# Patient Record
Sex: MALE | Race: WHITE | NOT HISPANIC OR LATINO | Employment: FULL TIME | URBAN - METROPOLITAN AREA
[De-identification: names, ages, dates, MRNs, and addresses within clinical notes are randomized per-mention and may not be internally consistent; named-entity substitution may affect disease eponyms.]

---

## 2018-03-14 ENCOUNTER — TRANSCRIBE ORDERS (OUTPATIENT)
Dept: ADMINISTRATIVE | Facility: HOSPITAL | Age: 58
End: 2018-03-14

## 2018-03-14 DIAGNOSIS — K21.9 GASTROESOPHAGEAL REFLUX DISEASE, ESOPHAGITIS PRESENCE NOT SPECIFIED: Primary | ICD-10-CM

## 2018-05-25 ENCOUNTER — TRANSCRIBE ORDERS (OUTPATIENT)
Dept: ADMINISTRATIVE | Facility: HOSPITAL | Age: 58
End: 2018-05-25

## 2018-05-25 DIAGNOSIS — C85.90 LYMPHOMA, UNSPECIFIED BODY REGION, UNSPECIFIED LYMPHOMA TYPE (HCC): Primary | ICD-10-CM

## 2018-05-26 ENCOUNTER — HOSPITAL ENCOUNTER (OUTPATIENT)
Dept: CT IMAGING | Facility: HOSPITAL | Age: 58
Discharge: HOME | End: 2018-05-26
Payer: COMMERCIAL

## 2018-05-26 DIAGNOSIS — C85.90 LYMPHOMA, UNSPECIFIED BODY REGION, UNSPECIFIED LYMPHOMA TYPE (HCC): ICD-10-CM

## 2018-05-26 PROCEDURE — 74177 CT ABD & PELVIS W/CONTRAST: CPT

## 2018-05-26 PROCEDURE — 71260 CT THORAX DX C+: CPT

## 2018-05-26 RX ADMIN — IOHEXOL 100 ML: 350 INJECTION, SOLUTION INTRAVENOUS at 12:45

## 2018-05-30 ENCOUNTER — HOSPITAL ENCOUNTER (OUTPATIENT)
Dept: RADIOLOGY | Age: 58
Discharge: HOME | End: 2018-05-30
Payer: COMMERCIAL

## 2018-05-30 VITALS — WEIGHT: 241 LBS

## 2018-05-30 DIAGNOSIS — D38.1 NEOPLASM OF UNCERTAIN BEHAVIOR OF TRACHEA, BRONCHUS, AND LUNG: ICD-10-CM

## 2018-05-30 LAB — GLUCOSE SERPL-MCNC: 90 MG/DL (ref 65–140)

## 2018-05-30 PROCEDURE — 78815 PET IMAGE W/CT SKULL-THIGH: CPT

## 2018-05-30 PROCEDURE — 82948 REAGENT STRIP/BLOOD GLUCOSE: CPT

## 2018-05-30 PROCEDURE — A9552 F18 FDG: HCPCS

## 2018-05-30 RX ADMIN — IOHEXOL 5 ML: 240 INJECTION, SOLUTION INTRATHECAL; INTRAVASCULAR; INTRAVENOUS; ORAL at 13:40

## 2018-06-13 ENCOUNTER — HOSPITAL ENCOUNTER (OUTPATIENT)
Facility: HOSPITAL | Age: 58
Setting detail: OUTPATIENT SURGERY
Discharge: HOME | End: 2018-06-13
Attending: INTERNAL MEDICINE | Admitting: INTERNAL MEDICINE
Payer: COMMERCIAL

## 2018-06-13 ENCOUNTER — ANESTHESIA EVENT (OUTPATIENT)
Dept: GASTROENTEROLOGY | Facility: HOSPITAL | Age: 58
End: 2018-06-13
Payer: COMMERCIAL

## 2018-06-13 ENCOUNTER — ANESTHESIA (OUTPATIENT)
Dept: GASTROENTEROLOGY | Facility: HOSPITAL | Age: 58
End: 2018-06-13
Payer: COMMERCIAL

## 2018-06-13 VITALS
TEMPERATURE: 98.2 F | DIASTOLIC BLOOD PRESSURE: 69 MMHG | HEIGHT: 70 IN | WEIGHT: 245 LBS | HEART RATE: 68 BPM | RESPIRATION RATE: 18 BRPM | OXYGEN SATURATION: 99 % | SYSTOLIC BLOOD PRESSURE: 113 MMHG | BODY MASS INDEX: 35.07 KG/M2

## 2018-06-13 DIAGNOSIS — C85.19 CUTANEOUS B-CELL LYMPHOMA (HCC): ICD-10-CM

## 2018-06-13 PROCEDURE — 88305 TISSUE EXAM BY PATHOLOGIST: CPT | Performed by: PATHOLOGY

## 2018-06-13 PROCEDURE — 88342 IMHCHEM/IMCYTCHM 1ST ANTB: CPT | Performed by: PATHOLOGY

## 2018-06-13 PROCEDURE — 87077 CULTURE AEROBIC IDENTIFY: CPT | Performed by: INTERNAL MEDICINE

## 2018-06-13 PROCEDURE — 88313 SPECIAL STAINS GROUP 2: CPT | Performed by: PATHOLOGY

## 2018-06-13 PROCEDURE — 88363 XM ARCHIVE TISSUE MOLEC ANAL: CPT | Performed by: PATHOLOGY

## 2018-06-13 RX ORDER — ROSUVASTATIN CALCIUM 5 MG/1
5 TABLET, COATED ORAL DAILY
COMMUNITY
End: 2021-01-01 | Stop reason: ALTCHOICE

## 2018-06-13 RX ORDER — MELATONIN
500 DAILY
COMMUNITY
End: 2022-01-01 | Stop reason: HOSPADM

## 2018-06-13 RX ORDER — SODIUM CHLORIDE 9 MG/ML
INJECTION, SOLUTION INTRAVENOUS CONTINUOUS PRN
Status: DISCONTINUED | OUTPATIENT
Start: 2018-06-13 | End: 2018-06-13 | Stop reason: SURG

## 2018-06-13 RX ORDER — TADALAFIL 5 MG/1
5 TABLET ORAL
COMMUNITY
End: 2021-01-01

## 2018-06-13 RX ORDER — FENTANYL CITRATE 50 UG/ML
INJECTION, SOLUTION INTRAMUSCULAR; INTRAVENOUS AS NEEDED
Status: DISCONTINUED | OUTPATIENT
Start: 2018-06-13 | End: 2018-06-13 | Stop reason: SURG

## 2018-06-13 RX ORDER — OMEPRAZOLE 40 MG/1
40 CAPSULE, DELAYED RELEASE ORAL DAILY
COMMUNITY
End: 2019-09-03 | Stop reason: ALTCHOICE

## 2018-06-13 RX ORDER — PROPOFOL 10 MG/ML
INJECTION, EMULSION INTRAVENOUS AS NEEDED
Status: DISCONTINUED | OUTPATIENT
Start: 2018-06-13 | End: 2018-06-13 | Stop reason: SURG

## 2018-06-13 RX ORDER — PROPOFOL 10 MG/ML
INJECTION, EMULSION INTRAVENOUS CONTINUOUS PRN
Status: DISCONTINUED | OUTPATIENT
Start: 2018-06-13 | End: 2018-06-13 | Stop reason: SURG

## 2018-06-13 RX ADMIN — PROPOFOL 80 MG: 10 INJECTION, EMULSION INTRAVENOUS at 14:11

## 2018-06-13 RX ADMIN — PROPOFOL 100 MCG/KG/MIN: 10 INJECTION, EMULSION INTRAVENOUS at 14:15

## 2018-06-13 RX ADMIN — FENTANYL CITRATE 50 MCG: 50 INJECTION, SOLUTION INTRAMUSCULAR; INTRAVENOUS at 14:06

## 2018-06-13 RX ADMIN — PROPOFOL 40 MG: 10 INJECTION, EMULSION INTRAVENOUS at 14:12

## 2018-06-13 RX ADMIN — PROPOFOL 40 MG: 10 INJECTION, EMULSION INTRAVENOUS at 14:14

## 2018-06-13 RX ADMIN — PROPOFOL 40 MG: 10 INJECTION, EMULSION INTRAVENOUS at 14:21

## 2018-06-13 RX ADMIN — SODIUM CHLORIDE: 0.9 INJECTION, SOLUTION INTRAVENOUS at 14:06

## 2018-06-13 NOTE — OP NOTE
**** GI/ENDOSCOPY REPORT ****     PATIENT NAME: CASSI AGUILAR - VISIT ID:  Patient ID: [id number]   YOB: 1960     INTRODUCTION: Upper Endoscopic [ULTRASOUND] - A 61 male patient presents   for an outpatient Upper Endoscopic [ULTRASOUND] at [address] [place] [address]  INDICATIONS: Gastric MALT lymphoma  CONSENT: The benefits, risks, and alternatives to the procedure were   discussed and informed consent was obtained from the patient  PREPARATION:  EKG, pulse, pulse oximetry and blood pressure were monitored   throughout the procedure  MEDICATIONS: Anesthesia-check records     PROCEDURE:  The endoscope was passed through the mouth under direct   visualization and advanced to the 3rd portion of the duodenum  The scope   was withdrawn and the mucosa was carefully examined  The views were   excellent  The patient's toleration of the procedure was excellent  FINDINGS: Visual Exam:   Esophagus: The esophagus appeared to be normal     Stomach: There was a smooth Marginal Zone Lymphoma per prior biopsy, that   measured 2 cm in size, in the fundus  Multiple biopsies was successfully   taken  The specimen was sent for MALT 1 gene to [facility]  Otherwise, the   stomach appeared to be normal  Two cold forceps biopsies was taken  The   specimens were collected for tj test  Two cold forceps biopsies was   taken  The specimens were collected for pathology (jar 1)  Two cold   forceps biopsies was taken  The specimens were collected for pathology   (jar 2)  Duodenum: The duodenum appeared to be normal  EUS EXAM: The   fundic mass was not readily seen on the EUS (tangential views, small   size)  Possible 2 1 x 0 8 cm mass in the fundus extending into the   sumbmucosa  The pancreas appeared to be normal  There was no evidence of   lymph node/adenopathy  COMPLICATIONS: There were no complications  IMPRESSIONS: Normal esophagus  A tumor found in the fundus   Multiple biopsies taken  Normal duodenum  The fundic mass was not readily seen on   the EUS (tangential views, small size)  Possible 2 1 x 0 8 cm mass in the   fundus extending into the sumbmucosa  Normal pancreas  RECOMMENDATIONS: Call Dr Javier Lopez [phone] with questions or   problems  Follow with Dr Zoila Porter  ESTIMATED BLOOD LOSS:     PATHOLOGY SPECIMENS: Multiple biopsies taken  Associated finding: Tumor  Two cold forceps biopsies taken for tj test  Two cold forceps biopsies   taken for pathology (jar 1)  Two cold forceps biopsies taken for pathology   (jar 2)  PROCEDURE CODES:     ICD-9 Codes: 239 0 Neoplasm of unspecified nature of digestive system     ICD-10 Codes: Q82 7 Neoplasm of uncertain behavior of stomach     PERFORMED BY: PADMA Orr  on 06/13/2018  Version 1, electronically signed by PADMA Orr  on   06/13/2018 at 15:06

## 2018-06-13 NOTE — H&P
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  Tumor biopsies were negative for H pylori  REVIEW OF SYSTEMS: Per the HPI, and otherwise unremarkable  Historical Information   Past Medical History:   Diagnosis Date    Cancer [facility])     Stomach    Thoracic aortic aneurysm ([address] )      Past Surgical History:   Procedure Laterality Date    APPENDECTOMY      HERNIA REPAIR      B/L hernia repair    KNEE ARTHROSCOPY, MEDIAL PATELLO FEMORAL LIGAMENT REPAIR Left      Social History   History   Alcohol Use    Yes     Comment: 2 drinks/month     History   Drug Use No     History   Smoking Status    Never Smoker   Smokeless Tobacco    Never Used     History reviewed  No pertinent family history  Meds/Allergies     Prescriptions Prior to Admission   Medication    cholecalciferol (VITAMIN D3) 1,000 units tablet    Nutritional Supplements (PRO-RAJINDER PLUS PO)    omeprazole (PriLOSEC) 40 MG capsule    rosuvastatin (CRESTOR) 5 mg tablet    tadalafil (CIALIS) 5 MG tablet       No Known Allergies    Objective     Blood pressure 146/66, pulse 67, temperature 98 2 °F (36 8 °C), temperature source Temporal, resp  rate 18, height 5' 10" (1 778 m), weight 111 kg (245 lb), SpO2 95 %  PHYSICAL EXAM    Gen: NAD  CV: RRR  CHEST: Clear  ABD: soft, NT/ND  EXT: no edema      ASSESSMENT/PLAN:  This is a 61y o  year old male here for additional tissue sampling of the gastric marginal zone lymphoma  Antral and body of the stomach biopsies will be obtained for Helicobacter pylori    Biopsies of the gastric mass will be obtained for MALT 1 gene  by [facility]  PLAN:  EGD with biopsy  Procedure:

## 2018-06-13 NOTE — ANESTHESIA POSTPROCEDURE EVALUATION
Post-Op Assessment Note      CV Status:  Stable    Mental Status:  Alert and awake    Hydration Status:  Euvolemic    PONV Controlled:  Controlled    Airway Patency:  Patent    Post Op Vitals Reviewed: Yes          Staff: Anesthesiologist, CRNA           /72 (06/13/18 1455)    Temp      Pulse 70 (06/13/18 1455)   Resp 16 (06/13/18 1455)    SpO2 96 % (06/13/18 1455)

## 2018-06-13 NOTE — ANESTHESIA PREPROCEDURE EVALUATION
Review of Systems/Medical History  Patient summary reviewed  Chart reviewed  No history of anesthetic complications     Cardiovascular  Exercise tolerance (METS): >4,  No CAD , No cardiac stents     Pulmonary  Not a smoker , No COPD , No recent URI ,        GI/Hepatic            Endo/Other     GYN       Hematology   Musculoskeletal       Neurology   Psychology       No results found for: WBC, HGB, PLT  No results found for: NA, K, BUN, CREATININE, GLUCOSE  No results found for: PTT   No results found for: INR      No results found for: HGBA1C      Physical Exam    Airway    Mallampati score: II  TM Distance: >3 FB       Dental       Cardiovascular      Pulmonary      Other Findings        Anesthesia Plan  ASA Score- 3     Anesthesia Type- IV sedation with anesthesia with ASA Monitors  Additional Monitors:   Airway Plan:     Comment: PADMA Clarke , have personally seen and evaluated the patient prior to anesthetic care  I have reviewed the pre-anesthetic record, and other medical records if appropriate to the anesthetic care  If a CRNA is involved in the case, I have reviewed the CRNA assessment, if present, and agree  Risks/benefits and alternatives discussed with patient including possible PONV, sore throat, and possibility of rare anesthetic and surgical emergencies        Plan Factors- Patient instructed to abstain from smoking on day of procedure  Patient did not smoke on day of surgery  Induction-     Postoperative Plan-     Informed Consent- Anesthetic plan and risks discussed with patient  I personally reviewed this patient with the CRNA  Discussed and agreed on the Anesthesia Plan with the CRNA  Norm Rowe

## 2018-06-14 LAB — UREASE TISS QL: NEGATIVE

## 2018-06-25 LAB — SCAN RESULT: NORMAL

## 2019-08-30 PROBLEM — C88.40 MALT LYMPHOMA: Status: ACTIVE | Noted: 2019-08-30

## 2019-08-30 PROBLEM — C88.4 MALT LYMPHOMA (HCC): Status: ACTIVE | Noted: 2019-08-30

## 2019-09-03 ENCOUNTER — RADIATION ONCOLOGY CONSULT (OUTPATIENT)
Dept: RADIATION ONCOLOGY | Facility: HOSPITAL | Age: 59
End: 2019-09-03
Attending: STUDENT IN AN ORGANIZED HEALTH CARE EDUCATION/TRAINING PROGRAM
Payer: COMMERCIAL

## 2019-09-03 VITALS
OXYGEN SATURATION: 97 % | DIASTOLIC BLOOD PRESSURE: 80 MMHG | TEMPERATURE: 97.6 F | HEART RATE: 84 BPM | HEIGHT: 70 IN | WEIGHT: 233.4 LBS | RESPIRATION RATE: 18 BRPM | BODY MASS INDEX: 33.41 KG/M2 | SYSTOLIC BLOOD PRESSURE: 124 MMHG

## 2019-09-03 DIAGNOSIS — C88.4 MALT LYMPHOMA (HCC): Primary | ICD-10-CM

## 2019-09-03 PROCEDURE — 99211 OFF/OP EST MAY X REQ PHY/QHP: CPT | Performed by: STUDENT IN AN ORGANIZED HEALTH CARE EDUCATION/TRAINING PROGRAM

## 2019-09-03 RX ORDER — CIMETIDINE 300 MG/1
300 TABLET, FILM COATED ORAL 2 TIMES DAILY
COMMUNITY
End: 2021-01-01

## 2019-09-03 RX ORDER — ASPIRIN 81 MG/1
81 TABLET ORAL DAILY
COMMUNITY
End: 2021-01-01 | Stop reason: ALTCHOICE

## 2019-09-03 NOTE — PROGRESS NOTES
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  H-Pylori positive, negative for MALT1 gene translocation  He had less than 10% CD5 /CD23+ monoclonal B-cell population in the bone marrow and less than 1% in peripheral blood  He is being followed, approximately one year post H  Pylori treatment (Sept 2018)  EGD in June 2019 showed persistent disease  Awaiting more records  Per Jeancarlos Garg at Dr Russ Salinas office, pt did not get chemo with Dr Sharyn Dyer  His last PET/CT was in 2018  He had EGD with Dr Agata Wild in August 2018, June 2019 and one is scheduled Sept 2019  Reports to be faxed  He also had Bone marrow biopsy in 2018 6/4/19 EGD     7/18/19 Dr Mas follow-up  Pt had intermittent left sided chest pain and stiffness  Stress test on 6/11/19 showed moderate anterolateral ischemia  Card[facility] catheter[facility] showed no evidence of coronary artery disease  Following with cardiologist    EGD from 6/4/19 showed persistent disease, pt much less symptomatic  Discussed further management  Radiation therapy would be recommended as standard of care, provided he has localized disease which workup is suggestive  Radiation lexus be curative as opposed to immunotherapy  F/U in 6-8 weeks  EGD in Sept 2019               MALT [address] [address] )    2018 Initial Diagnosis     MALT lymphoma ([facility] [address] )      6/1/2018 Biopsy     Gastric cardia mass, biopsy: ([facility])  Extranodal marginal zone lymphoma of mucosa-associated lymphoid tissue (MALT lymphoma)           Clinical Trial: No    Past Medical History:   Diagnosis Date    Cancer ([facility] [address] )     Stomach    Lymphoma ([facility] [address] ) 05/2018    Thoracic aortic aneurysm [facility])        Past Surgical History:   Procedure Laterality Date    APPENDECTOMY      HERNIA REPAIR B/L hernia repair    KNEE ARTHROSCOPY, MEDIAL PATELLO FEMORAL LIGAMENT REPAIR Left     LINEAR ENDOSCOPIC U/S N/A 6/13/2018    Procedure: LINEAR ENDOSCOPIC U/S;  Surgeon: Sixto Velázquez MD;  Location: [facility]; Service: Gastroenterology       Family History   Problem Relation Age of Onset   Mona Cordova Melanoma Mother     Prostate cancer Father     Lung cancer Father     Breast cancer Sister     Pancreatic cancer Paternal Grandfather     Hodgkin's lymphoma Sister        Social History     Tobacco Use    Smoking status: Never Smoker    Smokeless tobacco: Never Used   Substance Use Topics    Alcohol use: Yes     Comment: 2 drinks/month    Drug use: No          Current Outpatient Medications:     aspirin (ECOTRIN LOW STRENGTH) 81 mg EC tablet, Take 81 mg by mouth daily, Disp: , Rfl:     cholecalciferol (VITAMIN D3) 1,000 units tablet, Take 500 Units by mouth daily, Disp: , Rfl:     cimetidine (TAGAMET) 300 MG tablet, Take 300 mg by mouth 2 (two) times a day, Disp: , Rfl:     Nutritional Supplements (PRO-RAJINDER PLUS PO), Take by mouth, Disp: , Rfl:     rosuvastatin (CRESTOR) 5 mg tablet, Take 5 mg by mouth daily, Disp: , Rfl:     tadalafil (CIALIS) 5 MG tablet, Take 5 mg by mouth daily as needed for erectile dysfunction, Disp: , Rfl:     No Known Allergies     Review of Systems:  Review of Systems   Constitutional: Positive for unexpected weight change (25 lb weight loss intentionally, mediterranian diet)  HENT: Negative  Eyes: Negative  Respiratory: Negative  Cardiovascular: Negative  Gastrointestinal: Negative  Endocrine: Negative  Genitourinary: Negative  Musculoskeletal: Negative  Skin: Negative  Allergic/Immunologic: Negative  Neurological: Negative  Hematological: Negative  Psychiatric/Behavioral: Negative          Vitals:    09/03/19 1237   BP: 124/80   BP Location: Right arm   Pulse: 84   Resp: 18   Temp: 97 6 °F (36 4 °C)   TempSrc: Temporal   SpO2: 97% Weight: 106 kg (233 lb 6 4 oz)   Height: 5' 10" (1 778 m)            Pain assessment: 0  Imaging:No images are attached to the encounter       Teaching RT packet provided

## 2019-09-04 NOTE — PROGRESS NOTES
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  H-Pylori positive, negative for MALT1 gene translocation  He had less than 10% CD5 /CD23+ monoclonal B-cell population in the bone marrow and less than 1% in peripheral blood  He is being followed, approximately one year post H  Pylori treatment (2018)  EGD in 2019 showed persistent disease       Awaiting more records  Per Kylie Fontana at Dr Sharlene Mohan office, pt did not get chemo with Dr Tina Washburn  His last PET/CT was in   He had EGD with Dr Constantine Verma in 2018, 2019 and one is scheduled 2019  Reports to be faxed  He also had Bone marrow biopsy in 2018       19 EGD      19 Dr Gregg Ip follow-up  Pt had intermittent left sided chest pain and stiffness  Stress test on 19 showed moderate anterolateral ischemia  Cardiac catheterization at [facility] showed no evidence of coronary artery disease  Following with cardiologist    EGD from 19 showed persistent disease, pt much less symptomatic  Discussed further management  Radiation therapy would be recommended as standard of care, provided he has localized disease which workup is suggestive  Radiation lexus be curative as opposed to immunotherapy  F/U in 6-8 weeks  EGD in 2019       Historical Information      MALT lymphoma [address] [address] )    2018 Initial Diagnosis     MALT lymphoma ([facility] [address] )      2018 Biopsy     Gastric cardia mass, biopsy: ([facility])  Extranodal marginal zone lymphoma of mucosa-associated lymphoid tissue (MALT lymphoma)             Past Medical History:   Diagnosis Date    Cancer ([facility] [address] )     Stomach    Lymphoma ([facility] [address] ) 05/2018    Thor[facility] aortic aneurysm [facility])      Past Surgical History:   Procedure Laterality Date    APPENDECTOMY      HERNIA REPAIR      B/L hernia repair    KNEE ARTHROSCOPY, MEDIAL PATELLO FEMORAL LIGAMENT REPAIR Left     LINEAR ENDOSCOPIC U/S N/A 6/13/2018    Procedure: LINEAR ENDOSCOPIC U/S;  Surgeon: Suresh Cid MD;  Location: [facility]; Service: Gastroenterology       Family History   Problem Relation Age of Onset   [employer] Melanoma Mother     Prostate cancer Father     Lung cancer Father     Breast cancer Sister     Pancreatic cancer Paternal Grandfather     Hodgkin's lymphoma Sister        Social History   Social History     Substance and Sexual Activity   Alcohol Use Yes    Comment: 2 drinks/month     Social History     Substance and Sexual Activity   Drug Use No     Social History     Tobacco Use   Smoking Status Never Smoker   Smokeless Tobacco Never Used         Meds/Allergies     Current Outpatient Medications:     aspirin (ECOTRIN LOW STRENGTH) 81 mg EC tablet, Take 81 mg by mouth daily, Disp: , Rfl:     cholecalciferol (VITAMIN D3) 1,000 units tablet, Take 500 Units by mouth daily, Disp: , Rfl:     cimetidine (TAGAMET) 300 MG tablet, Take 300 mg by mouth 2 (two) times a day, Disp: , Rfl:     Nutritional Supplements (PRO-RAJINDER PLUS PO), Take by mouth, Disp: , Rfl:     rosuvastatin (CRESTOR) 5 mg tablet, Take 5 mg by mouth daily, Disp: , Rfl:     tadalafil (CIALIS) 5 MG tablet, Take 5 mg by mouth daily as needed for erectile dysfunction, Disp: , Rfl:   No Known Allergies      Review of Systems Constitutional: Positive for unexpected weight change (25 lb weight loss intentionally, mediterranian diet)  HENT: Negative  Eyes: Negative  Respiratory: Negative  Cardiovascular: Negative      Gastrointestinal: Negative  Endocrine: Negative  Genitourinary: Negative  Musculoskeletal: Negative  Skin: Negative  Allergic/Immunologic: Negative  Neurological: Negative  Hematological: Negative  Psychiatric/Behavioral: Negative            OBJECTIVE:   /80 (BP Location: Right arm)   Pulse 84   Temp 97 6 °F (36 4 °C) (Temporal)   Resp 18   Ht 5' 10" (1 778 m)   Wt 106 kg (233 lb 6 4 oz)   SpO2 97%   BMI 33 49 kg/m²   Pain Assessment:  0  Performance Status: ECOG/Zubrod/WHO: 0 - Asymptomatic    Physical Exam GENERAL:  Appears stated age, in no apparent distress  Alert and oriented  HEENT:  Normocephalic, atraumatic   extraocular muscles intact  Oral mucosa moist   PULMONARY:  Respirations unlabored  CARDIOVASCULAR:  Regular rate  ABDOMEN:  Soft, nondistended  NEUROLOGIC: Moving all extremities, No focal deficits noted  EXTREMITIES: no clubbing, cyanosis, or edema  PSYCHIATRIC: normal mood and affect  Appropriate thought content and judgement  RESULTS  Lab Results    Chemistry    No results found for: NA, K, CL, CO2, BUN, CREATININE No results found for: CALCIUM, ALKPHOS, AST, ALT, BILITOT         No results found for: WBC, HGB, HCT, MCV, PLT      Imaging Studies  No results found  Pathology:MALT lymphoma        ASSESSMENT  No diagnosis found  Cancer Staging  No matching staging information was found for the patient  PLAN/DISCUSSION  No orders of the defined types were placed in this encounter  Gertrude Luke is a 60y o  year old male with MALT lymphoma of the stomach initially 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, H  Pylori +, status post triple antibiotic course at that time  He has been followed with surveillance EGD every 3 months since that time  I explained that unfortunately, I do not have all the EGD reports to evaluate whether he has had any regression of disease from initial size of lesion    Last EGD report from 6/4/19 states that it was stable from EGD in February prior   I advised that if he has had any response from initial diagnosis, it is reasonable to consider continued surveillance until 18 months from diagnosis, which would be around December time frame, as MALT lymphoma have been shown to have delayed response to therapy  We reviewed the mechanism of action of irradiation, logistics of treatment including CT simulation, and fields to include margin on whole stomach over a course of 20 fractions, and side effects including but not limited to fatigue, nausea, vomiting, diarrhea, impaired gastric motility, and secondary malignancy  After this discussion, we agreed to the following:    PLAN:    Awaiting further EGD/office notes  Advised that would be reasonable to continue surveillance until December as he would like to take some time to think about whether he would like to pursue radiation treatment  Patient was in good understanding of these recommendations and all of his questions were answered to his apparent satisfaction  Thank you for allowing us to participate in the care of Mr Jael Joseph  Tyrell Fairbanks MD  9/3/19,  15:47    Portions of the record may have been created with voice recognition software   Occasional wrong word or "sound a like" substitutions may have occurred due to the inherent limitations of voice recognition software   Read the chart carefully and recognize, using context, where substitutions have occurred

## 2019-09-23 ENCOUNTER — LAB REQUISITION (OUTPATIENT)
Dept: LAB | Facility: HOSPITAL | Age: 59
End: 2019-09-23
Payer: COMMERCIAL

## 2019-09-23 DIAGNOSIS — C88.4 EXTRANODAL MARGINAL ZONE B-CELL LYMPHOMA OF MUCOSA-ASSOCIATED LYMPHOID TISSUE (MALT) (HCC): ICD-10-CM

## 2019-09-23 PROCEDURE — 88321 CONSLTJ&REPRT SLD PREP ELSWR: CPT | Performed by: PATHOLOGY

## 2020-01-13 ENCOUNTER — TRANSCRIBE ORDERS (OUTPATIENT)
Dept: ADMINISTRATIVE | Facility: HOSPITAL | Age: 60
End: 2020-01-13

## 2020-01-13 DIAGNOSIS — C85.90 LEUCOSARCOMA (HCC): ICD-10-CM

## 2020-01-13 DIAGNOSIS — C85.19 CUTANEOUS B-CELL LYMPHOMA (HCC): Primary | ICD-10-CM

## 2020-01-15 ENCOUNTER — APPOINTMENT (OUTPATIENT)
Dept: LAB | Facility: HOSPITAL | Age: 60
End: 2020-01-15
Attending: INTERNAL MEDICINE
Payer: COMMERCIAL

## 2020-01-15 DIAGNOSIS — C85.90 LEUCOSARCOMA (HCC): ICD-10-CM

## 2020-01-15 LAB
ALBUMIN SERPL BCP-MCNC: 3.9 G/DL (ref 3.5–5)
ALP SERPL-CCNC: 75 U/L (ref 46–116)
ALT SERPL W P-5'-P-CCNC: 26 U/L (ref 12–78)
ANION GAP SERPL CALCULATED.3IONS-SCNC: 5 MMOL/L (ref 4–13)
AST SERPL W P-5'-P-CCNC: 19 U/L (ref 5–45)
BILIRUB SERPL-MCNC: 0.57 MG/DL (ref 0.2–1)
BUN SERPL-MCNC: 15 MG/DL (ref 5–25)
CALCIUM SERPL-MCNC: 8.6 MG/DL (ref 8.3–10.1)
CHLORIDE SERPL-SCNC: 108 MMOL/L (ref 100–108)
CO2 SERPL-SCNC: 29 MMOL/L (ref 21–32)
CREAT SERPL-MCNC: 0.98 MG/DL (ref 0.6–1.3)
GFR SERPL CREATININE-BSD FRML MDRD: 83 ML/MIN/1.73SQ M
GLUCOSE SERPL-MCNC: 94 MG/DL (ref 65–140)
POTASSIUM SERPL-SCNC: 3.9 MMOL/L (ref 3.5–5.3)
PROT SERPL-MCNC: 7.5 G/DL (ref 6.4–8.2)
SODIUM SERPL-SCNC: 142 MMOL/L (ref 136–145)

## 2020-01-15 PROCEDURE — 80053 COMPREHEN METABOLIC PANEL: CPT

## 2020-01-15 PROCEDURE — 36415 COLL VENOUS BLD VENIPUNCTURE: CPT

## 2020-01-16 ENCOUNTER — HOSPITAL ENCOUNTER (OUTPATIENT)
Dept: RADIOLOGY | Facility: HOSPITAL | Age: 60
Discharge: HOME | End: 2020-01-16
Attending: INTERNAL MEDICINE
Payer: COMMERCIAL

## 2020-01-16 DIAGNOSIS — C85.19 CUTANEOUS B-CELL LYMPHOMA (HCC): ICD-10-CM

## 2020-01-16 PROCEDURE — 74177 CT ABD & PELVIS W/CONTRAST: CPT

## 2020-01-16 PROCEDURE — 71260 CT THORAX DX C+: CPT

## 2020-01-16 RX ADMIN — IOHEXOL 100 ML: 350 INJECTION, SOLUTION INTRAVENOUS at 18:52

## 2020-01-24 ENCOUNTER — HOSPITAL ENCOUNTER (OUTPATIENT)
Dept: RADIOLOGY | Age: 60
Discharge: HOME | End: 2020-01-24

## 2020-01-24 ENCOUNTER — HOSPITAL ENCOUNTER (OUTPATIENT)
Dept: RADIOLOGY | Age: 60
Discharge: HOME | End: 2020-01-24
Payer: COMMERCIAL

## 2020-01-24 DIAGNOSIS — C85.19 B-CELL LYMPHOMA OF EXTRANODAL SITE EXCLUDING SPLEEN AND OTHER SOLID ORGANS, UNSPECIFIED B-CELL LYMPHOMA TYPE (HCC): ICD-10-CM

## 2020-01-24 LAB — GLUCOSE SERPL-MCNC: 86 MG/DL (ref 65–140)

## 2020-01-24 PROCEDURE — 82948 REAGENT STRIP/BLOOD GLUCOSE: CPT

## 2020-01-24 PROCEDURE — A9552 F18 FDG: HCPCS

## 2020-01-24 PROCEDURE — 78815 PET IMAGE W/CT SKULL-THIGH: CPT

## 2020-02-04 ENCOUNTER — LAB REQUISITION (OUTPATIENT)
Dept: LAB | Facility: HOSPITAL | Age: 60
End: 2020-02-04
Payer: COMMERCIAL

## 2020-02-04 ENCOUNTER — CLINICAL SUPPORT (OUTPATIENT)
Dept: RADIATION ONCOLOGY | Facility: HOSPITAL | Age: 60
End: 2020-02-04
Attending: STUDENT IN AN ORGANIZED HEALTH CARE EDUCATION/TRAINING PROGRAM
Payer: COMMERCIAL

## 2020-02-04 VITALS
RESPIRATION RATE: 18 BRPM | BODY MASS INDEX: 34.32 KG/M2 | OXYGEN SATURATION: 97 % | DIASTOLIC BLOOD PRESSURE: 80 MMHG | HEART RATE: 81 BPM | SYSTOLIC BLOOD PRESSURE: 128 MMHG | TEMPERATURE: 98 F | WEIGHT: 239.2 LBS

## 2020-02-04 DIAGNOSIS — E55.9 VITAMIN D DEFICIENCY, UNSPECIFIED: ICD-10-CM

## 2020-02-04 DIAGNOSIS — D51.8 OTHER VITAMIN B12 DEFICIENCY ANEMIAS: ICD-10-CM

## 2020-02-04 DIAGNOSIS — C85.19 B-CELL LYMPHOMA OF EXTRANODAL SITE EXCLUDING SPLEEN AND OTHER SOLID ORGANS, UNSPECIFIED B-CELL LYMPHOMA TYPE (HCC): ICD-10-CM

## 2020-02-04 DIAGNOSIS — C88.4 MALT LYMPHOMA (HCC): Primary | ICD-10-CM

## 2020-02-04 DIAGNOSIS — C85.19 UNSPECIFIED B-CELL LYMPHOMA, EXTRANODAL AND SOLID ORGAN SITES (HCC): ICD-10-CM

## 2020-02-04 LAB
T3FREE SERPL-MCNC: 2.94 PG/ML (ref 2.3–4.2)
T4 FREE SERPL-MCNC: 1.24 NG/DL (ref 0.76–1.46)

## 2020-02-04 PROCEDURE — 83918 ORGANIC ACIDS TOTAL QUANT: CPT | Performed by: INTERNAL MEDICINE

## 2020-02-04 PROCEDURE — G0463 HOSPITAL OUTPT CLINIC VISIT: HCPCS | Performed by: STUDENT IN AN ORGANIZED HEALTH CARE EDUCATION/TRAINING PROGRAM

## 2020-02-04 PROCEDURE — 84481 FREE ASSAY (FT-3): CPT | Performed by: INTERNAL MEDICINE

## 2020-02-04 PROCEDURE — 99211 OFF/OP EST MAY X REQ PHY/QHP: CPT | Performed by: STUDENT IN AN ORGANIZED HEALTH CARE EDUCATION/TRAINING PROGRAM

## 2020-02-04 PROCEDURE — 84439 ASSAY OF FREE THYROXINE: CPT | Performed by: INTERNAL MEDICINE

## 2020-02-04 RX ORDER — PANTOPRAZOLE SODIUM 40 MG/1
40 TABLET, DELAYED RELEASE ORAL DAILY
COMMUNITY
End: 2020-06-15 | Stop reason: SDUPTHER

## 2020-02-04 NOTE — PROGRESS NOTES
Consultation - Radiation Oncology     HSR:[id number] : 1960  Encounter: [id number]  Patient Information: Duong Combs      CHIEF COMPLAINT  Chief Complaint   Patient presents with    Consult     Radiation Oncology     Cancer Staging  No matching staging information was found for the patient  History of Present Illness   Duong Combs is a 60y o  year old male who returns to radiation oncology, referred back by Dr Orlando Rojo      60year old male with MALT lymphoma of the stomach initially diagnosed in 2018, H  Pylori +, status post triple antibiotic course at that time  He was followed with surveillance EGD every 3 months since that time       9/3/19 Initial consult with Dr Mimi Rojas  PLAN: Discussed that it is reasonable to continue surveillance until December, he wanted some time to think about pursuing radiation treatment       20 Dr Orlando Rojo follow-up (Med Onc)  Pt reports increased postprandial discomfort, indigestion  Follow-up EGD on 12/10/19 showed persistent lesion in the fundus of the stomach and distal gastric body erosive gastritis  Biopsy of both sites (per Dr Orlando Rojo office note) showed persistent extranodal marginal zone lymphoma fundus and new atypical lymphoid infiltrate in the distal body of the stomach  Pt is more symptomatic  PLAN:  Restaging CT and PET  Pt was already seen by Dr Mimi Rojas, recommend that Radiation be initiated as long as no obvious disease progression     Continue PPI daily          20 CT chest abd pelvis (ordered by Dr Orlando Rojo)  IMPRESSION:   Unchanged mildly enlarged node presumably in the omentum in the anterior right lower quadrant the abdomen   No other significant lymphadenopathy by CT criteria in the chest, abdomen or pelvis   No new osvaldo enlargement      Cardiomegaly   Fusiform ascending thoracic aortic aneurysm measuring up to 42 mm, unchanged      Fat-containing inguinal hernias bilaterally   Prostatomegaly, unchanged         20 PET/CT (  [facility]  IMPRESSION:   1   No evidence of hypermetabolic malignancy         2/3/20 Dr Pk Perera follow-up to discuss treatment plan (office notes not complete yet)     2/25/20 Dr Pk Perera follow-up  Historical Information      [address] [facility])    2018 Initial Diagnosis     MALT lymphoma ([facility] [address] )      6/1/2018 Biopsy     Gastric cardia mass, biopsy: ([facility])  Extranodal marginal zone lymphoma of mucosa-associated lymphoid tissue (MALT lymphoma)        12/10/2019 Biopsy     Distal Body of Stomach, Biopsy:  Antrofundic-type gastric mucosa with an atypical lymphoid infiltrate    [facility] (Dr Maren Wright)           Past Medical History:   Diagnosis Date    Cancer [facility])     Stomach    Lymphoma ([facility] ) 05/2018    Thoracic aortic aneurysm [facility])      Past Surgical History:   Procedure Laterality Date    APPENDECTOMY      HERNIA REPAIR      B/L hernia repair    KNEE ARTHROSCOPY, MEDIAL PATELLO FEMORAL LIGAMENT REPAIR Left     LINEAR ENDOSCOPIC U/S N/A 6/13/2018    Procedure: LINEAR ENDOSCOPIC U/S;  Surgeon: Maren Wright MD;  Location: [facility];   Service: Gastroenterology       Family History   Problem Relation Age of Onset   [facility] Melanoma Mother     Prostate cancer Father     Lung cancer Father     Breast cancer Sister     Pancreatic cancer Paternal Grandfather     Hodgkin's lymphoma Sister        Social History   Social History     Substance and Sexual Activity   Alcohol Use Yes    Comment: 2 drinks/month     Social History     Substance and Sexual Activity   Drug Use No     Social History     Tobacco Use   Smoking Status Never Smoker   Smokeless Tobacco Never Used         Meds/Allergies     Current Outpatient Medications:     aspirin (ECOTRIN LOW STRENGTH) 81 mg EC tablet, Take 81 mg by mouth daily, Disp: , Rfl:     cholecalciferol (VITAMIN D3) 1,000 units tablet, Take 500 Units by mouth daily, Disp: , Rfl:     cimetidine (TAGAMET) 300 MG tablet, Take 300 mg by mouth 2 (two) times a day, Disp: , Rfl:     magnesium oxide (MAG-OX) 400 mg, Take 400 mg by mouth 2 (two) times a day, Disp: , Rfl:     Nutritional Supplements (PRO-RAJINDER PLUS PO), Take by mouth, Disp: , Rfl:     pantoprazole (PROTONIX) 40 mg tablet, Take 40 mg by mouth daily, Disp: , Rfl:     rosuvastatin (CRESTOR) 5 mg tablet, Take 5 mg by mouth daily, Disp: , Rfl:     tadalafil (CIALIS) 5 MG tablet, Take 5 mg by mouth daily as needed for erectile dysfunction, Disp: , Rfl:   No Known Allergies      Review of Systems Constitutional: Positive for fatigue (more fatigued lately)  HENT:        Indigestion and mild discomfort at times, not constant  Taking pantoprazole daily   Eyes: Negative  Respiratory: Negative  Cardiovascular: Negative  Gastrointestinal: Positive for abdominal pain ("discomfort" not pain, intermittent)  Endocrine: Negative  Genitourinary: Negative  Musculoskeletal: Negative  Skin: Negative  Allergic/Immunologic: Negative  Neurological: Negative  Hematological: Negative  Psychiatric/Behavioral: Negative  OBJECTIVE:   /80 (BP Location: Right arm)   Pulse 81   Temp 98 °F (36 7 °C) (Temporal)   Resp 18   Wt 109 kg (239 lb 3 2 oz)   SpO2 97%   BMI 34 32 kg/m²   Pain Assessment:  0  Performance Status: ECOG/Zubrod/WHO: 1 - Symptomatic but completely ambulatory    Physical Exam GENERAL:  Appears stated age, in no apparent distress  Alert and oriented  HEENT:  Normocephalic, atraumatic   extraocular muscles intact  Oral mucosa moist   PULMONARY:  Respirations unlabored  CARDIOVASCULAR:  Regular rate  ABDOMEN:  Soft, nondistended  NEUROLOGIC: Moving all extremities, No focal deficits noted  EXTREMITIES: no clubbing, cyanosis, or edema  PSYCHIATRIC: normal mood and affect  Appropriate thought content and judgement            RESULTS  Lab Results    Chemistry        Component Value Date/Time    K 3 9 01/15/2020 1551     01/15/2020 1551    CO2 29 01/15/2020 1551    BUN 15 01/15/2020 1551    CREATININE 0 98 01/15/2020 1551        Component Value Date/Time    CALCIUM 8 6 01/15/2020 1551    ALKPHOS 75 01/15/2020 1551    AST 19 01/15/2020 1551    ALT 26 01/15/2020 1551            No results found for: WBC, HGB, HCT, MCV, PLT      Imaging Studies  Ct Chest Abdomen Pelvis W Contrast    Result Date: 1/17/2020  Narrative: CT CHEST, ABDOMEN AND PELVIS WITH IV CONTRAST INDICATION:   C85 19: Unspecified b-cell lymphoma, extranodal and solid organ sites  COMPARISON:  None  TECHNIQUE: CT examination of the chest, abdomen and pelvis was performed  Axial, sagittal, and coronal 2D reformatted images were created from the source data and submitted for interpretation  Radiation dose length product (DLP) for this visit:  1373 47 mGy-cm   This examination, like all CT scans performed in the [facility], was performed utilizing techniques to minimize radiation dose exposure, including the use of iterative reconstruction and automated exposure control  IV Contrast:  100 mL of iohexol (OMNIPAQUE) Enteric Contrast: Enteric contrast was administered  FINDINGS: CHEST LUNGS:  Dependent atelectatic changes are noted in the costophrenic angles  Lungs are otherwise clear  No suspicious pulmonary nodule or mass  No tracheal or endobronchial mass noted  PLEURA:  Unremarkable  HEART/GREAT VESSELS:  Heart is enlarged  Again noted is fusiform aneurysmal enlargement of the ascending thoracic aorta measuring up to 42 mm, tapering to 37 mm at the level of the proximal aortic arch, unchanged  MEDIASTINUM AND LUISA:  Unremarkable  Specifically, no significantly enlarged mediastinal or hilar lymph nodes  CHEST WALL AND LOWER NECK:   Unremarkable  No axillary lymphadenopathy  ABDOMEN LIVER/BILIARY TREE:  One or more subcentimeter sharply circumscribed low-density hepatic lesion(s) are noted, too small to accurately characterize, but statistically most likely to represent subcentimeter hepatic cysts    No suspicious solid hepatic lesion is identified  Hepatic contours are normal   No biliary dilatation  GALLBLADDER:  No calcified gallstones  No pericholecystic inflammatory change  SPLEEN:  Unremarkable  PANCREAS:  Unremarkable  ADRENAL GLANDS:  Unremarkable  KIDNEYS/URETERS:  Unremarkable  No hydronephrosis  STOMACH AND BOWEL:  Unremarkable  APPENDIX:  No findings to suggest appendicitis  ABDOMINOPELVIC CAVITY:  No retroperitoneal or pelvic lymphadenopathy  An abnormally enlarged node to the right of midline in the anterior lower abdomen, probably an omental node, measures 1 0 x 1 0 cm in axial dimensions on image 101 of series 2 and 1 8  cm in craniocaudal dimension on image 47 of series 601, unchanged from prior examination  VESSELS:  Unremarkable for patient's age  PELVIS REPRODUCTIVE ORGANS:  Prostate is heterogeneous and mildly enlarged with slight invagination into the urinary bladder base  URINARY BLADDER:  Intrinsically unremarkable  ABDOMINAL WALL/INGUINAL REGIONS:  Fat containing bilateral inguinal hernias are noted, larger on the right than on the left  OSSEOUS STRUCTURES:  No acute fracture or destructive osseous lesion  Impression: Unchanged mildly enlarged node presumably in the omentum in the anterior right lower quadrant the abdomen  No other significant lymphadenopathy by CT criteria in the chest, abdomen or pelvis  No new osvaldo enlargement  Cardiomegaly  Fusiform ascending thoracic aortic aneurysm measuring up to 42 mm, unchanged  Fat-containing inguinal hernias bilaterally  Prostatomegaly, unchanged  Workstation performed: [id number]     Nm Pet Ct Skull Base To Mid Thigh    Result Date: 1/24/2020  Narrative: PET/CT SCAN INDICATION: Extranodal marginal zone lymphoma  Restaging exam   Originally diagnosed in the duodenum    C85 19: Unspecified b-cell lymphoma, extranodal and solid organ sites MODIFIER: PS COMPARISON: PET/CT 5/30/2018, CT chest abdomen pelvis 1/16/2020 CELL TYPE:  Biopsy duodenum 5/11/2018, malignant B-cell lymphoma TECHNIQUE:   10 1 mCi F-18-FDG administered IV  Multiplanar attenuation corrected and non attenuation corrected PET images were acquired 60 minutes post injection  Contiguous, low dose, axial CT sections were obtained from the skull vertex through the femurs   Intravenous contrast material was not utilized  This examination, like all CT scans performed in the [facility], was performed utilizing techniques to minimize radiation dose exposure, including the use of iterative reconstruction and automated exposure control  Fasting serum glucose: 86 mg/dl FINDINGS: VISUALIZED BRAIN:   No acute abnormalities are seen  HEAD/NECK:   There is a physiologic distribution of FDG  No FDG avid cervical adenopathy is seen  CT images: Mucosal thickening in the left maxillary sinus  CHEST:   No FDG avid soft tissue lesions are seen  CT images: Mildly aneurysmal ascending thoracic aorta measuring up to 4 2 cm in diameter, stable  3 mm nodule in the right mid lung anteriorly image 105 series 3, stable  ABDOMEN:   No FDG avid soft tissue lesions are seen  No suspicious FDG uptake in the stomach or duodenum  Previously there was focal FDG uptake at the gastric fundus which is no longer seen  CT images: A few subcentimeter hypodensities in the liver, too small to characterize and stable  PELVIS: No FDG avid soft tissue lesions are seen  CT images: Stable 1 cm low-density nodule in the anterior omentum image 235 series 3, unchanged and not FDG avid  Small to moderate-sized fat-containing right inguinal hernia  Small fat-containing left inguinal hernia  Prostatomegaly  OSSEOUS STRUCTURES: No FDG avid lesions are seen  CT images: Stable appearance  Spondylolysis defects at the last lumbar vertebral body  Impression: 1  No evidence of hypermetabolic malignancy  Workstation performed: [id number]         Pathology:MALT        ASSESSMENT  1  MALT lymphoma ([address] )     2  B-cell lymphoma of extranodal site excluding spleen and other solid organs, unspecified B-cell lymphoma type (HCC)  NM PET CT skull base to mid thigh     Cancer Staging  No matching staging information was found for the patient  PLAN/DISCUSSION  No orders of the defined types were placed in this encounter  Michael Davison is a 60y o  year old male with MALT lymphoma of the stomach initially 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, H  Pylori +, status post triple antibiotic course at that time, who has been followed with surveillance EGD  Last EGD per report from December states known lesion with continued MALT lymphoma, and also second distal stomach lesion with atypical findings  We discussed that we will request slides for pathology review  If second lesion remains atypical, with no other diagnosis, it would be reasonable to proceed with definitive treatment over a course of 20 fractions, which encompasses the whole stomach with margin  We will plan to follow up in 2 weeks for definitive recommendations, pending pathology review  Johnna Delgado MD  2/4/2020,4:21 PM      Portions of the record may have been created with voice recognition software   Occasional wrong word or "sound a like" substitutions may have occurred due to the inherent limitations of voice recognition software   Read the chart carefully and recognize, using context, where substitutions have occurred

## 2020-02-04 NOTE — PROGRESS NOTES
Sulaiman Warren 1960 is a 60 y o  male    Oncology History    Patient returns to radiation oncology, referred back by Dr Lexus Jones  60year old male with MALT lymphoma of the stomach initially 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, H  Pylori +, status post triple antibiotic course at that time  He was followed with surveillance EGD every 3 months since that time  9/3/19 Initial consult with Dr Albertina Hussein  PLAN: Discussed that it is reasonable to continue surveillance until December, he wanted some time to think about pursuing radiation treatment  1/13/20 Dr Lexus Jones follow-up (Med Onc)  Pt reports increased postprandial discomfort, indigestion  Follow-up EGD on 12/10/19 showed persistent lesion in the fundus of the stomach and distal gastric body erosive gastritis  Biopsy of both sites (per Dr Lexus Jones office note) showed persistent extranodal marginal zone lymphoma fundus and new atypical lymphoid infiltrate in the distal body of the stomach  Pt is more symptomatic  PLAN:  Restaging CT and PET  Pt was already seen by Dr Albertina Hussein, recommend that Radiation be initiated as long as no obvious disease progression  Continue PPI daily  1/16/20 CT chest abd pelvis (ordered by Dr Lexus Jones)  IMPRESSION:   Unchanged mildly enlarged node presumably in the omentum in the anterior right lower quadrant the abdomen  No other significant lymphadenopathy by CT criteria in the chest, abdomen or pelvis  No new osvaldo enlargement      Cardiomegaly  Fusiform ascending thoracic aortic aneurysm measuring up to 42 mm, unchanged      Fat-containing inguinal hernias bilaterally    Prostatomegaly, unchanged        1/24/20 PET/CT (Dr Lexus Jones)  IMPRESSION:   1   No evidence of hypermetabolic malignancy        2/3/20 Dr Lexus Jones follow-up to discuss treatment plan (office notes not complete yet)    2/25/20 Dr Lexus Jones follow-up        MALT lymphoma [facility])    2018 Initial Diagnosis     MALT lymphoma ([facility] [address] )      6/1/2018 Biopsy     Gastric cardia mass, biopsy: ([facility])  Extranodal marginal zone lymphoma of mucosa-associated lymphoid tissue (MALT lymphoma)           Clinical Trial: no      Health Maintenance   Topic Date Due    Hepatitis C Screening  1960    Depression Screening PHQ  1960    Pneumococcal Vaccine: Pediatrics (0 to 5 Years) and At-Risk Patients (6 to 59 Years) (1 of 3 - PCV13) 12/06/1965    DTaP,Tdap,and Td Vaccines (1 - Tdap) 12/06/1970    HIV Screening  12/06/1974    BMI: Followup Plan  12/06/1977    Annual Physical  12/06/1977    Influenza Vaccine  07/01/2019    BMI: Adult  09/03/2020    CRC Screening: Colonoscopy  08/09/2023    Pneumococcal Vaccine: 65+ Years (1 of 2 - PCV13) 12/06/2024    HIB Vaccine  Aged Out    Hepatitis B Vaccine  Aged Out    IPV Vaccine  Aged Out    Hepatitis A Vaccine  Aged Out    Meningococcal ACWY Vaccine  Aged Out    HPV Vaccine  Aged Out       Past Medical History:   Diagnosis Date    Cancer ([facility] [address] )     Stomach    Lymphoma ([facility] [address] ) 05/2018    Thoracic aortic aneurysm ([facility] [address] )        Past Surgical History:   Procedure Laterality Date    APPENDECTOMY      HERNIA REPAIR      B/L hernia repair    KNEE ARTHROSCOPY, MEDIAL PATELLO FEMORAL LIGAMENT REPAIR Left     LINEAR ENDOSCOPIC U/S N/A 6/13/2018    Procedure: LINEAR ENDOSCOPIC U/S;  Surgeon: Vee Archer MD;  Location: [facility];   Service: Gastroenterology       Family History   Problem Relation Age of Onset   Julieann Frankel Melanoma Mother     Prostate cancer Father     Lung cancer Father     Breast cancer Sister     Pancreatic cancer Paternal Grandfather     Hodgkin's lymphoma Sister        Social History     Tobacco Use    Smoking status: Never Smoker    Smokeless tobacco: Never Used   Substance Use Topics    Alcohol use: Yes     Comment: 2 drinks/month    Drug use: No          Current Outpatient Medications:     aspirin (ECOTRIN LOW STRENGTH) 81 mg EC tablet, Take 81 mg by mouth daily, Disp: , Rfl:     cholecalciferol (VITAMIN D3) 1,000 units tablet, Take 500 Units by mouth daily, Disp: , Rfl:     cimetidine (TAGAMET) 300 MG tablet, Take 300 mg by mouth 2 (two) times a day, Disp: , Rfl:     magnesium oxide (MAG-OX) 400 mg, Take 400 mg by mouth 2 (two) times a day, Disp: , Rfl:     Nutritional Supplements (PRO-RAJINDER PLUS PO), Take by mouth, Disp: , Rfl:     pantoprazole (PROTONIX) 40 mg tablet, Take 40 mg by mouth daily, Disp: , Rfl:     rosuvastatin (CRESTOR) 5 mg tablet, Take 5 mg by mouth daily, Disp: , Rfl:     tadalafil (CIALIS) 5 MG tablet, Take 5 mg by mouth daily as needed for erectile dysfunction, Disp: , Rfl:     No Known Allergies     Review of Systems:  Review of Systems   Constitutional: Positive for fatigue (more fatigued lately)  HENT:        Indigestion and mild discomfort at times, not constant  Taking pantoprazole daily   Eyes: Negative  Respiratory: Negative  Cardiovascular: Negative  Gastrointestinal: Positive for abdominal pain ("discomfort" not pain, intermittent)  Endocrine: Negative  Genitourinary: Negative  Musculoskeletal: Negative  Skin: Negative  Allergic/Immunologic: Negative  Neurological: Negative  Hematological: Negative  Psychiatric/Behavioral: Negative  Vitals:    02/04/20 1241   BP: 128/80   BP Location: Right arm   Pulse: 81   Resp: 18   Temp: 98 °F (36 7 °C)   TempSrc: Temporal   SpO2: 97%   Weight: 109 kg (239 lb 3 2 oz)            Pain assessment: 0    Imaging:No images are attached to the encounter       Teaching: Radiation packet, discussed side effects

## 2020-02-07 LAB
METHYLMALONATE SERPL-SCNC: 239 NMOL/L (ref 0–378)
SL AMB DISCLAIMER: NORMAL

## 2020-03-03 ENCOUNTER — TRANSCRIBE ORDERS (OUTPATIENT)
Dept: RADIATION ONCOLOGY | Facility: HOSPITAL | Age: 60
End: 2020-03-03

## 2020-03-03 ENCOUNTER — CLINICAL SUPPORT (OUTPATIENT)
Dept: RADIATION ONCOLOGY | Facility: HOSPITAL | Age: 60
End: 2020-03-03
Attending: STUDENT IN AN ORGANIZED HEALTH CARE EDUCATION/TRAINING PROGRAM
Payer: COMMERCIAL

## 2020-03-03 VITALS
HEART RATE: 76 BPM | WEIGHT: 241 LBS | OXYGEN SATURATION: 97 % | BODY MASS INDEX: 34.58 KG/M2 | RESPIRATION RATE: 18 BRPM | DIASTOLIC BLOOD PRESSURE: 80 MMHG | SYSTOLIC BLOOD PRESSURE: 130 MMHG | TEMPERATURE: 97.1 F

## 2020-03-03 DIAGNOSIS — C88.4 MALT LYMPHOMA (HCC): Primary | ICD-10-CM

## 2020-03-03 PROCEDURE — 99211 OFF/OP EST MAY X REQ PHY/QHP: CPT | Performed by: STUDENT IN AN ORGANIZED HEALTH CARE EDUCATION/TRAINING PROGRAM

## 2020-03-03 PROCEDURE — G0463 HOSPITAL OUTPT CLINIC VISIT: HCPCS | Performed by: STUDENT IN AN ORGANIZED HEALTH CARE EDUCATION/TRAINING PROGRAM

## 2020-03-03 NOTE — PROGRESS NOTES
Anabel Ngo 1960 is a 60 y o  male       Follow up visit       Oncology History    Patient returns to discuss treatment plan  Last seen on 2/4/20     60year old male with MALT lymphoma of the stomach initially 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, H  Pylori +, status post triple antibiotic course at that time, who has been followed with surveillance EGD  Last EGD per report from December states known lesion with continued MALT lymphoma, and also second distal stomach lesion with atypical findings  On 2/4/20, discussed that slides will be requested for pathology review  If second lesion remains atypical, with no other diagnosis, it is reasonable to proceed with definitive treatment over a course of 20 fractions, which encompasses the whole stomach with margin  Plan for 2 week follow-up pending pathology review              MALT lymphoma ([facility] [address] )    2018 Initial Diagnosis     MALT lymphoma ([facility] [address] )      6/1/2018 Biopsy     Gastric cardia mass, biopsy: ([facility])  Extranodal marginal zone lymphoma of mucosa-associated lymphoid tissue (MALT lymphoma)        12/10/2019 Biopsy     Distal Body of Stomach, Biopsy:  Antrofundic-type gastric mucosa with an atypical lymphoid infiltrate    [facility] [address]  (Dr Vee Archer)         Clinical Trial: No    Health Maintenance   Topic Date Due    Hepatitis C Screening  1960    Depression Screening PHQ  1960    Pneumococcal Vaccine: Pediatrics (0 to 5 Years) and At-Risk Patients (6 to 59 Years) (1 of 3 - PCV13) 12/06/1965    DTaP,Tdap,and Td Vaccines (1 - Tdap) 12/06/1970    HIV Screening  12/06/1974    BMI: Followup Plan  12/06/1977    Annual Physical  12/06/1977    Influenza Vaccine  07/01/2019    BMI: Adult  02/04/2021    CRC Screening: Colonoscopy  08/09/2023    Pneumococcal Vaccine: 65+ Years (1 of 2 - PCV13) 12/06/2024    HIB Vaccine  Aged Out    Hepatitis B Vaccine  Aged Out    IPV Vaccine  Aged Out    Hepatitis A Vaccine  Aged Graham Callejas partner violence:     Fear of current or ex partner: Not on file     Emotionally abused: Not on file     Physically abused: Not on file     Forced sexual activity: Not on file   Other Topics Concern    Not on file   Social History Narrative    Not on file       Current Outpatient Medications:     aspirin (ECOTRIN LOW STRENGTH) 81 mg EC tablet, Take 81 mg by mouth daily, Disp: , Rfl:     cholecalciferol (VITAMIN D3) 1,000 units tablet, Take 500 Units by mouth daily, Disp: , Rfl:     cimetidine (TAGAMET) 300 MG tablet, Take 300 mg by mouth 2 (two) times a day, Disp: , Rfl:     magnesium oxide (MAG-OX) 400 mg, Take 400 mg by mouth 2 (two) times a day, Disp: , Rfl:     Nutritional Supplements (PRO-RAJINDER PLUS PO), Take by mouth, Disp: , Rfl:     pantoprazole (PROTONIX) 40 mg tablet, Take 40 mg by mouth daily, Disp: , Rfl:     rosuvastatin (CRESTOR) 5 mg tablet, Take 5 mg by mouth daily, Disp: , Rfl:     tadalafil (CIALIS) 5 MG tablet, Take 5 mg by mouth daily as needed for erectile dysfunction, Disp: , Rfl:   No Known Allergies    Review of Systems:  Review of Systems   Constitutional: Positive for fatigue  HENT: Negative  Eyes: Negative  Respiratory: Negative  Cardiovascular: Negative  Gastrointestinal: Positive for abdominal pain (mild "burning" intermittent)  Endocrine: Negative  Genitourinary: Negative  Musculoskeletal: Negative  Skin: Negative  Allergic/Immunologic: Negative  Neurological: Negative  Hematological: Negative  Psychiatric/Behavioral: Negative  Vitals:    03/03/20 1427   BP: 130/80   BP Location: Right arm   Pulse: 76   Resp: 18   Temp: (!) 97 1 °F (36 2 °C)   TempSrc: Temporal   SpO2: 97%   Weight: 109 kg (241 lb)               Imaging:No results found

## 2020-03-03 NOTE — PROGRESS NOTES
Follow-up - Radiation Oncology   Zahra Toth 1960 60 y o  male [id number]      History of Present Illness   Cancer Staging  No matching staging information was found for the patient  Zahra Toth is a 60y o  year old male who returns to discuss treatment plan  Last seen on 2/4/20      60year old male with MALT lymphoma of the stomach initially 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, H  Pylori +, status post triple antibiotic course at that time, who has been followed with surveillance EGD   Last EGD per report from December states known lesion with continued MALT lymphoma, and also second distal stomach lesion with atypical findings      On 2/4/20, discussed that slides will be requested for pathology review  If second lesion remains atypical, with no other diagnosis, it is reasonable to proceed with definitive treatment over a course of 20 fractions, which encompasses the whole stomach with margin  Plan for 2 week follow-up pending pathology review          Historical Information      MALT lymphoma ([facility] )    2018 Initial Diagnosis     MALT lymphoma ([facility] )      6/1/2018 Biopsy     Gastric cardia mass, biopsy: ([place])  Extranodal marginal zone lymphoma of mucosa-associated lymphoid tissue (MALT lymphoma)        12/10/2019 Biopsy     Distal Body of Stomach, Biopsy:  Antrofundic-type gastric mucosa with an atypical lymphoid infiltrate    [facility] (Dr Rhea Zamudio)         Past Medical History:   Diagnosis Date    Cancer [facility])     Stomach    Lymphoma ([facility] ) 05/2018    Thoracic aortic aneurysm [facility])      Past Surgical History:   Procedure Laterality Date    APPENDECTOMY      HERNIA REPAIR      B/L hernia repair    KNEE ARTHROSCOPY, MEDIAL PATELLO FEMORAL LIGAMENT REPAIR Left     LINEAR ENDOSCOPIC U/S N/A 6/13/2018    Procedure: LINEAR ENDOSCOPIC U/S;  Surgeon: Rhea Zamudio MD;  Location: [facility];   Service: Gastroenterology       Social History   Social History     Substance and Sexual Activity   Alcohol Use Yes    Comment: 2 drinks/month     Social History     Substance and Sexual Activity   Drug Use No     Social History     Tobacco Use   Smoking Status Never Smoker   Smokeless Tobacco Never Used         Meds/Allergies     Current Outpatient Medications:     aspirin (ECOTRIN LOW STRENGTH) 81 mg EC tablet, Take 81 mg by mouth daily, Disp: , Rfl:     cholecalciferol (VITAMIN D3) 1,000 units tablet, Take 500 Units by mouth daily, Disp: , Rfl:     cimetidine (TAGAMET) 300 MG tablet, Take 300 mg by mouth 2 (two) times a day, Disp: , Rfl:     magnesium oxide (MAG-OX) 400 mg, Take 400 mg by mouth 2 (two) times a day, Disp: , Rfl:     Nutritional Supplements (PRO-RAJINDER PLUS PO), Take by mouth, Disp: , Rfl:     pantoprazole (PROTONIX) 40 mg tablet, Take 40 mg by mouth daily, Disp: , Rfl:     rosuvastatin (CRESTOR) 5 mg tablet, Take 5 mg by mouth daily, Disp: , Rfl:     tadalafil (CIALIS) 5 MG tablet, Take 5 mg by mouth daily as needed for erectile dysfunction, Disp: , Rfl:   No Known Allergies      Review of Systems Constitutional: Positive for fatigue  HENT: Negative  Eyes: Negative  Respiratory: Negative  Cardiovascular: Negative  Gastrointestinal: Positive for abdominal pain (mild "burning" intermittent)  Endocrine: Negative  Genitourinary: Negative  Musculoskeletal: Negative  Skin: Negative  Allergic/Immunologic: Negative  Neurological: Negative  Hematological: Negative  Psychiatric/Behavioral: Negative  OBJECTIVE:   /80 (BP Location: Right arm)   Pulse 76   Temp (!) 97 1 °F (36 2 °C) (Temporal)   Resp 18   Wt 109 kg (241 lb)   SpO2 97%   BMI 34 58 kg/m²   Pain Assessment:  2  ECOG/Zubrod/WHO: 1 - Symptomatic but completely ambulatory    Physical Exam GENERAL:  Appears stated age, in no apparent distress  Alert and oriented  HEENT:  Normocephalic, atraumatic   extraocular muscles intact   Oral mucosa moist   LYMPHATICS:  No cervical, supraclavicular, or infraclavicular lymphadenopathy noted bilaterally  PULMONARY:  Respirations unlabored  CARDIOVASCULAR:  Regular rate  ABDOMEN:  Soft, nondistended  NEUROLOGIC: Moving all extremities, No focal deficits noted  EXTREMITIES: no clubbing, cyanosis, or edema  PSYCHIATRIC: normal mood and affect  Appropriate thought content and judgement  RESULTS    Lab Results: No results found for this or any previous visit (from the past 672 hour(s))  Imaging Studies:No results found  Assessment/Plan:  No orders of the defined types were placed in this encounter  Janna Drake is a 60y o  year old male with MALT lymphoma of the stomach initially 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, H  Pylori +, status post triple antibiotic course at that time, who has been followed with surveillance EGD  Pathology review shows mild lymphoma at both lesions seen on prior EGD  We again reviewed recommendations for definitive radiation therapy, with involved site radiation over a course 20 treatments  Review to the logistics of treatment including no eating 4 hours prior to treatment, and side effects including but not limited to fatigue, nausea, vomiting, diarrhea, abdominal cramping, pancreatic dysfunction, kidney dysfunction, secondary malignancy  After this discussion we agreed to the following:    Plan:    Informed consent obtained with plan for patient to return for CT simulation  Recommendations for 30 Gy in 15 fractions definitive IS RT  Patient and his wife accompanies him today were in good understanding of these recommendations and all their questions were answered to their apparent satisfaction thank you for allowing us to participate in the care of Mr Katherine Vinson          Surendra Hameed MD  3/3/2020,4:33 PM    Portions of the record may have been created with voice recognition software   Occasional wrong word or "sound a like" substitutions may have occurred due to the inherent limitations of voice recognition software   Read the chart carefully and recognize, using context, where substitutions have occurred

## 2020-03-06 ENCOUNTER — APPOINTMENT (OUTPATIENT)
Dept: RADIOLOGY | Facility: HOSPITAL | Age: 60
End: 2020-03-06
Attending: STUDENT IN AN ORGANIZED HEALTH CARE EDUCATION/TRAINING PROGRAM
Payer: COMMERCIAL

## 2020-03-06 ENCOUNTER — APPOINTMENT (OUTPATIENT)
Dept: RADIATION ONCOLOGY | Facility: HOSPITAL | Age: 60
End: 2020-03-06
Attending: STUDENT IN AN ORGANIZED HEALTH CARE EDUCATION/TRAINING PROGRAM
Payer: COMMERCIAL

## 2020-03-06 PROCEDURE — 77290 THER RAD SIMULAJ FIELD CPLX: CPT | Performed by: STUDENT IN AN ORGANIZED HEALTH CARE EDUCATION/TRAINING PROGRAM

## 2020-03-06 PROCEDURE — 77370 RADIATION PHYSICS CONSULT: CPT | Performed by: STUDENT IN AN ORGANIZED HEALTH CARE EDUCATION/TRAINING PROGRAM

## 2020-03-06 PROCEDURE — 77334 RADIATION TREATMENT AID(S): CPT | Performed by: STUDENT IN AN ORGANIZED HEALTH CARE EDUCATION/TRAINING PROGRAM

## 2020-03-16 PROCEDURE — 77295 3-D RADIOTHERAPY PLAN: CPT | Performed by: STUDENT IN AN ORGANIZED HEALTH CARE EDUCATION/TRAINING PROGRAM

## 2020-03-16 PROCEDURE — 77334 RADIATION TREATMENT AID(S): CPT | Performed by: STUDENT IN AN ORGANIZED HEALTH CARE EDUCATION/TRAINING PROGRAM

## 2020-03-16 PROCEDURE — 77300 RADIATION THERAPY DOSE PLAN: CPT | Performed by: STUDENT IN AN ORGANIZED HEALTH CARE EDUCATION/TRAINING PROGRAM

## 2020-03-17 ENCOUNTER — TELEPHONE (OUTPATIENT)
Dept: RADIATION ONCOLOGY | Facility: HOSPITAL | Age: 60
End: 2020-03-17

## 2020-03-17 ENCOUNTER — APPOINTMENT (OUTPATIENT)
Dept: RADIATION ONCOLOGY | Facility: HOSPITAL | Age: 60
End: 2020-03-17
Attending: STUDENT IN AN ORGANIZED HEALTH CARE EDUCATION/TRAINING PROGRAM
Payer: COMMERCIAL

## 2020-03-17 PROCEDURE — 77412 RADIATION TX DELIVERY LVL 3: CPT | Performed by: STUDENT IN AN ORGANIZED HEALTH CARE EDUCATION/TRAINING PROGRAM

## 2020-03-17 PROCEDURE — 77417 THER RADIOLOGY PORT IMAGE(S): CPT | Performed by: STUDENT IN AN ORGANIZED HEALTH CARE EDUCATION/TRAINING PROGRAM

## 2020-03-17 PROCEDURE — 77387 GUIDANCE FOR RADJ TX DLVR: CPT | Performed by: STUDENT IN AN ORGANIZED HEALTH CARE EDUCATION/TRAINING PROGRAM

## 2020-03-18 PROCEDURE — 77387 GUIDANCE FOR RADJ TX DLVR: CPT | Performed by: STUDENT IN AN ORGANIZED HEALTH CARE EDUCATION/TRAINING PROGRAM

## 2020-03-18 PROCEDURE — 77412 RADIATION TX DELIVERY LVL 3: CPT | Performed by: STUDENT IN AN ORGANIZED HEALTH CARE EDUCATION/TRAINING PROGRAM

## 2020-03-18 PROCEDURE — 77331 SPECIAL RADIATION DOSIMETRY: CPT | Performed by: STUDENT IN AN ORGANIZED HEALTH CARE EDUCATION/TRAINING PROGRAM

## 2020-03-19 ENCOUNTER — APPOINTMENT (OUTPATIENT)
Dept: RADIATION ONCOLOGY | Facility: HOSPITAL | Age: 60
End: 2020-03-19
Attending: STUDENT IN AN ORGANIZED HEALTH CARE EDUCATION/TRAINING PROGRAM
Payer: COMMERCIAL

## 2020-03-19 PROCEDURE — 77387 GUIDANCE FOR RADJ TX DLVR: CPT | Performed by: STUDENT IN AN ORGANIZED HEALTH CARE EDUCATION/TRAINING PROGRAM

## 2020-03-19 PROCEDURE — 77412 RADIATION TX DELIVERY LVL 3: CPT | Performed by: STUDENT IN AN ORGANIZED HEALTH CARE EDUCATION/TRAINING PROGRAM

## 2020-03-20 ENCOUNTER — APPOINTMENT (OUTPATIENT)
Dept: RADIATION ONCOLOGY | Facility: HOSPITAL | Age: 60
End: 2020-03-20
Attending: STUDENT IN AN ORGANIZED HEALTH CARE EDUCATION/TRAINING PROGRAM
Payer: COMMERCIAL

## 2020-03-20 PROCEDURE — 77387 GUIDANCE FOR RADJ TX DLVR: CPT | Performed by: STUDENT IN AN ORGANIZED HEALTH CARE EDUCATION/TRAINING PROGRAM

## 2020-03-20 PROCEDURE — 77412 RADIATION TX DELIVERY LVL 3: CPT | Performed by: STUDENT IN AN ORGANIZED HEALTH CARE EDUCATION/TRAINING PROGRAM

## 2020-03-23 ENCOUNTER — TELEPHONE (OUTPATIENT)
Dept: RADIATION ONCOLOGY | Facility: HOSPITAL | Age: 60
End: 2020-03-23

## 2020-03-23 ENCOUNTER — APPOINTMENT (OUTPATIENT)
Dept: RADIATION ONCOLOGY | Facility: HOSPITAL | Age: 60
End: 2020-03-23
Attending: STUDENT IN AN ORGANIZED HEALTH CARE EDUCATION/TRAINING PROGRAM
Payer: COMMERCIAL

## 2020-03-23 PROCEDURE — 77412 RADIATION TX DELIVERY LVL 3: CPT | Performed by: STUDENT IN AN ORGANIZED HEALTH CARE EDUCATION/TRAINING PROGRAM

## 2020-03-23 PROCEDURE — 77387 GUIDANCE FOR RADJ TX DLVR: CPT | Performed by: STUDENT IN AN ORGANIZED HEALTH CARE EDUCATION/TRAINING PROGRAM

## 2020-03-23 PROCEDURE — 77336 RADIATION PHYSICS CONSULT: CPT | Performed by: STUDENT IN AN ORGANIZED HEALTH CARE EDUCATION/TRAINING PROGRAM

## 2020-03-24 ENCOUNTER — APPOINTMENT (OUTPATIENT)
Dept: RADIATION ONCOLOGY | Facility: HOSPITAL | Age: 60
End: 2020-03-24
Attending: STUDENT IN AN ORGANIZED HEALTH CARE EDUCATION/TRAINING PROGRAM
Payer: COMMERCIAL

## 2020-03-24 DIAGNOSIS — C88.4 MALT LYMPHOMA (HCC): Primary | ICD-10-CM

## 2020-03-24 DIAGNOSIS — C85.19 B-CELL LYMPHOMA OF EXTRANODAL SITE EXCLUDING SPLEEN AND OTHER SOLID ORGANS, UNSPECIFIED B-CELL LYMPHOMA TYPE (HCC): ICD-10-CM

## 2020-03-24 PROCEDURE — 77412 RADIATION TX DELIVERY LVL 3: CPT | Performed by: STUDENT IN AN ORGANIZED HEALTH CARE EDUCATION/TRAINING PROGRAM

## 2020-03-24 PROCEDURE — 77387 GUIDANCE FOR RADJ TX DLVR: CPT | Performed by: STUDENT IN AN ORGANIZED HEALTH CARE EDUCATION/TRAINING PROGRAM

## 2020-03-24 RX ORDER — ONDANSETRON 4 MG/1
4 TABLET, FILM COATED ORAL EVERY 8 HOURS PRN
Qty: 20 TABLET | Refills: 0 | Status: CANCELLED | OUTPATIENT
Start: 2020-03-24

## 2020-03-24 RX ORDER — ONDANSETRON 4 MG/1
4 TABLET, FILM COATED ORAL EVERY 8 HOURS PRN
Qty: 84 TABLET | Refills: 0 | Status: SHIPPED | OUTPATIENT
Start: 2020-03-24 | End: 2020-06-15 | Stop reason: ALTCHOICE

## 2020-03-25 ENCOUNTER — APPOINTMENT (OUTPATIENT)
Dept: RADIATION ONCOLOGY | Facility: HOSPITAL | Age: 60
End: 2020-03-25
Attending: STUDENT IN AN ORGANIZED HEALTH CARE EDUCATION/TRAINING PROGRAM
Payer: COMMERCIAL

## 2020-03-25 PROCEDURE — 77387 GUIDANCE FOR RADJ TX DLVR: CPT | Performed by: STUDENT IN AN ORGANIZED HEALTH CARE EDUCATION/TRAINING PROGRAM

## 2020-03-25 PROCEDURE — 77412 RADIATION TX DELIVERY LVL 3: CPT | Performed by: STUDENT IN AN ORGANIZED HEALTH CARE EDUCATION/TRAINING PROGRAM

## 2020-03-26 ENCOUNTER — APPOINTMENT (OUTPATIENT)
Dept: RADIATION ONCOLOGY | Facility: HOSPITAL | Age: 60
End: 2020-03-26
Attending: STUDENT IN AN ORGANIZED HEALTH CARE EDUCATION/TRAINING PROGRAM
Payer: COMMERCIAL

## 2020-03-26 PROCEDURE — 77387 GUIDANCE FOR RADJ TX DLVR: CPT | Performed by: STUDENT IN AN ORGANIZED HEALTH CARE EDUCATION/TRAINING PROGRAM

## 2020-03-26 PROCEDURE — 77412 RADIATION TX DELIVERY LVL 3: CPT | Performed by: STUDENT IN AN ORGANIZED HEALTH CARE EDUCATION/TRAINING PROGRAM

## 2020-03-27 ENCOUNTER — APPOINTMENT (OUTPATIENT)
Dept: RADIATION ONCOLOGY | Facility: HOSPITAL | Age: 60
End: 2020-03-27
Attending: STUDENT IN AN ORGANIZED HEALTH CARE EDUCATION/TRAINING PROGRAM
Payer: COMMERCIAL

## 2020-03-27 LAB
BASOPHILS # BLD AUTO: 39 CELLS/UL (ref 0–200)
BASOPHILS NFR BLD AUTO: 1.1 %
EOSINOPHIL # BLD AUTO: 238 CELLS/UL (ref 15–500)
EOSINOPHIL NFR BLD AUTO: 6.8 %
ERYTHROCYTE [DISTWIDTH] IN BLOOD BY AUTOMATED COUNT: 13.8 % (ref 11–15)
HCT VFR BLD AUTO: 43.2 % (ref 38.5–50)
HGB BLD-MCNC: 14.4 G/DL (ref 13.2–17.1)
LYMPHOCYTES # BLD AUTO: 567 CELLS/UL (ref 850–3900)
LYMPHOCYTES NFR BLD AUTO: 16.2 %
MCH RBC QN AUTO: 28.5 PG (ref 27–33)
MCHC RBC AUTO-ENTMCNC: 33.3 G/DL (ref 32–36)
MCV RBC AUTO: 85.5 FL (ref 80–100)
MONOCYTES # BLD AUTO: 431 CELLS/UL (ref 200–950)
MONOCYTES NFR BLD AUTO: 12.3 %
NEUTROPHILS # BLD AUTO: 2226 CELLS/UL (ref 1500–7800)
NEUTROPHILS NFR BLD AUTO: 63.6 %
PLATELET # BLD AUTO: 212 THOUSAND/UL (ref 140–400)
PMV BLD REES-ECKER: 9.8 FL (ref 7.5–12.5)
RBC # BLD AUTO: 5.05 MILLION/UL (ref 4.2–5.8)
WBC # BLD AUTO: 3.5 THOUSAND/UL (ref 3.8–10.8)

## 2020-03-27 PROCEDURE — 77387 GUIDANCE FOR RADJ TX DLVR: CPT | Performed by: STUDENT IN AN ORGANIZED HEALTH CARE EDUCATION/TRAINING PROGRAM

## 2020-03-27 PROCEDURE — 77412 RADIATION TX DELIVERY LVL 3: CPT | Performed by: STUDENT IN AN ORGANIZED HEALTH CARE EDUCATION/TRAINING PROGRAM

## 2020-03-30 PROCEDURE — 77412 RADIATION TX DELIVERY LVL 3: CPT | Performed by: STUDENT IN AN ORGANIZED HEALTH CARE EDUCATION/TRAINING PROGRAM

## 2020-03-30 PROCEDURE — 77336 RADIATION PHYSICS CONSULT: CPT | Performed by: STUDENT IN AN ORGANIZED HEALTH CARE EDUCATION/TRAINING PROGRAM

## 2020-03-30 PROCEDURE — 77387 GUIDANCE FOR RADJ TX DLVR: CPT | Performed by: STUDENT IN AN ORGANIZED HEALTH CARE EDUCATION/TRAINING PROGRAM

## 2020-03-31 ENCOUNTER — APPOINTMENT (OUTPATIENT)
Dept: RADIATION ONCOLOGY | Facility: HOSPITAL | Age: 60
End: 2020-03-31
Attending: STUDENT IN AN ORGANIZED HEALTH CARE EDUCATION/TRAINING PROGRAM
Payer: COMMERCIAL

## 2020-03-31 PROCEDURE — 77387 GUIDANCE FOR RADJ TX DLVR: CPT | Performed by: STUDENT IN AN ORGANIZED HEALTH CARE EDUCATION/TRAINING PROGRAM

## 2020-03-31 PROCEDURE — 77412 RADIATION TX DELIVERY LVL 3: CPT | Performed by: STUDENT IN AN ORGANIZED HEALTH CARE EDUCATION/TRAINING PROGRAM

## 2020-04-01 ENCOUNTER — APPOINTMENT (OUTPATIENT)
Dept: RADIATION ONCOLOGY | Facility: HOSPITAL | Age: 60
End: 2020-04-01
Attending: STUDENT IN AN ORGANIZED HEALTH CARE EDUCATION/TRAINING PROGRAM
Payer: COMMERCIAL

## 2020-04-01 PROCEDURE — 77412 RADIATION TX DELIVERY LVL 3: CPT | Performed by: STUDENT IN AN ORGANIZED HEALTH CARE EDUCATION/TRAINING PROGRAM

## 2020-04-01 PROCEDURE — 77387 GUIDANCE FOR RADJ TX DLVR: CPT | Performed by: STUDENT IN AN ORGANIZED HEALTH CARE EDUCATION/TRAINING PROGRAM

## 2020-04-02 ENCOUNTER — APPOINTMENT (OUTPATIENT)
Dept: RADIATION ONCOLOGY | Facility: HOSPITAL | Age: 60
End: 2020-04-02
Attending: STUDENT IN AN ORGANIZED HEALTH CARE EDUCATION/TRAINING PROGRAM
Payer: COMMERCIAL

## 2020-04-02 PROCEDURE — 77412 RADIATION TX DELIVERY LVL 3: CPT | Performed by: STUDENT IN AN ORGANIZED HEALTH CARE EDUCATION/TRAINING PROGRAM

## 2020-04-02 PROCEDURE — 77387 GUIDANCE FOR RADJ TX DLVR: CPT | Performed by: STUDENT IN AN ORGANIZED HEALTH CARE EDUCATION/TRAINING PROGRAM

## 2020-04-03 ENCOUNTER — APPOINTMENT (OUTPATIENT)
Dept: RADIATION ONCOLOGY | Facility: HOSPITAL | Age: 60
End: 2020-04-03
Attending: STUDENT IN AN ORGANIZED HEALTH CARE EDUCATION/TRAINING PROGRAM
Payer: COMMERCIAL

## 2020-04-03 PROCEDURE — 77387 GUIDANCE FOR RADJ TX DLVR: CPT | Performed by: STUDENT IN AN ORGANIZED HEALTH CARE EDUCATION/TRAINING PROGRAM

## 2020-04-03 PROCEDURE — 77412 RADIATION TX DELIVERY LVL 3: CPT | Performed by: STUDENT IN AN ORGANIZED HEALTH CARE EDUCATION/TRAINING PROGRAM

## 2020-04-06 ENCOUNTER — APPOINTMENT (OUTPATIENT)
Dept: RADIATION ONCOLOGY | Facility: HOSPITAL | Age: 60
End: 2020-04-06
Attending: STUDENT IN AN ORGANIZED HEALTH CARE EDUCATION/TRAINING PROGRAM
Payer: COMMERCIAL

## 2020-04-06 PROCEDURE — 77412 RADIATION TX DELIVERY LVL 3: CPT | Performed by: STUDENT IN AN ORGANIZED HEALTH CARE EDUCATION/TRAINING PROGRAM

## 2020-04-06 PROCEDURE — 77336 RADIATION PHYSICS CONSULT: CPT | Performed by: STUDENT IN AN ORGANIZED HEALTH CARE EDUCATION/TRAINING PROGRAM

## 2020-04-06 PROCEDURE — 77387 GUIDANCE FOR RADJ TX DLVR: CPT | Performed by: STUDENT IN AN ORGANIZED HEALTH CARE EDUCATION/TRAINING PROGRAM

## 2020-04-07 PROCEDURE — 77387 GUIDANCE FOR RADJ TX DLVR: CPT | Performed by: STUDENT IN AN ORGANIZED HEALTH CARE EDUCATION/TRAINING PROGRAM

## 2020-04-07 PROCEDURE — 77412 RADIATION TX DELIVERY LVL 3: CPT | Performed by: STUDENT IN AN ORGANIZED HEALTH CARE EDUCATION/TRAINING PROGRAM

## 2020-04-08 PROCEDURE — 77387 GUIDANCE FOR RADJ TX DLVR: CPT | Performed by: STUDENT IN AN ORGANIZED HEALTH CARE EDUCATION/TRAINING PROGRAM

## 2020-04-08 PROCEDURE — 77412 RADIATION TX DELIVERY LVL 3: CPT | Performed by: STUDENT IN AN ORGANIZED HEALTH CARE EDUCATION/TRAINING PROGRAM

## 2020-04-09 ENCOUNTER — APPOINTMENT (OUTPATIENT)
Dept: RADIATION ONCOLOGY | Facility: HOSPITAL | Age: 60
End: 2020-04-09
Attending: STUDENT IN AN ORGANIZED HEALTH CARE EDUCATION/TRAINING PROGRAM
Payer: COMMERCIAL

## 2020-04-09 PROCEDURE — 77412 RADIATION TX DELIVERY LVL 3: CPT | Performed by: STUDENT IN AN ORGANIZED HEALTH CARE EDUCATION/TRAINING PROGRAM

## 2020-04-09 PROCEDURE — 77387 GUIDANCE FOR RADJ TX DLVR: CPT | Performed by: STUDENT IN AN ORGANIZED HEALTH CARE EDUCATION/TRAINING PROGRAM

## 2020-04-10 ENCOUNTER — APPOINTMENT (OUTPATIENT)
Dept: RADIATION ONCOLOGY | Facility: HOSPITAL | Age: 60
End: 2020-04-10
Attending: STUDENT IN AN ORGANIZED HEALTH CARE EDUCATION/TRAINING PROGRAM
Payer: COMMERCIAL

## 2020-04-10 PROCEDURE — 77387 GUIDANCE FOR RADJ TX DLVR: CPT | Performed by: STUDENT IN AN ORGANIZED HEALTH CARE EDUCATION/TRAINING PROGRAM

## 2020-04-10 PROCEDURE — 77412 RADIATION TX DELIVERY LVL 3: CPT | Performed by: STUDENT IN AN ORGANIZED HEALTH CARE EDUCATION/TRAINING PROGRAM

## 2020-04-11 ENCOUNTER — HOSPITAL ENCOUNTER (EMERGENCY)
Facility: HOSPITAL | Age: 60
Discharge: HOME | End: 2020-04-11
Attending: EMERGENCY MEDICINE
Payer: COMMERCIAL

## 2020-04-11 VITALS
OXYGEN SATURATION: 95 % | TEMPERATURE: 97 F | SYSTOLIC BLOOD PRESSURE: 142 MMHG | DIASTOLIC BLOOD PRESSURE: 78 MMHG | HEART RATE: 56 BPM | BODY MASS INDEX: 34.44 KG/M2 | WEIGHT: 240 LBS | RESPIRATION RATE: 18 BRPM

## 2020-04-11 DIAGNOSIS — R11.2 NAUSEA AND VOMITING: Primary | ICD-10-CM

## 2020-04-11 LAB
ALBUMIN SERPL BCP-MCNC: 3.9 G/DL (ref 3.5–5)
ALP SERPL-CCNC: 57 U/L (ref 46–116)
ALT SERPL W P-5'-P-CCNC: 37 U/L (ref 12–78)
ANION GAP SERPL CALCULATED.3IONS-SCNC: 7 MMOL/L (ref 4–13)
AST SERPL W P-5'-P-CCNC: 33 U/L (ref 5–45)
BASOPHILS # BLD AUTO: 0.03 THOUSANDS/ΜL (ref 0–0.1)
BASOPHILS NFR BLD AUTO: 0 % (ref 0–1)
BILIRUB SERPL-MCNC: 0.8 MG/DL (ref 0.2–1)
BUN SERPL-MCNC: 14 MG/DL (ref 5–25)
CALCIUM SERPL-MCNC: 8.6 MG/DL (ref 8.3–10.1)
CHLORIDE SERPL-SCNC: 103 MMOL/L (ref 100–108)
CO2 SERPL-SCNC: 28 MMOL/L (ref 21–32)
CREAT SERPL-MCNC: 0.99 MG/DL (ref 0.6–1.3)
EOSINOPHIL # BLD AUTO: 0.04 THOUSAND/ΜL (ref 0–0.61)
EOSINOPHIL NFR BLD AUTO: 1 % (ref 0–6)
ERYTHROCYTE [DISTWIDTH] IN BLOOD BY AUTOMATED COUNT: 13.4 % (ref 11.6–15.1)
GFR SERPL CREATININE-BSD FRML MDRD: 82 ML/MIN/1.73SQ M
GLUCOSE SERPL-MCNC: 130 MG/DL (ref 65–140)
HCT VFR BLD AUTO: 45.5 % (ref 36.5–49.3)
HGB BLD-MCNC: 15.2 G/DL (ref 12–17)
IMM GRANULOCYTES # BLD AUTO: 0.02 THOUSAND/UL (ref 0–0.2)
IMM GRANULOCYTES NFR BLD AUTO: 0 % (ref 0–2)
LIPASE SERPL-CCNC: 264 U/L (ref 73–393)
LYMPHOCYTES # BLD AUTO: 0.15 THOUSANDS/ΜL (ref 0.6–4.47)
LYMPHOCYTES NFR BLD AUTO: 2 % (ref 14–44)
MCH RBC QN AUTO: 28.4 PG (ref 26.8–34.3)
MCHC RBC AUTO-ENTMCNC: 33.4 G/DL (ref 31.4–37.4)
MCV RBC AUTO: 85 FL (ref 82–98)
MONOCYTES # BLD AUTO: 0.43 THOUSAND/ΜL (ref 0.17–1.22)
MONOCYTES NFR BLD AUTO: 6 % (ref 4–12)
NEUTROPHILS # BLD AUTO: 6.01 THOUSANDS/ΜL (ref 1.85–7.62)
NEUTS SEG NFR BLD AUTO: 91 % (ref 43–75)
NRBC BLD AUTO-RTO: 0 /100 WBCS
PLATELET # BLD AUTO: 129 THOUSANDS/UL (ref 149–390)
PMV BLD AUTO: 9.3 FL (ref 8.9–12.7)
POTASSIUM SERPL-SCNC: 3.9 MMOL/L (ref 3.5–5.3)
PROT SERPL-MCNC: 7.3 G/DL (ref 6.4–8.2)
RBC # BLD AUTO: 5.36 MILLION/UL (ref 3.88–5.62)
SODIUM SERPL-SCNC: 138 MMOL/L (ref 136–145)
WBC # BLD AUTO: 6.68 THOUSAND/UL (ref 4.31–10.16)

## 2020-04-11 PROCEDURE — 96361 HYDRATE IV INFUSION ADD-ON: CPT

## 2020-04-11 PROCEDURE — 36415 COLL VENOUS BLD VENIPUNCTURE: CPT | Performed by: EMERGENCY MEDICINE

## 2020-04-11 PROCEDURE — 85025 COMPLETE CBC W/AUTO DIFF WBC: CPT | Performed by: EMERGENCY MEDICINE

## 2020-04-11 PROCEDURE — 80053 COMPREHEN METABOLIC PANEL: CPT | Performed by: EMERGENCY MEDICINE

## 2020-04-11 PROCEDURE — 96374 THER/PROPH/DIAG INJ IV PUSH: CPT

## 2020-04-11 PROCEDURE — 99283 EMERGENCY DEPT VISIT LOW MDM: CPT | Performed by: EMERGENCY MEDICINE

## 2020-04-11 PROCEDURE — 83690 ASSAY OF LIPASE: CPT | Performed by: EMERGENCY MEDICINE

## 2020-04-11 PROCEDURE — 96376 TX/PRO/DX INJ SAME DRUG ADON: CPT

## 2020-04-11 PROCEDURE — 99283 EMERGENCY DEPT VISIT LOW MDM: CPT

## 2020-04-11 RX ORDER — PANTOPRAZOLE SODIUM 40 MG/1
40 TABLET, DELAYED RELEASE ORAL AS NEEDED
COMMUNITY
Start: 2020-03-07 | End: 2022-01-01 | Stop reason: HOSPADM

## 2020-04-11 RX ORDER — ONDANSETRON 2 MG/ML
4 INJECTION INTRAMUSCULAR; INTRAVENOUS ONCE
Status: COMPLETED | OUTPATIENT
Start: 2020-04-11 | End: 2020-04-11

## 2020-04-11 RX ADMIN — SODIUM CHLORIDE 1000 ML: 0.9 INJECTION, SOLUTION INTRAVENOUS at 16:33

## 2020-04-11 RX ADMIN — ONDANSETRON 4 MG: 2 INJECTION INTRAMUSCULAR; INTRAVENOUS at 16:33

## 2020-04-11 RX ADMIN — ONDANSETRON 4 MG: 2 INJECTION INTRAMUSCULAR; INTRAVENOUS at 17:45

## 2020-04-13 ENCOUNTER — APPOINTMENT (OUTPATIENT)
Dept: RADIATION ONCOLOGY | Facility: HOSPITAL | Age: 60
End: 2020-04-13
Attending: STUDENT IN AN ORGANIZED HEALTH CARE EDUCATION/TRAINING PROGRAM
Payer: COMMERCIAL

## 2020-04-14 ENCOUNTER — APPOINTMENT (OUTPATIENT)
Dept: RADIATION ONCOLOGY | Facility: HOSPITAL | Age: 60
End: 2020-04-14
Attending: STUDENT IN AN ORGANIZED HEALTH CARE EDUCATION/TRAINING PROGRAM
Payer: COMMERCIAL

## 2020-04-14 PROCEDURE — 77336 RADIATION PHYSICS CONSULT: CPT | Performed by: STUDENT IN AN ORGANIZED HEALTH CARE EDUCATION/TRAINING PROGRAM

## 2020-04-14 PROCEDURE — 77387 GUIDANCE FOR RADJ TX DLVR: CPT | Performed by: STUDENT IN AN ORGANIZED HEALTH CARE EDUCATION/TRAINING PROGRAM

## 2020-04-14 PROCEDURE — 77412 RADIATION TX DELIVERY LVL 3: CPT | Performed by: STUDENT IN AN ORGANIZED HEALTH CARE EDUCATION/TRAINING PROGRAM

## 2020-04-15 ENCOUNTER — APPOINTMENT (OUTPATIENT)
Dept: RADIATION ONCOLOGY | Facility: HOSPITAL | Age: 60
End: 2020-04-15
Attending: STUDENT IN AN ORGANIZED HEALTH CARE EDUCATION/TRAINING PROGRAM
Payer: COMMERCIAL

## 2020-04-28 PROBLEM — Z00.00 ENCOUNTER FOR PREVENTIVE HEALTH EXAMINATION: Status: ACTIVE | Noted: 2020-04-28

## 2020-04-30 ENCOUNTER — DOCUMENTATION (OUTPATIENT)
Dept: INFUSION CENTER | Facility: CLINIC | Age: 60
End: 2020-04-30

## 2020-05-01 ENCOUNTER — APPOINTMENT (OUTPATIENT)
Dept: UROLOGY | Facility: CLINIC | Age: 60
End: 2020-05-01
Payer: COMMERCIAL

## 2020-05-01 VITALS
TEMPERATURE: 99.2 F | SYSTOLIC BLOOD PRESSURE: 125 MMHG | HEIGHT: 70 IN | WEIGHT: 230 LBS | BODY MASS INDEX: 32.93 KG/M2 | DIASTOLIC BLOOD PRESSURE: 72 MMHG | HEART RATE: 108 BPM | RESPIRATION RATE: 17 BRPM

## 2020-05-01 DIAGNOSIS — R68.89 OTHER GENERAL SYMPTOMS AND SIGNS: ICD-10-CM

## 2020-05-01 DIAGNOSIS — Z86.39 PERSONAL HISTORY OF OTHER ENDOCRINE, NUTRITIONAL AND METABOLIC DISEASE: ICD-10-CM

## 2020-05-01 PROCEDURE — 99204 OFFICE O/P NEW MOD 45 MIN: CPT

## 2020-05-01 RX ORDER — CHROMIUM 200 MCG
TABLET ORAL
Refills: 0 | Status: ACTIVE | COMMUNITY

## 2020-05-01 RX ORDER — BACILLUS COAGULANS/INULIN 1B-250 MG
CAPSULE ORAL
Refills: 0 | Status: ACTIVE | COMMUNITY

## 2020-05-01 RX ORDER — PANTOPRAZOLE SODIUM 40 MG/10ML
40 INJECTION, POWDER, FOR SOLUTION INTRAVENOUS
Refills: 0 | Status: ACTIVE | COMMUNITY

## 2020-05-01 RX ORDER — CIMETIDINE 300 MG/1
300 TABLET, FILM COATED ORAL
Refills: 0 | Status: ACTIVE | COMMUNITY

## 2020-05-01 RX ORDER — ASPIRIN 81 MG
81 TABLET, DELAYED RELEASE (ENTERIC COATED) ORAL
Refills: 0 | Status: ACTIVE | COMMUNITY

## 2020-05-01 RX ORDER — ROSUVASTATIN CALCIUM 5 MG/1
5 TABLET, FILM COATED ORAL
Refills: 0 | Status: ACTIVE | COMMUNITY

## 2020-05-01 RX ORDER — TADALAFIL 5 MG/1
5 TABLET, FILM COATED ORAL
Refills: 0 | Status: ACTIVE | COMMUNITY

## 2020-05-01 NOTE — ASSESSMENT
[FreeTextEntry1] : We reviewed his abdomen for radiation marking . He was not radiated in pelvis . Due to the abnormal TYLER we will attempt to get a MR prostate

## 2020-05-01 NOTE — REVIEW OF SYSTEMS
[Dry Eyes] : dryness of the eyes [Feeling Tired] : feeling tired [Abdominal Pain] : abdominal pain [Vomiting] : vomiting [Heartburn] : heartburn [see HPI] : see HPI [Strong urge to urinate] : strong urge to urinate [Unaware of when urine is leaking] : unaware of when urine is leaking [Slow urine stream] : slow urine stream [Dizziness] : dizziness [Negative] : Heme/Lymph

## 2020-05-01 NOTE — HISTORY OF PRESENT ILLNESS
[Nocturia] : nocturia [FreeTextEntry1] : History of b Cell lymphoma  treated with radiation . He had a baseline PSA of 1-2 .\par Creatinine is 0.99 . PSA in April 11, 2020 was 6.7 He was on an antibiotic x 7 days and test was repeated and it was 16.9

## 2020-05-01 NOTE — PHYSICAL EXAM
[General Appearance - Well Developed] : well developed [General Appearance - Well Nourished] : well nourished [] : no respiratory distress [Heart Rate And Rhythm] : Heart rate and rhythm were normal [Bowel Sounds] : normal bowel sounds [Prostate Size ___ gm] : prostate size [unfilled] gm [Normal Station and Gait] : the gait and station were normal for the patient's age [Skin Color & Pigmentation] : normal skin color and pigmentation [No Focal Deficits] : no focal deficits [Oriented To Time, Place, And Person] : oriented to person, place, and time [No Palpable Adenopathy] : no palpable adenopathy [FreeTextEntry1] : multiple indurations Family HX of Ca P (father )

## 2020-05-05 ENCOUNTER — TRANSCRIBE ORDERS (OUTPATIENT)
Dept: ADMINISTRATIVE | Facility: HOSPITAL | Age: 60
End: 2020-05-05

## 2020-05-05 DIAGNOSIS — R97.20 PSA ELEVATION: Primary | ICD-10-CM

## 2020-05-15 ENCOUNTER — HOSPITAL ENCOUNTER (OUTPATIENT)
Dept: RADIOLOGY | Age: 60
Discharge: HOME | End: 2020-05-15
Payer: COMMERCIAL

## 2020-05-15 DIAGNOSIS — R97.20 PSA ELEVATION: ICD-10-CM

## 2020-05-15 PROCEDURE — A9585 GADOBUTROL INJECTION: HCPCS | Performed by: RADIOLOGY

## 2020-05-15 PROCEDURE — 76377 3D RENDER W/INTRP POSTPROCES: CPT

## 2020-05-15 PROCEDURE — 72197 MRI PELVIS W/O & W/DYE: CPT

## 2020-05-15 RX ADMIN — GADOBUTROL 10 ML: 604.72 INJECTION INTRAVENOUS at 10:19

## 2020-05-29 ENCOUNTER — OUTPATIENT (OUTPATIENT)
Dept: OUTPATIENT SERVICES | Facility: HOSPITAL | Age: 60
LOS: 1 days | End: 2020-05-29
Payer: COMMERCIAL

## 2020-05-29 ENCOUNTER — APPOINTMENT (OUTPATIENT)
Dept: UROLOGY | Facility: CLINIC | Age: 60
End: 2020-05-29
Payer: COMMERCIAL

## 2020-05-29 VITALS
DIASTOLIC BLOOD PRESSURE: 90 MMHG | SYSTOLIC BLOOD PRESSURE: 124 MMHG | HEART RATE: 93 BPM | TEMPERATURE: 97.5 F | RESPIRATION RATE: 16 BRPM

## 2020-05-29 VITALS — DIASTOLIC BLOOD PRESSURE: 88 MMHG | SYSTOLIC BLOOD PRESSURE: 122 MMHG

## 2020-05-29 DIAGNOSIS — R35.0 FREQUENCY OF MICTURITION: ICD-10-CM

## 2020-05-29 DIAGNOSIS — R97.20 ELEVATED PROSTATE, SPECIFIC ANTIGEN [PSA]: ICD-10-CM

## 2020-05-29 PROCEDURE — 55700: CPT

## 2020-05-29 PROCEDURE — 76872 US TRANSRECTAL: CPT | Mod: 26

## 2020-05-29 PROCEDURE — 76872 US TRANSRECTAL: CPT

## 2020-05-29 PROCEDURE — 76942 ECHO GUIDE FOR BIOPSY: CPT | Mod: 26,59

## 2020-05-29 PROCEDURE — 76942 ECHO GUIDE FOR BIOPSY: CPT | Mod: 59

## 2020-05-30 ENCOUNTER — HOSPITAL ENCOUNTER (EMERGENCY)
Facility: HOSPITAL | Age: 60
Discharge: HOME | End: 2020-05-30
Attending: EMERGENCY MEDICINE | Admitting: EMERGENCY MEDICINE
Payer: COMMERCIAL

## 2020-05-30 ENCOUNTER — APPOINTMENT (EMERGENCY)
Dept: RADIOLOGY | Facility: HOSPITAL | Age: 60
End: 2020-05-30
Payer: COMMERCIAL

## 2020-05-30 VITALS
TEMPERATURE: 97.9 F | SYSTOLIC BLOOD PRESSURE: 137 MMHG | OXYGEN SATURATION: 95 % | RESPIRATION RATE: 19 BRPM | DIASTOLIC BLOOD PRESSURE: 96 MMHG | HEART RATE: 72 BPM

## 2020-05-30 DIAGNOSIS — S46.912A LEFT SHOULDER STRAIN: Primary | ICD-10-CM
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
## 2020-05-30 PROCEDURE — 99284 EMERGENCY DEPT VISIT MOD MDM: CPT | Performed by: PHYSICIAN ASSISTANT

## 2020-05-30 PROCEDURE — 99283 EMERGENCY DEPT VISIT LOW MDM: CPT

## 2020-05-30 PROCEDURE — 73030 X-RAY EXAM OF SHOULDER: CPT

## 2020-06-01 ENCOUNTER — TRANSCRIBE ORDERS (OUTPATIENT)
Dept: ADMINISTRATIVE | Facility: HOSPITAL | Age: 60
End: 2020-06-01

## 2020-06-01 DIAGNOSIS — R97.20 ELEVATED PROSTATE SPECIFIC ANTIGEN [PSA]: ICD-10-CM

## 2020-06-01 DIAGNOSIS — S43.085D OTHER DISLOCATION OF LEFT SHOULDER JOINT, SUBSEQUENT ENCOUNTER: ICD-10-CM

## 2020-06-01 DIAGNOSIS — M25.512 ACUTE PAIN OF LEFT SHOULDER: Primary | ICD-10-CM

## 2020-06-01 LAB — CORE LAB BIOPSY: NORMAL

## 2020-06-03 ENCOUNTER — TRANSCRIBE ORDERS (OUTPATIENT)
Dept: ADMINISTRATIVE | Facility: HOSPITAL | Age: 60
End: 2020-06-03

## 2020-06-03 DIAGNOSIS — C85.19 CUTANEOUS B-CELL LYMPHOMA (HCC): ICD-10-CM

## 2020-06-03 DIAGNOSIS — R97.20 ELEVATED PROSTATE SPECIFIC ANTIGEN (PSA): ICD-10-CM

## 2020-06-03 DIAGNOSIS — C61 PROSTATE CANCER (HCC): Primary | ICD-10-CM

## 2020-06-07 ENCOUNTER — HOSPITAL ENCOUNTER (OUTPATIENT)
Dept: RADIOLOGY | Facility: HOSPITAL | Age: 60
Discharge: HOME | End: 2020-06-07
Payer: COMMERCIAL

## 2020-06-07 DIAGNOSIS — M25.512 ACUTE PAIN OF LEFT SHOULDER: ICD-10-CM

## 2020-06-07 DIAGNOSIS — S43.085D OTHER DISLOCATION OF LEFT SHOULDER JOINT, SUBSEQUENT ENCOUNTER: ICD-10-CM

## 2020-06-07 PROCEDURE — 73221 MRI JOINT UPR EXTREM W/O DYE: CPT

## 2020-06-08 ENCOUNTER — HOSPITAL ENCOUNTER (OUTPATIENT)
Dept: RADIOLOGY | Age: 60
Discharge: HOME | End: 2020-06-08
Payer: COMMERCIAL

## 2020-06-08 DIAGNOSIS — C88.4 EXTRANODAL MARGINAL ZONE LYMPHOMA OF MUCOSA-ASSOCIATED LYMPHOID TISSUE (MALT) OF STOMACH (HCC): ICD-10-CM

## 2020-06-08 DIAGNOSIS — D40.0 NEOPLASM OF UNCERTAIN BEHAVIOR OF PROSTATE: ICD-10-CM

## 2020-06-08 LAB — GLUCOSE SERPL-MCNC: 82 MG/DL (ref 65–140)

## 2020-06-08 PROCEDURE — 78815 PET IMAGE W/CT SKULL-THIGH: CPT

## 2020-06-08 PROCEDURE — 82948 REAGENT STRIP/BLOOD GLUCOSE: CPT

## 2020-06-08 PROCEDURE — A9552 F18 FDG: HCPCS

## 2020-06-14 ENCOUNTER — ANESTHESIA EVENT (OUTPATIENT)
Dept: GASTROENTEROLOGY | Facility: HOSPITAL | Age: 60
End: 2020-06-14

## 2020-06-14 RX ORDER — SODIUM CHLORIDE 9 MG/ML
125 INJECTION, SOLUTION INTRAVENOUS CONTINUOUS
Status: CANCELLED | OUTPATIENT
Start: 2020-06-14

## 2020-06-15 ENCOUNTER — ANESTHESIA (OUTPATIENT)
Dept: ANESTHESIOLOGY | Facility: HOSPITAL | Age: 60
End: 2020-06-15

## 2020-06-15 ENCOUNTER — ANESTHESIA EVENT (OUTPATIENT)
Dept: ANESTHESIOLOGY | Facility: HOSPITAL | Age: 60
End: 2020-06-15

## 2020-06-15 ENCOUNTER — ANESTHESIA (OUTPATIENT)
Dept: GASTROENTEROLOGY | Facility: HOSPITAL | Age: 60
End: 2020-06-15

## 2020-06-15 ENCOUNTER — HOSPITAL ENCOUNTER (OUTPATIENT)
Dept: GASTROENTEROLOGY | Facility: HOSPITAL | Age: 60
Setting detail: OUTPATIENT SURGERY
Discharge: HOME | End: 2020-06-15
Attending: INTERNAL MEDICINE | Admitting: INTERNAL MEDICINE
Payer: COMMERCIAL

## 2020-06-15 VITALS
DIASTOLIC BLOOD PRESSURE: 81 MMHG | WEIGHT: 235 LBS | HEART RATE: 82 BPM | HEIGHT: 70 IN | SYSTOLIC BLOOD PRESSURE: 125 MMHG | OXYGEN SATURATION: 96 % | RESPIRATION RATE: 18 BRPM | BODY MASS INDEX: 33.64 KG/M2 | TEMPERATURE: 97.2 F

## 2020-06-15 DIAGNOSIS — R59.0 MEDIASTINAL ADENOPATHY: ICD-10-CM

## 2020-06-15 LAB — SARS-COV-2 RNA RESP QL NAA+PROBE: NEGATIVE

## 2020-06-15 PROCEDURE — 88172 CYTP DX EVAL FNA 1ST EA SITE: CPT | Performed by: PATHOLOGY

## 2020-06-15 PROCEDURE — 88305 TISSUE EXAM BY PATHOLOGIST: CPT | Performed by: PATHOLOGY

## 2020-06-15 PROCEDURE — 88341 IMHCHEM/IMCYTCHM EA ADD ANTB: CPT | Performed by: PATHOLOGY

## 2020-06-15 PROCEDURE — 88342 IMHCHEM/IMCYTCHM 1ST ANTB: CPT | Performed by: PATHOLOGY

## 2020-06-15 PROCEDURE — 88365 INSITU HYBRIDIZATION (FISH): CPT | Performed by: PATHOLOGY

## 2020-06-15 PROCEDURE — 88364 INSITU HYBRIDIZATION (FISH): CPT | Performed by: PATHOLOGY

## 2020-06-15 PROCEDURE — 88368 INSITU HYBRIDIZATION MANUAL: CPT | Performed by: PATHOLOGY

## 2020-06-15 PROCEDURE — 88173 CYTOPATH EVAL FNA REPORT: CPT | Performed by: PATHOLOGY

## 2020-06-15 PROCEDURE — U0003 INFECTIOUS AGENT DETECTION BY NUCLEIC ACID (DNA OR RNA); SEVERE ACUTE RESPIRATORY SYNDROME CORONAVIRUS 2 (SARS-COV-2) (CORONAVIRUS DISEASE [COVID-19]), AMPLIFIED PROBE TECHNIQUE, MAKING USE OF HIGH THROUGHPUT TECHNOLOGIES AS DESCRIBED BY CMS-2020-01-R: HCPCS | Performed by: INTERNAL MEDICINE

## 2020-06-15 RX ORDER — SODIUM CHLORIDE 9 MG/ML
INJECTION, SOLUTION INTRAVENOUS CONTINUOUS PRN
Status: DISCONTINUED | OUTPATIENT
Start: 2020-06-15 | End: 2020-06-15 | Stop reason: SURG

## 2020-06-15 RX ORDER — PROPOFOL 10 MG/ML
INJECTION, EMULSION INTRAVENOUS AS NEEDED
Status: DISCONTINUED | OUTPATIENT
Start: 2020-06-15 | End: 2020-06-15 | Stop reason: SURG

## 2020-06-15 RX ORDER — PROPOFOL 10 MG/ML
INJECTION, EMULSION INTRAVENOUS CONTINUOUS PRN
Status: DISCONTINUED | OUTPATIENT
Start: 2020-06-15 | End: 2020-06-15 | Stop reason: SURG

## 2020-06-15 RX ORDER — LIDOCAINE HYDROCHLORIDE 10 MG/ML
INJECTION, SOLUTION EPIDURAL; INFILTRATION; INTRACAUDAL; PERINEURAL AS NEEDED
Status: DISCONTINUED | OUTPATIENT
Start: 2020-06-15 | End: 2020-06-15 | Stop reason: SURG

## 2020-06-15 RX ADMIN — PROPOFOL 70 MG: 10 INJECTION, EMULSION INTRAVENOUS at 16:50

## 2020-06-15 RX ADMIN — PROPOFOL 40 MG: 10 INJECTION, EMULSION INTRAVENOUS at 16:01

## 2020-06-15 RX ADMIN — LIDOCAINE HYDROCHLORIDE 50 MG: 10 INJECTION, SOLUTION EPIDURAL; INFILTRATION; INTRACAUDAL; PERINEURAL at 16:00

## 2020-06-15 RX ADMIN — PROPOFOL 80 MG: 10 INJECTION, EMULSION INTRAVENOUS at 16:00

## 2020-06-15 RX ADMIN — PROPOFOL 100 MCG/KG/MIN: 10 INJECTION, EMULSION INTRAVENOUS at 16:00

## 2020-06-15 RX ADMIN — PROPOFOL 40 MG: 10 INJECTION, EMULSION INTRAVENOUS at 16:02

## 2020-06-15 RX ADMIN — SODIUM CHLORIDE: 9 INJECTION, SOLUTION INTRAVENOUS at 15:57

## 2020-06-15 RX ADMIN — PROPOFOL 40 MG: 10 INJECTION, EMULSION INTRAVENOUS at 16:03

## 2020-06-16 LAB — SCAN RESULT: NORMAL

## 2020-06-22 DIAGNOSIS — R59.9 ADENOPATHY: ICD-10-CM

## 2020-06-22 PROBLEM — C61 PROSTATE CANCER (HCC): Status: ACTIVE | Noted: 2020-06-22

## 2020-06-22 PROCEDURE — U0003 INFECTIOUS AGENT DETECTION BY NUCLEIC ACID (DNA OR RNA); SEVERE ACUTE RESPIRATORY SYNDROME CORONAVIRUS 2 (SARS-COV-2) (CORONAVIRUS DISEASE [COVID-19]), AMPLIFIED PROBE TECHNIQUE, MAKING USE OF HIGH THROUGHPUT TECHNOLOGIES AS DESCRIBED BY CMS-2020-01-R: HCPCS

## 2020-06-23 ENCOUNTER — ANESTHESIA EVENT (OUTPATIENT)
Dept: PERIOP | Facility: HOSPITAL | Age: 60
End: 2020-06-23
Payer: COMMERCIAL

## 2020-06-23 LAB — SARS-COV-2 RNA SPEC QL NAA+PROBE: NOT DETECTED

## 2020-06-24 ENCOUNTER — HOSPITAL ENCOUNTER (EMERGENCY)
Facility: HOSPITAL | Age: 60
Discharge: HOME | End: 2020-06-24
Attending: EMERGENCY MEDICINE | Admitting: EMERGENCY MEDICINE
Payer: COMMERCIAL

## 2020-06-24 ENCOUNTER — ANESTHESIA (OUTPATIENT)
Dept: PERIOP | Facility: HOSPITAL | Age: 60
End: 2020-06-24
Payer: COMMERCIAL

## 2020-06-24 ENCOUNTER — HOSPITAL ENCOUNTER (OUTPATIENT)
Facility: HOSPITAL | Age: 60
Setting detail: OUTPATIENT SURGERY
Discharge: HOME | End: 2020-06-24
Attending: SURGERY | Admitting: SURGERY
Payer: COMMERCIAL

## 2020-06-24 VITALS
TEMPERATURE: 99.8 F | WEIGHT: 230 LBS | SYSTOLIC BLOOD PRESSURE: 163 MMHG | BODY MASS INDEX: 32.93 KG/M2 | HEIGHT: 70 IN | HEART RATE: 106 BPM | DIASTOLIC BLOOD PRESSURE: 85 MMHG | OXYGEN SATURATION: 93 % | RESPIRATION RATE: 18 BRPM

## 2020-06-24 VITALS
HEIGHT: 70 IN | WEIGHT: 235 LBS | RESPIRATION RATE: 20 BRPM | SYSTOLIC BLOOD PRESSURE: 135 MMHG | OXYGEN SATURATION: 94 % | HEART RATE: 75 BPM | BODY MASS INDEX: 33.64 KG/M2 | DIASTOLIC BLOOD PRESSURE: 80 MMHG | TEMPERATURE: 97.5 F

## 2020-06-24 DIAGNOSIS — R33.8 ACUTE URINARY RETENTION: Primary | ICD-10-CM

## 2020-06-24 DIAGNOSIS — R59.9 ADENOPATHY: ICD-10-CM

## 2020-06-24 PROCEDURE — 88305 TISSUE EXAM BY PATHOLOGIST: CPT | Performed by: PATHOLOGY

## 2020-06-24 PROCEDURE — 88185 FLOWCYTOMETRY/TC ADD-ON: CPT

## 2020-06-24 PROCEDURE — 88184 FLOWCYTOMETRY/ TC 1 MARKER: CPT

## 2020-06-24 PROCEDURE — 88342 IMHCHEM/IMCYTCHM 1ST ANTB: CPT | Performed by: PATHOLOGY

## 2020-06-24 PROCEDURE — 88341 IMHCHEM/IMCYTCHM EA ADD ANTB: CPT | Performed by: PATHOLOGY

## 2020-06-24 PROCEDURE — 99282 EMERGENCY DEPT VISIT SF MDM: CPT | Performed by: PHYSICIAN ASSISTANT

## 2020-06-24 PROCEDURE — 88184 FLOWCYTOMETRY/ TC 1 MARKER: CPT | Performed by: SURGERY

## 2020-06-24 PROCEDURE — 38500 BIOPSY/REMOVAL LYMPH NODES: CPT | Performed by: SURGERY

## 2020-06-24 PROCEDURE — 99283 EMERGENCY DEPT VISIT LOW MDM: CPT

## 2020-06-24 RX ORDER — BUPIVACAINE HYDROCHLORIDE AND EPINEPHRINE 2.5; 5 MG/ML; UG/ML
INJECTION, SOLUTION INFILTRATION; PERINEURAL AS NEEDED
Status: DISCONTINUED | OUTPATIENT
Start: 2020-06-24 | End: 2020-06-24 | Stop reason: HOSPADM

## 2020-06-24 RX ORDER — CEFAZOLIN SODIUM 2 G/50ML
2000 SOLUTION INTRAVENOUS ONCE
Status: COMPLETED | OUTPATIENT
Start: 2020-06-24 | End: 2020-06-24

## 2020-06-24 RX ORDER — HYDROMORPHONE HCL/PF 1 MG/ML
0.5 SYRINGE (ML) INJECTION
Status: DISCONTINUED | OUTPATIENT
Start: 2020-06-24 | End: 2020-06-24 | Stop reason: HOSPADM

## 2020-06-24 RX ORDER — OXYCODONE HYDROCHLORIDE AND ACETAMINOPHEN 5; 325 MG/1; MG/1
1 TABLET ORAL EVERY 4 HOURS PRN
Status: DISCONTINUED | OUTPATIENT
Start: 2020-06-24 | End: 2020-06-24 | Stop reason: HOSPADM

## 2020-06-24 RX ORDER — LIDOCAINE HYDROCHLORIDE 10 MG/ML
0.5 INJECTION, SOLUTION EPIDURAL; INFILTRATION; INTRACAUDAL; PERINEURAL ONCE AS NEEDED
Status: DISCONTINUED | OUTPATIENT
Start: 2020-06-24 | End: 2020-06-24 | Stop reason: HOSPADM

## 2020-06-24 RX ORDER — ONDANSETRON 2 MG/ML
4 INJECTION INTRAMUSCULAR; INTRAVENOUS EVERY 4 HOURS PRN
Status: DISCONTINUED | OUTPATIENT
Start: 2020-06-24 | End: 2020-06-24 | Stop reason: HOSPADM

## 2020-06-24 RX ORDER — LIDOCAINE HYDROCHLORIDE 10 MG/ML
INJECTION, SOLUTION EPIDURAL; INFILTRATION; INTRACAUDAL; PERINEURAL AS NEEDED
Status: DISCONTINUED | OUTPATIENT
Start: 2020-06-24 | End: 2020-06-24 | Stop reason: SURG

## 2020-06-24 RX ORDER — FENTANYL CITRATE 50 UG/ML
INJECTION, SOLUTION INTRAMUSCULAR; INTRAVENOUS AS NEEDED
Status: DISCONTINUED | OUTPATIENT
Start: 2020-06-24 | End: 2020-06-24 | Stop reason: SURG

## 2020-06-24 RX ORDER — NEOSTIGMINE METHYLSULFATE 1 MG/ML
INJECTION INTRAVENOUS AS NEEDED
Status: DISCONTINUED | OUTPATIENT
Start: 2020-06-24 | End: 2020-06-24 | Stop reason: SURG

## 2020-06-24 RX ORDER — ONDANSETRON 2 MG/ML
INJECTION INTRAMUSCULAR; INTRAVENOUS AS NEEDED
Status: DISCONTINUED | OUTPATIENT
Start: 2020-06-24 | End: 2020-06-24 | Stop reason: SURG

## 2020-06-24 RX ORDER — SODIUM CHLORIDE, SODIUM LACTATE, POTASSIUM CHLORIDE, CALCIUM CHLORIDE 600; 310; 30; 20 MG/100ML; MG/100ML; MG/100ML; MG/100ML
100 INJECTION, SOLUTION INTRAVENOUS CONTINUOUS
Status: DISCONTINUED | OUTPATIENT
Start: 2020-06-24 | End: 2020-06-24 | Stop reason: HOSPADM

## 2020-06-24 RX ORDER — OXYCODONE HYDROCHLORIDE AND ACETAMINOPHEN 5; 325 MG/1; MG/1
1 TABLET ORAL EVERY 4 HOURS PRN
Qty: 12 TABLET | Refills: 0 | Status: SHIPPED | OUTPATIENT
Start: 2020-06-24 | End: 2021-01-01 | Stop reason: ALTCHOICE

## 2020-06-24 RX ORDER — ACETAMINOPHEN 325 MG/1
650 TABLET ORAL EVERY 4 HOURS PRN
Status: DISCONTINUED | OUTPATIENT
Start: 2020-06-24 | End: 2020-06-24 | Stop reason: HOSPADM

## 2020-06-24 RX ORDER — GLYCOPYRROLATE 0.2 MG/ML
INJECTION INTRAMUSCULAR; INTRAVENOUS AS NEEDED
Status: DISCONTINUED | OUTPATIENT
Start: 2020-06-24 | End: 2020-06-24 | Stop reason: SURG

## 2020-06-24 RX ORDER — SUCCINYLCHOLINE/SOD CL,ISO/PF 100 MG/5ML
SYRINGE (ML) INTRAVENOUS AS NEEDED
Status: DISCONTINUED | OUTPATIENT
Start: 2020-06-24 | End: 2020-06-24 | Stop reason: SURG

## 2020-06-24 RX ORDER — FENTANYL CITRATE/PF 50 MCG/ML
25 SYRINGE (ML) INJECTION
Status: DISCONTINUED | OUTPATIENT
Start: 2020-06-24 | End: 2020-06-24 | Stop reason: HOSPADM

## 2020-06-24 RX ORDER — PROPOFOL 10 MG/ML
INJECTION, EMULSION INTRAVENOUS AS NEEDED
Status: DISCONTINUED | OUTPATIENT
Start: 2020-06-24 | End: 2020-06-24 | Stop reason: SURG

## 2020-06-24 RX ORDER — ONDANSETRON 2 MG/ML
4 INJECTION INTRAMUSCULAR; INTRAVENOUS ONCE AS NEEDED
Status: DISCONTINUED | OUTPATIENT
Start: 2020-06-24 | End: 2020-06-24 | Stop reason: HOSPADM

## 2020-06-24 RX ORDER — DEXAMETHASONE SODIUM PHOSPHATE 10 MG/ML
INJECTION, SOLUTION INTRAMUSCULAR; INTRAVENOUS AS NEEDED
Status: DISCONTINUED | OUTPATIENT
Start: 2020-06-24 | End: 2020-06-24 | Stop reason: SURG

## 2020-06-24 RX ORDER — ROCURONIUM BROMIDE 10 MG/ML
INJECTION, SOLUTION INTRAVENOUS AS NEEDED
Status: DISCONTINUED | OUTPATIENT
Start: 2020-06-24 | End: 2020-06-24 | Stop reason: SURG

## 2020-06-24 RX ORDER — MIDAZOLAM HYDROCHLORIDE 2 MG/2ML
INJECTION, SOLUTION INTRAMUSCULAR; INTRAVENOUS AS NEEDED
Status: DISCONTINUED | OUTPATIENT
Start: 2020-06-24 | End: 2020-06-24 | Stop reason: SURG

## 2020-06-24 RX ORDER — LIDOCAINE HYDROCHLORIDE 20 MG/ML
1 JELLY TOPICAL ONCE
Status: COMPLETED | OUTPATIENT
Start: 2020-06-24 | End: 2020-06-24

## 2020-06-24 RX ADMIN — DEXAMETHASONE SODIUM PHOSPHATE 10 MG: 10 INJECTION, SOLUTION INTRAMUSCULAR; INTRAVENOUS at 10:31

## 2020-06-24 RX ADMIN — SODIUM CHLORIDE, SODIUM LACTATE, POTASSIUM CHLORIDE, AND CALCIUM CHLORIDE: .6; .31; .03; .02 INJECTION, SOLUTION INTRAVENOUS at 09:59

## 2020-06-24 RX ADMIN — FENTANYL CITRATE 50 MCG: 50 INJECTION, SOLUTION INTRAMUSCULAR; INTRAVENOUS at 10:16

## 2020-06-24 RX ADMIN — LIDOCAINE HYDROCHLORIDE 50 MG: 10 INJECTION, SOLUTION EPIDURAL; INFILTRATION; INTRACAUDAL; PERINEURAL at 10:16

## 2020-06-24 RX ADMIN — Medication 100 MG: at 10:16

## 2020-06-24 RX ADMIN — LIDOCAINE HYDROCHLORIDE 1 APPLICATION: 20 JELLY TOPICAL at 18:00

## 2020-06-24 RX ADMIN — GLYCOPYRROLATE 0.6 MG: 0.2 INJECTION, SOLUTION INTRAMUSCULAR; INTRAVENOUS at 11:11

## 2020-06-24 RX ADMIN — PROPOFOL 180 MG: 10 INJECTION, EMULSION INTRAVENOUS at 10:16

## 2020-06-24 RX ADMIN — SODIUM CHLORIDE, SODIUM LACTATE, POTASSIUM CHLORIDE, AND CALCIUM CHLORIDE 100 ML/HR: .6; .31; .03; .02 INJECTION, SOLUTION INTRAVENOUS at 09:15

## 2020-06-24 RX ADMIN — NEOSTIGMINE METHYLSULFATE 3 MG: 1 INJECTION, SOLUTION INTRAVENOUS at 11:11

## 2020-06-24 RX ADMIN — MIDAZOLAM 2 MG: 1 INJECTION INTRAMUSCULAR; INTRAVENOUS at 10:07

## 2020-06-24 RX ADMIN — ONDANSETRON 4 MG: 2 INJECTION INTRAMUSCULAR; INTRAVENOUS at 10:31

## 2020-06-24 RX ADMIN — ROCURONIUM BROMIDE 35 MG: 10 INJECTION, SOLUTION INTRAVENOUS at 10:20

## 2020-06-24 RX ADMIN — CEFAZOLIN SODIUM 2000 MG: 2 SOLUTION INTRAVENOUS at 10:24

## 2020-06-24 RX ADMIN — ROCURONIUM BROMIDE 5 MG: 10 INJECTION, SOLUTION INTRAVENOUS at 10:16

## 2020-06-25 ENCOUNTER — TELEPHONE (OUTPATIENT)
Dept: UROLOGY | Facility: MEDICAL CENTER | Age: 60
End: 2020-06-25

## 2020-06-25 LAB — SCAN RESULT: NORMAL

## 2020-06-26 ENCOUNTER — TELEPHONE (OUTPATIENT)
Dept: UROLOGY | Facility: CLINIC | Age: 60
End: 2020-06-26

## 2020-06-26 ENCOUNTER — OFFICE VISIT (OUTPATIENT)
Dept: UROLOGY | Facility: CLINIC | Age: 60
End: 2020-06-26
Payer: COMMERCIAL

## 2020-06-26 VITALS
DIASTOLIC BLOOD PRESSURE: 78 MMHG | WEIGHT: 234 LBS | SYSTOLIC BLOOD PRESSURE: 124 MMHG | BODY MASS INDEX: 33.58 KG/M2 | TEMPERATURE: 97.8 F | HEART RATE: 88 BPM

## 2020-06-26 DIAGNOSIS — C61 PROSTATE CANCER (HCC): Primary | ICD-10-CM

## 2020-06-26 PROCEDURE — 99242 OFF/OP CONSLTJ NEW/EST SF 20: CPT | Performed by: PHYSICIAN ASSISTANT

## 2020-06-26 RX ORDER — CYCLOSPORINE 0.5 MG/ML
1 EMULSION OPHTHALMIC EVERY 12 HOURS
COMMUNITY
Start: 2020-06-09 | End: 2022-01-01 | Stop reason: HOSPADM

## 2020-06-26 NOTE — TELEPHONE ENCOUNTER
Patient seen for consult today for retention  He has newly diagnosed prostate cancer and is treated in [place]  For continuity of care can we determine if the patient is going to continue any of his care in our office? If so would need records of his prostate cancer diagnosis and treatment to best manage any future retention issues  Alternatively, patient can contact his [place] urologist and arrange trial of void and plan

## 2020-06-26 NOTE — TELEPHONE ENCOUNTER
Contacted and spoke with patient  Patient states he had a procedure on 06/24/2020, biopsy of lymph node, in [place] and then developed urinary retention and seen at [facility] ED for placement of ferris  Patient awaiting biopsy results and he thinks he will decide to have treatment in [place]  Patient wishes to have ferris care here with [facility]  Patient scheduled for voiding trial next week in our office

## 2020-06-27 ENCOUNTER — HOSPITAL ENCOUNTER (OUTPATIENT)
Dept: MRI IMAGING | Facility: HOSPITAL | Age: 60
Discharge: HOME | End: 2020-06-27

## 2020-06-27 DIAGNOSIS — C85.19 CUTANEOUS B-CELL LYMPHOMA (HCC): ICD-10-CM

## 2020-06-27 DIAGNOSIS — R97.20 ELEVATED PROSTATE SPECIFIC ANTIGEN (PSA): ICD-10-CM

## 2020-06-29 ENCOUNTER — TRANSCRIBE ORDERS (OUTPATIENT)
Dept: ADMINISTRATIVE | Facility: HOSPITAL | Age: 60
End: 2020-06-29

## 2020-06-29 ENCOUNTER — OUTPATIENT (OUTPATIENT)
Dept: OUTPATIENT SERVICES | Facility: HOSPITAL | Age: 60
LOS: 1 days | Discharge: HOME | End: 2020-06-29
Payer: COMMERCIAL

## 2020-06-29 DIAGNOSIS — C85.90 LEUCOSARCOMA (HCC): Primary | ICD-10-CM

## 2020-06-29 DIAGNOSIS — C61 MALIGNANT NEOPLASM OF PROSTATE: ICD-10-CM

## 2020-06-29 DIAGNOSIS — C61 MALIGNANT NEOPLASM OF PROSTATE (HCC): ICD-10-CM

## 2020-06-30 ENCOUNTER — RESULT REVIEW (OUTPATIENT)
Age: 60
End: 2020-06-30

## 2020-06-30 ENCOUNTER — TELEPHONE (OUTPATIENT)
Dept: UROLOGY | Facility: MEDICAL CENTER | Age: 60
End: 2020-06-30

## 2020-06-30 PROBLEM — C85.90 LYMPHOMA, LOW GRADE: Status: ACTIVE | Noted: 2020-06-30

## 2020-06-30 PROBLEM — C61 PROSTATE CANCER: Status: ACTIVE | Noted: 2020-06-30

## 2020-06-30 PROCEDURE — 88321 CONSLTJ&REPRT SLD PREP ELSWR: CPT

## 2020-07-01 ENCOUNTER — APPOINTMENT (OUTPATIENT)
Dept: HEMATOLOGY ONCOLOGY | Facility: CLINIC | Age: 60
End: 2020-07-01
Payer: COMMERCIAL

## 2020-07-01 VITALS
TEMPERATURE: 99 F | SYSTOLIC BLOOD PRESSURE: 120 MMHG | DIASTOLIC BLOOD PRESSURE: 86 MMHG | HEART RATE: 107 BPM | BODY MASS INDEX: 33.14 KG/M2 | WEIGHT: 231.49 LBS | HEIGHT: 70 IN | OXYGEN SATURATION: 98 % | RESPIRATION RATE: 17 BRPM

## 2020-07-01 DIAGNOSIS — Z82.5 FAMILY HISTORY OF ASTHMA AND OTHER CHRONIC LOWER RESPIRATORY DISEASES: ICD-10-CM

## 2020-07-01 DIAGNOSIS — Z80.7 FAMILY HISTORY OF OTHER MALIGNANT NEOPLASMS OF LYMPHOID, HEMATOPOIETIC AND RELATED TISSUES: ICD-10-CM

## 2020-07-01 DIAGNOSIS — Z80.42 FAMILY HISTORY OF MALIGNANT NEOPLASM OF PROSTATE: ICD-10-CM

## 2020-07-01 DIAGNOSIS — Z80.1 FAMILY HISTORY OF MALIGNANT NEOPLASM OF TRACHEA, BRONCHUS AND LUNG: ICD-10-CM

## 2020-07-01 DIAGNOSIS — Z80.3 FAMILY HISTORY OF MALIGNANT NEOPLASM OF BREAST: ICD-10-CM

## 2020-07-01 DIAGNOSIS — C85.90 NON-HODGKIN LYMPHOMA, UNSPECIFIED, UNSPECIFIED SITE: ICD-10-CM

## 2020-07-01 DIAGNOSIS — C61 MALIGNANT NEOPLASM OF PROSTATE: ICD-10-CM

## 2020-07-01 DIAGNOSIS — Z85.72 PERSONAL HISTORY OF NON-HODGKIN LYMPHOMAS: ICD-10-CM

## 2020-07-01 DIAGNOSIS — Z80.8 FAMILY HISTORY OF MALIGNANT NEOPLASM OF OTHER ORGANS OR SYSTEMS: ICD-10-CM

## 2020-07-01 PROCEDURE — 99205 OFFICE O/P NEW HI 60 MIN: CPT

## 2020-07-01 RX ORDER — MECLIZINE HCL 25 MG
25 TABLET ORAL
Refills: 0 | Status: DISCONTINUED | COMMUNITY
End: 2020-07-01

## 2020-07-01 RX ORDER — ONDANSETRON HYDROCHLORIDE 4 MG/1
4 TABLET, FILM COATED ORAL
Refills: 0 | Status: DISCONTINUED | COMMUNITY
End: 2020-07-01

## 2020-07-01 NOTE — REASON FOR VISIT
[Initial Consultation] : an initial consultation [Spouse] : spouse [FreeTextEntry2] : prostate cancer, new diagnosis

## 2020-07-01 NOTE — REVIEW OF SYSTEMS
[Fatigue] : fatigue [Dry Eyes] : dryness of the eyes [Joint Pain] : joint pain [Dizziness] : dizziness [Anxiety] : anxiety [Negative] : ENT [Recent Change In Weight] : ~T no recent weight change [Fever] : no fever [Chills] : no chills [Chest Pain] : no chest pain [Palpitations] : no palpitations [Cough] : no cough [Lower Ext Edema] : no lower extremity edema [Wheezing] : no wheezing [Abdominal Pain] : no abdominal pain [Vomiting] : no vomiting [SOB on Exertion] : no shortness of breath during exertion [Constipation] : no constipation [Dysuria] : no dysuria [Incontinence] : no incontinence [Joint Stiffness] : no joint stiffness [Muscle Pain] : no muscle pain [Hot Flashes] : no hot flashes [Depression] : no depression [Skin Rash] : no skin rash [Muscle Weakness] : no muscle weakness [Easy Bleeding] : no tendency for easy bleeding [Easy Bruising] : no tendency for easy bruising [FreeTextEntry7] : no reflux [FreeTextEntry4] : anosmia for years [FreeTextEntry9] : L shoulder [de-identified] : No HA, no paresthesias. Had vertigo, resolving

## 2020-07-01 NOTE — ASSESSMENT
[Palliative Care Plan] : not applicable at this time [FreeTextEntry1] : Darrian Kennedy was seen in the office today in consultation. Notes from his primary oncologist are not available. In 2018, he was found to have gastric MALT lymphoma and he was found to have H. pylori. He was treated for the H. pylori. He recently had endoscopy performed which show that he still had residual mass, and he was referred for radiation therapy to the stomach and duodenum. He had 20 fractions of radiation. He has not received any systemic treatment for the MALT lymphoma.\par \par He was recently diagnosed with prostate cancer. His PSA increased rapidly. His previous PSAs had been between one and 2 in April of 2020, the PSA increased to a value of 6.7. He was treated with a course of antibiotics and a subsequent PSA was 16.9. An MRI of the prostate was performed in [facility] in this revealed a large tumor within the prostate. The lesion broadly abuts the capsule without any visualized gross extraprostatic extension. The seminal vesicles were normal. Retroperitoneal and pelvic adenopathy was noted. He had a PET/CT previously in January and in 2008, and there was no lymphadenopathy at that time. The PET/CT revealed right common iliac, left external iliac, right external iliac, right internal iliac lymph nodes with the largest lymph node being 1.4 x 1.8 cm. There was a 1.2 cm T1 hypointense/T2 hyperintense lesion in the posterior right acetabulum. This was stated to be suspicious for metastasis.\par \par Another PET/CT was performed in June of 2020, revealing multiple avid lymphadenopathy including a supraclavicular mass on the left, mediastinal disease, as well as retroperitoneal adenopathy. He underwent a biopsy of the left supraclavicular node on June 25. Flow cytometry revealed positive findings for low-grade lymphoma. I did not have the biopsy report until his visit, and he printed the results off of the portal, revealing the presence of prostate adenocarcinoma within the supraclavicular node. The report of his recent PET/CT was not available, but the films were loaded onto her system and reviewed.\par \par States that he feels "okay". He fell and fractured his left humerus and has a labral tear and damage to the rotator cuff. He slipped on some weight surface in the garage. He has some paresthesias of the left neck after the lymph node biopsy. He states he feels tired. He had urinary clots along with retention after the anesthesia. The clots not develop until after the Omalley catheter was placed. The Omalley has been removed, and he currently has good urinary flow, but significant frequency. Nocturia occurs once per night. He was previously as high as 4 or 5 times before starting Cialis and a daily basis in the past. He has no dysuria or incontinence. There is no erectile dysfunction. There are no bone pains except for the left humerus where he sustained a recent fracture.\par \par He was due to have an MRI of the pelvis but there was some mix up, and he waited at the radiology Center last Saturday but they told him he could not be done until clarification was made. This has been rescheduled.\par \par He needs to have a formal bone scan as well with which to compare future studies 2. I aborted this, and he will have this performed locally in [facility].\par \par We discussed the natural history of prostate cancer. We discussed risk stratification of newly diagnosed patients based on screening, which is not really pertinent to his case given his metastatic disease at this point. We discussed the meaning of [place] score. We discussed patterns of metastasis, and 9095% of patients with metastatic prostate cancer to have bone metastasis. Lymph node metastases occur in 50-60% of patients, and in those patients with lymph node metastases alone, survival is much better. We discussed other sites of metastasis which in part a worse prognosis such as liver or lung metastases.\par \par We discussed the basis of treatment for metastatic prostate cancer. I would like to see the results of his MRI and bone scan before proceeding. At first I thought that a bone biopsy might be needed, but then the pathology report was brought to my attention showing metastases within the supraclavicular lymph node.\par \par We discussed the role of LHRH agonists in the management of metastatic prostate cancer. We discussed the typical duration of hormonal response in the meaning of castrate resistant metastatic prostate cancer.\par \par We discussed the role of chemotherapy hormonal therapy as published in the CHAARTED trial in the [place] Journal of Medicine in 2014. Patients treated with Taxotere chemotherapy for 6 cycles with extensive disease had a significant survival benefit over patients treated with standard hormonal therapy alone. The definition of extensive disease in that trial was the presence of 4 or more bone metastases, at least one of which was outside of the axial skeleton. In addition, patients with soft tissue metastases such as liver, lung, and adrenal metastases rule considered high risk. Lymph nodes metastases were not considered in the volume of disease definition.\par \par We then discussed the outcome of the LATTITUDE trial, also published in the [place] Journal of Medicine, in 2017. In this trial, nearly 1200 patients were administered either the GHRH agonist or the same treatment combined with abiraterone and prednisone. Overall survival was much improved with this treatment as well. Once again, the definition of extensive disease was similar to that in the chemohormonal trial.\par \par He can receive his GHRH agonist locally. I've asked him to have his radiology results sent to me, and I will discuss the results with him once they are available. We discussed the adverse effects that may occur with a dictation such as Lupron. These adverse affects include the loss of muscle mass as well as anemia, osteopenia and osteoporosis, loss of libido and sexual function, weight gain and the potential for adverse affects on hypertension and diabetes control, or the unmasking of patients who are predisposed to such disorders. Hypertension and diabetes are associated with an increased risk of cardiovascular disease, and that was also discussed as well. We also discussed how there are cognitive issues that develop in some patients, although this has been hard to define. Hot flushes and fatigue are also common adverse effects from this treatment.\par \par He understands that his disease is incurable, likely stage IV B. We discussed some of the other options for treatment and castrate resistant disease. All questions were answered to best of my ability and to his apparent satisfaction. We will speak by telephone once further studies have been performed.

## 2020-07-01 NOTE — HISTORY OF PRESENT ILLNESS
[Disease: _____________________] : Disease: [unfilled] [T: ___] : T[unfilled] [N: ___] : N[unfilled] [M: ___] : M[unfilled] [AJCC Stage: ____] : AJCC Stage: [unfilled] [de-identified] : Darrian Kennedy is seen in consultation on July 1, 2020. He is referred by Alistair Johnson for prostate cancer. He was seen on May 1 by Dr. Johnson for an elevated PSA. He has a history of B-cell lymphoma which was recently treated with radiation treatment (March to April 2020). He was treated with therapy for H pylori, mass was persistent. . His baseline PSA was between one and 2. In April of 2020, his PSA increased to 6.7. He was treated with antibiotics and the test was repeated. PSA was 16.9. An MRI of the prostate was performed at [facility] in [facility]. The prostate volume was 50.7 cc. The posterior aspects of the bilateral peripheral zone revealed a lesion measuring 4.3 x 1.2 x 2.7 cm. This lesion was PIRADS 5. The lesion broadly abuts the capsule without visualized gross extraprostatic extension. The seminal vesicles were normal. Retroperitoneal lymphadenopathy was present and not seen on the prior is PET/CT. A right common iliac lymph node measured 1.1 x 1.4 cm. A left external iliac lymph node was 1.2 x 1.6 cm. A right external iliac lymph node was 0.9 x 1.1 cm, and a right internal iliac lymph node was 1.4 x 1.8 cm. A right internal iliac lymph node measured 1.2 x 1.6 cm. There was a 1.2 cm T1 hypointense/T2 hyperintense lesion in the posterior right acetabulum. This was labeled as suspicious for metastases.\par \par A PET CT is available from January of 2020. This was performed for extra itz marginal zone lymphoma, a restaging exam. He was originally diagnosed from the duodenum according to the radiology report. This was compared to a prior PET/CT in May of 2018. There were no FDG avid lesions noted. No lymphadenopathy was present at that time. A biopsy report dated June 25 of 2020 was a supraclavicular biopsy. The flow cytometry revealed kappa positive, CD5 positive, CD23 positive, CD20 positive (dim) and CD38 negative. He had a PET/CT this month, report not available, but films reviewed. The LN was read as c/w SLL/CLL in addition to metastatic prostate cancer.\par \par Feels "okay". He feels he has had a rough month, fell and fractured L humerus with a labral tear. Has some discomfort. He has paresthesias of the left neck after the LN biopsy. Feels tired. He had clots and retention after the anesthesias, and had Omalley placed. The clots occurred after the Omalley was placed. Omalley was removed and he has good flow, has frequency, nocturia x 01-. He did have nocturia x 4-5 in the past before starting Cialis on a daily basis. No dysuria. NO incontinence. No erectile dysfunction. No bone pans except for left shoulder due to recent fracture.  [de-identified] : Brigid group 4, with intraductal component.

## 2020-07-01 NOTE — RESULTS/DATA
[FreeTextEntry1] : Final Diagnosis\par 1. Prostate, right posterior medial apex, biopsy\par      -Adenocarcinoma of the prostate, Prognostic Grade Group 4 ([place] score 4+4=8) involving 80% (10 mm in length) of 1 of 1 core(s).\par      -Intraductal carcinoma is also present. \par 2. Prostate, right posterior medial base, biopsy\par      -Adenocarcinoma of the prostate, Prognostic Grade Group 4 (Brigid score 4+4=8) involving 10% (1 mm in length) of 1 of 1 core(s).\par 3. Prostate, right posterior lateral apex, biopsy\par      -Adenocarcinoma of the prostate, Prognostic Grade Group 4 (Brigid score 4+4=8) involving 85% (11 mm in length) of 1 of 1 core(s).\par 4. Prostate, right posterior lateral base, biopsy\par      -Adenocarcinoma of the prostate, Prognostic Grade Group 4 ([place] score 4+4=8) involving 95% (11 mm in length) of 1 of 1 core(s).\par      -Intraductal carcinoma is also present.\par 5. Prostate, right lateral, biopsy\par      -Adenocarcinoma of the prostate, Prognostic Grade Group 4 (Brigid score 4+4=8) involving 10% (1 mm in length) of 1 of 1 core(s).\par      -Intraductal carcinoma is also present.\par 6. Prostate, right anterior, biopsy\par      -Adenocarcinoma of the prostate, Prognostic Grade Group 4 (Brigid score 4+4=8) involving 30% (1 mm in length) of 1 of 1 core(s).\par 7. Prostate, left posterior medial apex, biopsy\par      -Adenocarcinoma of the prostate, Prognostic Grade Group 3 (Brigid score 4+3=7) involving 15% (2.5 mm in length) of 1 of 1 core(s).\par 8. Prostate, left posterior medial base, biopsy\par      -Benign prostate tissue. \par 9. Prostate, left posterior lateral apex, biopsy\par      -Adenocarcinoma of the prostate, Prognostic Grade Group 4 ([place] score 4+4=8) involving 90% (10.5 mm in length) of 1 of 1 core(s).\par 10. Prostate, left posterior lateral base, biopsy\par      -Benign prostate tissue. \par 11. Prostate, left lateral, biopsy\par      -Adenocarcinoma of the prostate, Prognostic Grade Group 1 ([place] score 3+3=6) involving 5% (0.5 mm in length) of 1 of 1 core(s).\par 12. Prostate, left anterior, biopsy\par      -Prostate tissue with a small focus of atypical glands.\par Comment:\par Dr. Johnson was notified of the diagnosis on 6/1/20.\par Case reported to Tumor Registry.\par Verified by: Ruth Taylor M.D., Chief of Genitourinary Pathology\par (Electronic Signature)\par Reported on: 06/01/20 \par ******************************************************\par

## 2020-07-07 ENCOUNTER — HOSPITAL ENCOUNTER (EMERGENCY)
Facility: HOSPITAL | Age: 60
Discharge: HOME | End: 2020-07-07
Attending: EMERGENCY MEDICINE | Admitting: EMERGENCY MEDICINE
Payer: COMMERCIAL

## 2020-07-07 ENCOUNTER — TRANSCRIBE ORDERS (OUTPATIENT)
Dept: ADMINISTRATIVE | Facility: HOSPITAL | Age: 60
End: 2020-07-07

## 2020-07-07 VITALS
TEMPERATURE: 100.4 F | SYSTOLIC BLOOD PRESSURE: 159 MMHG | HEIGHT: 70 IN | BODY MASS INDEX: 32.93 KG/M2 | OXYGEN SATURATION: 95 % | HEART RATE: 98 BPM | WEIGHT: 230 LBS | DIASTOLIC BLOOD PRESSURE: 72 MMHG | RESPIRATION RATE: 18 BRPM

## 2020-07-07 DIAGNOSIS — R68.89 RIGORS: ICD-10-CM

## 2020-07-07 DIAGNOSIS — N39.0 URINARY TRACT INFECTION: Primary | ICD-10-CM

## 2020-07-07 DIAGNOSIS — C61 PROSTATE CA (HCC): Primary | ICD-10-CM

## 2020-07-07 LAB
ANION GAP SERPL CALCULATED.3IONS-SCNC: 7 MMOL/L (ref 4–13)
BACTERIA UR QL AUTO: ABNORMAL /HPF
BASOPHILS # BLD AUTO: 0.05 THOUSANDS/ΜL (ref 0–0.1)
BASOPHILS NFR BLD AUTO: 0 % (ref 0–1)
BILIRUB UR QL STRIP: NEGATIVE
BILIRUB UR QL STRIP: NEGATIVE
BUN SERPL-MCNC: 15 MG/DL (ref 5–25)
CALCIUM SERPL-MCNC: 8.1 MG/DL (ref 8.3–10.1)
CHLORIDE SERPL-SCNC: 103 MMOL/L (ref 100–108)
CLARITY UR: CLEAR
CLARITY UR: CLEAR
CLARITY, POC: CLEAR
CO2 SERPL-SCNC: 29 MMOL/L (ref 21–32)
COLOR UR: YELLOW
COLOR UR: YELLOW
COLOR, POC: YELLOW
CREAT SERPL-MCNC: 0.99 MG/DL (ref 0.6–1.3)
EOSINOPHIL # BLD AUTO: 0.13 THOUSAND/ΜL (ref 0–0.61)
EOSINOPHIL NFR BLD AUTO: 1 % (ref 0–6)
ERYTHROCYTE [DISTWIDTH] IN BLOOD BY AUTOMATED COUNT: 13.3 % (ref 11.6–15.1)
EXT BILIRUBIN, UA: NORMAL
EXT BLOOD URINE: NORMAL
EXT GLUCOSE, UA: NORMAL
EXT KETONES: NORMAL
EXT NITRITE, UA: NORMAL
EXT PH, UA: 7.2
EXT PROTEIN, UA: NORMAL
EXT SPECIFIC GRAVITY, UA: 1.01
EXT UROBILINOGEN: NORMAL
GFR SERPL CREATININE-BSD FRML MDRD: 82 ML/MIN/1.73SQ M
GLUCOSE SERPL-MCNC: 113 MG/DL (ref 65–140)
GLUCOSE UR STRIP-MCNC: NEGATIVE MG/DL
GLUCOSE UR STRIP-MCNC: NEGATIVE MG/DL
HCT VFR BLD AUTO: 40.5 % (ref 36.5–49.3)
HGB BLD-MCNC: 13.6 G/DL (ref 12–17)
HGB UR QL STRIP.AUTO: ABNORMAL
HGB UR QL STRIP.AUTO: ABNORMAL
IMM GRANULOCYTES # BLD AUTO: 0.04 THOUSAND/UL (ref 0–0.2)
IMM GRANULOCYTES NFR BLD AUTO: 0 % (ref 0–2)
KETONES UR STRIP-MCNC: NEGATIVE MG/DL
KETONES UR STRIP-MCNC: NEGATIVE MG/DL
LEUKOCYTE ESTERASE UR QL STRIP: ABNORMAL
LEUKOCYTE ESTERASE UR QL STRIP: ABNORMAL
LYMPHOCYTES # BLD AUTO: 0.29 THOUSANDS/ΜL (ref 0.6–4.47)
LYMPHOCYTES NFR BLD AUTO: 2 % (ref 14–44)
MCH RBC QN AUTO: 29.1 PG (ref 26.8–34.3)
MCHC RBC AUTO-ENTMCNC: 33.6 G/DL (ref 31.4–37.4)
MCV RBC AUTO: 87 FL (ref 82–98)
MONOCYTES # BLD AUTO: 0.08 THOUSAND/ΜL (ref 0.17–1.22)
MONOCYTES NFR BLD AUTO: 1 % (ref 4–12)
MUCOUS THREADS UR QL AUTO: ABNORMAL
NEUTROPHILS # BLD AUTO: 11.46 THOUSANDS/ΜL (ref 1.85–7.62)
NEUTS SEG NFR BLD AUTO: 96 % (ref 43–75)
NITRITE UR QL STRIP: POSITIVE
NITRITE UR QL STRIP: POSITIVE
NON-SQ EPI CELLS URNS QL MICRO: ABNORMAL /HPF
NRBC BLD AUTO-RTO: 0 /100 WBCS
OTHER STN SPEC: ABNORMAL
PH UR STRIP.AUTO: 5.5 [PH]
PH UR STRIP.AUTO: 5.5 [PH]
PLATELET # BLD AUTO: 177 THOUSANDS/UL (ref 149–390)
PMV BLD AUTO: 8.7 FL (ref 8.9–12.7)
POTASSIUM SERPL-SCNC: 3.5 MMOL/L (ref 3.5–5.3)
PROT UR STRIP-MCNC: NEGATIVE MG/DL
PROT UR STRIP-MCNC: NEGATIVE MG/DL
RBC # BLD AUTO: 4.67 MILLION/UL (ref 3.88–5.62)
RBC #/AREA URNS AUTO: ABNORMAL /HPF
SODIUM SERPL-SCNC: 139 MMOL/L (ref 136–145)
SP GR UR STRIP.AUTO: 1.02 (ref 1–1.03)
SP GR UR STRIP.AUTO: 1.02 (ref 1–1.03)
UROBILINOGEN UR QL STRIP.AUTO: 0.2 E.U./DL
UROBILINOGEN UR QL STRIP.AUTO: 0.2 E.U./DL
WBC # BLD AUTO: 12.05 THOUSAND/UL (ref 4.31–10.16)
WBC # BLD EST: NORMAL 10*3/UL
WBC #/AREA URNS AUTO: ABNORMAL /HPF

## 2020-07-07 PROCEDURE — 80048 BASIC METABOLIC PNL TOTAL CA: CPT | Performed by: EMERGENCY MEDICINE

## 2020-07-07 PROCEDURE — 87186 SC STD MICRODIL/AGAR DIL: CPT

## 2020-07-07 PROCEDURE — 96365 THER/PROPH/DIAG IV INF INIT: CPT

## 2020-07-07 PROCEDURE — 99283 EMERGENCY DEPT VISIT LOW MDM: CPT

## 2020-07-07 PROCEDURE — 87077 CULTURE AEROBIC IDENTIFY: CPT

## 2020-07-07 PROCEDURE — 36415 COLL VENOUS BLD VENIPUNCTURE: CPT | Performed by: EMERGENCY MEDICINE

## 2020-07-07 PROCEDURE — 87040 BLOOD CULTURE FOR BACTERIA: CPT | Performed by: EMERGENCY MEDICINE

## 2020-07-07 PROCEDURE — 81001 URINALYSIS AUTO W/SCOPE: CPT

## 2020-07-07 PROCEDURE — 99284 EMERGENCY DEPT VISIT MOD MDM: CPT | Performed by: EMERGENCY MEDICINE

## 2020-07-07 PROCEDURE — 87086 URINE CULTURE/COLONY COUNT: CPT

## 2020-07-07 PROCEDURE — 85025 COMPLETE CBC W/AUTO DIFF WBC: CPT | Performed by: EMERGENCY MEDICINE

## 2020-07-07 RX ORDER — CEFTRIAXONE 1 G/50ML
1000 INJECTION, SOLUTION INTRAVENOUS ONCE
Status: COMPLETED | OUTPATIENT
Start: 2020-07-07 | End: 2020-07-07

## 2020-07-07 RX ORDER — SILODOSIN 4 MG/1
CAPSULE ORAL
COMMUNITY
End: 2021-01-01

## 2020-07-07 RX ORDER — CEPHALEXIN 500 MG/1
500 CAPSULE ORAL EVERY 6 HOURS SCHEDULED
Qty: 28 CAPSULE | Refills: 0 | Status: SHIPPED | OUTPATIENT
Start: 2020-07-07 | End: 2020-07-14

## 2020-07-07 RX ADMIN — CEFTRIAXONE 1000 MG: 1 INJECTION, SOLUTION INTRAVENOUS at 21:54

## 2020-07-08 ENCOUNTER — HOSPITAL ENCOUNTER (OUTPATIENT)
Dept: MRI IMAGING | Facility: HOSPITAL | Age: 60
Discharge: HOME | End: 2020-07-08
Payer: COMMERCIAL

## 2020-07-08 DIAGNOSIS — C61 MALIGNANT NEOPLASM OF PROSTATE (HCC): ICD-10-CM

## 2020-07-08 DIAGNOSIS — C85.90 LEUCOSARCOMA (HCC): ICD-10-CM

## 2020-07-08 PROCEDURE — 73721 MRI JNT OF LWR EXTRE W/O DYE: CPT

## 2020-07-08 NOTE — ED PROVIDER NOTES
History  Chief Complaint   Patient presents with    Shaking     Pt c/o of shaking an frequent urination  HPI     59-year-old male presents for rigors and frequent urination  The patient has a history of recently diagnosed prostate cancer, recently underwent biopsy of lymph node as well as the prostate and had urinary retention requiring Milan insertion approximately 10 days ago  He had hematuria his Milan is removed and he was placed on ciprofloxacin  He states that after two days she stop taking the medication because it did make him feel well  Today around 3 hours prior to arrival developed shaking chills, subjective fever as well as urinary frequency  Denies any other associated symptoms  No other modifying factors  Denies chest pain cough diarrhea nausea vomiting  Patient is a comfortable appearing has normal vital signs other than the T temperature of 100 4° orally  Not tachycardic norm attempt not currently shaking or showing evidence of chills    Assessment plan:  Rigors with likely urinary tract infection with recent Milan placement  Will check basic labs including blood culture just for bacteremia although the patient does not appear to be septic  We will start IV Rocephin, send urine culture, treat with Keflex  Follow up with primary care doctor    Prior to Admission Medications   Prescriptions Last Dose Informant Patient Reported? Taking?    Nutritional Supplements (PRO-RAJINDER PLUS PO)  Self Yes Yes   Sig: Take by mouth   RESTASIS 0 05 % ophthalmic emulsion   Yes Yes   Sig: Administer 1 drop to both eyes every 12 (twelve) hours   Silodosin (Rapaflo) 4 MG CAPS   Yes Yes   Sig: Take by mouth   aspirin (ECOTRIN LOW STRENGTH) 81 mg EC tablet  Self Yes Yes   Sig: Take 81 mg by mouth daily   cholecalciferol (VITAMIN D3) 1,000 units tablet  Self Yes Yes   Sig: Take 500 Units by mouth daily   cimetidine (TAGAMET) 300 MG tablet  Self Yes Yes   Sig: Take 300 mg by mouth 2 (two) times a day oxyCODONE-acetaminophen (PERCOCET) 5-325 mg per tablet Not Taking at Unknown time Self No No   Sig: Take 1 tablet by mouth every 4 (four) hours as needed for moderate pain for up to 12 dosesMax Daily Amount: 6 tablets   Patient not taking: Reported on 7/7/2020   pantoprazole (PROTONIX) 40 mg tablet  Self Yes Yes   rosuvastatin (CRESTOR) 5 mg tablet  Self Yes Yes   Sig: Take 5 mg by mouth daily   tadalafil (CIALIS) 5 MG tablet  Self Yes Yes   Sig: Take 5 mg by mouth daily at bedtime       Facility-Administered Medications: None       Past Medical History:   Diagnosis Date    Cancer ([facility] )     Stomach    [facility] ) 05/2018    Non Hodgkin's lymphoma ([facility] )     Thoracic aortic aneurysm ([facility] )        Past Surgical History:   Procedure Laterality Date    APPENDECTOMY      COLONOSCOPY      HERNIA REPAIR      B/L hernia repair    KNEE ARTHROSCOPY      KNEE ARTHROSCOPY, MEDIAL PATELLO FEMORAL LIGAMENT REPAIR Left     LINEAR ENDOSCOPIC U/S N/A 6/13/2018    Procedure: LINEAR ENDOSCOPIC U/S;  Surgeon: Maranda Montes MD;  [facility]; Service: Gastroenterology    NC BIOPSY/EXCISION, LYMPH NODE(S) Left 6/24/2020    Procedure: EXCISION BIOPSY LYMPH NODE SUPRACLAVICULAR;  Surgeon: Power Blanco MD;  [facility];  Service: General       Family History   Problem Relation Age of Onset   Valentine Morelnad Melanoma Mother     Prostate cancer Father     Lung cancer Father     Breast cancer Sister     Pancreatic cancer Paternal Grandfather     Hodgkin's lymphoma Sister      I have reviewed and agree with the history as documented      E-Cigarette/Vaping    E-Cigarette Use Never User      E-Cigarette/Vaping Substances    Nicotine No     Flavoring No      Social History     Tobacco Use    Smoking status: Never Smoker    Smokeless tobacco: Never Used   Substance Use Topics    Alcohol use: Not Currently     Frequency: Never     Comment: 2 drinks/month    Drug use: No       Review of Systems   Constitutional: Positive for chills  Negative for fatigue and fever  Eyes: Negative for photophobia and visual disturbance  Respiratory: Negative for cough and shortness of breath  Cardiovascular: Negative for chest pain, palpitations and leg swelling  Gastrointestinal: Negative for diarrhea, nausea and vomiting  Endocrine: Negative for polydipsia and polyuria  Genitourinary: Positive for urgency  Negative for decreased urine volume, difficulty urinating, dysuria and frequency  Musculoskeletal: Negative for back pain, neck pain and neck stiffness  Skin: Negative for color change and rash  Allergic/Immunologic: Negative for environmental allergies and immunocompromised state  Neurological: Negative for dizziness and headaches  Hematological: Negative for adenopathy  Does not bruise/bleed easily  Psychiatric/Behavioral: Negative for dysphoric mood  The patient is not nervous/anxious  Physical Exam  Physical Exam   Constitutional: He is oriented to person, place, and time  He appears well-developed  HENT:   Head: Normocephalic and atraumatic  Right Ear: External ear normal    Left Ear: External ear normal    Mouth/Throat: Oropharynx is clear and moist    Eyes: Pupils are equal, round, and reactive to light  Conjunctivae and EOM are normal    Neck: Normal range of motion  Neck supple  No JVD present  No thyromegaly present  Cardiovascular: Normal rate, regular rhythm and normal heart sounds  Exam reveals no gallop and no friction rub  No murmur heard  Pulmonary/Chest: Effort normal and breath sounds normal  No respiratory distress  He has no wheezes  He has no rales  Abdominal: Soft  Bowel sounds are normal  He exhibits no distension  There is no rebound and no guarding  Musculoskeletal: Normal range of motion  He exhibits no edema  Lymphadenopathy:     He has no cervical adenopathy  Neurological: He is alert and oriented to person, place, and time  No cranial nerve deficit     Skin: Skin is warm  Psychiatric: He has a normal mood and affect  His behavior is normal    Nursing note and vitals reviewed  Vital Signs  ED Triage Vitals [07/07/20 2035]   Temperature Pulse Respirations Blood Pressure SpO2   100 4 °F (38 °C) 91 18 146/76 96 %      Temp Source Heart Rate Source Patient Position - Orthostatic VS BP Location FiO2 (%)   Temporal Monitor Lying Right arm --      Pain Score       --           Vitals:    07/07/20 2035 07/07/20 2241   BP: 146/76 159/72   Pulse: 91 98   Patient Position - Orthostatic VS: Lying Lying         Visual Acuity      ED Medications  Medications   cefTRIAXone (ROCEPHIN) IVPB (premix) 1,000 mg 50 mL (0 mg Intravenous Stopped 7/7/20 2229)       Diagnostic Studies  Results Reviewed     Procedure Component Value Units Date/Time    Blood culture #1 [[id number]] Collected:  07/07/20 2154    Lab Status:  Preliminary result Specimen:  Blood from Arm, Right Updated:  07/08/20 1001     Blood Culture Received in Microbiology Lab  Culture in Progress  Blood culture #2 [[id number]] Collected:  07/07/20 2154    Lab Status:  Preliminary result Specimen:  Blood from Hand, Right Updated:  07/08/20 1001     Blood Culture Received in Microbiology Lab  Culture in Progress      CBC and differential [[id number]]  (Abnormal) Collected:  07/07/20 2154    Lab Status:  Final result Specimen:  Blood from Arm, Right Updated:  07/07/20 2236     WBC 12 05 Thousand/uL      RBC 4 67 Million/uL      Hemoglobin 13 6 g/dL      Hematocrit 40 5 %      MCV 87 fL      MCH 29 1 pg      MCHC 33 6 g/dL      RDW 13 3 %      MPV 8 7 fL      Platelets 557 Thousands/uL      nRBC 0 /100 WBCs      Neutrophils Relative 96 %      Immat GRANS % 0 %      Lymphocytes Relative 2 %      Monocytes Relative 1 %      Eosinophils Relative 1 %      Basophils Relative 0 %      Neutrophils Absolute 11 46 Thousands/µL      Immature Grans Absolute 0 04 Thousand/uL      Lymphocytes Absolute 0 29 Thousands/µL      Monocytes Absolute 0 08 Thousand/µL      Eosinophils Absolute 0 13 Thousand/µL      Basophils Absolute 0 05 Thousands/µL     Narrative: This is an appended report  These results have been appended to a previously verified report      UA w Reflex to Microscopic w Reflex to Culture [[id number]]  (Abnormal) Collected:  07/07/20 2208    Lab Status:  Final result Specimen:  Urine, Clean Catch Updated:  07/07/20 2223     Color, UA Yellow     Clarity, UA Clear     Specific Gravity, UA 1 025     pH, UA 5 5     Leukocytes, UA Small     Nitrite, UA Positive     Protein, UA Negative mg/dl      Glucose, UA Negative mg/dl      Ketones, UA Negative mg/dl      Urobilinogen, UA 0 2 E U /dl      Bilirubin, UA Negative     Blood, UA Moderate    Basic metabolic panel [[id number]]  (Abnormal) Collected:  07/07/20 2154    Lab Status:  Final result Specimen:  Blood from Arm, Right Updated:  07/07/20 2221     Sodium 139 mmol/L      Potassium 3 5 mmol/L      Chloride 103 mmol/L      CO2 29 mmol/L      ANION GAP 7 mmol/L      BUN 15 mg/dL      Creatinine 0 99 mg/dL      Glucose 113 mg/dL      Calcium 8 1 mg/dL      eGFR 82 ml/min/1 73sq m     Narrative:       [place] guidelines for Chronic Kidney Disease (CKD):     Stage 1 with normal or high GFR (GFR > 90 mL/min/1 73 square meters)    Stage 2 Mild CKD (GFR = 60-89 mL/min/1 73 square meters)    Stage 3A Moderate CKD (GFR = 45-59 mL/min/1 73 square meters)    Stage 3B Moderate CKD (GFR = 30-44 mL/min/1 73 square meters)    Stage 4 Severe CKD (GFR = 15-29 mL/min/1 73 square meters)    Stage 5 End Stage CKD (GFR <15 mL/min/1 73 square meters)  Note: GFR calculation is accurate only with a steady state creatinine    Urine Microscopic [[id number]]  (Abnormal) Collected:  07/07/20 2049    Lab Status:  Final result Specimen:  Urine, Clean Catch Updated:  07/07/20 2124     RBC, UA 1-2 /hpf      WBC, UA 10-20 /hpf      Epithelial Cells Moderate /hpf      Bacteria, UA Moderate /hpf OTHER OBSERVATIONS Sperm Present  WBCs Clumped  Renal Tubule Epithelial Cells Present     MUCUS THREADS Occasional    Urine culture [[id number]] Collected:  07/07/20 2049    Lab Status: In process Specimen:  Urine, Clean Catch Updated:  07/07/20 2124    UA w Reflex to Microscopic w Reflex to Culture [[id number]]  (Abnormal) Collected:  07/07/20 2049    Lab Status:  Final result Specimen:  Urine, Clean Catch Updated:  07/07/20 2057     Color, UA Yellow     Clarity, UA Clear     Specific [place], UA 1 020     pH, UA 5 5     Leukocytes, UA Small     Nitrite, UA Positive     Protein, UA Negative mg/dl      Glucose, UA Negative mg/dl      Ketones, UA Negative mg/dl      Urobilinogen, UA 0 2 E U /dl      Bilirubin, UA Negative     Blood, UA Moderate    POCT urinalysis dipstick [[id number]]  (Normal) Resulted:  07/07/20 2045    Lab Status:  Final result Updated:  07/07/20 2045     Color, UA yellow     Clarity, UA clear     Glucose, UA (Ref: Negative) neg     Bilirubin, UA (Ref: Negative) neg     Ketones, UA (Ref: Negative) neg     Spec Grav, UA (Ref:1 003-1 030) 1 010     Blood, UA (Ref: Negative) pos     pH, UA (Ref: 4 5-8 0) 7 2     Protein, UA (Ref: Negative) neg     Urobilinogen, UA (Ref: 0 2- 1 0) neg      Leukocytes, UA (Ref: Negative) pos     Nitrite, UA (Ref: Negative) pos                 No orders to display              Procedures  Procedures         ED Course       US AUDIT      Most Recent Value   Initial Alcohol Screen: US AUDIT-C    1   How often do you have a drink containing alcohol?  0 Filed at: 07/07/2020 2036   Audit-C Score  0 Filed at: 07/07/2020 2036                                            MDM  Number of Diagnoses or Management Options  Rigors: new and requires workup  Urinary tract infection:      Amount and/or Complexity of Data Reviewed  Clinical lab tests: reviewed and ordered  Tests in the medicine section of CPT®: ordered and reviewed  Review and summarize past medical records: yes    Patient Progress  Patient progress: stable        Disposition  Final diagnoses:   Urinary tract infection   Rigors     Time reflects when diagnosis was documented in both MDM as applicable and the Disposition within this note     Time User Action Codes Description Comment    7/7/2020  9:52 PM Sofie GARCIA Add [N39 0] Urinary tract infection     7/7/2020  9:52 PM Dede Lundborg Add [R68 [id number]] [address] [place]       ED Disposition     ED Disposition Condition Date/Time Comment    Discharge Stable Tue Jul 7, 2020 10:28 PM Hollis Li discharge to home/self care              Follow-up Information     Follow up With Specialties Details Why [id number] Oanh Banegas MD [facility] Medicine Schedule an appointment as soon as possible for a visit in 2 days  [facility]  [address]   [phone]            Discharge Medication List as of 7/7/2020 10:28 PM      START taking these medications    Details   cephalexin (KEFLEX) 500 mg capsule Take 1 capsule (500 mg total) by mouth every 6 (six) hours for 7 days, Starting Tue 7/7/2020, Until Tue 7/14/2020, Normal         CONTINUE these medications which have NOT CHANGED    Details   aspirin (ECOTRIN LOW STRENGTH) 81 mg EC tablet Take 81 mg by mouth daily, Historical Med      cholecalciferol (VITAMIN D3) 1,000 units tablet Take 500 Units by mouth daily, Historical Med      cimetidine (TAGAMET) 300 MG tablet Take 300 mg by mouth 2 (two) times a day, Historical Med      Nutritional Supplements (PRO-RAJINDER PLUS PO) Take by mouth, Historical Med      oxyCODONE-acetaminophen (PERCOCET) 5-325 mg per tablet Take 1 tablet by mouth every 4 (four) hours as needed for moderate pain for up to 12 dosesMax Daily Amount: 6 tablets, Starting Wed 6/24/2020, Normal      pantoprazole (PROTONIX) 40 mg tablet Starting Sat 3/7/2020, Historical Med      RESTASIS 0 05 % ophthalmic emulsion Administer 1 drop to both eyes every 12 (twelve) hours, Starting Tue 6/9/2020, Historical Med      rosuvastatin (CRESTOR) 5 mg tablet Take 5 mg by mouth daily, Historical Med      tadalafil (CIALIS) 5 MG tablet Take 5 mg by mouth daily at bedtime , Historical Med      Silodosin (Rapaflo) 4 MG CAPS Take by mouth, Historical Med           No discharge procedures on file      PDMP Review     None          ED Provider  Electronically Signed by           Gwen Schreiber DO  07/08/20 3555

## 2020-07-10 LAB — BACTERIA UR CULT: ABNORMAL

## 2020-07-13 ENCOUNTER — HOSPITAL ENCOUNTER (OUTPATIENT)
Dept: RADIOLOGY | Facility: HOSPITAL | Age: 60
Discharge: HOME | End: 2020-07-13
Payer: COMMERCIAL

## 2020-07-13 ENCOUNTER — TRANSCRIBE ORDERS (OUTPATIENT)
Dept: ADMINISTRATIVE | Facility: HOSPITAL | Age: 60
End: 2020-07-13

## 2020-07-13 DIAGNOSIS — C79.51 SECONDARY MALIGNANT NEOPLASM OF BONE AND BONE MARROW (HCC): ICD-10-CM

## 2020-07-13 DIAGNOSIS — C85.19 CUTANEOUS B-CELL LYMPHOMA (HCC): ICD-10-CM

## 2020-07-13 DIAGNOSIS — C79.52 SECONDARY MALIGNANT NEOPLASM OF BONE AND BONE MARROW (HCC): ICD-10-CM

## 2020-07-13 DIAGNOSIS — C85.19 CUTANEOUS B-CELL LYMPHOMA (HCC): Primary | ICD-10-CM

## 2020-07-13 DIAGNOSIS — C61 MALIGNANT NEOPLASM OF PROSTATE (HCC): ICD-10-CM

## 2020-07-13 LAB
BACTERIA BLD CULT: NORMAL
BACTERIA BLD CULT: NORMAL

## 2020-07-13 PROCEDURE — 73522 X-RAY EXAM HIPS BI 3-4 VIEWS: CPT

## 2020-07-13 PROCEDURE — 73552 X-RAY EXAM OF FEMUR 2/>: CPT

## 2020-07-14 ENCOUNTER — TELEPHONE (OUTPATIENT)
Dept: INTERVENTIONAL RADIOLOGY/VASCULAR | Facility: HOSPITAL | Age: 60
End: 2020-07-14

## 2020-07-14 ENCOUNTER — HOSPITAL ENCOUNTER (OUTPATIENT)
Dept: NUCLEAR MEDICINE | Facility: HOSPITAL | Age: 60
Discharge: HOME | End: 2020-07-14
Payer: COMMERCIAL

## 2020-07-14 DIAGNOSIS — C61 PROSTATE CA (HCC): ICD-10-CM

## 2020-07-14 PROCEDURE — A9503 TC99M MEDRONATE: HCPCS

## 2020-07-14 PROCEDURE — 78306 BONE IMAGING WHOLE BODY: CPT

## 2020-07-14 RX ORDER — SODIUM CHLORIDE 9 MG/ML
125 INJECTION, SOLUTION INTRAVENOUS ONCE
Status: CANCELLED | OUTPATIENT
Start: 2020-07-17

## 2020-07-15 ENCOUNTER — TELEPHONE (OUTPATIENT)
Dept: INTERVENTIONAL RADIOLOGY/VASCULAR | Facility: HOSPITAL | Age: 60
End: 2020-07-15

## 2020-07-16 ENCOUNTER — TELEPHONE (OUTPATIENT)
Dept: INTERVENTIONAL RADIOLOGY/VASCULAR | Facility: HOSPITAL | Age: 60
End: 2020-07-16

## 2020-07-22 ENCOUNTER — HOSPITAL ENCOUNTER (OUTPATIENT)
Dept: MRI IMAGING | Facility: HOSPITAL | Age: 60
Discharge: HOME | End: 2020-07-22
Payer: COMMERCIAL

## 2020-07-22 DIAGNOSIS — C85.19 CUTANEOUS B-CELL LYMPHOMA (HCC): ICD-10-CM

## 2020-07-22 LAB — SCAN RESULT: NORMAL

## 2020-07-22 PROCEDURE — 72146 MRI CHEST SPINE W/O DYE: CPT

## 2020-07-22 PROCEDURE — 72148 MRI LUMBAR SPINE W/O DYE: CPT

## 2020-07-22 PROCEDURE — 72141 MRI NECK SPINE W/O DYE: CPT

## 2020-07-31 ENCOUNTER — HOSPITAL ENCOUNTER (OUTPATIENT)
Dept: CT IMAGING | Facility: HOSPITAL | Age: 60
Discharge: HOME | End: 2020-07-31
Payer: COMMERCIAL

## 2020-07-31 DIAGNOSIS — C61 MALIGNANT NEOPLASM OF PROSTATE (HCC): ICD-10-CM

## 2020-07-31 DIAGNOSIS — C85.19 CUTANEOUS B-CELL LYMPHOMA (HCC): ICD-10-CM

## 2020-07-31 PROCEDURE — 74177 CT ABD & PELVIS W/CONTRAST: CPT

## 2020-07-31 PROCEDURE — 71260 CT THORAX DX C+: CPT

## 2020-07-31 RX ADMIN — IOHEXOL 100 ML: 350 INJECTION, SOLUTION INTRAVENOUS at 15:51

## 2020-09-14 ENCOUNTER — TRANSCRIBE ORDERS (OUTPATIENT)
Dept: ADMINISTRATIVE | Facility: HOSPITAL | Age: 60
End: 2020-09-14

## 2020-09-14 DIAGNOSIS — C61 MALIGNANT NEOPLASM OF PROSTATE (HCC): ICD-10-CM

## 2020-09-14 DIAGNOSIS — C85.19 UNSPECIFIED B-CELL LYMPHOMA, EXTRANODAL AND SOLID ORGAN SITES (HCC): ICD-10-CM

## 2020-09-14 DIAGNOSIS — C79.51 SECONDARY MALIGNANT NEOPLASM OF BONE (HCC): ICD-10-CM

## 2020-09-14 DIAGNOSIS — C85.19 UNSPECIFIED B-CELL LYMPHOMA, EXTRANODAL AND SOLID ORGAN SITES (HCC): Primary | ICD-10-CM

## 2020-09-14 DIAGNOSIS — R42 VERTIGO: ICD-10-CM

## 2020-09-14 DIAGNOSIS — R42 DIZZINESS AND GIDDINESS: Primary | ICD-10-CM

## 2020-11-11 ENCOUNTER — HOSPITAL ENCOUNTER (OUTPATIENT)
Dept: CT IMAGING | Facility: HOSPITAL | Age: 60
Discharge: HOME | End: 2020-11-11
Payer: COMMERCIAL

## 2020-11-11 DIAGNOSIS — C61 MALIGNANT NEOPLASM OF PROSTATE (HCC): ICD-10-CM

## 2020-11-11 DIAGNOSIS — C85.19 UNSPECIFIED B-CELL LYMPHOMA, EXTRANODAL AND SOLID ORGAN SITES (HCC): ICD-10-CM

## 2020-11-11 DIAGNOSIS — C79.51 SECONDARY MALIGNANT NEOPLASM OF BONE (HCC): ICD-10-CM

## 2020-11-11 PROCEDURE — 71260 CT THORAX DX C+: CPT

## 2020-11-11 PROCEDURE — G1004 CDSM NDSC: HCPCS

## 2020-11-11 PROCEDURE — 74177 CT ABD & PELVIS W/CONTRAST: CPT

## 2020-11-11 RX ADMIN — IOHEXOL 100 ML: 350 INJECTION, SOLUTION INTRAVENOUS at 10:24

## 2020-12-21 ENCOUNTER — TRANSCRIBE ORDERS (OUTPATIENT)
Dept: ADMINISTRATIVE | Facility: HOSPITAL | Age: 60
End: 2020-12-21

## 2020-12-21 DIAGNOSIS — C61 MALIGNANT NEOPLASM OF PROSTATE (HCC): Primary | ICD-10-CM

## 2020-12-21 DIAGNOSIS — C85.19 UNSPECIFIED B-CELL LYMPHOMA, EXTRANODAL AND SOLID ORGAN SITES (HCC): ICD-10-CM

## 2020-12-21 DIAGNOSIS — C79.51 SECONDARY MALIGNANT NEOPLASM OF BONE (HCC): ICD-10-CM

## 2021-01-01 ENCOUNTER — HOSPITAL ENCOUNTER (OUTPATIENT)
Dept: MRI IMAGING | Facility: HOSPITAL | Age: 61
Discharge: HOME | End: 2021-06-16
Attending: INTERNAL MEDICINE
Payer: COMMERCIAL

## 2021-01-01 ENCOUNTER — HOSPITAL ENCOUNTER (OUTPATIENT)
Dept: RADIOLOGY | Facility: HOSPITAL | Age: 61
Discharge: HOME | End: 2021-11-04
Attending: INTERNAL MEDICINE
Payer: COMMERCIAL

## 2021-01-01 ENCOUNTER — APPOINTMENT (OUTPATIENT)
Dept: RADIATION ONCOLOGY | Facility: HOSPITAL | Age: 61
End: 2021-01-01
Attending: RADIOLOGY
Payer: COMMERCIAL

## 2021-01-01 ENCOUNTER — ANESTHESIA EVENT (INPATIENT)
Dept: GASTROENTEROLOGY | Facility: HOSPITAL | Age: 61
DRG: 853 | End: 2021-01-01
Payer: COMMERCIAL

## 2021-01-01 ENCOUNTER — APPOINTMENT (EMERGENCY)
Dept: CT IMAGING | Facility: HOSPITAL | Age: 61
End: 2021-01-01
Payer: COMMERCIAL

## 2021-01-01 ENCOUNTER — HOSPITAL ENCOUNTER (OUTPATIENT)
Dept: ULTRASOUND IMAGING | Facility: HOSPITAL | Age: 61
Discharge: HOME | End: 2021-11-03
Payer: COMMERCIAL

## 2021-01-01 ENCOUNTER — HOSPITAL ENCOUNTER (OUTPATIENT)
Dept: NON INVASIVE DIAGNOSTICS | Facility: HOSPITAL | Age: 61
Discharge: HOME | End: 2021-10-21
Payer: COMMERCIAL

## 2021-01-01 ENCOUNTER — APPOINTMENT (EMERGENCY)
Dept: RADIOLOGY | Facility: HOSPITAL | Age: 61
DRG: 853 | End: 2021-01-01
Payer: COMMERCIAL

## 2021-01-01 ENCOUNTER — TRANSCRIBE ORDERS (OUTPATIENT)
Dept: ADMINISTRATIVE | Facility: HOSPITAL | Age: 61
End: 2021-01-01

## 2021-01-01 ENCOUNTER — HOSPITAL ENCOUNTER (OUTPATIENT)
Dept: MRI IMAGING | Facility: HOSPITAL | Age: 61
Discharge: HOME | End: 2021-05-21
Payer: COMMERCIAL

## 2021-01-01 ENCOUNTER — APPOINTMENT (INPATIENT)
Dept: NON INVASIVE DIAGNOSTICS | Facility: HOSPITAL | Age: 61
DRG: 853 | End: 2021-01-01
Payer: COMMERCIAL

## 2021-01-01 ENCOUNTER — HOSPITAL ENCOUNTER (OUTPATIENT)
Dept: INFUSION CENTER | Facility: HOSPITAL | Age: 61
Discharge: HOME | End: 2021-10-22
Payer: COMMERCIAL

## 2021-01-01 ENCOUNTER — APPOINTMENT (OUTPATIENT)
Dept: RADIATION ONCOLOGY | Facility: HOSPITAL | Age: 61
End: 2021-01-01
Payer: COMMERCIAL

## 2021-01-01 ENCOUNTER — TELEPHONE (OUTPATIENT)
Dept: SURGERY | Facility: HOSPITAL | Age: 61
End: 2021-01-01

## 2021-01-01 ENCOUNTER — APPOINTMENT (OUTPATIENT)
Dept: LAB | Facility: HOSPITAL | Age: 61
End: 2021-01-01
Payer: COMMERCIAL

## 2021-01-01 ENCOUNTER — LAB REQUISITION (OUTPATIENT)
Dept: LAB | Facility: HOSPITAL | Age: 61
End: 2021-01-01
Payer: COMMERCIAL

## 2021-01-01 ENCOUNTER — APPOINTMENT (INPATIENT)
Dept: RADIOLOGY | Facility: HOSPITAL | Age: 61
DRG: 853 | End: 2021-01-01
Payer: COMMERCIAL

## 2021-01-01 ENCOUNTER — HOSPITAL ENCOUNTER (OUTPATIENT)
Dept: MRI IMAGING | Facility: HOSPITAL | Age: 61
Discharge: HOME | End: 2021-11-08
Payer: COMMERCIAL

## 2021-01-01 ENCOUNTER — HOSPITAL ENCOUNTER (OUTPATIENT)
Dept: INFUSION CENTER | Facility: HOSPITAL | Age: 61
Discharge: HOME | End: 2021-12-17
Payer: COMMERCIAL

## 2021-01-01 ENCOUNTER — HOSPITAL ENCOUNTER (OUTPATIENT)
Dept: RADIOLOGY | Age: 61
Discharge: HOME | End: 2021-10-04
Payer: COMMERCIAL

## 2021-01-01 ENCOUNTER — HOSPITAL ENCOUNTER (OUTPATIENT)
Dept: MRI IMAGING | Facility: HOSPITAL | Age: 61
Discharge: HOME | End: 2021-10-22
Payer: COMMERCIAL

## 2021-01-01 ENCOUNTER — HOSPITAL ENCOUNTER (OUTPATIENT)
Dept: NON INVASIVE DIAGNOSTICS | Facility: CLINIC | Age: 61
Discharge: HOME | End: 2021-07-26
Payer: COMMERCIAL

## 2021-01-01 ENCOUNTER — HOSPITAL ENCOUNTER (OUTPATIENT)
Dept: RADIOLOGY | Facility: HOSPITAL | Age: 61
Setting detail: RADIATION/ONCOLOGY SERIES
Discharge: HOME | End: 2021-06-15
Attending: RADIOLOGY
Payer: COMMERCIAL

## 2021-01-01 ENCOUNTER — HOSPITAL ENCOUNTER (INPATIENT)
Facility: HOSPITAL | Age: 61
LOS: 6 days | Discharge: HOME | DRG: 853 | End: 2021-09-21
Attending: EMERGENCY MEDICINE | Admitting: INTERNAL MEDICINE
Payer: COMMERCIAL

## 2021-01-01 ENCOUNTER — PATIENT OUTREACH (OUTPATIENT)
Dept: CASE MANAGEMENT | Facility: HOSPITAL | Age: 61
End: 2021-01-01

## 2021-01-01 ENCOUNTER — HOSPITAL ENCOUNTER (OUTPATIENT)
Dept: INFUSION CENTER | Facility: HOSPITAL | Age: 61
Discharge: HOME | End: 2021-12-24
Payer: COMMERCIAL

## 2021-01-01 ENCOUNTER — HOSPITAL ENCOUNTER (OUTPATIENT)
Dept: INFUSION CENTER | Facility: HOSPITAL | Age: 61
Discharge: HOME | End: 2021-11-12
Payer: COMMERCIAL

## 2021-01-01 ENCOUNTER — TELEPHONE (OUTPATIENT)
Dept: GASTROENTEROLOGY | Facility: HOSPITAL | Age: 61
End: 2021-01-01

## 2021-01-01 ENCOUNTER — HOSPITAL ENCOUNTER (EMERGENCY)
Facility: HOSPITAL | Age: 61
Discharge: HOME | End: 2021-10-22
Attending: EMERGENCY MEDICINE
Payer: COMMERCIAL

## 2021-01-01 ENCOUNTER — ANESTHESIA (INPATIENT)
Dept: GASTROENTEROLOGY | Facility: HOSPITAL | Age: 61
DRG: 853 | End: 2021-01-01
Payer: COMMERCIAL

## 2021-01-01 ENCOUNTER — HOSPITAL ENCOUNTER (OUTPATIENT)
Dept: CT IMAGING | Facility: HOSPITAL | Age: 61
Discharge: HOME | End: 2021-11-03
Payer: COMMERCIAL

## 2021-01-01 ENCOUNTER — HOSPITAL ENCOUNTER (OUTPATIENT)
Dept: MRI IMAGING | Facility: HOSPITAL | Age: 61
Discharge: HOME | End: 2021-07-06
Payer: COMMERCIAL

## 2021-01-01 ENCOUNTER — TELEMEDICINE (OUTPATIENT)
Dept: RADIATION ONCOLOGY | Facility: CLINIC | Age: 61
End: 2021-01-01
Attending: RADIOLOGY

## 2021-01-01 ENCOUNTER — HOSPITAL ENCOUNTER (OUTPATIENT)
Dept: INFUSION CENTER | Facility: HOSPITAL | Age: 61
Discharge: HOME | End: 2021-12-02
Payer: COMMERCIAL

## 2021-01-01 ENCOUNTER — HOSPITAL ENCOUNTER (OUTPATIENT)
Dept: CT IMAGING | Facility: HOSPITAL | Age: 61
Discharge: HOME | End: 2021-05-21
Payer: COMMERCIAL

## 2021-01-01 ENCOUNTER — HOSPITAL ENCOUNTER (OUTPATIENT)
Dept: RADIOLOGY | Age: 61
Discharge: HOME | End: 2021-06-03
Payer: COMMERCIAL

## 2021-01-01 ENCOUNTER — HOSPITAL ENCOUNTER (OUTPATIENT)
Dept: GASTROENTEROLOGY | Facility: HOSPITAL | Age: 61
Setting detail: OUTPATIENT SURGERY
Discharge: HOME | End: 2021-09-15
Attending: INTERNAL MEDICINE

## 2021-01-01 ENCOUNTER — HOSPITAL ENCOUNTER (OUTPATIENT)
Dept: CT IMAGING | Facility: HOSPITAL | Age: 61
Discharge: HOME | End: 2021-08-22
Payer: COMMERCIAL

## 2021-01-01 ENCOUNTER — HOSPITAL ENCOUNTER (OUTPATIENT)
Dept: CT IMAGING | Facility: HOSPITAL | Age: 61
Discharge: HOME | End: 2021-09-14
Attending: INTERNAL MEDICINE
Payer: COMMERCIAL

## 2021-01-01 ENCOUNTER — HOSPITAL ENCOUNTER (OUTPATIENT)
Dept: RADIOLOGY | Facility: HOSPITAL | Age: 61
Discharge: HOME | End: 2021-10-21
Payer: COMMERCIAL

## 2021-01-01 ENCOUNTER — ANESTHESIA (OUTPATIENT)
Dept: GASTROENTEROLOGY | Facility: HOSPITAL | Age: 61
End: 2021-01-01

## 2021-01-01 ENCOUNTER — ANESTHESIA EVENT (OUTPATIENT)
Dept: GASTROENTEROLOGY | Facility: HOSPITAL | Age: 61
End: 2021-01-01

## 2021-01-01 ENCOUNTER — HOSPITAL ENCOUNTER (OUTPATIENT)
Dept: MRI IMAGING | Facility: HOSPITAL | Age: 61
Discharge: HOME | End: 2021-08-14
Payer: COMMERCIAL

## 2021-01-01 ENCOUNTER — APPOINTMENT (OUTPATIENT)
Dept: CT IMAGING | Facility: HOSPITAL | Age: 61
End: 2021-01-01
Payer: COMMERCIAL

## 2021-01-01 ENCOUNTER — TRANSCRIBE ORDERS (OUTPATIENT)
Dept: OTHER | Facility: HOSPITAL | Age: 61
End: 2021-01-01

## 2021-01-01 ENCOUNTER — APPOINTMENT (INPATIENT)
Dept: GASTROENTEROLOGY | Facility: HOSPITAL | Age: 61
DRG: 853 | End: 2021-01-01
Payer: COMMERCIAL

## 2021-01-01 ENCOUNTER — HOSPITAL ENCOUNTER (OUTPATIENT)
Dept: INFUSION CENTER | Facility: HOSPITAL | Age: 61
Discharge: HOME | End: 2021-11-20
Payer: COMMERCIAL

## 2021-01-01 VITALS
OXYGEN SATURATION: 98 % | HEART RATE: 84 BPM | TEMPERATURE: 97.9 F | RESPIRATION RATE: 16 BRPM | SYSTOLIC BLOOD PRESSURE: 110 MMHG | DIASTOLIC BLOOD PRESSURE: 64 MMHG

## 2021-01-01 VITALS
TEMPERATURE: 98.5 F | HEART RATE: 95 BPM | SYSTOLIC BLOOD PRESSURE: 110 MMHG | DIASTOLIC BLOOD PRESSURE: 72 MMHG | RESPIRATION RATE: 18 BRPM

## 2021-01-01 VITALS
SYSTOLIC BLOOD PRESSURE: 111 MMHG | DIASTOLIC BLOOD PRESSURE: 77 MMHG | HEART RATE: 90 BPM | TEMPERATURE: 97.9 F | RESPIRATION RATE: 18 BRPM

## 2021-01-01 VITALS
SYSTOLIC BLOOD PRESSURE: 111 MMHG | WEIGHT: 224.43 LBS | HEART RATE: 71 BPM | RESPIRATION RATE: 18 BRPM | HEIGHT: 70 IN | OXYGEN SATURATION: 97 % | TEMPERATURE: 96.6 F | BODY MASS INDEX: 32.13 KG/M2 | DIASTOLIC BLOOD PRESSURE: 59 MMHG

## 2021-01-01 VITALS
SYSTOLIC BLOOD PRESSURE: 124 MMHG | HEART RATE: 91 BPM | DIASTOLIC BLOOD PRESSURE: 77 MMHG | RESPIRATION RATE: 18 BRPM | TEMPERATURE: 97.8 F

## 2021-01-01 VITALS
OXYGEN SATURATION: 94 % | RESPIRATION RATE: 18 BRPM | DIASTOLIC BLOOD PRESSURE: 59 MMHG | TEMPERATURE: 101.7 F | HEART RATE: 100 BPM | SYSTOLIC BLOOD PRESSURE: 109 MMHG

## 2021-01-01 VITALS
RESPIRATION RATE: 16 BRPM | SYSTOLIC BLOOD PRESSURE: 115 MMHG | TEMPERATURE: 97.8 F | DIASTOLIC BLOOD PRESSURE: 73 MMHG | WEIGHT: 198 LBS | HEART RATE: 87 BPM | OXYGEN SATURATION: 97 % | HEIGHT: 70 IN | BODY MASS INDEX: 28.35 KG/M2

## 2021-01-01 VITALS
TEMPERATURE: 96.9 F | RESPIRATION RATE: 18 BRPM | OXYGEN SATURATION: 98 % | WEIGHT: 223.8 LBS | DIASTOLIC BLOOD PRESSURE: 62 MMHG | BODY MASS INDEX: 32.11 KG/M2 | SYSTOLIC BLOOD PRESSURE: 116 MMHG | HEART RATE: 83 BPM

## 2021-01-01 VITALS
HEART RATE: 84 BPM | DIASTOLIC BLOOD PRESSURE: 66 MMHG | WEIGHT: 190 LBS | RESPIRATION RATE: 18 BRPM | OXYGEN SATURATION: 97 % | TEMPERATURE: 99 F | SYSTOLIC BLOOD PRESSURE: 108 MMHG | HEIGHT: 70 IN | BODY MASS INDEX: 27.2 KG/M2

## 2021-01-01 VITALS
DIASTOLIC BLOOD PRESSURE: 70 MMHG | RESPIRATION RATE: 18 BRPM | SYSTOLIC BLOOD PRESSURE: 130 MMHG | TEMPERATURE: 97.8 F | HEART RATE: 55 BPM

## 2021-01-01 VITALS
TEMPERATURE: 98.5 F | RESPIRATION RATE: 16 BRPM | HEART RATE: 93 BPM | OXYGEN SATURATION: 99 % | DIASTOLIC BLOOD PRESSURE: 68 MMHG | SYSTOLIC BLOOD PRESSURE: 107 MMHG

## 2021-01-01 DIAGNOSIS — C79.51 OSSEOUS METASTASIS (HCC): ICD-10-CM

## 2021-01-01 DIAGNOSIS — C85.19 UNSPECIFIED B-CELL LYMPHOMA, EXTRANODAL AND SOLID ORGAN SITES (HCC): ICD-10-CM

## 2021-01-01 DIAGNOSIS — C61 MALIGNANT NEOPLASM OF PROSTATE (HCC): ICD-10-CM

## 2021-01-01 DIAGNOSIS — C85.19 CUTANEOUS B-CELL LYMPHOMA (HCC): ICD-10-CM

## 2021-01-01 DIAGNOSIS — R50.9 FEVER: ICD-10-CM

## 2021-01-01 DIAGNOSIS — C85.90 NON-HODGKIN LYMPHOMA, UNSPECIFIED, UNSPECIFIED SITE (HCC): ICD-10-CM

## 2021-01-01 DIAGNOSIS — R77.8 ELEVATED TROPONIN: ICD-10-CM

## 2021-01-01 DIAGNOSIS — D51.8 OTHER VITAMIN B12 DEFICIENCY ANEMIAS: ICD-10-CM

## 2021-01-01 DIAGNOSIS — Z51.11 ENCOUNTER FOR ANTINEOPLASTIC CHEMOTHERAPY: ICD-10-CM

## 2021-01-01 DIAGNOSIS — R09.02 HYPOXIA: ICD-10-CM

## 2021-01-01 DIAGNOSIS — R09.02 HYPOXEMIA: ICD-10-CM

## 2021-01-01 DIAGNOSIS — C79.51 SECONDARY MALIGNANT NEOPLASM OF BONE AND BONE MARROW (HCC): ICD-10-CM

## 2021-01-01 DIAGNOSIS — R59.1 LYMPHADENOPATHY: ICD-10-CM

## 2021-01-01 DIAGNOSIS — R74.01 ELEVATION OF LEVELS OF LIVER TRANSAMINASE LEVELS: ICD-10-CM

## 2021-01-01 DIAGNOSIS — C95.90 LEUKEMIA NOT HAVING ACHIEVED REMISSION, UNSPECIFIED LEUKEMIA TYPE (HCC): ICD-10-CM

## 2021-01-01 DIAGNOSIS — C88.4 EXTRANODAL MARGINAL ZONE B-CELL LYMPHOMA OF MUCOSA-ASSOCIATED LYMPHOID TISSUE (MALT-LYMPHOMA) (HCC): ICD-10-CM

## 2021-01-01 DIAGNOSIS — R00.0 TACHYCARDIA: ICD-10-CM

## 2021-01-01 DIAGNOSIS — D64.81 ANEMIA DUE TO ANTINEOPLASTIC CHEMOTHERAPY (CODE): ICD-10-CM

## 2021-01-01 DIAGNOSIS — C85.19 CUTANEOUS B-CELL LYMPHOMA (HCC): Primary | ICD-10-CM

## 2021-01-01 DIAGNOSIS — C85.19 UNSPECIFIED B-CELL LYMPHOMA, EXTRANODAL AND SOLID ORGAN SITES (HCC): Primary | ICD-10-CM

## 2021-01-01 DIAGNOSIS — D64.9 ANEMIA, UNSPECIFIED: ICD-10-CM

## 2021-01-01 DIAGNOSIS — C79.52 SECONDARY MALIGNANT NEOPLASM OF BONE AND BONE MARROW (HCC): ICD-10-CM

## 2021-01-01 DIAGNOSIS — E55.9 VITAMIN D DEFICIENCY, UNSPECIFIED: ICD-10-CM

## 2021-01-01 DIAGNOSIS — C79.51 SECONDARY MALIGNANT NEOPLASM OF BONE (HCC): ICD-10-CM

## 2021-01-01 DIAGNOSIS — C61 PROSTATE CANCER (HCC): ICD-10-CM

## 2021-01-01 DIAGNOSIS — R42 DIZZINESS AND GIDDINESS: ICD-10-CM

## 2021-01-01 DIAGNOSIS — R60.9 EDEMA: ICD-10-CM

## 2021-01-01 DIAGNOSIS — C61 PROSTATE CANCER (HCC): Primary | ICD-10-CM

## 2021-01-01 DIAGNOSIS — E87.1 HYPONATREMIA: ICD-10-CM

## 2021-01-01 DIAGNOSIS — I26.99 PULMONARY EMBOLUS (HCC): ICD-10-CM

## 2021-01-01 DIAGNOSIS — I26.99 PULMONARY EMBOLI (HCC): Primary | ICD-10-CM

## 2021-01-01 DIAGNOSIS — D47.3 ESSENTIAL (HEMORRHAGIC) THROMBOCYTHEMIA (HCC): ICD-10-CM

## 2021-01-01 DIAGNOSIS — D68.51 ACTIVATED PROTEIN C RESISTANCE (HCC): ICD-10-CM

## 2021-01-01 DIAGNOSIS — G46.2 POSTERIOR CEREBRAL ARTERY SYNDROME: ICD-10-CM

## 2021-01-01 DIAGNOSIS — M54.9 BACK PAIN: ICD-10-CM

## 2021-01-01 DIAGNOSIS — R82.71 BACTERIA IN URINE: ICD-10-CM

## 2021-01-01 DIAGNOSIS — D64.9 ANEMIA: ICD-10-CM

## 2021-01-01 DIAGNOSIS — R97.0 ELEVATED CARCINOEMBRYONIC ANTIGEN (CEA): ICD-10-CM

## 2021-01-01 DIAGNOSIS — M79.606 LEG PAIN: ICD-10-CM

## 2021-01-01 DIAGNOSIS — D61.810 ANTINEOPLASTIC CHEMOTHERAPY INDUCED PANCYTOPENIA (CODE) (HCC): ICD-10-CM

## 2021-01-01 DIAGNOSIS — R06.02 SHORTNESS OF BREATH: ICD-10-CM

## 2021-01-01 DIAGNOSIS — R11.0 NAUSEA: ICD-10-CM

## 2021-01-01 DIAGNOSIS — S30.1XXA HEMATOMA OF LEFT PSOAS REGION TO ANTICOAGULANT THERAPY, INITIAL ENCOUNTER: Primary | ICD-10-CM

## 2021-01-01 DIAGNOSIS — R06.02 SHORTNESS OF BREATH: Primary | ICD-10-CM

## 2021-01-01 DIAGNOSIS — M81.0 AGE-RELATED OSTEOPOROSIS WITHOUT CURRENT PATHOLOGICAL FRACTURE: Primary | ICD-10-CM

## 2021-01-01 DIAGNOSIS — M25.552 PAIN IN LEFT HIP: ICD-10-CM

## 2021-01-01 DIAGNOSIS — E83.51 HYPOCALCEMIA: ICD-10-CM

## 2021-01-01 DIAGNOSIS — K59.00 CONSTIPATION: ICD-10-CM

## 2021-01-01 DIAGNOSIS — N13.30 HYDRONEPHROSIS OF LEFT KIDNEY: ICD-10-CM

## 2021-01-01 DIAGNOSIS — C85.90 NON-HODGKIN'S LYMPHOMA, UNSPECIFIED BODY REGION, UNSPECIFIED NON-HODGKIN LYMPHOMA TYPE (HCC): ICD-10-CM

## 2021-01-01 DIAGNOSIS — C79.52 SECONDARY MALIGNANT NEOPLASM OF BONE AND BONE MARROW (HCC): Primary | ICD-10-CM

## 2021-01-01 DIAGNOSIS — R51.9 HEADACHE: Primary | ICD-10-CM

## 2021-01-01 DIAGNOSIS — E83.42 HYPOMAGNESEMIA: ICD-10-CM

## 2021-01-01 DIAGNOSIS — R51.9 HEADACHE: ICD-10-CM

## 2021-01-01 DIAGNOSIS — C79.51 SECONDARY MALIGNANT NEOPLASM OF BONE AND BONE MARROW (HCC): Primary | ICD-10-CM

## 2021-01-01 DIAGNOSIS — C88.4 MALT LYMPHOMA (HCC): ICD-10-CM

## 2021-01-01 DIAGNOSIS — N17.9 AKI (ACUTE KIDNEY INJURY) (HCC): ICD-10-CM

## 2021-01-01 DIAGNOSIS — M79.81 NONTRAUMATIC HEMATOMA OF SOFT TISSUE: ICD-10-CM

## 2021-01-01 DIAGNOSIS — R79.89 ELEVATED BRAIN NATRIURETIC PEPTIDE (BNP) LEVEL: ICD-10-CM

## 2021-01-01 DIAGNOSIS — N13.30 UNSPECIFIED HYDRONEPHROSIS: ICD-10-CM

## 2021-01-01 DIAGNOSIS — R94.31 T WAVE INVERSION IN EKG: ICD-10-CM

## 2021-01-01 DIAGNOSIS — A41.9 SEPSIS (HCC): ICD-10-CM

## 2021-01-01 LAB
% PARASITEMIA: 0
ABO GROUP BLD BPU: NORMAL
ABO GROUP BLD: NORMAL
ALBUMIN SERPL BCP-MCNC: 1.9 G/DL (ref 3.5–5)
ALBUMIN SERPL BCP-MCNC: 2.2 G/DL (ref 3.5–5)
ALBUMIN SERPL BCP-MCNC: 2.5 G/DL (ref 3.5–5)
ALP SERPL-CCNC: 307 U/L (ref 46–116)
ALP SERPL-CCNC: 348 U/L (ref 46–116)
ALP SERPL-CCNC: 638 U/L (ref 46–116)
ALT SERPL W P-5'-P-CCNC: 14 U/L (ref 12–78)
ALT SERPL W P-5'-P-CCNC: 16 U/L (ref 12–78)
ALT SERPL W P-5'-P-CCNC: 20 U/L (ref 12–78)
ANION GAP SERPL CALCULATED.3IONS-SCNC: 11 MMOL/L (ref 4–13)
ANION GAP SERPL CALCULATED.3IONS-SCNC: 6 MMOL/L (ref 4–13)
ANION GAP SERPL CALCULATED.3IONS-SCNC: 7 MMOL/L (ref 4–13)
ANISOCYTOSIS BLD QL SMEAR: PRESENT
APTT PPP: 33 SECONDS (ref 23–37)
APTT PPP: 35 SECONDS (ref 23–37)
ARTIFACT: PRESENT
AST SERPL W P-5'-P-CCNC: 29 U/L (ref 5–45)
AST SERPL W P-5'-P-CCNC: 33 U/L (ref 5–45)
AST SERPL W P-5'-P-CCNC: 91 U/L (ref 5–45)
ATRIAL RATE: 100 BPM
ATRIAL RATE: 103 BPM
ATRIAL RATE: 98 BPM
BACTERIA BLD CULT: NORMAL
BACTERIA BLD CULT: NORMAL
BACTERIA UR QL AUTO: NORMAL /HPF
BASOPHILS # BLD AUTO: 0.02 THOUSANDS/ΜL (ref 0–0.1)
BASOPHILS # BLD AUTO: 0.02 THOUSANDS/ΜL (ref 0–0.1)
BASOPHILS # BLD AUTO: 0.03 THOUSANDS/ΜL (ref 0–0.1)
BASOPHILS # BLD AUTO: 0.05 THOUSANDS/ΜL (ref 0–0.1)
BASOPHILS # BLD AUTO: 0.05 THOUSANDS/ΜL (ref 0–0.1)
BASOPHILS # BLD MANUAL: 0 THOUSAND/UL (ref 0–0.1)
BASOPHILS # BLD MANUAL: 0.08 THOUSAND/UL (ref 0–0.1)
BASOPHILS NFR BLD AUTO: 0 % (ref 0–1)
BASOPHILS NFR BLD AUTO: 0 % (ref 0–1)
BASOPHILS NFR BLD AUTO: 1 % (ref 0–1)
BASOPHILS NFR MAR MANUAL: 0 % (ref 0–1)
BASOPHILS NFR MAR MANUAL: 1 % (ref 0–1)
BILIRUB SERPL-MCNC: 0.48 MG/DL (ref 0.2–1)
BILIRUB SERPL-MCNC: 0.83 MG/DL (ref 0.2–1)
BILIRUB SERPL-MCNC: 1.11 MG/DL (ref 0.2–1)
BILIRUB UR QL STRIP: NEGATIVE
BLD GP AB SCN SERPL QL: NEGATIVE
BPU ID: NORMAL
BUN SERPL-MCNC: 11 MG/DL (ref 5–25)
BUN SERPL-MCNC: 15 MG/DL (ref 5–25)
BUN SERPL-MCNC: 17 MG/DL (ref 5–25)
BUN SERPL-MCNC: 20 MG/DL (ref 5–25)
BUN SERPL-MCNC: 9 MG/DL (ref 5–25)
CALCIUM ALBUM COR SERPL-MCNC: 8.8 MG/DL (ref 8.3–10.1)
CALCIUM ALBUM COR SERPL-MCNC: 9.2 MG/DL (ref 8.3–10.1)
CALCIUM ALBUM COR SERPL-MCNC: 9.7 MG/DL (ref 8.3–10.1)
CALCIUM SERPL-MCNC: 6.9 MG/DL (ref 8.3–10.1)
CALCIUM SERPL-MCNC: 7.3 MG/DL (ref 8.3–10.1)
CALCIUM SERPL-MCNC: 7.5 MG/DL (ref 8.3–10.1)
CALCIUM SERPL-MCNC: 7.6 MG/DL (ref 8.3–10.1)
CALCIUM SERPL-MCNC: 8.3 MG/DL (ref 8.3–10.1)
CHLORIDE SERPL-SCNC: 101 MMOL/L (ref 100–108)
CHLORIDE SERPL-SCNC: 103 MMOL/L (ref 100–108)
CHLORIDE SERPL-SCNC: 103 MMOL/L (ref 100–108)
CHLORIDE SERPL-SCNC: 95 MMOL/L (ref 100–108)
CHLORIDE SERPL-SCNC: 98 MMOL/L (ref 100–108)
CLARITY UR: ABNORMAL
CO2 SERPL-SCNC: 22 MMOL/L (ref 21–32)
CO2 SERPL-SCNC: 24 MMOL/L (ref 21–32)
CO2 SERPL-SCNC: 26 MMOL/L (ref 21–32)
COARSE GRAN CASTS URNS QL MICRO: NORMAL /LPF
COLOR UR: YELLOW
CREAT SERPL-MCNC: 0.82 MG/DL (ref 0.6–1.3)
CREAT SERPL-MCNC: 0.91 MG/DL (ref 0.6–1.3)
CREAT SERPL-MCNC: 0.97 MG/DL (ref 0.6–1.3)
CREAT SERPL-MCNC: 1.1 MG/DL (ref 0.6–1.3)
CREAT SERPL-MCNC: 1.4 MG/DL (ref 0.6–1.3)
CROSSMATCH: NORMAL
DACRYOCYTES BLD QL SMEAR: PRESENT
EOSINOPHIL # BLD AUTO: 0.01 THOUSAND/ΜL (ref 0–0.61)
EOSINOPHIL # BLD AUTO: 0.13 THOUSAND/ΜL (ref 0–0.61)
EOSINOPHIL # BLD AUTO: 0.23 THOUSAND/ΜL (ref 0–0.61)
EOSINOPHIL # BLD AUTO: 0.24 THOUSAND/ΜL (ref 0–0.61)
EOSINOPHIL # BLD AUTO: 0.25 THOUSAND/ΜL (ref 0–0.61)
EOSINOPHIL # BLD MANUAL: 0 THOUSAND/UL (ref 0–0.4)
EOSINOPHIL # BLD MANUAL: 0.3 THOUSAND/UL (ref 0–0.4)
EOSINOPHIL NFR BLD AUTO: 0 % (ref 0–6)
EOSINOPHIL NFR BLD AUTO: 1 % (ref 0–6)
EOSINOPHIL NFR BLD AUTO: 4 % (ref 0–6)
EOSINOPHIL NFR BLD MANUAL: 0 % (ref 0–6)
EOSINOPHIL NFR BLD MANUAL: 4 % (ref 0–6)
ERYTHROCYTE [DISTWIDTH] IN BLOOD BY AUTOMATED COUNT: 18.8 % (ref 11.6–15.1)
ERYTHROCYTE [DISTWIDTH] IN BLOOD BY AUTOMATED COUNT: 19 % (ref 11.6–15.1)
ERYTHROCYTE [DISTWIDTH] IN BLOOD BY AUTOMATED COUNT: 19.2 % (ref 11.6–15.1)
ERYTHROCYTE [DISTWIDTH] IN BLOOD BY AUTOMATED COUNT: 19.3 % (ref 11.6–15.1)
ERYTHROCYTE [DISTWIDTH] IN BLOOD BY AUTOMATED COUNT: 19.5 % (ref 11.6–15.1)
ERYTHROCYTE [DISTWIDTH] IN BLOOD BY AUTOMATED COUNT: 19.6 % (ref 11.6–15.1)
ERYTHROCYTE [DISTWIDTH] IN BLOOD BY AUTOMATED COUNT: 19.6 % (ref 11.6–15.1)
ERYTHROCYTE [DISTWIDTH] IN BLOOD BY AUTOMATED COUNT: 19.7 % (ref 11.6–15.1)
ERYTHROCYTE [DISTWIDTH] IN BLOOD BY AUTOMATED COUNT: 20 % (ref 11.6–15.1)
FERRITIN SERPL-MCNC: 3093 NG/ML (ref 8–388)
FLUAV RNA RESP QL NAA+PROBE: NEGATIVE
FLUBV RNA RESP QL NAA+PROBE: NEGATIVE
GFR SERPL CREATININE-BSD FRML MDRD: 54 ML/MIN/1.73SQ M
GFR SERPL CREATININE-BSD FRML MDRD: 72 ML/MIN/1.73SQ M
GFR SERPL CREATININE-BSD FRML MDRD: 84 ML/MIN/1.73SQ M
GFR SERPL CREATININE-BSD FRML MDRD: 91 ML/MIN/1.73SQ M
GFR SERPL CREATININE-BSD FRML MDRD: 95 ML/MIN/1.73SQ M
GIANT PLATELETS BLD QL SMEAR: PRESENT
GLUCOSE SERPL-MCNC: 102 MG/DL (ref 65–140)
GLUCOSE SERPL-MCNC: 108 MG/DL (ref 65–140)
GLUCOSE SERPL-MCNC: 113 MG/DL (ref 65–140)
GLUCOSE SERPL-MCNC: 137 MG/DL (ref 65–140)
GLUCOSE SERPL-MCNC: 83 MG/DL (ref 65–140)
GLUCOSE SERPL-MCNC: 98 MG/DL (ref 65–140)
GLUCOSE UR STRIP-MCNC: NEGATIVE MG/DL
HAPTOGLOB SERPL-MCNC: 403 MG/DL (ref 32–363)
HAPTOGLOB SERPL-MCNC: 430 MG/DL (ref 32–363)
HCT VFR BLD AUTO: 20 % (ref 36.5–49.3)
HCT VFR BLD AUTO: 20.8 % (ref 36.5–49.3)
HCT VFR BLD AUTO: 22.4 % (ref 36.5–49.3)
HCT VFR BLD AUTO: 23.6 % (ref 36.5–49.3)
HCT VFR BLD AUTO: 23.7 % (ref 36.5–49.3)
HCT VFR BLD AUTO: 23.7 % (ref 36.5–49.3)
HCT VFR BLD AUTO: 24.3 % (ref 36.5–49.3)
HCT VFR BLD AUTO: 24.5 % (ref 36.5–49.3)
HCT VFR BLD AUTO: 24.7 % (ref 36.5–49.3)
HEMOCCULT STL QL: NEGATIVE
HGB BLD-MCNC: 6.4 G/DL (ref 12–17)
HGB BLD-MCNC: 6.7 G/DL (ref 12–17)
HGB BLD-MCNC: 7.2 G/DL (ref 12–17)
HGB BLD-MCNC: 7.4 G/DL (ref 12–17)
HGB BLD-MCNC: 7.6 G/DL (ref 12–17)
HGB BLD-MCNC: 7.6 G/DL (ref 12–17)
HGB BLD-MCNC: 7.8 G/DL (ref 12–17)
HGB BLD-MCNC: 7.8 G/DL (ref 12–17)
HGB BLD-MCNC: 7.9 G/DL (ref 12–17)
HGB UR QL STRIP.AUTO: NEGATIVE
IMM GRANULOCYTES # BLD AUTO: 0.06 THOUSAND/UL (ref 0–0.2)
IMM GRANULOCYTES # BLD AUTO: 0.07 THOUSAND/UL (ref 0–0.2)
IMM GRANULOCYTES # BLD AUTO: 0.09 THOUSAND/UL (ref 0–0.2)
IMM GRANULOCYTES # BLD AUTO: 0.09 THOUSAND/UL (ref 0–0.2)
IMM GRANULOCYTES # BLD AUTO: 0.11 THOUSAND/UL (ref 0–0.2)
IMM GRANULOCYTES NFR BLD AUTO: 1 % (ref 0–2)
IMM GRANULOCYTES NFR BLD AUTO: 2 % (ref 0–2)
IMM GRANULOCYTES NFR BLD AUTO: 2 % (ref 0–2)
INR PPP: 1.12 (ref 0.84–1.19)
INR PPP: 1.5 (ref 0.84–1.19)
IRON SATN MFR SERPL: 51 % (ref 20–50)
IRON SERPL-MCNC: 57 UG/DL (ref 65–175)
KETONES UR STRIP-MCNC: NEGATIVE MG/DL
LACTATE SERPL-SCNC: 1.2 MMOL/L (ref 0.5–2)
LDH SERPL-CCNC: 1494 U/L (ref 81–234)
LDH SERPL-CCNC: 646 U/L (ref 81–234)
LDH SERPL-CCNC: 725 U/L (ref 81–234)
LEUKOCYTE ESTERASE UR QL STRIP: NEGATIVE
LYMPHOCYTES # BLD AUTO: 0 % (ref 14–44)
LYMPHOCYTES # BLD AUTO: 0 THOUSAND/UL (ref 0.6–4.47)
LYMPHOCYTES # BLD AUTO: 0.37 THOUSAND/UL (ref 0.6–4.47)
LYMPHOCYTES # BLD AUTO: 0.45 THOUSANDS/ΜL (ref 0.6–4.47)
LYMPHOCYTES # BLD AUTO: 0.46 THOUSANDS/ΜL (ref 0.6–4.47)
LYMPHOCYTES # BLD AUTO: 0.48 THOUSANDS/ΜL (ref 0.6–4.47)
LYMPHOCYTES # BLD AUTO: 0.53 THOUSANDS/ΜL (ref 0.6–4.47)
LYMPHOCYTES # BLD AUTO: 0.73 THOUSANDS/ΜL (ref 0.6–4.47)
LYMPHOCYTES # BLD AUTO: 5 % (ref 14–44)
LYMPHOCYTES NFR BLD AUTO: 5 % (ref 14–44)
LYMPHOCYTES NFR BLD AUTO: 6 % (ref 14–44)
LYMPHOCYTES NFR BLD AUTO: 7 % (ref 14–44)
LYMPHOCYTES NFR BLD AUTO: 9 % (ref 14–44)
LYMPHOCYTES NFR BLD AUTO: 9 % (ref 14–44)
MCH RBC QN AUTO: 28.4 PG (ref 26.8–34.3)
MCH RBC QN AUTO: 28.5 PG (ref 26.8–34.3)
MCH RBC QN AUTO: 28.5 PG (ref 26.8–34.3)
MCH RBC QN AUTO: 28.7 PG (ref 26.8–34.3)
MCH RBC QN AUTO: 28.8 PG (ref 26.8–34.3)
MCH RBC QN AUTO: 28.8 PG (ref 26.8–34.3)
MCH RBC QN AUTO: 28.9 PG (ref 26.8–34.3)
MCH RBC QN AUTO: 29 PG (ref 26.8–34.3)
MCH RBC QN AUTO: 29.2 PG (ref 26.8–34.3)
MCHC RBC AUTO-ENTMCNC: 31.2 G/DL (ref 31.4–37.4)
MCHC RBC AUTO-ENTMCNC: 31.6 G/DL (ref 31.4–37.4)
MCHC RBC AUTO-ENTMCNC: 31.8 G/DL (ref 31.4–37.4)
MCHC RBC AUTO-ENTMCNC: 32 G/DL (ref 31.4–37.4)
MCHC RBC AUTO-ENTMCNC: 32.1 G/DL (ref 31.4–37.4)
MCHC RBC AUTO-ENTMCNC: 32.1 G/DL (ref 31.4–37.4)
MCHC RBC AUTO-ENTMCNC: 32.2 G/DL (ref 31.4–37.4)
MCHC RBC AUTO-ENTMCNC: 32.2 G/DL (ref 31.4–37.4)
MCHC RBC AUTO-ENTMCNC: 32.5 G/DL (ref 31.4–37.4)
MCV RBC AUTO: 89 FL (ref 82–98)
MCV RBC AUTO: 90 FL (ref 82–98)
MCV RBC AUTO: 91 FL (ref 82–98)
MCV RBC AUTO: 92 FL (ref 82–98)
MONOCYTES # BLD AUTO: 0 THOUSAND/UL (ref 0–1.22)
MONOCYTES # BLD AUTO: 0.08 THOUSAND/UL (ref 0–1.22)
MONOCYTES # BLD AUTO: 0.32 THOUSAND/ΜL (ref 0.17–1.22)
MONOCYTES # BLD AUTO: 0.33 THOUSAND/ΜL (ref 0.17–1.22)
MONOCYTES # BLD AUTO: 0.37 THOUSAND/ΜL (ref 0.17–1.22)
MONOCYTES # BLD AUTO: 0.63 THOUSAND/ΜL (ref 0.17–1.22)
MONOCYTES # BLD AUTO: 1.19 THOUSAND/ΜL (ref 0.17–1.22)
MONOCYTES NFR BLD AUTO: 10 % (ref 4–12)
MONOCYTES NFR BLD AUTO: 5 % (ref 4–12)
MONOCYTES NFR BLD AUTO: 6 % (ref 4–12)
MONOCYTES NFR BLD AUTO: 6 % (ref 4–12)
MONOCYTES NFR BLD AUTO: 7 % (ref 4–12)
MONOCYTES NFR BLD: 0 % (ref 4–12)
MONOCYTES NFR BLD: 1 % (ref 4–12)
NEUTROPHILS # BLD AUTO: 10.28 THOUSANDS/ΜL (ref 1.85–7.62)
NEUTROPHILS # BLD AUTO: 4.38 THOUSANDS/ΜL (ref 1.85–7.62)
NEUTROPHILS # BLD AUTO: 4.5 THOUSANDS/ΜL (ref 1.85–7.62)
NEUTROPHILS # BLD AUTO: 5.35 THOUSANDS/ΜL (ref 1.85–7.62)
NEUTROPHILS # BLD AUTO: 8.41 THOUSANDS/ΜL (ref 1.85–7.62)
NEUTROPHILS # BLD MANUAL: 6.94 THOUSAND/UL (ref 1.85–7.62)
NEUTROPHILS # BLD MANUAL: 7.06 THOUSAND/UL (ref 1.85–7.62)
NEUTS BAND NFR BLD MANUAL: 7 % (ref 0–8)
NEUTS SEG NFR BLD AUTO: 78 % (ref 43–75)
NEUTS SEG NFR BLD AUTO: 78 % (ref 43–75)
NEUTS SEG NFR BLD AUTO: 82 % (ref 43–75)
NEUTS SEG NFR BLD AUTO: 83 % (ref 43–75)
NEUTS SEG NFR BLD AUTO: 86 % (ref 43–75)
NEUTS SEG NFR BLD AUTO: 86 % (ref 43–75)
NEUTS SEG NFR BLD AUTO: 95 % (ref 43–75)
NITRITE UR QL STRIP: NEGATIVE
NON-SQ EPI CELLS URNS QL MICRO: NORMAL /HPF
NRBC BLD AUTO-RTO: 0 /100 WBCS
NT-PROBNP SERPL-MCNC: 2488 PG/ML
OSMOLALITY UR/SERPL-RTO: 281 MMOL/KG (ref 282–298)
OSMOLALITY UR: 285 MMOL/KG
OVALOCYTES BLD QL SMEAR: PRESENT
OVALOCYTES BLD QL SMEAR: PRESENT
P AXIS: 32 DEGREES
P AXIS: 35 DEGREES
P AXIS: 36 DEGREES
PARASITE BLD: NO
PATHOLOGIST INTERPRETATION: NORMAL
PH UR STRIP.AUTO: 5.5 [PH]
PLATELET # BLD AUTO: 143 THOUSANDS/UL (ref 149–390)
PLATELET # BLD AUTO: 194 THOUSANDS/UL (ref 149–390)
PLATELET # BLD AUTO: 195 THOUSANDS/UL (ref 149–390)
PLATELET # BLD AUTO: 202 THOUSANDS/UL (ref 149–390)
PLATELET # BLD AUTO: 206 THOUSANDS/UL (ref 149–390)
PLATELET # BLD AUTO: 207 THOUSANDS/UL (ref 149–390)
PLATELET # BLD AUTO: 220 THOUSANDS/UL (ref 149–390)
PLATELET # BLD AUTO: 223 THOUSANDS/UL (ref 149–390)
PLATELET BLD QL SMEAR: ABNORMAL
PLATELET BLD QL SMEAR: ADEQUATE
PMV BLD AUTO: 10.2 FL (ref 8.9–12.7)
PMV BLD AUTO: 10.2 FL (ref 8.9–12.7)
PMV BLD AUTO: 10.4 FL (ref 8.9–12.7)
PMV BLD AUTO: 10.6 FL (ref 8.9–12.7)
PMV BLD AUTO: 10.6 FL (ref 8.9–12.7)
PMV BLD AUTO: 10.7 FL (ref 8.9–12.7)
PMV BLD AUTO: 9.1 FL (ref 8.9–12.7)
PMV BLD AUTO: 9.5 FL (ref 8.9–12.7)
PMV BLD AUTO: 9.6 FL (ref 8.9–12.7)
PMV BLD AUTO: 9.9 FL (ref 8.9–12.7)
POIKILOCYTOSIS BLD QL SMEAR: PRESENT
POIKILOCYTOSIS BLD QL SMEAR: PRESENT
POLYCHROMASIA BLD QL SMEAR: PRESENT
POTASSIUM SERPL-SCNC: 3.3 MMOL/L (ref 3.5–5.3)
POTASSIUM SERPL-SCNC: 3.4 MMOL/L (ref 3.5–5.3)
POTASSIUM SERPL-SCNC: 3.5 MMOL/L (ref 3.5–5.3)
POTASSIUM SERPL-SCNC: 3.6 MMOL/L (ref 3.5–5.3)
POTASSIUM SERPL-SCNC: 4 MMOL/L (ref 3.5–5.3)
PR INTERVAL: 112 MS
PR INTERVAL: 112 MS
PR INTERVAL: 114 MS
PROCALCITONIN SERPL-MCNC: 0.83 NG/ML
PROCALCITONIN SERPL-MCNC: 0.93 NG/ML
PROT SERPL-MCNC: 5.4 G/DL (ref 6.4–8.2)
PROT SERPL-MCNC: 6.2 G/DL (ref 6.4–8.2)
PROT SERPL-MCNC: 6.4 G/DL (ref 6.4–8.2)
PROT UR STRIP-MCNC: ABNORMAL MG/DL
PROTHROMBIN TIME: 14.3 SECONDS (ref 11.6–14.5)
PROTHROMBIN TIME: 17.5 SECONDS (ref 11.6–14.5)
QRS AXIS: -17 DEGREES
QRS AXIS: -28 DEGREES
QRS AXIS: -40 DEGREES
QRSD INTERVAL: 146 MS
QRSD INTERVAL: 154 MS
QRSD INTERVAL: 154 MS
QT INTERVAL: 394 MS
QT INTERVAL: 430 MS
QT INTERVAL: 430 MS
QTC INTERVAL: 516 MS
QTC INTERVAL: 548 MS
QTC INTERVAL: 554 MS
RBC # BLD AUTO: 2.25 MILLION/UL (ref 3.88–5.62)
RBC # BLD AUTO: 2.35 MILLION/UL (ref 3.88–5.62)
RBC # BLD AUTO: 2.5 MILLION/UL (ref 3.88–5.62)
RBC # BLD AUTO: 2.57 MILLION/UL (ref 3.88–5.62)
RBC # BLD AUTO: 2.62 MILLION/UL (ref 3.88–5.62)
RBC # BLD AUTO: 2.63 MILLION/UL (ref 3.88–5.62)
RBC # BLD AUTO: 2.71 MILLION/UL (ref 3.88–5.62)
RBC # BLD AUTO: 2.72 MILLION/UL (ref 3.88–5.62)
RBC # BLD AUTO: 2.74 MILLION/UL (ref 3.88–5.62)
RBC #/AREA URNS AUTO: NORMAL /HPF
RBC MORPH BLD: PRESENT
RBC MORPH BLD: PRESENT
RETICS # AUTO: NORMAL 10*3/UL (ref 14356–105094)
RETICS # AUTO: NORMAL 10*3/UL (ref 14356–105094)
RETICS # CALC: 0.8 % (ref 0.37–1.87)
RETICS # CALC: 1.13 % (ref 0.37–1.87)
RH BLD: NEGATIVE
RSV RNA RESP QL NAA+PROBE: NEGATIVE
SARS-COV-2 RNA RESP QL NAA+PROBE: NEGATIVE
SCAN RESULT: NORMAL
SODIUM 24H UR-SCNC: 46 MOL/L
SODIUM SERPL-SCNC: 126 MMOL/L (ref 136–145)
SODIUM SERPL-SCNC: 128 MMOL/L (ref 136–145)
SODIUM SERPL-SCNC: 132 MMOL/L (ref 136–145)
SODIUM SERPL-SCNC: 133 MMOL/L (ref 136–145)
SODIUM SERPL-SCNC: 134 MMOL/L (ref 136–145)
SODIUM SERPL-SCNC: 135 MMOL/L (ref 136–145)
SP GR UR STRIP.AUTO: 1.02 (ref 1–1.03)
SPECIMEN EXPIRATION DATE: NORMAL
T WAVE AXIS: -13 DEGREES
T WAVE AXIS: 4 DEGREES
T WAVE AXIS: 5 DEGREES
TIBC SERPL-MCNC: 112 UG/DL (ref 250–450)
TROPONIN I SERPL-MCNC: 0.02 NG/ML
TROPONIN I SERPL-MCNC: 0.07 NG/ML
TROPONIN I SERPL-MCNC: <0.02 NG/ML
UNIT DISPENSE STATUS: NORMAL
UNIT PRODUCT CODE: NORMAL
UNIT PRODUCT VOLUME: 250 ML
UNIT PRODUCT VOLUME: 300 ML
UNIT PRODUCT VOLUME: 350 ML
UNIT RH: NORMAL
URATE SERPL-MCNC: 3.6 MG/DL (ref 4.2–8)
URATE SERPL-MCNC: 4.5 MG/DL (ref 4.2–8)
UROBILINOGEN UR QL STRIP.AUTO: 0.2 E.U./DL
VARIANT LYMPHS # BLD AUTO: 1 %
VENTRICULAR RATE: 100 BPM
VENTRICULAR RATE: 103 BPM
VENTRICULAR RATE: 98 BPM
WBC # BLD AUTO: 11.41 THOUSAND/UL (ref 4.31–10.16)
WBC # BLD AUTO: 12.34 THOUSAND/UL (ref 4.31–10.16)
WBC # BLD AUTO: 14 THOUSAND/UL (ref 4.31–10.16)
WBC # BLD AUTO: 5.56 THOUSAND/UL (ref 4.31–10.16)
WBC # BLD AUTO: 5.77 THOUSAND/UL (ref 4.31–10.16)
WBC # BLD AUTO: 6.48 THOUSAND/UL (ref 4.31–10.16)
WBC # BLD AUTO: 7.31 THOUSAND/UL (ref 4.31–10.16)
WBC # BLD AUTO: 7.59 THOUSAND/UL (ref 4.31–10.16)
WBC # BLD AUTO: 9.71 THOUSAND/UL (ref 4.31–10.16)
WBC #/AREA URNS AUTO: NORMAL /HPF

## 2021-01-01 PROCEDURE — G1004 CDSM NDSC: HCPCS

## 2021-01-01 PROCEDURE — 77412 RADIATION TX DELIVERY LVL 3: CPT | Performed by: RADIOLOGY

## 2021-01-01 PROCEDURE — 76937 US GUIDE VASCULAR ACCESS: CPT | Performed by: INTERNAL MEDICINE

## 2021-01-01 PROCEDURE — 77334 RADIATION TREATMENT AID(S): CPT | Performed by: RADIOLOGY

## 2021-01-01 PROCEDURE — 86850 RBC ANTIBODY SCREEN: CPT | Performed by: INTERNAL MEDICINE

## 2021-01-01 PROCEDURE — 86923 COMPATIBILITY TEST ELECTRIC: CPT

## 2021-01-01 PROCEDURE — 99255 IP/OBS CONSLTJ NEW/EST HI 80: CPT | Performed by: INTERNAL MEDICINE

## 2021-01-01 PROCEDURE — 82728 ASSAY OF FERRITIN: CPT | Performed by: STUDENT IN AN ORGANIZED HEALTH CARE EDUCATION/TRAINING PROGRAM

## 2021-01-01 PROCEDURE — 77387 GUIDANCE FOR RADJ TX DLVR: CPT | Performed by: RADIOLOGY

## 2021-01-01 PROCEDURE — 71260 CT THORAX DX C+: CPT

## 2021-01-01 PROCEDURE — 99239 HOSP IP/OBS DSCHRG MGMT >30: CPT | Performed by: INTERNAL MEDICINE

## 2021-01-01 PROCEDURE — 36415 COLL VENOUS BLD VENIPUNCTURE: CPT

## 2021-01-01 PROCEDURE — 84484 ASSAY OF TROPONIN QUANT: CPT | Performed by: HOSPITALIST

## 2021-01-01 PROCEDURE — 36430 TRANSFUSION BLD/BLD COMPNT: CPT

## 2021-01-01 PROCEDURE — 99152 MOD SED SAME PHYS/QHP 5/>YRS: CPT | Performed by: INTERNAL MEDICINE

## 2021-01-01 PROCEDURE — 72158 MRI LUMBAR SPINE W/O & W/DYE: CPT

## 2021-01-01 PROCEDURE — 83935 ASSAY OF URINE OSMOLALITY: CPT | Performed by: NURSE PRACTITIONER

## 2021-01-01 PROCEDURE — 87207 SMEAR SPECIAL STAIN: CPT | Performed by: INTERNAL MEDICINE

## 2021-01-01 PROCEDURE — 88342 IMHCHEM/IMCYTCHM 1ST ANTB: CPT | Performed by: PATHOLOGY

## 2021-01-01 PROCEDURE — A9585 GADOBUTROL INJECTION: HCPCS | Performed by: INTERNAL MEDICINE

## 2021-01-01 PROCEDURE — 99232 SBSQ HOSP IP/OBS MODERATE 35: CPT | Performed by: INTERNAL MEDICINE

## 2021-01-01 PROCEDURE — 0DJD8ZZ INSPECTION OF LOWER INTESTINAL TRACT, VIA NATURAL OR ARTIFICIAL OPENING ENDOSCOPIC: ICD-10-PCS | Performed by: INTERNAL MEDICINE

## 2021-01-01 PROCEDURE — 80053 COMPREHEN METABOLIC PANEL: CPT | Performed by: INTERNAL MEDICINE

## 2021-01-01 PROCEDURE — 99291 CRITICAL CARE FIRST HOUR: CPT | Performed by: EMERGENCY MEDICINE

## 2021-01-01 PROCEDURE — 36561 INSERT TUNNELED CV CATH: CPT | Performed by: INTERNAL MEDICINE

## 2021-01-01 PROCEDURE — 30233N1 TRANSFUSION OF NONAUTOLOGOUS RED BLOOD CELLS INTO PERIPHERAL VEIN, PERCUTANEOUS APPROACH: ICD-10-PCS | Performed by: INTERNAL MEDICINE

## 2021-01-01 PROCEDURE — 93010 ELECTROCARDIOGRAM REPORT: CPT | Performed by: INTERNAL MEDICINE

## 2021-01-01 PROCEDURE — G6002 STEREOSCOPIC X-RAY GUIDANCE: HCPCS | Performed by: RADIOLOGY

## 2021-01-01 PROCEDURE — 77427 RADIATION TX MANAGEMENT X5: CPT | Performed by: RADIOLOGY

## 2021-01-01 PROCEDURE — 83615 LACTATE (LD) (LDH) ENZYME: CPT | Performed by: INTERNAL MEDICINE

## 2021-01-01 PROCEDURE — 77331 SPECIAL RADIATION DOSIMETRY: CPT | Performed by: RADIOLOGY

## 2021-01-01 PROCEDURE — 84300 ASSAY OF URINE SODIUM: CPT | Performed by: NURSE PRACTITIONER

## 2021-01-01 PROCEDURE — 85730 THROMBOPLASTIN TIME PARTIAL: CPT | Performed by: EMERGENCY MEDICINE

## 2021-01-01 PROCEDURE — 99211 OFF/OP EST MAY X REQ PHY/QHP: CPT | Performed by: RADIOLOGY

## 2021-01-01 PROCEDURE — 88173 CYTOPATH EVAL FNA REPORT: CPT | Performed by: PATHOLOGY

## 2021-01-01 PROCEDURE — 85027 COMPLETE CBC AUTOMATED: CPT | Performed by: INTERNAL MEDICINE

## 2021-01-01 PROCEDURE — 86901 BLOOD TYPING SEROLOGIC RH(D): CPT | Performed by: INTERNAL MEDICINE

## 2021-01-01 PROCEDURE — 86920 COMPATIBILITY TEST SPIN: CPT

## 2021-01-01 PROCEDURE — A9552 F18 FDG: HCPCS

## 2021-01-01 PROCEDURE — 83930 ASSAY OF BLOOD OSMOLALITY: CPT | Performed by: INTERNAL MEDICINE

## 2021-01-01 PROCEDURE — 77280 THER RAD SIMULAJ FIELD SMPL: CPT | Performed by: RADIOLOGY

## 2021-01-01 PROCEDURE — 77001 FLUOROGUIDE FOR VEIN DEVICE: CPT | Performed by: INTERNAL MEDICINE

## 2021-01-01 PROCEDURE — 85025 COMPLETE CBC W/AUTO DIFF WBC: CPT | Performed by: STUDENT IN AN ORGANIZED HEALTH CARE EDUCATION/TRAINING PROGRAM

## 2021-01-01 PROCEDURE — 88172 CYTP DX EVAL FNA 1ST EA SITE: CPT | Performed by: PATHOLOGY

## 2021-01-01 PROCEDURE — 88341 IMHCHEM/IMCYTCHM EA ADD ANTB: CPT | Performed by: PATHOLOGY

## 2021-01-01 PROCEDURE — 74177 CT ABD & PELVIS W/CONTRAST: CPT

## 2021-01-01 PROCEDURE — 84145 PROCALCITONIN (PCT): CPT | Performed by: EMERGENCY MEDICINE

## 2021-01-01 PROCEDURE — 86900 BLOOD TYPING SEROLOGIC ABO: CPT | Performed by: INTERNAL MEDICINE

## 2021-01-01 PROCEDURE — 83010 ASSAY OF HAPTOGLOBIN QUANT: CPT | Performed by: STUDENT IN AN ORGANIZED HEALTH CARE EDUCATION/TRAINING PROGRAM

## 2021-01-01 PROCEDURE — 36415 COLL VENOUS BLD VENIPUNCTURE: CPT | Performed by: EMERGENCY MEDICINE

## 2021-01-01 PROCEDURE — 99285 EMERGENCY DEPT VISIT HI MDM: CPT | Performed by: EMERGENCY MEDICINE

## 2021-01-01 PROCEDURE — 99152 MOD SED SAME PHYS/QHP 5/>YRS: CPT

## 2021-01-01 PROCEDURE — 99285 EMERGENCY DEPT VISIT HI MDM: CPT

## 2021-01-01 PROCEDURE — 99233 SBSQ HOSP IP/OBS HIGH 50: CPT | Performed by: INTERNAL MEDICINE

## 2021-01-01 PROCEDURE — 83880 ASSAY OF NATRIURETIC PEPTIDE: CPT | Performed by: EMERGENCY MEDICINE

## 2021-01-01 PROCEDURE — P9058 RBC, L/R, CMV-NEG, IRRAD: HCPCS

## 2021-01-01 PROCEDURE — 77290 THER RAD SIMULAJ FIELD CPLX: CPT | Performed by: RADIOLOGY

## 2021-01-01 PROCEDURE — 36561 INSERT TUNNELED CV CATH: CPT

## 2021-01-01 PROCEDURE — 84550 ASSAY OF BLOOD/URIC ACID: CPT | Performed by: INTERNAL MEDICINE

## 2021-01-01 PROCEDURE — 83615 LACTATE (LD) (LDH) ENZYME: CPT | Performed by: STUDENT IN AN ORGANIZED HEALTH CARE EDUCATION/TRAINING PROGRAM

## 2021-01-01 PROCEDURE — 88333 PATH CONSLTJ SURG CYTO XM 1: CPT | Performed by: PATHOLOGY

## 2021-01-01 PROCEDURE — 82272 OCCULT BLD FECES 1-3 TESTS: CPT | Performed by: INTERNAL MEDICINE

## 2021-01-01 PROCEDURE — 73522 X-RAY EXAM HIPS BI 3-4 VIEWS: CPT

## 2021-01-01 PROCEDURE — 77336 RADIATION PHYSICS CONSULT: CPT | Performed by: RADIOLOGY

## 2021-01-01 PROCEDURE — 93970 EXTREMITY STUDY: CPT

## 2021-01-01 PROCEDURE — 99024 POSTOP FOLLOW-UP VISIT: CPT | Performed by: RADIOLOGY

## 2021-01-01 PROCEDURE — 82948 REAGENT STRIP/BLOOD GLUCOSE: CPT

## 2021-01-01 PROCEDURE — 99233 SBSQ HOSP IP/OBS HIGH 50: CPT | Performed by: FAMILY MEDICINE

## 2021-01-01 PROCEDURE — 93970 EXTREMITY STUDY: CPT | Performed by: SURGERY

## 2021-01-01 PROCEDURE — A9585 GADOBUTROL INJECTION: HCPCS | Performed by: RADIOLOGY

## 2021-01-01 PROCEDURE — 78815 PET IMAGE W/CT SKULL-THIGH: CPT

## 2021-01-01 PROCEDURE — 76937 US GUIDE VASCULAR ACCESS: CPT

## 2021-01-01 PROCEDURE — 80048 BASIC METABOLIC PNL TOTAL CA: CPT | Performed by: FAMILY MEDICINE

## 2021-01-01 PROCEDURE — 96365 THER/PROPH/DIAG IV INF INIT: CPT

## 2021-01-01 PROCEDURE — C1788 PORT, INDWELLING, IMP: HCPCS

## 2021-01-01 PROCEDURE — 99222 1ST HOSP IP/OBS MODERATE 55: CPT | Performed by: INTERNAL MEDICINE

## 2021-01-01 PROCEDURE — 77370 RADIATION PHYSICS CONSULT: CPT | Performed by: RADIOLOGY

## 2021-01-01 PROCEDURE — 85025 COMPLETE CBC W/AUTO DIFF WBC: CPT | Performed by: INTERNAL MEDICINE

## 2021-01-01 PROCEDURE — 73721 MRI JNT OF LWR EXTRE W/O DYE: CPT

## 2021-01-01 PROCEDURE — 85610 PROTHROMBIN TIME: CPT | Performed by: EMERGENCY MEDICINE

## 2021-01-01 PROCEDURE — 99153 MOD SED SAME PHYS/QHP EA: CPT

## 2021-01-01 PROCEDURE — 85045 AUTOMATED RETICULOCYTE COUNT: CPT | Performed by: INTERNAL MEDICINE

## 2021-01-01 PROCEDURE — 85007 BL SMEAR W/DIFF WBC COUNT: CPT | Performed by: EMERGENCY MEDICINE

## 2021-01-01 PROCEDURE — 77295 3-D RADIOTHERAPY PLAN: CPT | Performed by: RADIOLOGY

## 2021-01-01 PROCEDURE — 96366 THER/PROPH/DIAG IV INF ADDON: CPT

## 2021-01-01 PROCEDURE — 77300 RADIATION THERAPY DOSE PLAN: CPT | Performed by: RADIOLOGY

## 2021-01-01 PROCEDURE — 99284 EMERGENCY DEPT VISIT MOD MDM: CPT

## 2021-01-01 PROCEDURE — 85007 BL SMEAR W/DIFF WBC COUNT: CPT | Performed by: INTERNAL MEDICINE

## 2021-01-01 PROCEDURE — 72195 MRI PELVIS W/O DYE: CPT

## 2021-01-01 PROCEDURE — 85027 COMPLETE CBC AUTOMATED: CPT | Performed by: HOSPITALIST

## 2021-01-01 PROCEDURE — 80053 COMPREHEN METABOLIC PANEL: CPT | Performed by: EMERGENCY MEDICINE

## 2021-01-01 PROCEDURE — 85049 AUTOMATED PLATELET COUNT: CPT | Performed by: INTERNAL MEDICINE

## 2021-01-01 PROCEDURE — P9053 PLT, PHER, L/R CMV-NEG, IRR: HCPCS

## 2021-01-01 PROCEDURE — 83605 ASSAY OF LACTIC ACID: CPT | Performed by: EMERGENCY MEDICINE

## 2021-01-01 PROCEDURE — 70553 MRI BRAIN STEM W/O & W/DYE: CPT

## 2021-01-01 PROCEDURE — 93306 TTE W/DOPPLER COMPLETE: CPT | Performed by: INTERNAL MEDICINE

## 2021-01-01 PROCEDURE — 93306 TTE W/DOPPLER COMPLETE: CPT

## 2021-01-01 PROCEDURE — 87040 BLOOD CULTURE FOR BACTERIA: CPT | Performed by: EMERGENCY MEDICINE

## 2021-01-01 PROCEDURE — 85045 AUTOMATED RETICULOCYTE COUNT: CPT | Performed by: STUDENT IN AN ORGANIZED HEALTH CARE EDUCATION/TRAINING PROGRAM

## 2021-01-01 PROCEDURE — 85025 COMPLETE CBC W/AUTO DIFF WBC: CPT | Performed by: EMERGENCY MEDICINE

## 2021-01-01 PROCEDURE — P9016 RBC LEUKOCYTES REDUCED: HCPCS

## 2021-01-01 PROCEDURE — 80048 BASIC METABOLIC PNL TOTAL CA: CPT | Performed by: EMERGENCY MEDICINE

## 2021-01-01 PROCEDURE — 71275 CT ANGIOGRAPHY CHEST: CPT

## 2021-01-01 PROCEDURE — 85025 COMPLETE CBC W/AUTO DIFF WBC: CPT | Performed by: FAMILY MEDICINE

## 2021-01-01 PROCEDURE — 88305 TISSUE EXAM BY PATHOLOGIST: CPT | Performed by: PATHOLOGY

## 2021-01-01 PROCEDURE — 83010 ASSAY OF HAPTOGLOBIN QUANT: CPT | Performed by: INTERNAL MEDICINE

## 2021-01-01 PROCEDURE — G0463 HOSPITAL OUTPT CLINIC VISIT: HCPCS | Performed by: RADIOLOGY

## 2021-01-01 PROCEDURE — 81001 URINALYSIS AUTO W/SCOPE: CPT | Performed by: EMERGENCY MEDICINE

## 2021-01-01 PROCEDURE — 76770 US EXAM ABDO BACK WALL COMP: CPT

## 2021-01-01 PROCEDURE — 93005 ELECTROCARDIOGRAM TRACING: CPT

## 2021-01-01 PROCEDURE — 83540 ASSAY OF IRON: CPT | Performed by: STUDENT IN AN ORGANIZED HEALTH CARE EDUCATION/TRAINING PROGRAM

## 2021-01-01 PROCEDURE — 71045 X-RAY EXAM CHEST 1 VIEW: CPT

## 2021-01-01 PROCEDURE — 84295 ASSAY OF SERUM SODIUM: CPT | Performed by: INTERNAL MEDICINE

## 2021-01-01 PROCEDURE — 74176 CT ABD & PELVIS W/O CONTRAST: CPT

## 2021-01-01 PROCEDURE — 83550 IRON BINDING TEST: CPT | Performed by: STUDENT IN AN ORGANIZED HEALTH CARE EDUCATION/TRAINING PROGRAM

## 2021-01-01 PROCEDURE — 84484 ASSAY OF TROPONIN QUANT: CPT | Performed by: EMERGENCY MEDICINE

## 2021-01-01 PROCEDURE — 99223 1ST HOSP IP/OBS HIGH 75: CPT | Performed by: SURGERY

## 2021-01-01 PROCEDURE — 73552 X-RAY EXAM OF FEMUR 2/>: CPT

## 2021-01-01 PROCEDURE — 0241U HB NFCT DS VIR RESP RNA 4 TRGT: CPT | Performed by: EMERGENCY MEDICINE

## 2021-01-01 PROCEDURE — 77470 SPECIAL RADIATION TREATMENT: CPT | Performed by: RADIOLOGY

## 2021-01-01 PROCEDURE — 85027 COMPLETE CBC AUTOMATED: CPT | Performed by: EMERGENCY MEDICINE

## 2021-01-01 PROCEDURE — 99223 1ST HOSP IP/OBS HIGH 75: CPT | Performed by: INTERNAL MEDICINE

## 2021-01-01 PROCEDURE — 079B8ZX DRAINAGE OF MESENTERIC LYMPHATIC, VIA NATURAL OR ARTIFICIAL OPENING ENDOSCOPIC APPROACH, DIAGNOSTIC: ICD-10-PCS | Performed by: INTERNAL MEDICINE

## 2021-01-01 RX ORDER — ONDANSETRON 4 MG/1
4 TABLET, ORALLY DISINTEGRATING ORAL EVERY 6 HOURS PRN
Status: DISCONTINUED | OUTPATIENT
Start: 2021-01-01 | End: 2021-01-01 | Stop reason: HOSPADM

## 2021-01-01 RX ORDER — POLYETHYLENE GLYCOL 3350 17 G/17G
238 POWDER, FOR SOLUTION ORAL ONCE
Status: DISCONTINUED | OUTPATIENT
Start: 2021-01-01 | End: 2021-01-01

## 2021-01-01 RX ORDER — VALACYCLOVIR HYDROCHLORIDE 1 G/1
1000 TABLET, FILM COATED ORAL 2 TIMES DAILY
COMMUNITY
End: 2021-01-01 | Stop reason: ALTCHOICE

## 2021-01-01 RX ORDER — SODIUM CHLORIDE 9 MG/ML
INJECTION, SOLUTION INTRAVENOUS CONTINUOUS PRN
Status: DISCONTINUED | OUTPATIENT
Start: 2021-01-01 | End: 2021-01-01

## 2021-01-01 RX ORDER — ACETAMINOPHEN 325 MG/1
650 TABLET ORAL ONCE
Status: COMPLETED | OUTPATIENT
Start: 2021-01-01 | End: 2021-01-01

## 2021-01-01 RX ORDER — DIPHENHYDRAMINE HCL 25 MG
50 TABLET ORAL ONCE
Status: COMPLETED | OUTPATIENT
Start: 2021-01-01 | End: 2021-01-01

## 2021-01-01 RX ORDER — DEXTROSE AND SODIUM CHLORIDE 5; .45 G/100ML; G/100ML
50 INJECTION, SOLUTION INTRAVENOUS CONTINUOUS
Status: DISCONTINUED | OUTPATIENT
Start: 2021-01-01 | End: 2021-01-01 | Stop reason: HOSPADM

## 2021-01-01 RX ORDER — ZINC GLUCONATE 50 MG
50 TABLET ORAL DAILY
COMMUNITY
End: 2022-01-01 | Stop reason: HOSPADM

## 2021-01-01 RX ORDER — MULTIVIT WITH MINERALS/LUTEIN
1000 TABLET ORAL DAILY
COMMUNITY
End: 2022-01-01 | Stop reason: HOSPADM

## 2021-01-01 RX ORDER — AMOXICILLIN 500 MG
CAPSULE ORAL
COMMUNITY
End: 2022-01-01 | Stop reason: HOSPADM

## 2021-01-01 RX ORDER — FONDAPARINUX SODIUM 10 MG/.8ML
10 INJECTION SUBCUTANEOUS EVERY 24 HOURS
COMMUNITY
End: 2021-01-01 | Stop reason: ALTCHOICE

## 2021-01-01 RX ORDER — POTASSIUM CHLORIDE 20 MEQ/1
40 TABLET, EXTENDED RELEASE ORAL ONCE
Status: COMPLETED | OUTPATIENT
Start: 2021-01-01 | End: 2021-01-01

## 2021-01-01 RX ORDER — MIDAZOLAM HYDROCHLORIDE 2 MG/2ML
INJECTION, SOLUTION INTRAMUSCULAR; INTRAVENOUS CODE/TRAUMA/SEDATION MEDICATION
Status: COMPLETED | OUTPATIENT
Start: 2021-01-01 | End: 2021-01-01

## 2021-01-01 RX ORDER — FAMOTIDINE 20 MG/1
20 TABLET, FILM COATED ORAL 2 TIMES DAILY
COMMUNITY
End: 2022-01-01 | Stop reason: HOSPADM

## 2021-01-01 RX ORDER — SODIUM CHLORIDE 9 MG/ML
125 INJECTION, SOLUTION INTRAVENOUS CONTINUOUS
Status: DISCONTINUED | OUTPATIENT
Start: 2021-01-01 | End: 2021-01-01 | Stop reason: HOSPADM

## 2021-01-01 RX ORDER — ACETAMINOPHEN 325 MG/1
650 TABLET ORAL EVERY 6 HOURS PRN
Status: DISCONTINUED | OUTPATIENT
Start: 2021-01-01 | End: 2021-01-01 | Stop reason: HOSPADM

## 2021-01-01 RX ORDER — ACETAMINOPHEN 325 MG/1
650 TABLET ORAL EVERY 6 HOURS PRN
Status: DISCONTINUED | OUTPATIENT
Start: 2021-01-01 | End: 2021-01-01

## 2021-01-01 RX ORDER — SODIUM CHLORIDE, SODIUM GLUCONATE, SODIUM ACETATE, POTASSIUM CHLORIDE, MAGNESIUM CHLORIDE, SODIUM PHOSPHATE, DIBASIC, AND POTASSIUM PHOSPHATE .53; .5; .37; .037; .03; .012; .00082 G/100ML; G/100ML; G/100ML; G/100ML; G/100ML; G/100ML; G/100ML
100 INJECTION, SOLUTION INTRAVENOUS CONTINUOUS
Status: DISCONTINUED | OUTPATIENT
Start: 2021-01-01 | End: 2021-01-01

## 2021-01-01 RX ORDER — LIDOCAINE 40 MG/G
CREAM TOPICAL 4 TIMES DAILY PRN
Status: DISCONTINUED | OUTPATIENT
Start: 2021-01-01 | End: 2021-01-01 | Stop reason: HOSPADM

## 2021-01-01 RX ORDER — FONDAPARINUX SODIUM 10 MG/.8ML
10 INJECTION SUBCUTANEOUS EVERY 24 HOURS
COMMUNITY
End: 2021-01-01 | Stop reason: SINTOL

## 2021-01-01 RX ORDER — LANOLIN ALCOHOL/MO/W.PET/CERES
3 CREAM (GRAM) TOPICAL
Status: DISCONTINUED | OUTPATIENT
Start: 2021-01-01 | End: 2021-01-01 | Stop reason: HOSPADM

## 2021-01-01 RX ORDER — OXYCODONE HCL 10 MG/1
10 TABLET, FILM COATED, EXTENDED RELEASE ORAL EVERY 12 HOURS SCHEDULED
Status: DISCONTINUED | OUTPATIENT
Start: 2021-01-01 | End: 2021-01-01 | Stop reason: HOSPADM

## 2021-01-01 RX ORDER — GABAPENTIN 300 MG/1
600 CAPSULE ORAL 3 TIMES DAILY
COMMUNITY
End: 2022-01-01 | Stop reason: HOSPADM

## 2021-01-01 RX ORDER — OXYCODONE HYDROCHLORIDE 5 MG/1
5 TABLET ORAL EVERY 4 HOURS PRN
Status: DISCONTINUED | OUTPATIENT
Start: 2021-01-01 | End: 2021-01-01

## 2021-01-01 RX ORDER — BACILLUS COAGULANS/INULIN 1B-250 MG
CAPSULE ORAL
COMMUNITY
End: 2022-01-01 | Stop reason: HOSPADM

## 2021-01-01 RX ORDER — ASPIRIN 81 MG/1
81 TABLET ORAL DAILY
Status: DISCONTINUED | OUTPATIENT
Start: 2021-01-01 | End: 2021-01-01 | Stop reason: HOSPADM

## 2021-01-01 RX ORDER — DIPHENHYDRAMINE HCL 25 MG
25 TABLET ORAL ONCE
Status: DISCONTINUED | OUTPATIENT
Start: 2021-01-01 | End: 2021-01-01

## 2021-01-01 RX ORDER — PROPOFOL 10 MG/ML
INJECTION, EMULSION INTRAVENOUS AS NEEDED
Status: DISCONTINUED | OUTPATIENT
Start: 2021-01-01 | End: 2021-01-01

## 2021-01-01 RX ORDER — PANTOPRAZOLE SODIUM 40 MG/1
40 TABLET, DELAYED RELEASE ORAL
Status: DISCONTINUED | OUTPATIENT
Start: 2021-01-01 | End: 2021-01-01

## 2021-01-01 RX ORDER — PROPOFOL 10 MG/ML
INJECTION, EMULSION INTRAVENOUS CONTINUOUS PRN
Status: DISCONTINUED | OUTPATIENT
Start: 2021-01-01 | End: 2021-01-01

## 2021-01-01 RX ORDER — POLYETHYLENE GLYCOL 3350 17 G/17G
17 POWDER, FOR SOLUTION ORAL DAILY
Status: DISCONTINUED | OUTPATIENT
Start: 2021-01-01 | End: 2021-01-01

## 2021-01-01 RX ORDER — SODIUM CHLORIDE 9 MG/ML
75 INJECTION, SOLUTION INTRAVENOUS CONTINUOUS
Status: DISCONTINUED | OUTPATIENT
Start: 2021-01-01 | End: 2021-01-01 | Stop reason: HOSPADM

## 2021-01-01 RX ORDER — CEFAZOLIN SODIUM 2 G/50ML
2000 SOLUTION INTRAVENOUS ONCE
Status: COMPLETED | OUTPATIENT
Start: 2021-01-01 | End: 2021-01-01

## 2021-01-01 RX ORDER — OXYCODONE HYDROCHLORIDE 10 MG/1
10 TABLET ORAL EVERY 4 HOURS PRN
Status: DISCONTINUED | OUTPATIENT
Start: 2021-01-01 | End: 2021-01-01 | Stop reason: HOSPADM

## 2021-01-01 RX ORDER — SENNOSIDES 8.6 MG
2 TABLET ORAL DAILY
Status: DISCONTINUED | OUTPATIENT
Start: 2021-01-01 | End: 2021-01-01

## 2021-01-01 RX ORDER — PANTOPRAZOLE SODIUM 40 MG/1
40 TABLET, DELAYED RELEASE ORAL
Status: DISCONTINUED | OUTPATIENT
Start: 2021-01-01 | End: 2021-01-01 | Stop reason: HOSPADM

## 2021-01-01 RX ORDER — ONDANSETRON 4 MG/1
4 TABLET, FILM COATED ORAL EVERY 8 HOURS PRN
COMMUNITY
End: 2022-01-01 | Stop reason: HOSPADM

## 2021-01-01 RX ORDER — VALACYCLOVIR HYDROCHLORIDE 1 G/1
1000 TABLET, FILM COATED ORAL 2 TIMES DAILY
Status: DISCONTINUED | OUTPATIENT
Start: 2021-01-01 | End: 2021-01-01 | Stop reason: HOSPADM

## 2021-01-01 RX ORDER — GABAPENTIN 300 MG/1
300 CAPSULE ORAL 3 TIMES DAILY
Status: DISCONTINUED | OUTPATIENT
Start: 2021-01-01 | End: 2021-01-01 | Stop reason: HOSPADM

## 2021-01-01 RX ORDER — PRAVASTATIN SODIUM 40 MG
40 TABLET ORAL
Status: DISCONTINUED | OUTPATIENT
Start: 2021-01-01 | End: 2021-01-01 | Stop reason: HOSPADM

## 2021-01-01 RX ORDER — LIDOCAINE 40 MG/G
CREAM TOPICAL 4 TIMES DAILY PRN
Status: DISCONTINUED | OUTPATIENT
Start: 2021-01-01 | End: 2021-01-01

## 2021-01-01 RX ORDER — ONDANSETRON 2 MG/ML
4 INJECTION INTRAMUSCULAR; INTRAVENOUS EVERY 6 HOURS PRN
Status: DISCONTINUED | OUTPATIENT
Start: 2021-01-01 | End: 2021-01-01 | Stop reason: HOSPADM

## 2021-01-01 RX ORDER — FENTANYL CITRATE 50 UG/ML
INJECTION, SOLUTION INTRAMUSCULAR; INTRAVENOUS CODE/TRAUMA/SEDATION MEDICATION
Status: COMPLETED | OUTPATIENT
Start: 2021-01-01 | End: 2021-01-01

## 2021-01-01 RX ORDER — SODIUM CHLORIDE 9 MG/ML
75 INJECTION, SOLUTION INTRAVENOUS CONTINUOUS
Status: DISCONTINUED | OUTPATIENT
Start: 2021-01-01 | End: 2021-01-01

## 2021-01-01 RX ORDER — MELATONIN
500 DAILY
Status: DISCONTINUED | OUTPATIENT
Start: 2021-01-01 | End: 2021-01-01 | Stop reason: HOSPADM

## 2021-01-01 RX ORDER — LIDOCAINE HYDROCHLORIDE 10 MG/ML
INJECTION, SOLUTION EPIDURAL; INFILTRATION; INTRACAUDAL; PERINEURAL AS NEEDED
Status: DISCONTINUED | OUTPATIENT
Start: 2021-01-01 | End: 2021-01-01

## 2021-01-01 RX ADMIN — POLYETHYLENE GLYCOL 3350 17 G: 17 POWDER, FOR SOLUTION ORAL at 18:51

## 2021-01-01 RX ADMIN — METOPROLOL TARTRATE 25 MG: 25 TABLET, FILM COATED ORAL at 21:30

## 2021-01-01 RX ADMIN — ACETAMINOPHEN 650 MG: 325 TABLET, FILM COATED ORAL at 18:43

## 2021-01-01 RX ADMIN — GABAPENTIN 300 MG: 300 CAPSULE ORAL at 16:39

## 2021-01-01 RX ADMIN — ASPIRIN 81 MG: 81 TABLET, COATED ORAL at 08:35

## 2021-01-01 RX ADMIN — DEXTROSE AND SODIUM CHLORIDE 50 ML/HR: 5; .45 INJECTION, SOLUTION INTRAVENOUS at 09:27

## 2021-01-01 RX ADMIN — ENOXAPARIN SODIUM 100 MG: 100 INJECTION SUBCUTANEOUS at 21:30

## 2021-01-01 RX ADMIN — GABAPENTIN 300 MG: 300 CAPSULE ORAL at 08:13

## 2021-01-01 RX ADMIN — LIDOCAINE: 4 CREAM TOPICAL at 17:54

## 2021-01-01 RX ADMIN — MELATONIN TAB 3 MG 3 MG: 3 TAB at 21:30

## 2021-01-01 RX ADMIN — MELATONIN TAB 3 MG 3 MG: 3 TAB at 22:38

## 2021-01-01 RX ADMIN — ONDANSETRON 4 MG: 4 TABLET, ORALLY DISINTEGRATING ORAL at 03:37

## 2021-01-01 RX ADMIN — GADOBUTROL 10 ML: 604.72 INJECTION INTRAVENOUS at 15:38

## 2021-01-01 RX ADMIN — METOPROLOL TARTRATE 25 MG: 25 TABLET, FILM COATED ORAL at 08:31

## 2021-01-01 RX ADMIN — ACETAMINOPHEN 650 MG: 325 TABLET ORAL at 12:48

## 2021-01-01 RX ADMIN — MORPHINE SULFATE 2 MG: 2 INJECTION, SOLUTION INTRAMUSCULAR; INTRAVENOUS at 08:56

## 2021-01-01 RX ADMIN — PANTOPRAZOLE SODIUM 40 MG: 40 TABLET, DELAYED RELEASE ORAL at 06:35

## 2021-01-01 RX ADMIN — ACETAMINOPHEN 650 MG: 325 TABLET, FILM COATED ORAL at 10:41

## 2021-01-01 RX ADMIN — SODIUM CHLORIDE, SODIUM GLUCONATE, SODIUM ACETATE, POTASSIUM CHLORIDE, MAGNESIUM CHLORIDE, SODIUM PHOSPHATE, DIBASIC, AND POTASSIUM PHOSPHATE 100 ML/HR: .53; .5; .37; .037; .03; .012; .00082 INJECTION, SOLUTION INTRAVENOUS at 12:19

## 2021-01-01 RX ADMIN — SODIUM CHLORIDE, SODIUM GLUCONATE, SODIUM ACETATE, POTASSIUM CHLORIDE, MAGNESIUM CHLORIDE, SODIUM PHOSPHATE, DIBASIC, AND POTASSIUM PHOSPHATE 100 ML/HR: .53; .5; .37; .037; .03; .012; .00082 INJECTION, SOLUTION INTRAVENOUS at 22:59

## 2021-01-01 RX ADMIN — ENOXAPARIN SODIUM 100 MG: 100 INJECTION SUBCUTANEOUS at 20:07

## 2021-01-01 RX ADMIN — IOHEXOL 100 ML: 350 INJECTION, SOLUTION INTRAVENOUS at 18:33

## 2021-01-01 RX ADMIN — METOPROLOL TARTRATE 25 MG: 25 TABLET, FILM COATED ORAL at 20:59

## 2021-01-01 RX ADMIN — PANTOPRAZOLE SODIUM 40 MG: 40 TABLET, DELAYED RELEASE ORAL at 16:42

## 2021-01-01 RX ADMIN — POTASSIUM CHLORIDE 40 MEQ: 1500 TABLET, EXTENDED RELEASE ORAL at 17:53

## 2021-01-01 RX ADMIN — MIDAZOLAM 1 MG: 1 INJECTION INTRAMUSCULAR; INTRAVENOUS at 09:37

## 2021-01-01 RX ADMIN — PANTOPRAZOLE SODIUM 40 MG: 40 TABLET, DELAYED RELEASE ORAL at 05:15

## 2021-01-01 RX ADMIN — GABAPENTIN 300 MG: 300 CAPSULE ORAL at 09:04

## 2021-01-01 RX ADMIN — GABAPENTIN 300 MG: 300 CAPSULE ORAL at 16:42

## 2021-01-01 RX ADMIN — OXYCODONE HYDROCHLORIDE 10 MG: 10 TABLET, FILM COATED, EXTENDED RELEASE ORAL at 08:13

## 2021-01-01 RX ADMIN — DIPHENHYDRAMINE HCL 50 MG: 25 TABLET ORAL at 10:41

## 2021-01-01 RX ADMIN — GABAPENTIN 300 MG: 300 CAPSULE ORAL at 09:12

## 2021-01-01 RX ADMIN — SODIUM CHLORIDE: 0.9 INJECTION, SOLUTION INTRAVENOUS at 16:19

## 2021-01-01 RX ADMIN — PANTOPRAZOLE SODIUM 40 MG: 40 TABLET, DELAYED RELEASE ORAL at 16:39

## 2021-01-01 RX ADMIN — VALACYCLOVIR HYDROCHLORIDE 1000 MG: 1 TABLET, FILM COATED ORAL at 10:34

## 2021-01-01 RX ADMIN — PANTOPRAZOLE SODIUM 40 MG: 40 TABLET, DELAYED RELEASE ORAL at 06:42

## 2021-01-01 RX ADMIN — OXYCODONE HYDROCHLORIDE 10 MG: 10 TABLET, FILM COATED, EXTENDED RELEASE ORAL at 22:19

## 2021-01-01 RX ADMIN — MELATONIN TAB 3 MG 3 MG: 3 TAB at 21:39

## 2021-01-01 RX ADMIN — VALACYCLOVIR HYDROCHLORIDE 1000 MG: 1 TABLET, FILM COATED ORAL at 08:36

## 2021-01-01 RX ADMIN — IODIXANOL 100 ML: 320 INJECTION, SOLUTION INTRAVASCULAR at 18:31

## 2021-01-01 RX ADMIN — METOPROLOL TARTRATE 25 MG: 25 TABLET, FILM COATED ORAL at 08:12

## 2021-01-01 RX ADMIN — FENTANYL CITRATE 50 MCG: 50 INJECTION INTRAMUSCULAR; INTRAVENOUS at 09:42

## 2021-01-01 RX ADMIN — OXYCODONE HYDROCHLORIDE 10 MG: 10 TABLET ORAL at 06:35

## 2021-01-01 RX ADMIN — GADOBUTROL 6 ML: 604.72 INJECTION INTRAVENOUS at 15:05

## 2021-01-01 RX ADMIN — OXYCODONE HYDROCHLORIDE 5 MG: 5 TABLET ORAL at 05:45

## 2021-01-01 RX ADMIN — CEFAZOLIN SODIUM 2000 MG: 2 SOLUTION INTRAVENOUS at 09:01

## 2021-01-01 RX ADMIN — GADOBUTROL 10 ML: 604.72 INJECTION INTRAVENOUS at 18:12

## 2021-01-01 RX ADMIN — OXYCODONE HYDROCHLORIDE 10 MG: 10 TABLET, FILM COATED, EXTENDED RELEASE ORAL at 08:30

## 2021-01-01 RX ADMIN — VALACYCLOVIR HYDROCHLORIDE 1000 MG: 1 TABLET, FILM COATED ORAL at 16:42

## 2021-01-01 RX ADMIN — GABAPENTIN 300 MG: 300 CAPSULE ORAL at 16:22

## 2021-01-01 RX ADMIN — MIDAZOLAM 1 MG: 1 INJECTION INTRAMUSCULAR; INTRAVENOUS at 09:32

## 2021-01-01 RX ADMIN — GADOBUTROL 8 ML: 604.72 INJECTION INTRAVENOUS at 17:24

## 2021-01-01 RX ADMIN — ONDANSETRON 4 MG: 4 TABLET, ORALLY DISINTEGRATING ORAL at 10:31

## 2021-01-01 RX ADMIN — MIDAZOLAM 1 MG: 1 INJECTION INTRAMUSCULAR; INTRAVENOUS at 09:51

## 2021-01-01 RX ADMIN — ENOXAPARIN SODIUM 100 MG: 100 INJECTION SUBCUTANEOUS at 09:33

## 2021-01-01 RX ADMIN — MORPHINE SULFATE 2 MG: 2 INJECTION, SOLUTION INTRAMUSCULAR; INTRAVENOUS at 22:39

## 2021-01-01 RX ADMIN — GABAPENTIN 300 MG: 300 CAPSULE ORAL at 16:23

## 2021-01-01 RX ADMIN — VALACYCLOVIR HYDROCHLORIDE 1000 MG: 1 TABLET, FILM COATED ORAL at 18:51

## 2021-01-01 RX ADMIN — GABAPENTIN 300 MG: 300 CAPSULE ORAL at 10:37

## 2021-01-01 RX ADMIN — GABAPENTIN 300 MG: 300 CAPSULE ORAL at 20:59

## 2021-01-01 RX ADMIN — VALACYCLOVIR HYDROCHLORIDE 1000 MG: 1 TABLET, FILM COATED ORAL at 10:37

## 2021-01-01 RX ADMIN — SODIUM CHLORIDE 75 ML/HR: 0.9 INJECTION, SOLUTION INTRAVENOUS at 06:44

## 2021-01-01 RX ADMIN — DIPHENHYDRAMINE HCL 50 MG: 25 TABLET ORAL at 08:29

## 2021-01-01 RX ADMIN — VALACYCLOVIR HYDROCHLORIDE 1000 MG: 1 TABLET, FILM COATED ORAL at 22:30

## 2021-01-01 RX ADMIN — OXYCODONE HYDROCHLORIDE 10 MG: 10 TABLET, FILM COATED, EXTENDED RELEASE ORAL at 20:07

## 2021-01-01 RX ADMIN — SODIUM CHLORIDE 75 ML/HR: 0.9 INJECTION, SOLUTION INTRAVENOUS at 15:19

## 2021-01-01 RX ADMIN — ASPIRIN 81 MG: 81 TABLET, COATED ORAL at 10:37

## 2021-01-01 RX ADMIN — POLYETHYLENE GLYCOL 3350, SODIUM SULFATE ANHYDROUS, SODIUM BICARBONATE, SODIUM CHLORIDE, POTASSIUM CHLORIDE 4000 ML: 236; 22.74; 6.74; 5.86; 2.97 POWDER, FOR SOLUTION ORAL at 16:25

## 2021-01-01 RX ADMIN — ENOXAPARIN SODIUM 100 MG: 100 INJECTION SUBCUTANEOUS at 22:30

## 2021-01-01 RX ADMIN — ACETAMINOPHEN 650 MG: 325 TABLET, FILM COATED ORAL at 09:43

## 2021-01-01 RX ADMIN — IOHEXOL 50 ML: 240 INJECTION, SOLUTION INTRATHECAL; INTRAVASCULAR; INTRAVENOUS; ORAL at 18:31

## 2021-01-01 RX ADMIN — ACETAMINOPHEN 650 MG: 325 TABLET, FILM COATED ORAL at 00:01

## 2021-01-01 RX ADMIN — OXYCODONE HYDROCHLORIDE 5 MG: 5 TABLET ORAL at 12:28

## 2021-01-01 RX ADMIN — PROPOFOL 130 MCG/KG/MIN: 10 INJECTION, EMULSION INTRAVENOUS at 15:41

## 2021-01-01 RX ADMIN — ACETAMINOPHEN 650 MG: 325 TABLET, FILM COATED ORAL at 12:37

## 2021-01-01 RX ADMIN — DIPHENHYDRAMINE HCL 50 MG: 25 TABLET ORAL at 09:43

## 2021-01-01 RX ADMIN — CEFTRIAXONE SODIUM 1000 MG: 10 INJECTION, POWDER, FOR SOLUTION INTRAVENOUS at 16:22

## 2021-01-01 RX ADMIN — DIPHENHYDRAMINE HCL 50 MG: 25 TABLET ORAL at 12:48

## 2021-01-01 RX ADMIN — IOHEXOL 100 ML: 350 INJECTION, SOLUTION INTRAVENOUS at 10:10

## 2021-01-01 RX ADMIN — GADOBUTROL 10 ML: 604.72 INJECTION INTRAVENOUS at 18:26

## 2021-01-01 RX ADMIN — SODIUM CHLORIDE: 0.9 INJECTION, SOLUTION INTRAVENOUS at 15:37

## 2021-01-01 RX ADMIN — SODIUM CHLORIDE, SODIUM GLUCONATE, SODIUM ACETATE, POTASSIUM CHLORIDE, MAGNESIUM CHLORIDE, SODIUM PHOSPHATE, DIBASIC, AND POTASSIUM PHOSPHATE 100 ML/HR: .53; .5; .37; .037; .03; .012; .00082 INJECTION, SOLUTION INTRAVENOUS at 20:31

## 2021-01-01 RX ADMIN — ACETAMINOPHEN 650 MG: 325 TABLET, FILM COATED ORAL at 12:18

## 2021-01-01 RX ADMIN — DIPHENHYDRAMINE HCL 50 MG: 25 TABLET ORAL at 09:32

## 2021-01-01 RX ADMIN — POLYETHYLENE GLYCOL 3350 17 G: 17 POWDER, FOR SOLUTION ORAL at 10:31

## 2021-01-01 RX ADMIN — ENOXAPARIN SODIUM 100 MG: 100 INJECTION SUBCUTANEOUS at 09:04

## 2021-01-01 RX ADMIN — GABAPENTIN 300 MG: 300 CAPSULE ORAL at 20:07

## 2021-01-01 RX ADMIN — ENOXAPARIN SODIUM 100 MG: 100 INJECTION SUBCUTANEOUS at 23:14

## 2021-01-01 RX ADMIN — SODIUM CHLORIDE 75 ML/HR: 0.9 INJECTION, SOLUTION INTRAVENOUS at 07:35

## 2021-01-01 RX ADMIN — OXYCODONE HYDROCHLORIDE 10 MG: 10 TABLET ORAL at 06:42

## 2021-01-01 RX ADMIN — ACETAMINOPHEN 650 MG: 325 TABLET, FILM COATED ORAL at 11:59

## 2021-01-01 RX ADMIN — ONDANSETRON 4 MG: 2 INJECTION INTRAMUSCULAR; INTRAVENOUS at 12:20

## 2021-01-01 RX ADMIN — PROPOFOL 50 MG: 10 INJECTION, EMULSION INTRAVENOUS at 15:43

## 2021-01-01 RX ADMIN — GABAPENTIN 300 MG: 300 CAPSULE ORAL at 21:30

## 2021-01-01 RX ADMIN — ENOXAPARIN SODIUM 100 MG: 100 INJECTION SUBCUTANEOUS at 22:19

## 2021-01-01 RX ADMIN — DEXTROSE 1000 MG: 50 INJECTION, SOLUTION INTRAVENOUS at 14:53

## 2021-01-01 RX ADMIN — DIPHENHYDRAMINE HYDROCHLORIDE 50 MG: 25 TABLET ORAL at 12:00

## 2021-01-01 RX ADMIN — SENNOSIDES 17.2 MG: 8.6 TABLET ORAL at 08:54

## 2021-01-01 RX ADMIN — VALACYCLOVIR HYDROCHLORIDE 1000 MG: 1 TABLET, FILM COATED ORAL at 17:36

## 2021-01-01 RX ADMIN — IOHEXOL 100 ML: 350 INJECTION, SOLUTION INTRAVENOUS at 20:09

## 2021-01-01 RX ADMIN — ENOXAPARIN SODIUM 100 MG: 100 INJECTION SUBCUTANEOUS at 08:36

## 2021-01-01 RX ADMIN — IOHEXOL 100 ML: 350 INJECTION, SOLUTION INTRAVENOUS at 20:21

## 2021-01-01 RX ADMIN — OXYCODONE HYDROCHLORIDE 5 MG: 5 TABLET ORAL at 16:26

## 2021-01-01 RX ADMIN — GABAPENTIN 300 MG: 300 CAPSULE ORAL at 08:54

## 2021-01-01 RX ADMIN — ACETAMINOPHEN 650 MG: 325 TABLET, FILM COATED ORAL at 09:32

## 2021-01-01 RX ADMIN — GABAPENTIN 300 MG: 300 CAPSULE ORAL at 16:44

## 2021-01-01 RX ADMIN — VALACYCLOVIR HYDROCHLORIDE 1000 MG: 1 TABLET, FILM COATED ORAL at 17:13

## 2021-01-01 RX ADMIN — VALACYCLOVIR HYDROCHLORIDE 1000 MG: 1 TABLET, FILM COATED ORAL at 08:12

## 2021-01-01 RX ADMIN — ACETAMINOPHEN 650 MG: 325 TABLET, FILM COATED ORAL at 06:43

## 2021-01-01 RX ADMIN — ASPIRIN 81 MG: 81 TABLET, COATED ORAL at 08:55

## 2021-01-01 RX ADMIN — PANTOPRAZOLE SODIUM 40 MG: 40 TABLET, DELAYED RELEASE ORAL at 05:45

## 2021-01-01 RX ADMIN — METOPROLOL TARTRATE 25 MG: 25 TABLET, FILM COATED ORAL at 09:29

## 2021-01-01 RX ADMIN — OXYCODONE HYDROCHLORIDE 10 MG: 10 TABLET, FILM COATED, EXTENDED RELEASE ORAL at 21:30

## 2021-01-01 RX ADMIN — POTASSIUM CHLORIDE 40 MEQ: 1500 TABLET, EXTENDED RELEASE ORAL at 14:32

## 2021-01-01 RX ADMIN — ENOXAPARIN SODIUM 100 MG: 100 INJECTION SUBCUTANEOUS at 10:37

## 2021-01-01 RX ADMIN — VALACYCLOVIR HYDROCHLORIDE 1000 MG: 1 TABLET, FILM COATED ORAL at 09:34

## 2021-01-01 RX ADMIN — ONDANSETRON 4 MG: 4 TABLET, ORALLY DISINTEGRATING ORAL at 05:13

## 2021-01-01 RX ADMIN — BISACODYL 10 MG: 5 TABLET, COATED ORAL at 16:24

## 2021-01-01 RX ADMIN — VALACYCLOVIR HYDROCHLORIDE 1000 MG: 1 TABLET, FILM COATED ORAL at 23:19

## 2021-01-01 RX ADMIN — ACETAMINOPHEN 650 MG: 325 TABLET, FILM COATED ORAL at 15:32

## 2021-01-01 RX ADMIN — GABAPENTIN 300 MG: 300 CAPSULE ORAL at 22:19

## 2021-01-01 RX ADMIN — MELATONIN TAB 3 MG 3 MG: 3 TAB at 22:31

## 2021-01-01 RX ADMIN — SODIUM CHLORIDE 500 ML: 0.9 INJECTION, SOLUTION INTRAVENOUS at 18:37

## 2021-01-01 RX ADMIN — MIDAZOLAM 1 MG: 1 INJECTION INTRAMUSCULAR; INTRAVENOUS at 09:42

## 2021-01-01 RX ADMIN — ASPIRIN 81 MG: 81 TABLET, COATED ORAL at 09:04

## 2021-01-01 RX ADMIN — ASPIRIN 81 MG: 81 TABLET, COATED ORAL at 09:12

## 2021-01-01 RX ADMIN — OXYCODONE HYDROCHLORIDE 10 MG: 10 TABLET, FILM COATED, EXTENDED RELEASE ORAL at 10:31

## 2021-01-01 RX ADMIN — PANTOPRAZOLE SODIUM 40 MG: 40 TABLET, DELAYED RELEASE ORAL at 16:24

## 2021-01-01 RX ADMIN — LIDOCAINE HYDROCHLORIDE 50 MG: 10 INJECTION, SOLUTION EPIDURAL; INFILTRATION; INTRACAUDAL; PERINEURAL at 15:41

## 2021-01-01 RX ADMIN — OXYCODONE HYDROCHLORIDE 5 MG: 5 TABLET ORAL at 20:59

## 2021-01-01 RX ADMIN — PROPOFOL 100 MG: 10 INJECTION, EMULSION INTRAVENOUS at 15:41

## 2021-01-01 RX ADMIN — METOPROLOL TARTRATE 25 MG: 25 TABLET, FILM COATED ORAL at 22:19

## 2021-01-01 RX ADMIN — OXYCODONE HYDROCHLORIDE 10 MG: 10 TABLET ORAL at 12:58

## 2021-01-01 RX ADMIN — OXYCODONE HYDROCHLORIDE 10 MG: 10 TABLET, FILM COATED, EXTENDED RELEASE ORAL at 22:30

## 2021-01-01 RX ADMIN — MELATONIN TAB 3 MG 3 MG: 3 TAB at 22:18

## 2021-01-01 RX ADMIN — Medication 20 ML: at 09:40

## 2021-01-01 RX ADMIN — OXYCODONE HYDROCHLORIDE 10 MG: 10 TABLET, FILM COATED, EXTENDED RELEASE ORAL at 09:12

## 2021-01-01 RX ADMIN — FENTANYL CITRATE 50 MCG: 50 INJECTION INTRAMUSCULAR; INTRAVENOUS at 09:38

## 2021-01-01 RX ADMIN — ASPIRIN 81 MG: 81 TABLET, COATED ORAL at 08:12

## 2021-01-01 RX ADMIN — GABAPENTIN 300 MG: 300 CAPSULE ORAL at 08:37

## 2021-01-01 RX ADMIN — ENOXAPARIN SODIUM 100 MG: 100 INJECTION SUBCUTANEOUS at 12:19

## 2021-01-01 RX ADMIN — FENTANYL CITRATE 50 MCG: 50 INJECTION INTRAMUSCULAR; INTRAVENOUS at 09:33

## 2021-01-01 RX ADMIN — VALACYCLOVIR HYDROCHLORIDE 1000 MG: 1 TABLET, FILM COATED ORAL at 12:18

## 2021-01-01 RX ADMIN — ACETAMINOPHEN 650 MG: 325 TABLET, FILM COATED ORAL at 08:29

## 2021-01-01 RX ADMIN — GABAPENTIN 300 MG: 300 CAPSULE ORAL at 22:31

## 2021-01-01 RX ADMIN — METOPROLOL TARTRATE 25 MG: 25 TABLET, FILM COATED ORAL at 10:31

## 2021-01-01 RX ADMIN — FENTANYL CITRATE 50 MCG: 50 INJECTION INTRAMUSCULAR; INTRAVENOUS at 09:51

## 2021-01-01 RX ADMIN — ENOXAPARIN SODIUM 100 MG: 100 INJECTION SUBCUTANEOUS at 21:00

## 2021-01-01 RX ADMIN — PANTOPRAZOLE SODIUM 40 MG: 40 TABLET, DELAYED RELEASE ORAL at 06:06

## 2021-01-01 RX ADMIN — ACETAMINOPHEN 650 MG: 325 TABLET, FILM COATED ORAL at 05:15

## 2021-01-01 RX ADMIN — OXYCODONE HYDROCHLORIDE 10 MG: 10 TABLET ORAL at 16:39

## 2021-01-01 RX ADMIN — IOHEXOL 85 ML: 350 INJECTION, SOLUTION INTRAVENOUS at 12:17

## 2021-01-27 ENCOUNTER — HOSPITAL ENCOUNTER (OUTPATIENT)
Dept: NUCLEAR MEDICINE | Facility: HOSPITAL | Age: 61
Discharge: HOME | End: 2021-01-27
Payer: COMMERCIAL

## 2021-01-27 DIAGNOSIS — C61 MALIGNANT NEOPLASM OF PROSTATE (HCC): ICD-10-CM

## 2021-01-27 DIAGNOSIS — C85.19 UNSPECIFIED B-CELL LYMPHOMA, EXTRANODAL AND SOLID ORGAN SITES (HCC): ICD-10-CM

## 2021-01-27 DIAGNOSIS — C79.51 SECONDARY MALIGNANT NEOPLASM OF BONE (HCC): ICD-10-CM

## 2021-01-27 PROCEDURE — 78306 BONE IMAGING WHOLE BODY: CPT

## 2021-01-27 PROCEDURE — G1004 CDSM NDSC: HCPCS

## 2021-01-27 PROCEDURE — A9503 TC99M MEDRONATE: HCPCS

## 2021-02-12 ENCOUNTER — TRANSCRIBE ORDERS (OUTPATIENT)
Dept: LAB | Facility: HOSPITAL | Age: 61
End: 2021-02-12

## 2021-02-12 ENCOUNTER — HOSPITAL ENCOUNTER (OUTPATIENT)
Dept: RADIOLOGY | Facility: HOSPITAL | Age: 61
Discharge: HOME | End: 2021-02-12
Payer: COMMERCIAL

## 2021-02-12 ENCOUNTER — TRANSCRIBE ORDERS (OUTPATIENT)
Dept: ADMINISTRATIVE | Facility: HOSPITAL | Age: 61
End: 2021-02-12

## 2021-02-12 DIAGNOSIS — M54.50 LOW BACK PAIN, UNSPECIFIED BACK PAIN LATERALITY, UNSPECIFIED CHRONICITY, UNSPECIFIED WHETHER SCIATICA PRESENT: ICD-10-CM

## 2021-02-12 DIAGNOSIS — C61 MALIGNANT NEOPLASM OF PROSTATE (HCC): ICD-10-CM

## 2021-02-12 DIAGNOSIS — M54.50 LOW BACK PAIN, UNSPECIFIED BACK PAIN LATERALITY, UNSPECIFIED CHRONICITY, UNSPECIFIED WHETHER SCIATICA PRESENT: Primary | ICD-10-CM

## 2021-02-12 DIAGNOSIS — C85.19 CUTANEOUS B-CELL LYMPHOMA (HCC): Primary | ICD-10-CM

## 2021-02-12 PROCEDURE — 72110 X-RAY EXAM L-2 SPINE 4/>VWS: CPT

## 2021-02-18 ENCOUNTER — HOSPITAL ENCOUNTER (OUTPATIENT)
Dept: MRI IMAGING | Facility: HOSPITAL | Age: 61
Discharge: HOME | End: 2021-02-18
Attending: INTERNAL MEDICINE
Payer: COMMERCIAL

## 2021-02-18 DIAGNOSIS — C61 MALIGNANT NEOPLASM OF PROSTATE (HCC): ICD-10-CM

## 2021-02-18 DIAGNOSIS — C85.19 CUTANEOUS B-CELL LYMPHOMA (HCC): ICD-10-CM

## 2021-02-18 PROCEDURE — G1004 CDSM NDSC: HCPCS

## 2021-02-18 PROCEDURE — 72148 MRI LUMBAR SPINE W/O DYE: CPT

## 2021-02-23 ENCOUNTER — TRANSCRIBE ORDERS (OUTPATIENT)
Dept: ADMINISTRATIVE | Facility: HOSPITAL | Age: 61
End: 2021-02-23

## 2021-02-23 DIAGNOSIS — C61 MALIGNANT NEOPLASM OF PROSTATE (HCC): Primary | ICD-10-CM

## 2021-02-23 DIAGNOSIS — C79.51 SECONDARY MALIGNANT NEOPLASM OF BONE AND BONE MARROW (HCC): ICD-10-CM

## 2021-02-23 DIAGNOSIS — C85.19 UNSPECIFIED B-CELL LYMPHOMA, EXTRANODAL AND SOLID ORGAN SITES (HCC): ICD-10-CM

## 2021-02-23 DIAGNOSIS — C85.90 LEUCOSARCOMA (HCC): ICD-10-CM

## 2021-02-23 DIAGNOSIS — C79.52 SECONDARY MALIGNANT NEOPLASM OF BONE AND BONE MARROW (HCC): ICD-10-CM

## 2021-02-23 DIAGNOSIS — C79.51 SECONDARY MALIGNANT NEOPLASM OF BONE (HCC): ICD-10-CM

## 2021-02-27 ENCOUNTER — HOSPITAL ENCOUNTER (OUTPATIENT)
Dept: RADIOLOGY | Facility: HOSPITAL | Age: 61
Discharge: HOME | End: 2021-02-27
Attending: INTERNAL MEDICINE
Payer: COMMERCIAL

## 2021-02-27 DIAGNOSIS — C79.52 SECONDARY MALIGNANT NEOPLASM OF BONE AND BONE MARROW (HCC): ICD-10-CM

## 2021-02-27 DIAGNOSIS — C85.19 UNSPECIFIED B-CELL LYMPHOMA, EXTRANODAL AND SOLID ORGAN SITES (HCC): ICD-10-CM

## 2021-02-27 DIAGNOSIS — C79.51 SECONDARY MALIGNANT NEOPLASM OF BONE AND BONE MARROW (HCC): ICD-10-CM

## 2021-02-27 DIAGNOSIS — C61 MALIGNANT NEOPLASM OF PROSTATE (HCC): ICD-10-CM

## 2021-02-27 DIAGNOSIS — C79.51 SECONDARY MALIGNANT NEOPLASM OF BONE (HCC): ICD-10-CM

## 2021-02-27 DIAGNOSIS — C85.90 LEUCOSARCOMA (HCC): ICD-10-CM

## 2021-02-27 PROCEDURE — A9585 GADOBUTROL INJECTION: HCPCS | Performed by: INTERNAL MEDICINE

## 2021-02-27 PROCEDURE — G1004 CDSM NDSC: HCPCS

## 2021-02-27 PROCEDURE — 71260 CT THORAX DX C+: CPT

## 2021-02-27 PROCEDURE — 72149 MRI LUMBAR SPINE W/DYE: CPT

## 2021-02-27 PROCEDURE — 74177 CT ABD & PELVIS W/CONTRAST: CPT

## 2021-02-27 RX ADMIN — GADOBUTROL 10 ML: 604.72 INJECTION INTRAVENOUS at 14:25

## 2021-02-27 RX ADMIN — IODIXANOL 100 ML: 320 INJECTION, SOLUTION INTRAVASCULAR at 13:42

## 2021-03-08 ENCOUNTER — LAB REQUISITION (OUTPATIENT)
Dept: LAB | Facility: HOSPITAL | Age: 61
End: 2021-03-08
Payer: COMMERCIAL

## 2021-03-08 DIAGNOSIS — C79.51 SECONDARY MALIGNANT NEOPLASM OF BONE (HCC): ICD-10-CM

## 2021-03-08 DIAGNOSIS — C61 MALIGNANT NEOPLASM OF PROSTATE (HCC): ICD-10-CM

## 2021-03-08 LAB
BASOPHILS # BLD MANUAL: 0 THOUSAND/UL (ref 0–0.1)
BASOPHILS NFR MAR MANUAL: 0 % (ref 0–1)
BLASTS NFR BLD MANUAL: 2 %
EOSINOPHIL # BLD MANUAL: 0.44 THOUSAND/UL (ref 0–0.4)
EOSINOPHIL NFR BLD MANUAL: 6 % (ref 0–6)
ERYTHROCYTE [DISTWIDTH] IN BLOOD BY AUTOMATED COUNT: 13.7 % (ref 11.6–15.1)
HCT VFR BLD AUTO: 41.6 % (ref 36.5–49.3)
HGB BLD-MCNC: 13.6 G/DL (ref 12–17)
LYMPHOCYTES # BLD AUTO: 0.44 THOUSAND/UL (ref 0.6–4.47)
LYMPHOCYTES # BLD AUTO: 6 % (ref 14–44)
MCH RBC QN AUTO: 28.5 PG (ref 26.8–34.3)
MCHC RBC AUTO-ENTMCNC: 32.7 G/DL (ref 31.4–37.4)
MCV RBC AUTO: 87 FL (ref 82–98)
METAMYELOCYTES NFR BLD MANUAL: 6 % (ref 0–1)
MONOCYTES # BLD AUTO: 0.07 THOUSAND/UL (ref 0–1.22)
MONOCYTES NFR BLD: 1 % (ref 4–12)
NEUTROPHILS # BLD MANUAL: 5.77 THOUSAND/UL (ref 1.85–7.62)
NEUTS BAND NFR BLD MANUAL: 11 % (ref 0–8)
NEUTS SEG NFR BLD AUTO: 68 % (ref 43–75)
NRBC BLD AUTO-RTO: 0 /100 WBCS
PLATELET # BLD AUTO: 230 THOUSANDS/UL (ref 149–390)
PLATELET BLD QL SMEAR: ADEQUATE
PMV BLD AUTO: 10 FL (ref 8.9–12.7)
POIKILOCYTOSIS BLD QL SMEAR: PRESENT
RBC # BLD AUTO: 4.77 MILLION/UL (ref 3.88–5.62)
RBC MORPH BLD: PRESENT
WBC # BLD AUTO: 7.31 THOUSAND/UL (ref 4.31–10.16)

## 2021-03-08 PROCEDURE — 85027 COMPLETE CBC AUTOMATED: CPT | Performed by: INTERNAL MEDICINE

## 2021-03-08 PROCEDURE — 85007 BL SMEAR W/DIFF WBC COUNT: CPT | Performed by: INTERNAL MEDICINE

## 2021-03-10 DIAGNOSIS — Z23 ENCOUNTER FOR IMMUNIZATION: ICD-10-CM

## 2021-06-15 NOTE — PROGRESS NOTES
Judy Sims 1960 is a 63 y o  male     Patient presents today for radiation consult for spine metastasis, followed by CT simulation  He is referred by Dr Roland Zuniga  63year old male with history of MALT lymphoma of the stomach initially 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, H  Pylori +, status post triple antibiotic course at that time, who has been followed with surveillance EGD, and found to have progressive disease with second gastric lesion  He received definitive external beam radiation therapy to the whole stomach with 30 Gy in 20 fractions  Overall, he tolerated treatments well, experiencing mild to moderate nausea for which he utilized Zofran  He completed all treatments on 4/14/20  In June 2020, he was diagnosed with metastatic adenocarinoma, consistent with prostatic primary s/p biopsies of lymph nodes (left supraclavicular and mediastinal)  Imaging revealed bony destructive lesion in the pelvis and pelvic adenopathy  4/23/20 Dr Fara Fallon follow-up  Pt completed RT  Consider endoscopy in 3 months and yearly x2  Consider CT abdomen to follow-up borderline omental node 1 x 1 cm, unchanged on serial CT (last 1/16/20)        6/8/20 PET/CT  1  New hypermetabolic left cervical, mediastinal, retrocrural and retroperitoneal adenopathy ascending into the pelvis, most concerning for malignancy/metastases  Given the FDG intensity and distribution, most of these lesions may be due to the history   of lymphoma  Coexistent metastatic nodes from prostate cancer, particularly in the pelvis and retroperitoneum, also cannot be excluded  2   Mildly heterogenous prostate activity predominantly posteriorly and on the left may correspond with the history of prostate malignancy  3   New mildly hypermetabolic osseous lesions in the posterior right acetabulum and proximal left femur, most compatible with metastases  These are suspected to be related to the history of prostate cancer    4   New small patchy infiltrates in the left lung base, with only minimal FDG activity, SUV 1 7, may be post inflammatory/infectious  These may be correlated clinically and reassessed on follow-up exam      6/24/20 Biopsy, left supraclavicular lymph node: metastatic carcinoma, compatible with prostatic primary  7/8/20 MRI left hip   Interval disease progression since May 15, 2020 evidenced by progressive osseous metastasis and increasing pelvic and inguinal lymphadenopathy    7/8/20 MRI right hip  Interval disease progression since May 15, 2020 evidenced by progressive osseous metastasis and increasing pelvic and inguinal lymphadenopathy  5/21/21 CT abdomen pelvis w contrast  Worsening retroperitoneal adenopathy as described in detail above  Stable osteoblastic metastatic disease throughout all visualized osseous structures likely related to metastatic prostate cancer  Internal hyperdensity of the bladder likely related to excreted intravenous contrast   Correlate for gross hematuria which is felt to be for less likely  5/21/21 MRI lumbar spine w wo contrast  1  Interval progression of osseous metastatic disease of lumbar spine and visualized sacroiliac bones as described  Markedly improved right ventral epidural soft tissue tumor at level L5    2   Vertebrae heights are grossly maintained compared to prior exam    3   Degenerative changes as detailed          Last office appt with Dr Schuster Morning on thurs 6/3/21        6/16/21 MRI brain         Oncology History   MALT lymphoma ([facility] )   2018 Initial Diagnosis    MALT lymphoma ([facility] )     6/1/2018 Biopsy    Gastric cardia mass, biopsy: ([place])  Extranodal marginal zone lymphoma of mucosa-associated lymphoid tissue (MALT lymphoma)       12/10/2019 Biopsy    Distal Body of Stomach, Biopsy:  Antrofundic-type gastric mucosa with an atypical lymphoid infiltrate    [address]  (Dr Bonita Velasquez)     3/17/2020 - 4/14/2020 Radiation    Course: C1    Plan ID Energy Fractions Dose per Fraction (cGy) Dose Correction (cGy) Total Dose Delivered (cGy) Elapsed Days   ABDOMEN 10X 20 / 20 150 0 3,000 28      Dr Jordana Ventura     Prostate cancer [facility])   6/2020 Initial Diagnosis    Prostate cancer ([address] )     6/15/2020 Biopsy    Lymph Node, Mediastinal, FNAB (ThinPrep, Smear Preparations, and Cell Block):  - Positive for malignancy  - Metastatic adenocarcinoma, consistent with prostatic primary     6/24/2020 Biopsy    Lymph node, left supraclavicular, biopsy:  -  Metastatic carcinoma, compatible with prostatic primary   -  Clonal B-cell population, compatible with lymph node involvement by chronic lymphocytic leukemia/small lymphocytic lymphoma (CLL/SLL)           Clinical Trial: no      Health Maintenance   Topic Date Due    Hepatitis C Screening  Never done    Pneumococcal Vaccine: Pediatrics (0 to 5 Years) and At-Risk Patients (6 to 59 Years) (1 of 4 - PCV13) Never done    Depression Screening PHQ  Never done    HIV Screening  Never done    BMI: Followup Plan  Never done    Annual Physical  Never done    DTaP,Tdap,and Td Vaccines (1 - Tdap) Never done    Influenza Vaccine (Season Ended) 09/01/2021    BMI: Adult  06/15/2022    Colorectal Cancer Screening  08/09/2023    COVID-19 Vaccine  Completed    HIB Vaccine  Aged Out    Hepatitis B Vaccine  Aged Out    IPV Vaccine  Aged Out    Hepatitis A Vaccine  Aged Out    Meningococcal ACWY Vaccine  Aged Out    HPV Vaccine  Aged Out       Past Medical History:   Diagnosis Date    Cancer ([address] )     Stomach    Lymphoma ([address] ) 05/2018    Non Hodgkin's lymphoma ([address] )     Prostate cancer ([address] ) 2020    Thoracic aortic aneurysm ([address] )        Past Surgical History:   Procedure Laterality Date    APPENDECTOMY      COLONOSCOPY      HERNIA REPAIR      B/L hernia repair    KNEE ARTHROSCOPY      KNEE ARTHROSCOPY, MEDIAL PATELLO FEMORAL LIGAMENT REPAIR Left     LINEAR ENDOSCOPIC U/S N/A 6/13/2018    Procedure: LINEAR ENDOSCOPIC U/S;  Surgeon: Jannette Matthew MD;  [facility];   Service: Gastroenterology    MO BIOPSY/EXCISION, LYMPH NODE(S) Left 6/24/2020    Procedure: EXCISION BIOPSY LYMPH NODE SUPRACLAVICULAR;  Surgeon: Marcia Adkins MD;  [facility];  Service: General       Family History   Problem Relation Age of Onset    Melanoma Mother     Prostate cancer Father     Lung cancer Father     Breast cancer Sister     Pancreatic cancer Paternal Grandfather     Hodgkin's lymphoma Sister        Social History     Tobacco Use    Smoking status: Never Smoker    Smokeless tobacco: Never Used   Vaping Use    Vaping Use: Never used   Substance Use Topics    Alcohol use: Not Currently     Comment: 2 drinks/month    Drug use: No          Current Outpatient Medications:     aspirin (ECOTRIN LOW STRENGTH) 81 mg EC tablet, Take 81 mg by mouth daily, Disp: , Rfl:     cholecalciferol (VITAMIN D3) 1,000 units tablet, Take 500 Units by mouth daily, Disp: , Rfl:     gabapentin (NEURONTIN) 300 mg capsule, Take 300 mg by mouth 3 (three) times a day, Disp: , Rfl:     metoprolol tartrate (LOPRESSOR) 25 mg tablet, Take 25 mg by mouth every 12 (twelve) hours, Disp: , Rfl:     Nutritional Supplements (PRO-RAJINDER PLUS PO), Take by mouth, Disp: , Rfl:     oxyCODONE-acetaminophen (PERCOCET) 5-325 mg per tablet, Take 1 tablet by mouth every 4 (four) hours as needed for moderate pain for up to 12 dosesMax Daily Amount: 6 tablets, Disp: 12 tablet, Rfl: 0    pantoprazole (PROTONIX) 40 mg tablet, , Disp: , Rfl:     RESTASIS 0 05 % ophthalmic emulsion, Administer 1 drop to both eyes every 12 (twelve) hours, Disp: , Rfl:     rosuvastatin (CRESTOR) 5 mg tablet, Take 5 mg by mouth daily, Disp: , Rfl:     cimetidine (TAGAMET) 300 MG tablet, Take 300 mg by mouth 2 (two) times a day, Disp: , Rfl:     Silodosin (Rapaflo) 4 MG CAPS, Take by mouth, Disp: , Rfl:     tadalafil (CIALIS) 5 MG tablet, Take 5 mg by mouth daily at bedtime , Disp: , Rfl:     No Known Allergies     Review of Systems:  Review of Systems   Constitutional: Positive for fatigue  HENT: Negative  Eyes: Negative  Respiratory: Positive for shortness of breath (with exertion )  Cardiovascular: Negative  Gastrointestinal: Positive for nausea (persistent, varies in intensity, taking zofran prn)  Endocrine: Negative  Musculoskeletal: Positive for back pain (lower back and sacral pain 2/10 today, taking percocet prn)  Skin:        Alopecia    Allergic/Immunologic: Negative  Neurological: Positive for dizziness (intermittent brief episodes, worsens when turning head to the left)  Hematological: Negative  Psychiatric/Behavioral: Negative  Vitals:    06/15/21 0938   BP: 116/62   BP Location: Right arm   Pulse: 83   Resp: 18   Temp: (!) 96 9 °F (36 1 °C)   TempSrc: Temporal   SpO2: 98%   Weight: 102 kg (223 lb 12 8 oz)            Pain assessment: 2/10 lower back/sacrum (taking percocet prn, not daily)    PFT: n/a    Imaging:No images are attached to the encounter       Teaching: NCI radiation packet, CT simulation and RT for bone metastasis    MST completed    Implantable Devices (Port, pacemaker, pain stimulator): no    Hip Replacement: no

## 2021-06-15 NOTE — PROGRESS NOTES
Consultation - Radiation Oncology      OOQ:[id number] : 1960  Encounter: [id number]  Patient Information: Anabel Dugan      CHIEF COMPLAINT  Chief Complaint   Patient presents with    Consult     Radiation Oncology      Cancer Staging  No matching staging information was found for the patient  History of Present Illness   Anabel Dugan is a 63y o  year old male who presents with Patient presents today for radiation consult for spine metastasis, followed by CT simulation  He is referred by Dr Jenniffer Martinez          63year old male with history of MALT lymphoma of the stomach initially diagnosed in 2018, H  Pylori +, status post triple antibiotic course at that time, who has been followed with surveillance EGD, and found to have progressive disease with second gastric lesion  He received definitive external beam radiation therapy to the whole stomach with 30 Gy in 20 fractions  Overall, he tolerated treatments well, experiencing mild to moderate nausea for which he utilized Zofran  He completed all treatments on 20  In 2020, he was diagnosed with metastatic adenocarinoma, consistent with prostatic primary s/p biopsies of lymph nodes (left supraclavicular and mediastinal)  Imaging revealed bony destructive lesion in the pelvis and pelvic adenopathy       * NEED RECENT NOTES FROM DR HENRY - REQUESTED NOTES 21 Dr Dennis Guerrero follow-up  Pt completed RT  Consider endoscopy in 3 months and yearly x2  Consider CT abdomen to follow-up borderline omental node 1 x 1 cm, unchanged on serial CT (last 20)           20 PET/CT  1   New hypermetabolic left cervical, mediastinal, retrocrural and retroperitoneal adenopathy ascending into the pelvis, most concerning for malignancy/metastases   Given the FDG intensity and distribution, most of these lesions may be due to the history   of lymphoma   Coexistent metastatic nodes from prostate cancer, particularly in the pelvis and retroperitoneum, also cannot be excluded  2  Jordy Tres Arroyos heterogenous prostate activity predominantly posteriorly and on the left may correspond with the history of prostate malignancy  3   New mildly hypermetabolic osseous lesions in the posterior right acetabulum and proximal left femur, most compatible with metastases   These are suspected to be related to the history of prostate cancer    4   New small patchy infiltrates in the left lung base, with only minimal FDG activity, SUV 1 7, may be post inflammatory/infectious  Ulyess Carota may be correlated clinically and reassessed on follow-up exam      6/24/20 Biopsy, left supraclavicular lymph node: metastatic carcinoma, compatible with prostatic primary       7/8/20 MRI left hip   Interval disease progression since May 15, 2020 evidenced by progressive osseous metastasis and increasing pelvic and inguinal lymphadenopathy     7/8/20 MRI right hip  Interval disease progression since May 15, 2020 evidenced by progressive osseous metastasis and increasing pelvic and inguinal lymphadenopathy               5/21/21 CT abdomen pelvis w contrast  Worsening retroperitoneal adenopathy as described in detail above      Stable osteoblastic metastatic disease throughout all visualized osseous structures likely related to metastatic prostate cancer      Internal hyperdensity of the bladder likely related to excreted intravenous contrast   Correlate for gross hematuria which is felt to be for less likely         5/21/21 MRI lumbar spine w wo contrast  1   Interval progression of osseous metastatic disease of lumbar spine and visualized sacroiliac bones as described  Bekah Nava improved right ventral epidural soft tissue tumor at level L5    2   Vertebrae heights are grossly maintained compared to prior exam    3   Degenerative changes as detailed          NEED RECENT NOTES FROM DR Chris Holbrook - REQUESTED NOTES  Last office appt with Dr Akhtar Given on thurs 6/3/21           6/16/21 MRI brain        Historical Information   Oncology History   MALT lymphoma ([facility] )   2018 Initial Diagnosis    MALT lymphoma ([facility] )     6/1/2018 Biopsy    Gastric cardia mass, biopsy: ([place])  Extranodal marginal zone lymphoma of mucosa-associated lymphoid tissue (MALT lymphoma)       12/10/2019 Biopsy    Distal Body of Stomach, Biopsy:  Antrofundic-type gastric mucosa with an atypical lymphoid infiltrate    [facility] (Dr Alex Jones)     3/17/2020 - 4/14/2020 Radiation    Course: C1    Plan ID Energy Fractions Dose per Fraction (cGy) Dose Correction (cGy) Total Dose Delivered (cGy) Elapsed Days   ABDOMEN 10X 20 / 20 150 0 3,000 28      Dr Finley [facility]     Prostate cancer [facility])   6/2020 Initial Diagnosis    Prostate cancer ([place] [address] )     6/15/2020 Biopsy    Lymph Node, Mediastinal, FNAB (ThinPrep, Smear Preparations, and Cell Block):  - Positive for malignancy  - Metastatic adenocarcinoma, consistent with prostatic primary     6/24/2020 Biopsy    Lymph node, left supraclavicular, biopsy:  -  Metastatic carcinoma, compatible with prostatic primary   -  Clonal B-cell population, compatible with lymph node involvement by chronic lymphocytic leukemia/small lymphocytic lymphoma (CLL/SLL)  Past Medical History:   Diagnosis Date    Cancer [facility])     Stomach    Lymphoma ([place] [address] ) 05/2018    Non Hodgkin's lymphoma ([place] [address] )     Prostate cancer ([place] [address] ) 2020    Thoracic aortic aneurysm [facility])      Past Surgical History:   Procedure Laterality Date    APPENDECTOMY      COLONOSCOPY      HERNIA REPAIR      B/L hernia repair    KNEE ARTHROSCOPY      KNEE ARTHROSCOPY, MEDIAL PATELLO FEMORAL LIGAMENT REPAIR Left     LINEAR ENDOSCOPIC U/S N/A 6/13/2018    Procedure: LINEAR ENDOSCOPIC U/S;  Surgeon: Alex Jones MD;  Location: [facility];   Service: Gastroenterology    HI BIOPSY/EXCISION, LYMPH NODE(S) Left 6/24/2020    Procedure: EXCISION BIOPSY LYMPH NODE SUPRACLAVICULAR;  Surgeon: Maik Carrero MD; Location: [facility];  Service: General       Family History   Problem Relation Age of Onset    Melanoma Mother     Prostate cancer Father     Lung cancer Father     Breast cancer Sister     Pancreatic cancer Paternal Grandfather     Hodgkin's lymphoma Sister        Social History   Social History     Substance and Sexual Activity   Alcohol Use Not Currently    Comment: 2 drinks/month     Social History     Substance and Sexual Activity   Drug Use No     Social History     Tobacco Use   Smoking Status Never Smoker   Smokeless Tobacco Never Used         Meds/Allergies     Current Outpatient Medications:     aspirin (ECOTRIN LOW STRENGTH) 81 mg EC tablet, Take 81 mg by mouth daily, Disp: , Rfl:     cholecalciferol (VITAMIN D3) 1,000 units tablet, Take 500 Units by mouth daily, Disp: , Rfl:     gabapentin (NEURONTIN) 300 mg capsule, Take 300 mg by mouth 3 (three) times a day, Disp: , Rfl:     metoprolol tartrate (LOPRESSOR) 25 mg tablet, Take 25 mg by mouth every 12 (twelve) hours, Disp: , Rfl:     Nutritional Supplements (PRO-RAJINDER PLUS PO), Take by mouth, Disp: , Rfl:     oxyCODONE-acetaminophen (PERCOCET) 5-325 mg per tablet, Take 1 tablet by mouth every 4 (four) hours as needed for moderate pain for up to 12 dosesMax Daily Amount: 6 tablets, Disp: 12 tablet, Rfl: 0    pantoprazole (PROTONIX) 40 mg tablet, , Disp: , Rfl:     RESTASIS 0 05 % ophthalmic emulsion, Administer 1 drop to both eyes every 12 (twelve) hours, Disp: , Rfl:     rosuvastatin (CRESTOR) 5 mg tablet, Take 5 mg by mouth daily, Disp: , Rfl:     cimetidine (TAGAMET) 300 MG tablet, Take 300 mg by mouth 2 (two) times a day, Disp: , Rfl:     Silodosin (Rapaflo) 4 MG CAPS, Take by mouth, Disp: , Rfl:     tadalafil (CIALIS) 5 MG tablet, Take 5 mg by mouth daily at bedtime , Disp: , Rfl:   No Known Allergies      Review of Systems   Constitutional: Positive for activity change and appetite change  Negative for fever     HENT: Negative for congestion, sneezing and sore throat  Eyes: Negative  Respiratory: Negative for cough and shortness of breath  Cardiovascular: Negative for chest pain and leg swelling  Gastrointestinal: Negative for abdominal pain, blood in stool, nausea and vomiting  Endocrine: Negative  Genitourinary: Negative for difficulty urinating, flank pain and hematuria  Musculoskeletal: Positive for back pain  Negative for arthralgias, gait problem and neck pain  Skin: Negative  Allergic/Immunologic: Negative  Neurological: Negative for dizziness, tremors, weakness and headaches  Hematological: Negative for adenopathy  Psychiatric/Behavioral: Negative  OBJECTIVE:   /62 (BP Location: Right arm)   Pulse 83   Temp (!) 96 9 °F (36 1 °C) (Temporal)   Resp 18   Wt 102 kg (223 lb 12 8 oz)   SpO2 98%   BMI 32 11 kg/m²   Pain Assessment:  8  Performance Status: Karnofsky: 80 - Normal activity with effort; some signs or symptoms of disease    Physical Exam  Constitutional:       Appearance: Normal appearance  He is normal weight  HENT:      Nose: No congestion  Mouth/Throat:      Pharynx: Oropharynx is clear  Eyes:      Extraocular Movements: Extraocular movements intact  Pupils: Pupils are equal, round, and reactive to light  Cardiovascular:      Rate and Rhythm: Normal rate and regular rhythm  Heart sounds: Normal heart sounds  Pulmonary:      Effort: Pulmonary effort is normal       Breath sounds: Normal breath sounds  Abdominal:      Palpations: Abdomen is soft  There is no mass (e IVMetastatic adenocarcinoma prostate with symptomatic lumbosacral spine positive bone scan, CT scan and MRI  PET-CT scan is pending  However there enough information without the PET-CT to go ahead with palliative radiation therapy to the lumbar sacral )  Tenderness: There is no abdominal tenderness  Musculoskeletal:         General: No swelling or tenderness   Normal range of motion  Cervical back: Normal range of motion  No rigidity  Skin:     General: Skin is warm  Findings: No lesion or rash  Neurological:      General: No focal deficit present  Mental Status: He is alert and oriented to person, place, and time  Mental status is at baseline  Psychiatric:         Mood and Affect: Mood normal          Behavior: Behavior normal             RESULTS  Lab Results    Chemistry        Component Value Date/Time    K 3 5 07/07/2020 2154     07/07/2020 2154    CO2 29 07/07/2020 2154    BUN 15 07/07/2020 2154    CREATININE 0 99 07/07/2020 2154        Component Value Date/Time    CALCIUM 8 1 (L) 07/07/2020 2154    ALKPHOS 57 04/11/2020 1632    AST 33 04/11/2020 1632    ALT 37 04/11/2020 1632            Lab Results   Component Value Date    WBC 7 31 03/08/2021    HGB 13 6 03/08/2021    HCT 41 6 03/08/2021    MCV 87 03/08/2021     03/08/2021         Imaging Studies  MRI lumbar spine w wo contrast    Result Date: 5/25/2021  Narrative: MRI LUMBAR SPINE WITH AND WITHOUT CONTRAST INDICATION: History of metastatic prostate cancer and lymphoma COMPARISON:  Unenhanced MR lumbar spine 2/18/2021 and enhanced MR lumbar spine 2/27/2021 TECHNIQUE:  Sagittal T1, sagittal T2, sagittal inversion recovery, axial T1 and axial T2, coronal T2  Sagittal and axial T1 postcontrast  IV Contrast:  10 mL of Gadobutrol injection (SINGLE-DOSE) IMAGE QUALITY:  Diagnostic FINDINGS: VERTEBRAL BODIES:  There are 5 lumbar type vertebral bodies  Redemonstration of diffuse infiltrative and discrete marrow replacing lesions involving anterior and posterior elements of lumbar spine, visualized lower thoracic vertebra and sacroiliac osseous structures  There is worsening of tumoral involvement of L1 with progression and complete involvement of right aspect of the vertebral body  There is worsening tumoral involvement in the superior endplate and right aspect of L2 vertebral body    There is progression  of infiltrative lesion in the left aspect of L4 and L5 vertebral bodies, now involving entire vertebral bodies  There is no significant change in vertebrae heights  There is no significant change in infiltrative tumoral involvement of visualized thoracic vertebrae There is markedly improved right ventral epidural soft tissue tumor at level L5  Tiny residual soft tissue may be seen on series 9 image 12 SACRUM:  There is worsening of infiltrative tumor in the left sacral ala  There is a lesion in the left iliac bone (series 8 image 40) with slight worsening of remaining iliac bone lesions  DISTAL CORD AND CONUS:  Normal size and signal within the distal cord and conus  PARASPINAL SOFT TISSUES:  Paraspinal soft tissues are unremarkable  LOWER THORACIC DISC SPACES:  Normal disc height and signal   No disc herniation, canal stenosis or foraminal narrowing  LUMBAR DISC SPACES: L1-L2:  There is left eccentric disc bulge and mild facet arthropathy  There is mild canal stenosis  Mild left foraminal narrowing  L2-L3:  Disc bulge and bilateral facet arthropathy along with mildly prominent posterior epidural fat resulting in moderate canal stenosis  Mild bilateral foraminal narrowing  L3-L4:  There is disc bulge and superimposed central disc protrusion  Mild bilateral facet arthropathy  There is mild canal stenosis  Mild bilateral foraminal stenosis, right greater than left  L4-L5:  There is disc bulge and bilateral facet arthropathy resulting in moderate left lateral recess narrowing without significant canal stenosis  There is moderate left and moderate right foraminal narrowing  L5-S1:  Mild disc bulge and mild facet arthropathy  No significant canal or foraminal stenosis  Impression: 1  Interval progression of osseous metastatic disease of lumbar spine and visualized sacroiliac bones as described    Markedly improved right ventral epidural soft tissue tumor at level L5  2   Vertebrae heights are grossly maintained compared to prior exam  3   Degenerative changes as detailed  Workstation performed: [id number]     CTA chest pe study    Result Date: 6/3/2021  Narrative: CTA - CHEST WITH IV CONTRAST - PULMONARY ANGIOGRAM INDICATION:   R09 02: Hypoxemia R06 02: Shortness of breath C85 19: Unspecified b-cell lymphoma, extranodal and solid organ sites C61: Malignant neoplasm of prostate C79 51: Secondary malignant neoplasm of bone C79 52: Secondary malignant neoplasm of bone marrow  COMPARISON: Abdomen CT from 5/21/2021  Chest CT from 2/27/2021 and 5/26/2018  TECHNIQUE: CT angiogram timed for optimal opacification of the pulmonary arteries  Axial, sagittal, and coronal 2D reformats created from source data  Coronal 3D MIP postprocessing on the acquisition scanner  Radiation dose length product (DLP):  440 mGy-cm   Radiation dose exposure minimized using iterative reconstruction and automated exposure control  IV Contrast:  85 mL of iohexol (OMNIPAQUE)  FINDINGS: PULMONARY ARTERIES:  No pulmonary embolus  LUNGS:  No acute disease  Stable benign 3 mm right middle lobe nodule since 2018 (3/54)  AIRWAYS: No significant filling defects  PLEURA:  Unremarkable  HEART/GREAT VESSELS:  Mild cardiomegaly  Mild coronary artery calcification indicating atherosclerotic heart disease  MEDIASTINUM AND LUISA:  Unremarkable  CHEST WALL AND LOWER NECK: Unremarkable  UPPER ABDOMEN:  Unremarkable  OSSEOUS STRUCTURES:  Redemonstration of extensive blastic bone metastases  Impression: No pulmonary embolus  Lungs clear  Redemonstration of extensive blastic bone metastases from prostate cancer  Workstation performed: [id number]     CT abdomen pelvis w contrast    Result Date: 5/25/2021  Narrative: CT ABDOMEN AND PELVIS WITH IV CONTRAST INDICATION:   C85 19: Unspecified b-cell lymphoma, extranodal and solid organ sites  Additional history of prostate cancer   COMPARISON:  CT scan chest/abdomen/pelvis 2/27/2021 TECHNIQUE:  CT examination of the abdomen and pelvis was performed  Axial, sagittal, and coronal 2D reformatted images were created from the source data and submitted for interpretation  Radiation dose length product (DLP) for this visit:  708 57 mGy-cm   This examination, like all CT scans performed in the [facility], was performed utilizing techniques to minimize radiation dose exposure, including the use of iterative  reconstruction and automated exposure control  IV Contrast:  100 mL of iohexol (OMNIPAQUE) Enteric Contrast:  Enteric contrast was administered  FINDINGS: ABDOMEN LOWER CHEST:  No clinically significant abnormality identified in the visualized lower chest  LIVER/BILIARY TREE:  Stable small cysts In the dome  GALLBLADDER:  No calcified gallstones  No pericholecystic inflammatory change  SPLEEN:  Unremarkable  PANCREAS:  Unremarkable  ADRENAL GLANDS:  Unremarkable  KIDNEYS/URETERS:  Unremarkable  No hydronephrosis  STOMACH AND BOWEL:  Unremarkable  APPENDIX:  No findings to suggest appendicitis  ABDOMINOPELVIC CAVITY:  No ascites  No pneumoperitoneum  Lymph nodes reidentified including a retroperitoneal node at the aortic bifurcation measuring 2 1 x 1 5 cm on image 2/61 (previous 1 5 x 1 0 cm)  Pelvic sidewall iliac chain node measures 4 2 x 2 5 cm on image 2/80 (previous 3 2 x 1 9 cm)  Left inguinal node measures 1 8 x 1 1 cm on image 2/87 (previous 1 5 x 0 8 cm)  VESSELS:  Unremarkable for patient's age  PELVIS REPRODUCTIVE ORGANS:  Unremarkable for patient's age  URINARY BLADDER:  Hyperdensity within the bladder likely related to excreted contrast  ABDOMINAL WALL/INGUINAL REGIONS:  Unremarkable  OSSEOUS STRUCTURES:  Stable diffuse osteoblastic foci throughout the osseous structures likely related to prostate cancer     Impression: Worsening retroperitoneal adenopathy as described in detail above   Stable osteoblastic metastatic disease throughout all visualized osseous structures likely related to metastatic prostate cancer  Internal hyperdensity of the bladder likely related to excreted intravenous contrast   Correlate for gross hematuria which is felt to be for less likely  The study was marked in [facility] for immediate notification  Workstation performed: [id number]         Pathology:  Metastatic adenocarcinoma primary prostate  ASSESSMENT  1  Prostate cancer [facility])       Cancer Staging  No matching staging information was found for the patient  PLAN/DISCUSSION  No orders of the defined types were placed in this encounter  Young Kincaid is a 63y o  year old male with  Stage IV metastatic carcinoma prostate with symptomatic area in the lumbosacral spine  We have enough information from the bone scan, CT scan and MRI to go ahead with radiation therapy course of 10 or 12 treatments total dose 30 Gy  Reviewed side effects which should be minimal but monitor CBC weekly  His treatments will be given between the 3 week cycles of chemotherapy  He will have his CT simulation treatment planning today and will start treatment as soon as we have official word from Dr Rehana Leon MD  6/15/2021,12:23 PM      Portions of the record may have been created with voice recognition software  Occasional wrong word or "sound a like" substitutions may have occurred due to the inherent limitations of voice recognition software  Read the chart carefully and recognize, using context, where substitutions have occurred

## 2021-06-18 NOTE — PROGRESS NOTES
MSW received a Distress Thermometer from Rad Onc, where the pt self scored a 5 to indicate his level of stress  The pt indicated treatment decisions as his psychosocial stressor and 3 out of 21 physical stressors  MSW made outreach to the pt this day and he was open and receptive of this call  The pt shared that he was pleased with all of his care he was receiving through [facility]  He feels that his stress is reasonable given his health situation but shared that he has a strong support system  Pt expressed gratitude for the outreach  MSW explained that an oncology social worker is always available to him  Emotional support provided

## 2021-08-25 NOTE — PROGRESS NOTES
Virtual Brief Visit    Verification of patient location:    Patient is located in the following state in which I hold an active license [place]      Assessment/Plan:    Problem List Items Addressed This Visit        Genitourinary    Prostate cancer ([facility] [address] ) - Primary        Jr Singh is a 63y o  year old male with Stage IV metastatic carcinoma of the prostate with symptomatic disease in the lumbosacral spine  We recommended palliative radiation therapy to the L2 through S2 spine that was completed on July 20, 2021  He returns today for follow-up examination  He has recovered well from radiation therapy  He has had great improvement in the lower back pain  Occasionally has pain and has taken oxycodone 3-4 days since he finished radiation therapy  He had a reaction to Zytiga and prednisone after 1 week which had to be discontinued  He started Limparza twice a day about 10 days ago which he is tolerating well  He only has minimal nausea relieved with Zofran  We are pleased with his good response to the radiation therapy  We do not recommend any additional radiation treatment  He will continue with his systemic treatment with Dr Selina Pace who he will see on Monday August 30, 2021  He will be seen here on p r n  basis  Reason for visit is   Chief Complaint   Patient presents with    Virtual Brief Visit        Encounter provider Gela Dias MD    Provider located at [address]  [address] [address]    Recent Visits  No visits were found meeting these conditions  Showing recent visits within past 7 days and meeting all other requirements  Today's Visits  Date Type Provider Dept   08/25/21 Telemedicine Gela Dias MD [facility]   Showing today's visits and meeting all other requirements  Future Appointments  No visits were found meeting these conditions    Showing future appointments within next 150 days and meeting all other requirements       After connecting through telephone, the patient was identified by name and date of birth  Delfino Flores was informed that this is a telemedicine visit and that the visit is being conducted through telephone  My office door was closed  No one else was in the room  He acknowledged consent and understanding of privacy and security of the platform  The patient has agreed to participate and understands he can discontinue the visit at any time  Patient is aware this is a billable service  Subjective    Delfino Flores is a 63 y o  male with metastatic prostate carcinoma to bone status post palliative lumbar spine radiation therapy ending on July 20, 2021  HPI   Oncology History Overview Note   63year old male with history of MALT lymphoma of the stomach initially 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, H  Pylori +, status post triple antibiotic course at that time, who has been followed with surveillance EGD, and found to have progressive disease with second gastric lesion  He received definitive external beam radiation therapy to the whole stomach with 30 Gy in 20 fractions  Overall, he tolerated treatments well, experiencing mild to moderate nausea for which he utilized Zofran  He completed all treatments on 4/14/20  In June 2020, he was diagnosed with metastatic adenocarinoma, consistent with prostatic primary s/p biopsies of lymph nodes (left supraclavicular and mediastinal)  Imaging revealed bony destructive lesion in the pelvis and pelvic adenopathy  Pt completed radiation to his spine on 07/20/21  He returns today for his EOT follow up visit  08/14/21 - MRI lumbar spine w wo contrast  IMPRESSION:  Multifocal progression of disease without pathologic fracture or change in the minimal L5 level right ventral extraosseous tumor  Overtly stable lumbar spondylosis and osteoarthritis, not compressive      08/22/21 - CT chest abdomen pelvis w contrast  IMPRESSION:  Overall progression of pelvic and lower left retroperitoneal lymphadenopathy now with significantly infiltrative component of tumor masses resulting in new left-sided hydroureteronephrosis to the level of the left hemipelvis  Tumor in the left hemipelvis is inseparable from the left seminal vesicle and from the left posterior wall urinary bladder  Subtly increasing tumor resulting in thickening of the upper left obturator muscle and the left hemipelvis  Left adrenal metastasis  Minimal progression of osteoblastic metastatic disease when compared to February 27, 2021  No new pathologic fractures  Upcoming:     MALT lymphoma ([facility] [address] )   2018 Initial Diagnosis    MALT lymphoma ([facility] [address] )     6/1/2018 Biopsy    Gastric cardia mass, biopsy: ([place])  Extranodal marginal zone lymphoma of mucosa-associated lymphoid tissue (MALT lymphoma)       12/10/2019 Biopsy    Distal Body of Stomach, Biopsy:  Antrofundic-type gastric mucosa with an atypical lymphoid infiltrate    [facility] (Dr Brian Jorgensen)     3/17/2020 - 4/14/2020 Radiation    Course: C1    Plan ID Energy Fractions Dose per Fraction (cGy) Dose Correction (cGy) Total Dose Delivered (cGy) Elapsed Days   ABDOMEN 10X 20 / 20 150 0 3,000 28      Dr Manuelito Simpson [facility])   6/2020 Initial Diagnosis    Prostate cancer ([facility] [address] )     6/15/2020 Biopsy    Lymph Node, Mediastinal, FNAB (ThinPrep, Smear Preparations, and Cell Block):  - Positive for malignancy  - Metastatic adenocarcinoma, consistent with prostatic primary     6/24/2020 Biopsy    Lymph node, left supraclavicular, biopsy:  -  Metastatic carcinoma, compatible with prostatic primary   -  Clonal B-cell population, compatible with lymph node involvement by chronic lymphocytic leukemia/small lymphocytic lymphoma (CLL/SLL)       7/7/2021 - 7/20/2021 Radiation    L2 S2 Spine 10X 10 / 10 300 0 3,000 13      Treatment dates:  C2: 7/7/2021 - 7/20/2021       Past Medical History:   Diagnosis Date    Cancer ([facility] [address] )     Stomach    Lymphoma ([facility] [address] ) 05/2018    Non Hodgkin's lymphoma ([facility] [address] )     Prostate cancer ([facility] [address] ) 2020    Thoracic aortic aneurysm [facility])        Past Surgical History:   Procedure Laterality Date    APPENDECTOMY      COLONOSCOPY      HERNIA REPAIR      B/L hernia repair    KNEE ARTHROSCOPY      KNEE ARTHROSCOPY, MEDIAL PATELLO FEMORAL LIGAMENT REPAIR Left     LINEAR ENDOSCOPIC U/S N/A 6/13/2018    Procedure: LINEAR ENDOSCOPIC U/S;  Surgeon: Jannette Matthew MD;  Location: [facility]; Service: Gastroenterology    [place] BIOPSY/EXCISION, LYMPH NODE(S) Left 6/24/2020    Procedure: EXCISION BIOPSY LYMPH NODE SUPRACLAVICULAR;  Surgeon: Marcia Adkins MD;  Location: [facility];  Service: General       Current Outpatient Medications   Medication Sig Dispense Refill    aspirin (ECOTRIN LOW STRENGTH) 81 mg EC tablet Take 81 mg by mouth daily      cholecalciferol (VITAMIN D3) 1,000 units tablet Take 500 Units by mouth daily      cimetidine (TAGAMET) 300 MG tablet Take 300 mg by mouth 2 (two) times a day      gabapentin (NEURONTIN) 300 mg capsule Take 300 mg by mouth 3 (three) times a day      metoprolol tartrate (LOPRESSOR) 25 mg tablet Take 25 mg by mouth every 12 (twelve) hours      Nutritional Supplements (PRO-RAJINDER PLUS PO) Take by mouth      oxyCODONE-acetaminophen (PERCOCET) 5-325 mg per tablet Take 1 tablet by mouth every 4 (four) hours as needed for moderate pain for up to 12 dosesMax Daily Amount: 6 tablets 12 tablet 0    pantoprazole (PROTONIX) 40 mg tablet       RESTASIS 0 05 % ophthalmic emulsion Administer 1 drop to both eyes every 12 (twelve) hours      rosuvastatin (CRESTOR) 5 mg tablet Take 5 mg by mouth daily      Silodosin (Rapaflo) 4 MG CAPS Take by mouth      tadalafil (CIALIS) 5 MG tablet Take 5 mg by mouth daily at bedtime        No current facility-administered medications for this visit          No Known Allergies    Review of Systems    There were no vitals filed for this visit       I spent 15 minutes directly with the patient during this visit    VIRTUAL VISIT DISCLAIMER      Yifan Ross verbally agrees to participate in [employer]  Pt is aware that [employer] could be limited without vital signs or the ability to perform a full hands-on physical exam  Kin Mena understands he or the provider may request at any time to terminate the video visit and request the patient to seek care or treatment in person

## 2021-09-15 PROBLEM — R50.9 FEVER: Status: ACTIVE | Noted: 2021-01-01

## 2021-09-15 PROBLEM — C79.51 OSSEOUS METASTASIS (HCC): Status: ACTIVE | Noted: 2021-01-01

## 2021-09-15 PROBLEM — I26.99 ACUTE PULMONARY EMBOLISM (HCC): Status: ACTIVE | Noted: 2021-01-01

## 2021-09-15 PROBLEM — R94.39 ABNORMAL NUCLEAR STRESS TEST: Status: ACTIVE | Noted: 2019-06-21

## 2021-09-15 PROBLEM — C61 PROSTATE CANCER (HCC): Status: RESOLVED | Noted: 2020-06-22 | Resolved: 2021-01-01

## 2021-09-15 NOTE — ASSESSMENT & PLAN NOTE
Has metastatic prostate cancer  Has been treated w/ radiation therapy, last treatment was 12 weeks ago  Currently receiving Zometa q4 weeks, and daily chemo with Lynparza  Will follow w/ his oncologist as an outpt  Pt's oncologist communicated w/ ED physician  CTA ordered for PE protocol  Pt w/o any cough or shortness of breath   Pending results

## 2021-09-15 NOTE — PROGRESS NOTES
The patient's temperature is 101 8°  Oxygen saturation on room air 88%  The patient was referred to the emergency room for further evaluation    The procedure was canceled

## 2021-09-15 NOTE — ED PROVIDER NOTES
History  Chief Complaint   Patient presents with    Fever Immunocompromised     pt presented to Gi lab for EGD, was found hypoxic and febrile  Tmax for Gi lab 101 5, spo2 85% on ra  Patient is a 69-year-old male, with a history significant for non-Hodgkin's lymphoma, prostate cancer, thoracic aortic aneurysm, who presents the ED today due to migratory leg and thoracic/lumbar back pain  Notably, however, on arrival, patient was found to be febrile, tachycardic, and with hypoxia to the high 80s that responded to 2L O2  In regards to the back pain, patient denies trauma, urinary incontinence, urinary retention, bowel incontinence, saddle anesthesia  The pain is sharp in character and dissimilar to the shingles that he had on his lower back one month ago  Movement exacerbates symptoms there are no clear remitting factors  In regards to the fever, patient denies chest pain, shortness of breath, cough, urinary symptoms  He has had UTI x2 in the recent past and most recent urine culture, in July of this year, grew Enterobacter susceptible to ceftriaxone  Notably, patient underwent CT chest abdomen pelvis yesterday and intra-abdominal and intrathoracic metastases were noted  Patient last received chemotherapy infusion with Zoretra four weeks ago  He reports constipation for three days but he denies melena/blood in stool      - No language barrier  - History obtained from patient  - There are no limitations to the history obtained  - Previous charting was reviewed          Prior to Admission Medications   Prescriptions Last Dose Informant Patient Reported? Taking?    Nutritional Supplements (PRO-RAJINDER PLUS PO)  Self Yes No   Sig: Take by mouth   OXYCODONE ER PO   Yes No   Sig: Take 10 mg by mouth 2 (two) times a day   OXYCODONE HCL PO   Yes No   Sig: Take 10 mg by mouth   RESTASIS 0 05 % ophthalmic emulsion   Yes No   Sig: Administer 1 drop to both eyes every 12 (twelve) hours   aspirin (ECOTRIN LOW STRENGTH) 81 mg EC tablet  Self Yes No   Sig: Take 81 mg by mouth daily   cholecalciferol (VITAMIN D3) 1,000 units tablet  Self Yes No   Sig: Take 500 Units by mouth daily   gabapentin (NEURONTIN) 300 mg capsule   Yes No   Sig: Take 300 mg by mouth 3 (three) times a day   metoprolol tartrate (LOPRESSOR) 25 mg tablet   Yes No   Sig: Take 25 mg by mouth every 12 (twelve) hours   oxyCODONE-acetaminophen (PERCOCET) 5-325 mg per tablet  Self No No   Sig: Take 1 tablet by mouth every 4 (four) hours as needed for moderate pain for up to 12 dosesMax Daily Amount: 6 tablets   pantoprazole (PROTONIX) 40 mg tablet  Self Yes No   rosuvastatin (CRESTOR) 5 mg tablet  Self Yes No   Sig: Take 5 mg by mouth daily   valACYclovir (VALTREX) 1,000 mg tablet   Yes No   Sig: Take 1,000 mg by mouth 2 (two) times a day      Facility-Administered Medications: None       Past Medical History:   Diagnosis Date    Cancer ([facility] )     Stomach    Hypertension     Lymphoma ([facility] ) 05/2018    Non Hodgkin's lymphoma ([facility] )     Prostate cancer ([facility] ) 2020    Shingles     Thoracic aortic aneurysm ([facility] )        Past Surgical History:   Procedure Laterality Date    APPENDECTOMY      COLONOSCOPY      HERNIA REPAIR      B/L hernia repair    KNEE ARTHROSCOPY      KNEE ARTHROSCOPY, MEDIAL PATELLO FEMORAL LIGAMENT REPAIR Left     LINEAR ENDOSCOPIC U/S N/A 6/13/2018    Procedure: LINEAR ENDOSCOPIC U/S;  Surgeon: Laura Mccall MD;  Location: [facility];   Service: Gastroenterology    IA BIOPSY/EXCISION, LYMPH NODE(S) Left 6/24/2020    Procedure: EXCISION BIOPSY LYMPH NODE SUPRACLAVICULAR;  Surgeon: Alonso Sow MD;  Location: [facility];  Service: General       Family History   Problem Relation Age of Onset   [place] Melanoma Mother     Prostate cancer Father     Lung cancer Father     Breast cancer Sister     Pancreatic cancer Paternal Grandfather     Hodgkin's lymphoma Sister      I have reviewed and agree with the history as documented  E-Cigarette/Vaping    E-Cigarette Use Never User      E-Cigarette/Vaping Substances    Nicotine No     Flavoring No      Social History     Tobacco Use    Smoking status: Never Smoker    Smokeless tobacco: Never Used   Vaping Use    Vaping Use: Never used   Substance Use Topics    Alcohol use: Not Currently    Drug use: No        Review of Systems   Constitutional: Positive for fever  HENT: Negative for trouble swallowing  Eyes: Negative for visual disturbance  Respiratory: Negative for cough and shortness of breath  Cardiovascular: Negative for chest pain  Gastrointestinal: Positive for constipation  Negative for abdominal pain and blood in stool  Endocrine: Negative for polyuria  Genitourinary: Negative for difficulty urinating and dysuria  Musculoskeletal: Positive for back pain and gait problem  Skin: Positive for rash (Shingles 4 weeks ago)  Allergic/Immunologic: Negative for environmental allergies  Neurological: Negative for weakness and numbness  Hematological: Negative for adenopathy  Psychiatric/Behavioral: Negative for confusion  Physical Exam  ED Triage Vitals   Temperature Pulse Respirations Blood Pressure SpO2   09/15/21 1336 09/15/21 1336 09/15/21 1336 09/15/21 1336 09/15/21 1336   100 5 °F (38 1 °C) 103 (!) 24 105/60 (!) 89 %      Temp Source Heart Rate Source Patient Position - Orthostatic VS BP Location FiO2 (%)   09/15/21 1336 09/15/21 2032 09/15/21 2032 09/15/21 2032 --   Oral Monitor Sitting Right arm       Pain Score       09/15/21 1843       Med Not Given for Pain - for MAR use only             Orthostatic Vital Signs  Vitals:    09/15/21 1700 09/15/21 1730 09/15/21 1830 09/15/21 2032   BP: 115/60 117/58 130/58 123/59   Pulse: 98 102 (!) 110 102   Patient Position - Orthostatic VS:    Sitting       Physical Exam  Vitals and nursing note reviewed  Constitutional:       General: He is not in acute distress       Appearance: He is ill-appearing  He is not toxic-appearing or diaphoretic  HENT:      Head: Normocephalic and atraumatic  Right Ear: External ear normal       Left Ear: External ear normal       Nose: Nose normal  No rhinorrhea  Mouth/Throat:      Mouth: Mucous membranes are moist       Pharynx: Oropharynx is clear  No oropharyngeal exudate or posterior oropharyngeal erythema  Eyes:      General: No scleral icterus  Right eye: No discharge  Left eye: No discharge  Conjunctiva/sclera: Conjunctivae normal       Pupils: Pupils are equal, round, and reactive to light  Cardiovascular:      Rate and Rhythm: Regular rhythm  Tachycardia present  Pulses: Normal pulses  Heart sounds: Normal heart sounds  No murmur heard  No friction rub  No gallop  Pulmonary:      Effort: Pulmonary effort is normal  No respiratory distress  Breath sounds: No stridor  Rales (Bibasilar) present  No wheezing or rhonchi  Abdominal:      General: Abdomen is flat  Bowel sounds are normal  There is no distension  Palpations: Abdomen is soft  Tenderness: There is no abdominal tenderness  There is no right CVA tenderness, left CVA tenderness, guarding or rebound  Genitourinary:     Rectum: Normal  Guaiac result negative  Musculoskeletal:         General: No tenderness or deformity  Cervical back: Neck supple  No rigidity or tenderness  Right lower leg: No edema  Left lower leg: No edema  Comments: No midline spinous process tenderness in the C, T, L spines   Lymphadenopathy:      Cervical: No cervical adenopathy  Skin:     General: Skin is warm and dry  Capillary Refill: Capillary refill takes less than 2 seconds  Coloration: Skin is pale  Skin is not jaundiced  Findings: Rash (Scabbed over shingles rash on right lower back ) present  Neurological:      Mental Status: He is alert  Comments: Patient is speaking clearly in complete sentences    Patient is answering appropriately and able follow commands  Patient is moving all four extremities spontaneously  No facial droop  Tongue midline  No saddle anesthesia    Intact L5 and S1 motor and sensory function bilaterally   Psychiatric:         Mood and Affect: Mood normal          Behavior: Behavior normal          ED Medications  Medications   senna (SENOKOT) tablet 17 2 mg (has no administration in time range)   multi-electrolyte (PLASMALYTE-A/ISOLYTE-S PH 7 4) IV solution (100 mL/hr Intravenous New Bag 9/15/21 2031)   acetaminophen (TYLENOL) tablet 650 mg (has no administration in time range)   ondansetron (ZOFRAN) injection 4 mg (has no administration in time range)   aspirin (ECOTRIN LOW STRENGTH) EC tablet 81 mg (has no administration in time range)   cholecalciferol (VITAMIN D3) tablet 500 Units (has no administration in time range)   gabapentin (NEURONTIN) capsule 300 mg (has no administration in time range)   metoprolol tartrate (LOPRESSOR) tablet 25 mg (has no administration in time range)   pantoprazole (PROTONIX) EC tablet 40 mg (has no administration in time range)   pravastatin (PRAVACHOL) tablet 40 mg (has no administration in time range)   valACYclovir (VALTREX) tablet 1,000 mg (has no administration in time range)   oxyCODONE (ROXICODONE) IR tablet 5 mg (has no administration in time range)   oxyCODONE (ROXICODONE) immediate release tablet 10 mg (has no administration in time range)   ceftriaxone (ROCEPHIN) 1 g/50 mL in dextrose IVPB (has no administration in time range)   enoxaparin (LOVENOX) subcutaneous injection 100 mg (has no administration in time range)   ceftriaxone (ROCEPHIN) 1 g/50 mL in dextrose IVPB (0 mg Intravenous Stopped 9/15/21 1636)   sodium chloride 0 9 % bolus 500 mL (0 mL Intravenous Stopped 9/15/21 2031)   acetaminophen (TYLENOL) tablet 650 mg (650 mg Oral Given 9/15/21 1843)   iohexol (OMNIPAQUE) 350 MG/ML injection (SINGLE-DOSE) 100 mL (100 mL Intravenous Given 9/15/21 2009) Diagnostic Studies  Results Reviewed     Procedure Component Value Units Date/Time    Occult blood 1-3, stool [[id number]]     Lab Status: No result Specimen: Stool     Lactate dehydrogenase [[id number]]  (Abnormal) Collected: 09/15/21 1648    Lab Status: Final result Specimen: Blood from Line, Venous Updated: 09/15/21 2102     LD 1,494 U/L     Platelet count [[id number]]  (Normal) Collected: 09/15/21 2030    Lab Status: Final result Specimen: Blood from Arm, Left Updated: 09/15/21 2040     Platelets 085 Thousands/uL      MPV 9 5 fL     Blood Parasite smear [[id number]] Collected: 09/15/21 2030    Lab Status:  In process Specimen: Blood from Arm, Left Updated: 09/15/21 2035    Uric acid [[id number]]  (Normal) Collected: 09/15/21 1648    Lab Status: Final result Specimen: Blood from Line, Venous Updated: 09/15/21 1957     Uric Acid 4 5 mg/dL     Procalcitonin with AM Reflex [[id number]]  (Abnormal) Collected: 09/15/21 1648    Lab Status: Final result Specimen: Blood from Line, Venous Updated: 09/15/21 1735     Procalcitonin 0 83 ng/ml     Procalcitonin Reflex [[id number]]     Lab Status: No result Specimen: Blood     CBC and differential [[id number]]  (Abnormal) Collected: 09/15/21 1648    Lab Status: Final result Specimen: Blood from Line, Venous Updated: 09/15/21 1735     WBC 12 34 Thousand/uL      RBC 2 74 Million/uL      Hemoglobin 7 8 g/dL      Hematocrit 24 5 %      MCV 89 fL      MCH 28 5 pg      MCHC 31 8 g/dL      RDW 19 7 %      MPV 9 1 fL      Platelets 017 Thousands/uL      nRBC 0 /100 WBCs      Neutrophils Relative 83 %      Immat GRANS % 1 %      Lymphocytes Relative 6 %      Monocytes Relative 10 %      Eosinophils Relative 0 %      Basophils Relative 0 %      Neutrophils Absolute 10 28 Thousands/µL      Immature Grans Absolute 0 11 Thousand/uL      Lymphocytes Absolute 0 73 Thousands/µL      Monocytes Absolute 1 19 Thousand/µL      Eosinophils Absolute 0 01 Thousand/µL      Basophils Absolute 0 02 Thousands/µL     Troponin I [[id number]]  (Abnormal) Collected: 09/15/21 1648    Lab Status: Final result Specimen: Blood from Line, Venous Updated: 09/15/21 1726     Troponin I 0 07 ng/mL     Comprehensive metabolic panel [[id number]]  (Abnormal) Collected: 09/15/21 1648    Lab Status: Final result Specimen: Blood from Line, Venous Updated: 09/15/21 1725     Sodium 126 mmol/L      Potassium 3 6 mmol/L      Chloride 95 mmol/L      CO2 24 mmol/L      ANION GAP 7 mmol/L      BUN 20 mg/dL      Creatinine 1 40 mg/dL      Glucose 113 mg/dL      Calcium 7 6 mg/dL      Corrected Calcium 8 8 mg/dL      AST 91 U/L      ALT 16 U/L      Alkaline Phosphatase 638 U/L      Total Protein 6 4 g/dL      Albumin 2 5 g/dL      Total Bilirubin 0 83 mg/dL      eGFR 54 ml/min/1 73sq m     Narrative:      [place] guidelines for Chronic Kidney Disease (CKD):     Stage 1 with normal or high GFR (GFR > 90 mL/min/1 73 square meters)    Stage 2 Mild CKD (GFR = 60-89 mL/min/1 73 square meters)    Stage 3A Moderate CKD (GFR = 45-59 mL/min/1 73 square meters)    Stage 3B Moderate CKD (GFR = 30-44 mL/min/1 73 square meters)    Stage 4 Severe CKD (GFR = 15-29 mL/min/1 73 square meters)    Stage 5 End Stage CKD (GFR <15 mL/min/1 73 square meters)  Note: GFR calculation is accurate only with a steady state creatinine    NT-BNP PRO [[id number]]  (Abnormal) Collected: 09/15/21 1648    Lab Status: Final result Specimen: Blood from Line, Venous Updated: 09/15/21 1725     NT-proBNP 2,488 pg/mL     APTT [[id number]]  (Normal) Collected: 09/15/21 1648    Lab Status: Final result Specimen: Blood from Line, Venous Updated: 09/15/21 1720     PTT 35 seconds     Protime-INR [[id number]]  (Abnormal) Collected: 09/15/21 1648    Lab Status: Final result Specimen: Blood from Line, Venous Updated: 09/15/21 1720     Protime 17 5 seconds      INR 1 50    Blood culture #1 [[id number]] Collected: 09/15/21 1330    Lab Status: Preliminary result Specimen: Blood from Arm, Right Updated: 09/15/21 1703     Blood Culture Received in Microbiology Lab  Culture in Progress  Blood culture #2 [[id number]] Collected: 09/15/21 1320    Lab Status: Preliminary result Specimen: Blood from Hand, Left Updated: 09/15/21 1703     Blood Culture Received in Microbiology Lab  Culture in Progress  COVID19, Influenza A/B, RSV PCR, [facility] [[id number]]  (Normal) Collected: 09/15/21 1450    Lab Status: Final result Specimen: Nares from Nasopharyngeal Swab Updated: 09/15/21 1607     SARS-CoV-2 Negative     INFLUENZA A PCR Negative     INFLUENZA B PCR Negative     RSV PCR Negative    Narrative:           Urine Microscopic [[id number]] Collected: 09/15/21 1451    Lab Status: Final result Specimen: Urine, Clean Catch Updated: 09/15/21 1542     RBC, UA 2-4 /hpf      WBC, UA 2-4 /hpf      Epithelial Cells None Seen /hpf      Bacteria, UA Occasional /hpf      COARSE GRANULAR CASTS 0-3 /lpf     UA w Reflex to Microscopic w Reflex to Culture [[id number]]  (Abnormal) Collected: 09/15/21 1451    Lab Status: Final result Specimen: Urine, Clean Catch Updated: 09/15/21 1509     Color, UA Yellow     Clarity, UA Cloudy     Specific Gravity, UA 1 021     pH, UA 5 5     Leukocytes, UA Negative     Nitrite, UA Negative     Protein, UA 30 (1+) mg/dl      Glucose, UA Negative mg/dl      Ketones, UA Negative mg/dl      Urobilinogen, UA 0 2 E U /dl      Bilirubin, UA Negative     Blood, UA Negative    Lactic acid [[id number]]  (Normal) Collected: 09/15/21 1320    Lab Status: Final result Specimen: Blood from Hand, Left Updated: 09/15/21 1436     LACTIC ACID 1 2 mmol/L     Narrative:      Result may be elevated if tourniquet was used during collection      CBC and differential [[id number]] Collected: 09/15/21 1320    Lab Status: No result Specimen: Blood from Hand, Left     Comprehensive metabolic panel [[id number]] Collected: 09/15/21 1320    Lab Status: No result Specimen: Blood from Hand, Left Procalcitonin with AM Reflex [[id number]] Collected: 09/15/21 1320    Lab Status: No result Specimen: Blood from Hand, Left     Protime-INR [[id number]] Collected: 09/15/21 1320    Lab Status: No result Specimen: Blood from Hand, Left     APTT [[id number]] Collected: 09/15/21 1320    Lab Status: No result Specimen: Blood from Hand, Left     Troponin I [[id number]] Collected: 09/15/21 1320    Lab Status: No result Specimen: Blood from Hand, Left     NT-BNP PRO [[id number]] Collected: 09/15/21 1320    Lab Status: No result Specimen: Blood from Hand, Left                  CTA ED chest PE Study   Final Result by Ulysses Culver MD (09/15 2101)      Right middle lobe, lingular, and left upper lobe segmental pulmonary emboli are noted  RV LV ratio is approximately 0 9, this is the cut off for RV dysfunction/right heart strain  Recommend echocardiography for further evaluation  Mediastinal and bilateral hilar, left greater than right, adenopathy is again noted         8 mm nodule in the right middle lobe; considering the history of neoplasm this raises the possibility of metastasis  Lingular opacity abutting the left heart has increased in size suggesting developing atelectasis or infiltrate  Linear atelectatic changes in the lung bases are again noted  I personally discussed this study with Dr Marlyn Jarvis on 9/15/2021 at 9:01 PM                Workstation performed: [id number]         XR chest portable   Final Result by Reilly Victor MD (09/15 1621)      No acute cardiopulmonary disease  Workstation performed: [id number]               Procedures  Procedures      ED Course  ED Course as of Sep 15 2121   Wed Sep 15, 2021   1437 LACTIC ACID: 1 2   1510 Leukocytes, UA: Negative   1510 Nitrite, UA: Negative   1550 Bacteria, UA: Occasional   1608 SARS-COV-2: Negative   1608 INFLU A PCR: Negative   1608 INFLU B PCR: Negative   1608 Reports improvement in his symptoms at this time  RSV PCR: Negative   1650 XR Final Read: No acute cardiopulmonary disease          1743 WBC(!): 12 34   1743 Troponin I(!): 0 07   1812 Results reviewed with patient  Questions answered to his satisfaction  Plan to admit  1813 Troponin I(!): 0 07   1813 Procalcitonin(!): 0 83   1813 WBC(!): 12 34   1813 Hemoglobin(!): 7 8   1813 NT-proBNP(!): 2,488   1813 Creatinine(!): 1 40   1813 Sodium(!): 126   1814 Patient continues to feel okay  No escalation of O2 supplementation  Patient is admitted to [facility]      1826 EKG: Tachycardic rate 103  Regular rhythm  No ectopy  Normal axis  RBBB with precordial T inversions  No prior to compare  1906 Discussed with Mya Garrett [id number]: Unremarkable exam yesterday  Had midscapular pain with elevated D-dimer to [id number] in the office  Concern for PE  Will order CTA  [facility] aware      2120 SLIM aware of CTA results                            Initial Sepsis Screening     Row Name 09/15/21 1808 09/15/21 1648             Is the patient's history suggestive of a new or worsening infection? (!) Yes (Proceed)  -CL  (!) Yes (Proceed)  -CL       Suspected source of infection  suspect infection, source unknown  -CL  suspect infection, source unknown  -CL       Are two or more of the following signs & symptoms of infection both present and new to the patient? (!) Yes (Proceed)  -CL  (!) Yes (Proceed)  -CL       Indicate SIRS criteria  Tachycardia > 90 bpm;Leukocytosis (WBC > 37414 IJL)  -CL  Leukocytosis (WBC > 31230 IJL); Tachycardia > 90 bpm  -CL       If the answer is yes to both questions, suspicion of sepsis is present  --  --       If severe sepsis is present AND tissue hypoperfusion perists in the hour after fluid resuscitation or lactate > 4, the patient meets criteria for SEPTIC SHOCK  --  --       Are any of the following organ dysfunction criteria present within 6 hours of suspected infection and SIRS criteria that are NOT considered to be chronic conditions? No  -CL  No  -CL       Organ dysfunction  --  -CL  --       Date of presentation of severe sepsis  --  --       Time of presentation of severe sepsis  --  --       Tissue hypoperfusion persists in the hour after crystalloid fluid administration, evidenced, by either:  --  --       Was hypotension present within one hour of the conclusion of crystalloid fluid administration? No  -CL  No  -CL       Date of presentation of septic shock  --  --       Time of presentation of septic shock  --  --         User Key  (r) = Recorded By, (t) = Taken By, (c) = Cosigned By    [address] Name Provider Ellsworth Duverney, MD Resident          SBIRT 20yo+      Most Recent Value   SBIRT (23 yo +)   In order to provide better care to our patients, we are screening all of our patients for alcohol and drug use  Would it be okay to ask you these screening questions? Yes Filed at: 09/15/2021 1349   Initial Alcohol Screen: US AUDIT-C    1  How often do you have a drink containing alcohol?  0 Filed at: 09/15/2021 1349   2  How many drinks containing alcohol do you have on a typical day you are drinking? 0 Filed at: 09/15/2021 1349   3a  Male UNDER 65: How often do you have five or more drinks on one occasion? 0 Filed at: 09/15/2021 1349   3b  FEMALE Any Age, or MALE 65+: How often do you have 4 or more drinks on one occassion? 0 Filed at: 09/15/2021 1349   Audit-C Score  0 Filed at: 09/15/2021 1349   NATHALIA: How many times in the past year have you    Used an illegal drug or used a prescription medication for non-medical reasons?   Never Filed at: 09/15/2021 1349          Wells' Criteria for PE      Most Recent Value   Wells' Criteria for PE   Clinical signs and symptoms of DVT  0 Filed at: 09/15/2021 1912   PE is primary diagnosis or equally likely  3 Filed at: 09/15/2021 1912   HR >100  1 5 Filed at: 09/15/2021 1912   Immobilization at least 3 days or Surgery in the previous 4 weeks  0 Filed at: 09/15/2021 1912   Previous, objectively diagnosed PE or DVT  0 Filed at: 09/15/2021 1912   Hemoptysis  0 Filed at: 09/15/2021 1912   Malignancy with treatment within 6 months or palliative  1 Filed at: 09/15/2021 1912   Wells' Criteria Total  5 5 Filed at: 09/15/2021 1912            MDM  Number of Diagnoses or Management Options  RANDI (acute kidney injury) ([facility] )  Anemia  Bacteria in urine  Elevated brain natriuretic peptide (BNP) level  Elevated troponin  Fever  Hyponatremia  Hypoxia  Sepsis (HCC)  Tachycardia  Diagnosis management comments: Patient is a 58-year-old male, with a history significant for non-Hodgkin's lymphoma, prostate cancer, thoracic aortic aneurysm, who presents the ED today due to migratory leg and thoracic/lumbar back pain  Notably, however, on arrival, patient was found to be febrile, tachycardic, and with hypoxia to the high 80s that responded to 2L O2  In regards to the back pain, patient denies trauma, urinary incontinence, urinary retention, bowel incontinence, saddle anesthesia  In regards to the fever, patient denies chest pain, shortness of breath, cough, urinary symptoms  He has had UTI x2 in the recent past and most recent urine culture, in July of this year, grew Enterobacter susceptible to ceftriaxone  Notably, patient underwent CT chest abdomen pelvis yesterday and intra-abdominal and intrathoracic metastases were noted  Patient is currently afebrile, tachycardic, otherwise stable on 2L O2  His physical exam is notable for bibasilar rales, pale appearance, shingles rash  This presentation is concerning for:  Sepsis, UTI, pneumonia, ACS, heart failure  I have a low clinical suspicion for cauda equina/conus medullaris at this time based upon history and physical exam   Will investigate with septic panel, CXR, troponin, BNP  Will manage with ceftriaxone given prior sensitivities and further based on workup    Will hold on aggressive fluid resuscitation at this time given rales/concern for decompensated heart failure         Amount and/or Complexity of Data Reviewed  Clinical lab tests: reviewed  Tests in the radiology section of CPT®: reviewed        Disposition  Final diagnoses:   Elevated troponin   Elevated brain natriuretic peptide (BNP) level   Fever   Tachycardia   RANDI (acute kidney injury) ([facility] )   Bacteria in urine   Sepsis ([place] [address] )   Anemia   Hypoxia   Hyponatremia   Hypocalcemia   Constipation   Back pain   T wave inversion in EKG   Pulmonary emboli ([place] [address] )     Time reflects when diagnosis was documented in both MDM as applicable and the Disposition within this note     Time User Action Codes Description Comment    9/15/2021  6:07 PM Lexis Docker A Add [R77 8] Elevated troponin     9/15/2021  6:07 PM Ameena Slaughter [R79 89] Elevated brain natriuretic peptide (BNP) level     9/15/2021  6:07 PM Lexis Docker Add [R50 9] Fever     9/15/2021  6:07 PM Lexis Docker A Add [R00 0] Tachycardia     9/15/2021  6:07 PM Lexis Docker Add [N17 9] RANDI (acute kidney injury) ([place] [address] )     9/15/2021  6:08 PM Kwasi Slaughter A Add [R82 71] Bacteria in urine     9/15/2021  6:08 PM Lexis Docker Add [A41 9,  R65 20] Severe sepsis ([place] [address] )     9/15/2021  6:08 PM Lexis Docker A Modify [R77 8] Elevated troponin     9/15/2021  6:08 PM Lexis Docker Modify [A41 9,  R65 20] Severe sepsis ([place] [address] )     9/15/2021  6:11 PM Ameena Slaughter [A41 9] Sepsis ([place] [address] )     9/15/2021  6:11 PM Lexis Docker Modify [A41 9,  R65 20] Severe sepsis ([place] [address] )     9/15/2021  6:11 PM JASON Slaughter 242 [A41 9] Sepsis ([place] [address] )     9/15/2021  6:11 PM Johnye Mort [A41 9,  R65 20] Severe sepsis ([place] [address] )     9/15/2021  6:11 PM Lexis Docker Add [D64 9] Anemia     9/15/2021  6:11 PM Lexis Brown [R09 02] Hypoxia     9/15/2021  6:13 PM Ameena Slaughter [E87 1] Hyponatremia     9/15/2021  6:14 PM Kwasi Slaughter [E83 51] Hypocalcemia 9/15/2021  6:15 PM Ramírez Hilts A Add [K59 00] Constipation     9/15/2021  6:23 PM Ramírez Hilts A Add [M54 9] Back pain     9/15/2021  6:27 PM Marleni Slaughter A Add [R94 31] T wave inversion in EKG     9/15/2021  9:06 PM Ramírez Hilts A Add [I26 99] Pulmonary emboli ([facility] [address] )     9/15/2021  9:06 PM Ramírez Hilts A Modify [A41 9] [facility]eps[facility] )     9/15/2021  9:06 PM Ramírez Hilts A Modify [I26 99] Pulmonary emboli [facility])       ED Disposition     ED Disposition Condition Date/Time Comment    Admit Stable Wed Sep 15, 2021  6:24 PM Case was discussed with MALIK and the patient's admission status was agreed to be Admission Status: inpatient status to the service of Dr Nabeel Sommers   Follow-up Information    None         Patient's Medications   Discharge Prescriptions    No medications on file     No discharge procedures on file  PDMP Review     None           ED Provider  Attending physically available and evaluated Angelito Gaspar I managed the patient along with the ED Attending      Electronically Signed by         Jay Jay Escobar MD  09/15/21 Prince Rivas MD  09/15/21 [id number]       Jay Jay Escobar MD  09/15/21 1715       Jay Jay Escobar MD  09/15/21 7743

## 2021-09-15 NOTE — ANESTHESIA PREPROCEDURE EVALUATION
Procedure:  EGD    Relevant Problems   CARDIO   (+) Pure hypercholesterolemia      /RENAL   (+) Prostate cancer (HCC)      HEMATOLOGY   (+) MALT lymphoma (HCC)      Other   (+) Abnormal nuclear stress test      Lab Results   Component Value Date    SODIUM 139 07/07/2020    K 3.5 07/07/2020     07/07/2020    CO2 29 07/07/2020    AGAP 7 07/07/2020    BUN 15 07/07/2020    CREATININE 0.99 07/07/2020    GLUC 113 07/07/2020    CALCIUM 8.1 (L) 07/07/2020    AST 33 04/11/2020    ALT 37 04/11/2020    ALKPHOS 57 04/11/2020    TP 7.3 04/11/2020    TBILI 0.80 04/11/2020    EGFR 82 07/07/2020     Lab Results   Component Value Date    WBC 7.31 03/08/2021    HGB 13.6 03/08/2021    HCT 41.6 03/08/2021    MCV 87 03/08/2021     03/08/2021 6/20/19 Cardiac Cath  • The left ventricular systolic function is normal.  • Patient has normal LV systolic function.  • Patient has normal LV end diastolic pressure.  • 1st lesion: Ost LAD to Prox LAD lesion is 50% stenosed.  • Patient has mild coronary artery disease.  • The left ventricular systolic function is normal  • Patient has normal LV systolic function.  • Patient has normal LV end diastolic pressure.    This patient arrived in [facility] for elective cath based on symptoms suspicious for angina and a stress that was  concerning for posterolateral ischemia  LM: -unobstructed  LAD: - small vessel 50% proximal tubular lesion- reasonable distal vessel does not supply apex  CFX: - moderate to large vessel- unobstructed  RCA: - Huge vessel, anterior takeoff, unobstructed  LV: Normal  Normal LVEDP  No MR, No AS       Anesthesia Plan  ASA Score- 3     Anesthesia Type-     Plan Factors-Exercise tolerance (METS): >4 METS.    Chart reviewed. Existing labs reviewed.     Patient is not a current smoker. Patient did not smoke on day of surgery.        Induction-     Postoperative Plan-     Informed Consent- Anesthetic plan and risks discussed with patient and spouse.  I personally reviewed  this patient with the CRNA. Discussed and agreed on the Anesthesia Plan with the CRNA..

## 2021-09-15 NOTE — ED ATTENDING ATTESTATION
9/15/2021  IBirgit DO, saw and evaluated the patient  I have discussed the patient with the resident/non-physician practitioner and agree with the resident's/non-physician practitioner's findings, Plan of Care, and MDM as documented in the resident's/non-physician practitioner's note, except where noted  All available labs and Radiology studies were reviewed  I was present for key portions of any procedure(s) performed by the resident/non-physician practitioner and I was immediately available to provide assistance  At this point I agree with the current assessment done in the Emergency Department  I have conducted an independent evaluation of this patient a history and physical is as follows:    Patient presents for evaluation of leg and back pain for the past few days  In the ED, he was noted to have a mild fever  He denies other infectious symptoms, including cough/cold symptoms and dysuria  He has had a history UTI  He also has a h/o non-Hodgkin's lymphoma but last underwent chemotherapy approximately 4 weeks ago  His pain is making it difficult to ambulate  No recent travel or sick contacts  ROS: Denies HA, CP, SOB, abdominal pain, n/v/d  12 system ROS o/w negative  PE: NAD, appears ill, alert; PERRL, EOMI; MMM, no posterior oropharyngeal exudate, edema or erythema; HRR, no murmur, monitor shows sinus rhythm at 94 bpm; lungs fine rales in the lower thirds bilaterally, POx 96% on RA (nl); abdomen s/nt/nd, nl BS in all 4 quadrant; (-) LE edema or calf TTP, FROM extremities x4, diffuse neck and back paraspinal pain; skin over right back/flank has resolving scabbed rash consistent with VZV, otherwise skin is p/w/d; CNs GI/NF, oriented  DDx:  Fever/lower extremity and back pain - UTI/pyelonephritis, sepsis, COVID, worsening mets, PE,  less likely infected stone or GI etiology        A/P: Will check septic workup, treat symptoms, reevaluate for further work up and disposition  ED Course         Critical Care Time  CriticalCare Time  Performed by: Perry Huff DO  Authorized by:  Perry Huff DO     Critical care provider statement:     Critical care time (minutes):  30    Critical care time was exclusive of:  Separately billable procedures and treating other patients and teaching time    Critical care was necessary to treat or prevent imminent or life-threatening deterioration of the following conditions:  Sepsis, circulatory failure and renal failure    Critical care was time spent personally by me on the following activities:  Obtaining history from patient or surrogate, development of treatment plan with patient or surrogate, discussions with consultants, evaluation of patient's response to treatment, examination of patient, ordering and performing treatments and interventions, ordering and review of laboratory studies, ordering and review of radiographic studies, re-evaluation of patient's condition and review of old charts    I assumed direction of critical care for this patient from another provider in my specialty: no

## 2021-09-15 NOTE — H&P
History and Physical - [facility]  Ewa Ramos 63 y o  male MRN: [id number]                  HPI: Ewa Ramos is a 63y o  year old male who presents for EGD and endoscopic ultrasound with possible fine-needle aspiration of mediastinal adenopathy  Indications for the procedure:  History of MALT lymphoma of the stomach, history of metastatic prostate carcinoma  Poor response to chemotherapy  Worsening of mediastinal adenopathy on recent CT scan  Resampling of mediastinal adenopathy for reassessment  REVIEW OF SYSTEMS: Per the HPI, and otherwise unremarkable  Historical Information   Past Medical History:   Diagnosis Date    Cancer ([place] [address] )     Stomach    Hypertension     Lymphoma ([place] [address] ) 05/2018    Non Hodgkin's lymphoma ([place] [address] )     Prostate cancer ([place] [address] ) 2020    Shingles     Thoracic aortic aneurysm [facility])      Past Surgical History:   Procedure Laterality Date    APPENDECTOMY      COLONOSCOPY      HERNIA REPAIR      B/L hernia repair    KNEE ARTHROSCOPY      KNEE ARTHROSCOPY, MEDIAL PATELLO FEMORAL LIGAMENT REPAIR Left     LINEAR ENDOSCOPIC U/S N/A 6/13/2018    Procedure: LINEAR ENDOSCOPIC U/S;  Surgeon: Mery Chand MD;  Location: [facility];   Service: Gastroenterology    ND BIOPSY/EXCISION, LYMPH NODE(S) Left 6/24/2020    Procedure: EXCISION BIOPSY LYMPH NODE SUPRACLAVICULAR;  Surgeon: Magan Peguero MD;  Location: [facility];  Service: General     Social History   Social History     Substance and Sexual Activity   Alcohol Use Not Currently     Social History     Substance and Sexual Activity   Drug Use No     Social History     Tobacco Use   Smoking Status Never Smoker   Smokeless Tobacco Never Used     Family History   Problem Relation Age of Onset    Melanoma Mother     Prostate cancer Father     Lung cancer Father     Breast cancer Sister     Pancreatic cancer Paternal Grandfather     Hodgkin's lymphoma Sister        Meds/Allergies       Current Outpatient Medications:     aspirin (ECOTRIN LOW STRENGTH) 81 mg EC tablet    gabapentin (NEURONTIN) 300 mg capsule    metoprolol tartrate (LOPRESSOR) 25 mg tablet    OXYCODONE ER PO    OXYCODONE HCL PO    pantoprazole (PROTONIX) 40 mg tablet    valACYclovir (VALTREX) 1,000 mg tablet    cholecalciferol (VITAMIN D3) 1,000 units tablet    cimetidine (TAGAMET) 300 MG tablet    Nutritional Supplements (PRO-RAJINDER PLUS PO)    oxyCODONE-acetaminophen (PERCOCET) 5-325 mg per tablet    RESTASIS 0 05 % ophthalmic emulsion    rosuvastatin (CRESTOR) 5 mg tablet    Silodosin (Rapaflo) 4 MG CAPS    tadalafil (CIALIS) 5 MG tablet    Current Facility-Administered Medications:     sodium chloride 0 9 % infusion, 125 mL/hr, Intravenous, Continuous    No Known Allergies    Objective     There were no vitals taken for this visit  PHYSICAL EXAM    Gen: NAD  Head: NCAT  CV: RRR  CHEST: Clear  ABD: soft, NT/ND  EXT: no edema      ASSESSMENT/PLAN:  This is a 63y o  year old male here for EGD and endoscopic ultrasound, and he is stable and optimized for his procedure

## 2021-09-15 NOTE — SEPSIS NOTE
Sepsis Note   Kei Rodriguez 63 y o  male MRN: [id number]  Unit/Bed#: ED 14 Encounter: [id number]      qSOFA     Row Name 09/15/21 1730 09/15/21 1700 09/15/21 1600 09/15/21 1415 09/15/21 1350    Altered mental status GCS < 15  --  --  --  --  0    Respiratory Rate > / =22  0  0  0  0  --    Systolic BP < / =156  0  0  0  0  --    Q Sofa Score  0  0  0  0  1    Row Name 09/15/21 1336                Altered mental status GCS < 15  --        Respiratory Rate > / =76  1        Systolic BP < / =872  0        Q Sofa Score  1            Initial Sepsis Screening     Row Name 09/15/21 1808 09/15/21 1648             Is the patient's history suggestive of a new or worsening infection? (!) Yes (Proceed)  -CL  (!) Yes (Proceed)  -CL       Suspected source of infection  suspect infection, source unknown  -CL  suspect infection, source unknown  -CL       Are two or more of the following signs & symptoms of infection both present and new to the patient? (!) Yes (Proceed)  -CL  (!) Yes (Proceed)  -CL       Indicate SIRS criteria  Tachycardia > 90 bpm;Leukocytosis (WBC > 02626 IJL)  -CL  Leukocytosis (WBC > 14005 IJL); Tachycardia > 90 bpm  -CL       If the answer is yes to both questions, suspicion of sepsis is present  --  --       If severe sepsis is present AND tissue hypoperfusion perists in the hour after fluid resuscitation or lactate > 4, the patient meets criteria for SEPTIC SHOCK  --  --       Are any of the following organ dysfunction criteria present within 6 hours of suspected infection and SIRS criteria that are NOT considered to be chronic conditions?   No  -CL  No  -CL       Organ dysfunction  --  -CL  --       Date of presentation of severe sepsis  --  --       Time of presentation of severe sepsis  --  --       Tissue hypoperfusion persists in the hour after crystalloid fluid administration, evidenced, by either:  --  --       Was hypotension present within one hour of the conclusion of crystalloid fluid administration?   No  -CL  No  -CL       Date of presentation of septic shock  --  --       Time of presentation of septic shock  --  --         User Key  (r) = Recorded By, (t) = Taken By, (c) = Cosigned By    [address] Name Provider Lea Morin MD Resident

## 2021-09-15 NOTE — ED PROCEDURE NOTE
Procedure  POC Cardiac US    Date/Time: 9/15/2021 2:12 PM  Performed by: Glenna May DO  Authorized by: Glenna May DO     Patient location:  ED  Procedure details:     Exam Type:  Diagnostic    Indications: respiratory distress      Assessment / Evaluation for: cardiac function and pericardial effusion      Exam Type: initial exam      Image quality: diagnostic      Image availability:  Images available in PACS  Patient Details:     Cardiac Rhythm:  Regular  Cardiac findings:     Echo technique: limited 2D      Views obtained: parasternal long axis      Pericardial effusion: absent      Tamponade physiology: absent      Wall motion: normal      LV systolic function: normal      RV dilation: none                       Serena Funk DO  09/15/21 1412

## 2021-09-16 PROBLEM — R50.9 FEVER: Status: RESOLVED | Noted: 2021-01-01 | Resolved: 2021-01-01

## 2021-09-16 PROBLEM — A41.9 SEPSIS (HCC): Status: ACTIVE | Noted: 2021-01-01

## 2021-09-16 PROBLEM — D64.9 ANEMIA: Status: ACTIVE | Noted: 2021-01-01

## 2021-09-16 NOTE — H&P
[address] [facility]  H&P- Ervin Ochoa 1960, 63 y o  male MRN: [id number]  Unit/Bed#: ED 14 Encounter: [id number]  Primary Care Provider: Riya Saldana MD   Date and time admitted to hospital: 9/15/2021  1:30 PM    Fever  Assessment & Plan  Pt was due for an EGD today, however presented w/ a fever of 101 8, 88% O2 sat, and tachycardia  EGD was cancelled and was sent to the ER  Pt remains febrile here in the ER w/ a temperature of 102 9  Pt w/ metastatic prostate cancer being treated w/ lynparza daily, Zometa q4 weeks and last radiation treatment 12 weeks ago  Pt initially w/ a BP of 105/60  Now 123/59  Blood cultures drawn, pt started on Ceftriaxone for sepsis protocol  Pending blood cultures  Fever likely secondary to tumor lysis  Uric acid ordered  Will monitor  And r/o sepsis first   Hemoglobin of 7 8 today, last hgb of 13 6 on 03/08  Acute anemia and fever w/ a differential of babesiosis  Blood smear and LDH ordered  CBC w/ differential w/o schistocytes  Will monitor    Osseous metastasis ([facility] [address] )  Assessment & Plan  Has been having pain to his bilateral hips, radiates down his left knee  Also having pain around the upper shoulders, which has improved  It is chronic, started last year  Has received radiation to the sacrum previously, which has helped his pain in the past    Seems to be chronic in nature, will treat pt symptomatically  Takes oxycodone at home  Prostate cancer [facility])  Assessment & Plan  Has metastatic prostate cancer  Has been treated w/ radiation therapy, last treatment was 12 weeks ago  Currently receiving Zometa q4 weeks, and daily chemo with Lynparza  Will follow w/ his oncologist as an outpt  Pt's oncologist communicated w/ ED physician  CTA ordered for PE protocol  Pt w/o any cough or shortness of breath   Pending results    MALT lymphoma [facility])  Assessment & Plan  Hx of MALT lymphoma of the stomach initially 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, H  Pylori +, status post triple antibiotic course at that time, who has been followed with surveillance EGD, and found to have progressive disease with second gastric lesion  Was due for an EGD today, however was cancelled secondary to pt's fever, hypoxemia, and tachycardia    VTE Pharmacologic Prophylaxis:   High Risk (Score >/= 5) - Pharmacological DVT Prophylaxis Ordered: enoxaparin (Lovenox)  Sequential Compression Devices Ordered  Code Status: Level 1 - Full Code   Discussion with family: Updated  (wife and son) at bedside  Anticipated Length of Stay: Patient will be admitted on an inpatient basis with an anticipated length of stay of greater than 2 midnights secondary to septic workup  Total Time for Visit, including Counseling / Coordination of Care: 45 minutes Greater than 50% of this total time spent on direct patient counseling and coordination of care  Chief Complaint: Fever    History of Present Illness:  Judy Sims is a 63 y o  male with a PMH of non-hodgkin's lymphoma, metastatic prostate cancer, thoracic aortic aneurysm, MALT lymphoma, and CLL who presents with a fever  Pt was due to get an EGD looking for a source of his intermittent nausea  He presented w/ a fever of 101 8, O2 sat of 88%, and tachycardiac  The procedure was cancelled and the pt was instructed to come to the ER  He is having bilateral hip pain, radiating down the left knee  It started last year, chronic in nature  Constant pain, sharp, better when he is feeling better, worse w/ movement  8/10 pain currently  Has received radiation in the past to the sacrum, which has helped his pain  Last time receiving radiation was 12 weeks ago, for the metastatic prostate cancer  Takes Zometa [id number]  And a daily Lynparza for his metastatic prostate cancer  Also having pain to the upper shoulders, but it has improved     Pt was also to receive an MRI of the brain today looking for metastasis, but was not able to make that appointment secondary to his febrile illness  Denies any saddle paresthesias, perianal numbness, bowel/bladder incontinence, or decreased sensation to the LE's  Review of Systems:  Review of Systems   Constitutional: Positive for chills and fever  HENT: Negative for ear pain and hearing loss  Eyes: Negative for visual disturbance  Respiratory: Negative for shortness of breath  Cardiovascular: Negative for chest pain and palpitations  Gastrointestinal: Negative for abdominal pain, diarrhea, nausea and vomiting  Genitourinary: Negative for dysuria and hematuria  Skin: Negative for rash (recent shingles to the right lower back)  Neurological: Positive for headaches  Denies saddle paresthesias, perianal numbness, decreased sensation to the LE's, or bowel/bladder incontinence   Psychiatric/Behavioral: Negative for suicidal ideas  Past Medical and Surgical History:   Past Medical History:   Diagnosis Date    Cancer ([facility] )     Stomach    Hypertension     Lymphoma ([facility] ) 05/2018    Non Hodgkin's lymphoma ([facility] )     Prostate cancer ([place] [address] ) 2020    Shingles     Thoracic aortic aneurysm [facility])        Past Surgical History:   Procedure Laterality Date    APPENDECTOMY      COLONOSCOPY      HERNIA REPAIR      B/L hernia repair    KNEE ARTHROSCOPY      KNEE ARTHROSCOPY, MEDIAL PATELLO FEMORAL LIGAMENT REPAIR Left     LINEAR ENDOSCOPIC U/S N/A 6/13/2018    Procedure: LINEAR ENDOSCOPIC U/S;  Surgeon: Brian Jorgensen MD;  Location: [facility]; Service: Gastroenterology    [place] BIOPSY/EXCISION, LYMPH NODE(S) Left 6/24/2020    Procedure: EXCISION BIOPSY LYMPH NODE SUPRACLAVICULAR;  Surgeon: Lakshmi Smith MD;  Location: [facility];  Service: General       Meds/Allergies:  Prior to Admission medications    Medication Sig Start Date End Date Taking?  Authorizing Provider   aspirin (ECOTRIN LOW STRENGTH) 81 mg EC tablet Take 81 mg by mouth daily    Historical Provider, MD   cholecalciferol (VITAMIN D3) 1,000 units tablet Take 500 Units by mouth daily    Historical Provider, MD   gabapentin (NEURONTIN) 300 mg capsule Take 300 mg by mouth 3 (three) times a day    Historical Provider, MD   metoprolol tartrate (LOPRESSOR) 25 mg tablet Take 25 mg by mouth every 12 (twelve) hours    Historical Provider, MD   Nutritional Supplements (PRO-RAJINDER PLUS PO) Take by mouth    Historical Provider, MD   OXYCODONE ER PO Take 10 mg by mouth 2 (two) times a day    Historical Provider, MD   OXYCODONE HCL PO Take 10 mg by mouth    Historical Provider, MD   oxyCODONE-acetaminophen (PERCOCET) 5-325 mg per tablet Take 1 tablet by mouth every 4 (four) hours as needed for moderate pain for up to 12 dosesMax Daily Amount: 6 tablets 6/24/20   Vernadine Showers, MD   pantoprazole (PROTONIX) 40 mg tablet  3/7/20   Historical Provider, MD   RESTASIS 0 05 % ophthalmic emulsion Administer 1 drop to both eyes every 12 (twelve) hours 6/9/20   Historical Provider, MD   rosuvastatin (CRESTOR) 5 mg tablet Take 5 mg by mouth daily    Historical Provider, MD   valACYclovir (VALTREX) 1,000 mg tablet Take 1,000 mg by mouth 2 (two) times a day    Historical Provider, MD   cimetidine (TAGAMET) 300 MG tablet Take 300 mg by mouth 2 (two) times a day  9/15/21  Historical Provider, MD   Silodosin (Rapaflo) 4 MG CAPS Take by mouth  9/15/21  Historical Provider, MD   tadalafil (CIALIS) 5 MG tablet Take 5 mg by mouth daily at bedtime   9/15/21  Historical Provider, MD     I have reviewed home medications with patient personally      Allergies: No Known Allergies    Social History:  Marital Status: /Civil Union   Occupation: , [employer]  Patient Pre-hospital Living Situation: Home  Patient Pre-hospital Level of Mobility: walks  Patient Pre-hospital Diet Restrictions: none  Substance Use History:   Social History     Substance and Sexual Activity   Alcohol Use Not Currently     Social History     Tobacco Use Smoking Status Never Smoker   Smokeless Tobacco Never Used     Social History     Substance and Sexual Activity   Drug Use No       Family History:  Family History   Problem Relation Age of Onset    Melanoma Mother     Prostate cancer Father     Lung cancer Father     Breast cancer Sister     Pancreatic cancer Paternal Grandfather     Hodgkin's lymphoma Sister        Physical Exam:     Vitals:   Blood Pressure: 130/58 (09/15/21 1830)  Pulse: (!) 110 (09/15/21 1830)  Temperature: (!) 102 9 °F (39 4 °C) (09/15/21 1904)  Temp Source: Oral (09/15/21 1336)  Respirations: 22 (09/15/21 1830)  SpO2: 96 % (09/15/21 1730)    Physical Exam  Constitutional:       Comments: Pleasant male, speaking in clear sentences, sitting upright in bed   HENT:      Head: Normocephalic  Eyes:      Pupils: Pupils are equal, round, and reactive to light  Cardiovascular:      Rate and Rhythm: Regular rhythm  Tachycardia present  Pulmonary:      Effort: Pulmonary effort is normal       Breath sounds: Normal breath sounds  Abdominal:      General: Abdomen is flat  Palpations: Abdomen is soft  Tenderness: There is no abdominal tenderness  Musculoskeletal:         General: Normal range of motion  Cervical back: Normal range of motion and neck supple  Comments: Normal sensation bilaterally   Skin:     Coloration: Skin is pale  Comments: Recent shingles to the right lower back noted   Neurological:      Mental Status: He is alert and oriented to person, place, and time        Comments: Speaking in clear sentences, moving extremities          Additional Data:     Lab Results:  Results from last 7 days   Lab Units 09/15/21  1648   WBC Thousand/uL 12 34*   HEMOGLOBIN g/dL 7 8*   HEMATOCRIT % 24 5*   PLATELETS Thousands/uL 202   NEUTROS PCT % 83*   LYMPHS PCT % 6*   MONOS PCT % 10   EOS PCT % 0     Results from last 7 days   Lab Units 09/15/21  1648   SODIUM mmol/L 126*   POTASSIUM mmol/L 3 6   CHLORIDE mmol/L 95* CO2 mmol/L 24   BUN mg/dL 20   CREATININE mg/dL 1 40*   ANION GAP mmol/L 7   CALCIUM mg/dL 7 6*   ALBUMIN g/dL 2 5*   TOTAL BILIRUBIN mg/dL 0 83   ALK PHOS U/L 638*   ALT U/L 16   AST U/L 91*   GLUCOSE RANDOM mg/dL 113     Results from last 7 days   Lab Units 09/15/21  1648   INR  1 50*             Results from last 7 days   Lab Units 09/15/21  1648 09/15/21  1320   LACTIC ACID mmol/L  --  1 2   PROCALCITONIN ng/ml 0 83*  --        Imaging: Reviewed radiology reports from this admission including: chest xray, chest CT scan and ultrasound(s)  XR chest portable   Final Result by Debora Asher MD (09/15 1621)      No acute cardiopulmonary disease  Workstation performed: [id number]         CTA ED chest PE Study    (Results Pending)       EKG and Other Studies Reviewed on Admission:   · EKG: Sinus Tachycardia    ** Please Note: This note has been constructed using a voice recognition system   **

## 2021-09-16 NOTE — ASSESSMENT & PLAN NOTE
Has metastatic prostate cancer  Has been treated w/ radiation therapy, last treatment was 12 weeks ago  Currently receiving Zometa q4 weeks, and daily chemo with Lynparza  Will follow w/ his oncologist as an outpt  Pt's oncologist communicated w/ ED physician

## 2021-09-16 NOTE — PROGRESS NOTES
Pt brought in bag of prescription medications with him from home  Medications documented and brought to pharmacy  Pt had 2 boxes of chemo medications labeled Monday-Sunday filled with pills that were not labeled with medication name  Pt unable to state medication name  Per pharmacy/hospital supervisor medications labeled with pt label and placed in lock box until family member can take medications home

## 2021-09-16 NOTE — ASSESSMENT & PLAN NOTE
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

## 2021-09-16 NOTE — PROGRESS NOTES
[address] [facility]  Progress Note Sneha Range 1960, 63 y o  male MRN: [id number]  Unit/Bed#: [facility] 711-01 Encounter: [id number]  Primary Care Provider: Sarah Bob MD   Date and time admitted to hospital: 9/15/2021  1:30 PM    * Sepsis [facility])  Assessment & Plan  Criteria met by fever, leukocytosis, tachycardia  Source being possible pneumonia  Procalcitonin elevated  Continue IV ceftriaxone  Pending blood cultures  Pending ID consult    Pulmonary embolus [facility])  Assessment & Plan  Right middle lobe, lingular, and left upper lobe segmental pulmonary emboli are noted  On treatment dose Lovenox  Pending echo  Will transition to Eliquis on discharge  Will place order to check cost    Anemia  Assessment & Plan  Hemoglobin of 7 8 today, last hgb of 13 6 on 03/08  Acute anemia and fever w/ a differential of babesiosis  Blood smear and LDH: 1494   CBC w/ differential w/o schistocytes  No signs of bleeding  Will check a occult blood, pending iron studies      Prostate cancer [facility])  Assessment & Plan  Has metastatic prostate cancer  Has been treated w/ radiation therapy, last treatment was 12 weeks ago  Currently receiving Zometa q4 weeks, and daily chemo with Lynparza  Will follow w/ his oncologist as an outpt  Pt's oncologist communicated w/ ED physician  MALT lymphoma ([facility] [address] )  Assessment & Plan  Hx of MALT lymphoma of the stomach initially 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, H  Pylori +, status post triple antibiotic course at that time, who has been followed with surveillance EGD, and found to have progressive disease with second gastric lesion   Was due for an EGD yesterday, however was cancelled secondary to pt's fever, hypoxemia, and tachycardia        VTE Pharmacologic Prophylaxis:   Pharmacologic: Enoxaparin (Lovenox)  Mechanical VTE Prophylaxis in Place: Yes    Patient Centered Rounds: I have evaluated patient without nursing staff present due to IV antibiotics    Discussions with Specialists or Other Care Team Provider:  Cm and nursing    Education and Discussions with Family / Patient:  Patient, patient did not want me to call family members    Time Spent for Care: 30 minutes  More than 50% of total time spent on counseling and coordination of care as described above  Current Length of Stay: 1 day(s)    Current Patient Status: Inpatient   Certification Statement: The patient will continue to require additional inpatient hospital stay due to IV antibiotics, pending blood cultures    Discharge Plan:  24- 48 hours    Code Status: Level 1 - Full Code      Subjective:   Patient seen examined bedside  No acute events  Continues to be febrile  Denies any chest pain, shortness of breath  Objective:     Vitals:   Temp (24hrs), Av 8 °F (37 7 °C), Min:97 6 °F (36 4 °C), Max:102 9 °F (39 4 °C)    Temp:  [97 6 °F (36 4 °C)-102 9 °F (39 4 °C)] 100 6 °F (38 1 °C)  HR:  [] 104  Resp:  [15-24] 15  BP: ()/(57-72) 108/72  SpO2:  [87 %-97 %] 90 %  Body mass index is 32 2 kg/m²  Input and Output Summary (last 24 hours): Intake/Output Summary (Last 24 hours) at 2021 0948  Last data filed at 2021 0901  Gross per 24 hour   Intake 550 ml   Output 500 ml   Net 50 ml       Physical Exam:     Physical Exam  Vitals and nursing note reviewed  Constitutional:       General: He is not in acute distress  Appearance: Normal appearance  Comments: Pale   HENT:      Head: Normocephalic  Nose: Nose normal       Mouth/Throat:      Mouth: Mucous membranes are moist    Eyes:      Conjunctiva/sclera: Conjunctivae normal    Cardiovascular:      Rate and Rhythm: Normal rate  Heart sounds: Normal heart sounds  No murmur heard  Pulmonary:      Effort: Pulmonary effort is normal  No respiratory distress  Breath sounds: Normal breath sounds  No wheezing  Abdominal:      General: There is no distension  Tenderness: There is no abdominal tenderness  There is no guarding  Musculoskeletal:         General: No swelling  Right lower leg: No edema  Skin:     General: Skin is warm  Neurological:      General: No focal deficit present  Mental Status: He is alert and oriented to person, place, and time  Psychiatric:         Mood and Affect: Mood normal            Additional Data:     Labs:    Results from last 7 days   Lab Units 09/16/21  0525 09/15/21  1648   WBC Thousand/uL 14 00* 12 34*   HEMOGLOBIN g/dL 7 6* 7 8*   HEMATOCRIT % 23 7* 24 5*   PLATELETS Thousands/uL 220 202   NEUTROS PCT %  --  83*   LYMPHS PCT %  --  6*   MONOS PCT %  --  10   EOS PCT %  --  0     Results from last 7 days   Lab Units 09/15/21  1648   SODIUM mmol/L 126*   POTASSIUM mmol/L 3 6   CHLORIDE mmol/L 95*   CO2 mmol/L 24   BUN mg/dL 20   CREATININE mg/dL 1 40*   ANION GAP mmol/L 7   CALCIUM mg/dL 7 6*   ALBUMIN g/dL 2 5*   TOTAL BILIRUBIN mg/dL 0 83   ALK PHOS U/L 638*   ALT U/L 16   AST U/L 91*   GLUCOSE RANDOM mg/dL 113     Results from last 7 days   Lab Units 09/15/21  1648   INR  1 50*             Results from last 7 days   Lab Units 09/16/21  0524 09/15/21  1648 09/15/21  1320   LACTIC ACID mmol/L  --   --  1 2   PROCALCITONIN ng/ml 0 93* 0 83*  --            * I Have Reviewed All Lab Data Listed Above  * Additional Pertinent [facility] Tests [facility]ed: [facility]    Imaging:    Imaging Reports Reviewed Today Include:  CT chest  Imaging Personally Reviewed by Myself Includes:  None    Recent Cultures (last 7 days):     Results from last 7 days   Lab Units 09/15/21  1330 09/15/21  1320   BLOOD CULTURE  Received in Microbiology Lab  Culture in Progress  Received in Microbiology Lab  Culture in Progress         Last 24 Hours Medication List:   Current Facility-Administered Medications   Medication Dose Route Frequency Provider Last Rate    acetaminophen  650 mg Oral Q6H PRN Jolene Quijano MD      aspirin  81 mg Oral Daily Jolene Quijano MD  [id number]  1,000 mg Intravenous Q24H Alesia Gracia MD      cholecalciferol  500 Units Oral Daily Alesia Gracia MD      enoxaparin  1 mg/kg Subcutaneous Q12H [address] Alesia Gracia MD      gabapentin  300 mg Oral TID Alesia Gracia MD      metoprolol tartrate  25 mg Oral Q12H [address] Alesia Gracia MD      morphine injection  2 mg Intravenous Q4H PRN Raimundo Olivo MD      multi-electrolyte  100 mL/hr Intravenous Continuous Alesia Gracia  mL/hr (09/15/21 2031)    ondansetron  4 mg Intravenous Q6H PRN Alesia Gracia MD      oxyCODONE  10 mg Oral Q4H PRN Alesia Gracia MD      oxyCODONE  5 mg Oral Q4H PRN Alesia Gracia MD      pantoprazole  40 mg Oral Early Morning Alesia Gracia MD      pravastatin  40 mg Oral Daily With Dmitri Huang MD      senna  2 tablet Oral Daily Alesia Gracia MD      valACYclovir  1,000 mg Oral BID Alesia Gracia MD       Facility-Administered Medications Ordered in Other Encounters   Medication Dose Route Frequency Provider Last Rate    sodium chloride  125 mL/hr Intravenous Continuous Baltazar Rubin MD          Today, Patient Was Seen By: Zohaib Orourke MD    ** Please Note: Dictation voice to text software may have been used in the creation of this document   **

## 2021-09-16 NOTE — UTILIZATION REVIEW
Inpatient Admission Authorization Request   NOTIFICATION OF INPATIENT ADMISSION/INPATIENT AUTHORIZATION REQUEST   SERVICING FACILITY:   [facility]  Address: [address] [place] [address], [address] [place] [address]  Tax ID: [id number]  NPI: [id number]  Place of Service: Inpatient [address] [facility] Code: 24     ATTENDING PROVIDER:  Attending Name and NPI#: Artur Max [[id number]]  Address: [address] [place] [address], [address] [place] [address]  Phone: [phone]     UTILIZATION REVIEW CONTACT:  Leeanna Bucio, Utilization   Network Utilization Review Department  Phone: [phone]  Fax: [phone]  Email: [email or web address]     PHYSICIAN ADVISORY SERVICES:  FOR OCBY-NM-FERZ REVIEW - MEDICAL NECESSITY DENIAL  Phone: [phone]  Fax: [phone]  Email: [email or web address]     TYPE OF REQUEST:  Inpatient Status     ADMISSION INFORMATION:  ADMISSION DATE/TIME: 9/15/21  6:25 PM  PATIENT DIAGNOSIS CODE/DESCRIPTION:  Hypocalcemia [E83 51]  Back pain [M54 9]  Hyponatremia [E87 1]  Anemia [D64 9]  Tachycardia [R00 0]  Constipation [K59 00]  Hypoxia [R09 02]  Elevated troponin [R77 8]  Fever [R50 9]  Bacteria in urine [R82 71]  RANDI (acute kidney injury) ([facility] [address] ) [N17 9]  Elevated brain natriuretic peptide (BNP) level [R79 89]  Pulmonary emboli ([facility] [address] ) [I26 99]  T wave inversion in EKG [R94 31]  Sepsis ([facility] [address] ) [A41 9]  DISCHARGE DATE/TIME: No discharge date for patient encounter  DISCHARGE DISPOSITION (IF DISCHARGED): Home/Self Care     IMPORTANT INFORMATION:  Please contact the Leeanna Bucio directly with any questions or concerns regarding this request  Department voicemails are confidential     Send requests for admission clinical reviews, concurrent reviews, approvals, and administrative denials due to lack of clinical to fax [phone]

## 2021-09-16 NOTE — ASSESSMENT & PLAN NOTE
Pt was due for an EGD today, however presented w/ a fever of 101 8, 88% O2 sat, and tachycardia  EGD was cancelled and was sent to the ER  Pt remains febrile here in the ER w/ a temperature of 102 9  Pt w/ metastatic prostate cancer being treated w/ lynparza daily, Zometa q4 weeks and last radiation treatment 12 weeks ago  Pt initially w/ a BP of 105/60  Now 123/59  Blood cultures drawn, pt started on Ceftriaxone for sepsis protocol  Pending blood cultures  Fever likely secondary to tumor lysis  Uric acid ordered  Will monitor  And r/o sepsis first   Hemoglobin of 7 8 today, last hgb of 13 6 on 03/08  Acute anemia and fever w/ a differential of babesiosis  Blood smear and LDH ordered  CBC w/ differential w/o schistocytes   Will monitor

## 2021-09-16 NOTE — ASSESSMENT & PLAN NOTE
Has been having pain to his bilateral hips, radiates down his left knee  Also having pain around the upper shoulders, which has improved  It is chronic, started last year  Has received radiation to the sacrum previously, which has helped his pain in the past    Seems to be chronic in nature, will treat pt symptomatically  Takes oxycodone at home

## 2021-09-16 NOTE — CONSULTS
Consultation - Infectious Disease   Ruiz Maciel 63 y o  male MRN: [id number]  Unit/Bed#: [facility] 711-01 Encounter: [id number]      IMPRESSION & RECOMMENDATIONS:   1  Sepsis, POA   2/2 fever, tachycardia, and hypoxia   Fever may be secondary to malignancy verses infection, specifically pneumonia versus atelectasis   Currently on ceftriaxone 1 g Q 24 hours day 2 on 9/16    Consider    Continue home regimen valacyclovir 1 g b i d  Lactic acid within normal limits   COVID and viral panel negative   Blood parasite smear pending, will follow-up   Procalcitonin elevated at 0 93, uptrending    blood culture x2 pending, will follow-up   No hx MRSA infection   CXR 9/15 demonstrates no acute cardiopulmonary disease   CT demonstrates basilar atelectasis   Continue to monitor fever curve and leukocytosis    Currently uptrending    2  Metastatic prostate cancer   Mets to bone, liver and likely lung   S/p palliative radiation July 2021   Uric acid 4 5, within normal limits   Absolute neutrophils 10 28, elevated   Pain regimen schedule Tylenol 650, oxycodone 5 mg q 4 hours p r n  Moderate pain, oxycodone 10 mg q 4 hours severe pain, morphine 2 mg q 4 hours p r n  Breakthrough pain    3  Anemia   Folate, B12, iron panel pending, will follow-up   Hemoglobin currently 7 6, baseline around 14 earlier 2021   Transfuse for hemoglobin less than 7   Elevated LDH   Reticulocytes count absolute 29,700    4   Hx of non-hodgkins lymphoma, MALT Lymphoma   S/p radiation 2018   Follows Dr Samson Quiroga outpatient      Have discussed the above management plan in detail with the primary service    Extensive review of the medical records in epic including review of the notes, radiographs, and laboratory results     HISTORY OF PRESENT ILLNESS:  Reason for Consult: fever in setting of cancer      HPI: Ruiz Maciel is a 63y o  year old male PMH of non-hodgkin's lymphoma 05/2018, diffuse metastatic prostate cancer (dx 5/2020, status post left cervical lymph node biopsy 06/24/2020, follows Dr Leopold Files, palliative radiation 7/2021, Jayden Larsen, daily Diadhruv Daft), thoracic aortic aneurysm, 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 status post triple antibiotic course and radiation, and CLL (dx 2020) who presents with a fever to [facility] from EGD suite  Pt was due to get an EGD 9/15 looking for a source of his intermittent nausea  He presented w/ a fever of 101 5, O2 sat of 88%, and tachycardiac  The procedure was cancelled and the pt was instructed to come to the ER  Patient states that he has baseline nausea vomiting and body pain particularly in the back and likes which he rates as 4/10  Worse with ambulation which increases to 9/10  Patient states that nausea and pain are intermittent she and random was no he deviating or exacerbating factors  Patient last vomited approximately 2 weeks ago nonbloody nonbilious  Patient states that he has baseline night sweats and sleepiness  He has lost approximately 25 lb over 1 year  Patient has no sick contents, fevers, chills, headache, sick contacts recent travel  He denies any aspiration/chocking dysphagia, or loss of consciousness  Most recent travel history to [facility] in 2018 and [place] in 2011 for [address] trip to the [place] area  Patient has been appropriately vaccinated for  tripped, COVID, childhood, pneumonia, and meningitis  Patient has no  service or job exposure as he is a director of [employer]  Patient has no history of tobacco, alcohol or intranasal or intravenous drug use, testing, treatment for sexually transmitted infection, or HIV risk factors  Patient denies any numbness, tingling, falls, congestion, diarrhea, dysuria, cough  Last seen chemo treatment yesterday b i d  Oral pill, started 4 weeks ago  Patient generally ambulates with a walker and cane      While in the ER, patient was treated with IV ceftriaxone, normal saline bolus Tylenol and 100 cc isolate per hour  Patient states he feels well overall, and has his basal pain a 4/10 and night sweats however any complaints  Wife at bedside confirms history as detailed above  Febrile overnight with tmax 102 9 at [address]:  A complete review of systems is negative other than that noted in the HPI  PAST MEDICAL HISTORY:  Past Medical History:   Diagnosis Date    Cancer [facility])     Stomach    Hypertension     [facility]    Non Hodgkin's lymphoma ([facility] )     Prostate cancer ([facility] )     Shingles     Thoracic aortic aneurysm [facility])      Past Surgical History:   Procedure Laterality Date    APPENDECTOMY      COLONOSCOPY      HERNIA REPAIR      B/L hernia repair    KNEE ARTHROSCOPY      KNEE ARTHROSCOPY, MEDIAL PATELLO FEMORAL LIGAMENT REPAIR Left     LINEAR ENDOSCOPIC U/S N/A 2018    Procedure: LINEAR ENDOSCOPIC U/S;  Surgeon: Alex Jones MD;  Location: [facility]; Service: Gastroenterology    UT BIOPSY/EXCISION, LYMPH NODE(S) Left 2020    Procedure: EXCISION BIOPSY LYMPH NODE SUPRACLAVICULAR;  Surgeon: Maik Carrero MD;  Location: [facility];  Service: General       FAMILY HISTORY:  Sister positive breast cancer and Hodgkin's lymphoma, lung and prostate cancer father, melanoma mother, pancreatic cancer paternal grandfather    SOCIAL HISTORY:  Social History   Social History     Substance and Sexual Activity   Alcohol Use Not Currently     Social History     Substance and Sexual Activity   Drug Use No     Social History     Tobacco Use   Smoking Status Never Smoker   Smokeless Tobacco Never Used       ALLERGIES:  No Known Allergies    MEDICATIONS:  All current active medications have been reviewed        PHYSICAL EXAM:  Temp:  [97 6 °F (36 4 °C)-102 9 °F (39 4 °C)] 100 6 °F (38 1 °C)  HR:  [] 104  Resp:  [15-24] 15  BP: ()/(57-72) 108/72  SpO2:  [87 %-97 %] 90 %  Temp (24hrs), Av 8 °F (37 7 °C), Min:97 6 °F (36 4 °C), Max:102 9 °F (39 4 °C)  Current: Temperature: (!) 100 6 °F (38 1 °C)    Intake/Output Summary (Last 24 hours) at 9/16/2021 0944  Last data filed at 9/16/2021 0901  Gross per 24 hour   Intake 550 ml   Output 500 ml   Net 50 ml       General Appearance:  Appears ill, nontoxic, and in no distress   Head:  Normocephalic, without obvious abnormality, atraumatic   Eyes:  Conjunctiva pink and sclera anicteric, both eyes   Nose: Nares normal, mucosa normal, no drainage   Throat: Oropharynx moist without lesions   Neck: Supple, symmetrical, mild adenopathy   Back:   Symmetric, no curvature, ROM normal, no CVA tenderness   Lungs:   Crackles and dullness to percussion b/l LL, respirations unlabored, + egophony   Chest Wall:  No tenderness or deformity   Heart:  RRR; no murmur, rub or gallop   Abdomen:   Soft, non-tender, non-distended, positive bowel sounds    Extremities: No cyanosis, clubbing or edema   Skin: No rashes or lesions  No draining wounds noted  Lymph nodes: Mild LAD Cervical, supraclavicular nodes   Neurologic: Alert and oriented times 3, extremity strength 5/5 and symmetric       LABS, IMAGING, & OTHER STUDIES:  Lab Results:  I have personally reviewed pertinent labs  Results from last 7 days   Lab Units 09/16/21  0525 09/15/21  2030 09/15/21  1648   WBC Thousand/uL 14 00*  --  12 34*   HEMOGLOBIN g/dL 7 6*  --  7 8*   PLATELETS Thousands/uL 220 195 202     Results from last 7 days   Lab Units 09/15/21  1648   SODIUM mmol/L 126*   POTASSIUM mmol/L 3 6   CHLORIDE mmol/L 95*   CO2 mmol/L 24   BUN mg/dL 20   CREATININE mg/dL 1 40*   EGFR ml/min/1 73sq m 54   CALCIUM mg/dL 7 6*   AST U/L 91*   ALT U/L 16   ALK PHOS U/L 638*     Results from last 7 days   Lab Units 09/15/21  1330 09/15/21  1320   BLOOD CULTURE  Received in Microbiology Lab  Culture in Progress  Received in Microbiology Lab  Culture in Progress       Results from last 7 days   Lab Units 09/16/21  0524 09/15/21  1648   PROCALCITONIN ng/ml 0 93* 0 83* Imaging Studies:   I have personally reviewed pertinent imaging study reports and images in PACS  CT chest abdomen pelvis 8/22 demonstrates overall progression pelvic and lower left retroperitoneal lymphadenopathy with significant infiltrative tumor masses resulting in left-sided hydroureteronephrosis, tumor and left hemipelvis inseparable from left seminal vesicle and posterior urinary wall bladder, left adrenal metastases, numerous new hypoenhancing liver lesions    Ultrasound 9/15 pending, will follow-up    CXR 9/15 demonstrates no acute cardiopulmonary disease    CTA PE demonstrates right middle lobe, lingular and left upper lobe segmental pulmonary emboli, RV LV ratio 0 9 recommending potential right heart strain, mediastinal and bilateral hilar adenopathy left greater than right, 8 mm right middle lobe nodule, lingular opacity increased in size concern for atelectasis versus infiltrate, bilateral lung base linear atelectasis changes      Other Studies:   I have personally reviewed pertinent reports

## 2021-09-16 NOTE — ASSESSMENT & PLAN NOTE
Hemoglobin of 7 8 today, last hgb of 13 6 on 03/08  Acute anemia and fever w/ a differential of babesiosis  Blood smear and LDH: 1494   CBC w/ differential w/o schistocytes     No signs of bleeding  Will check a occult blood, pending iron studies

## 2021-09-16 NOTE — PLAN OF CARE
Problem: Potential for Falls  Goal: Patient will remain free of falls  Description: INTERVENTIONS:  - Educate patient/family on patient safety including physical limitations  - Instruct patient to call for assistance with activity   - Consult OT/PT to assist with strengthening/mobility   - Keep Call bell within reach  - Keep bed low and locked with side rails adjusted as appropriate  - Keep care items and personal belongings within reach  - Initiate and maintain comfort rounds  - Make Fall Risk Sign visible to staff  - Apply yellow socks and bracelet for high fall risk patients  - Consider moving patient to room near nurses station  Outcome: Progressing     Problem: CARDIOVASCULAR - ADULT  Goal: Maintains optimal cardiac output and hemodynamic stability  Description: INTERVENTIONS:  - Monitor I/O, vital signs and rhythm  - Monitor for S/S and trends of decreased cardiac output  - Administer and titrate ordered vasoactive medications to optimize hemodynamic stability  - Assess quality of pulses, skin color and temperature  - Assess for signs of decreased coronary artery perfusion  - Instruct patient to report change in severity of symptoms  Outcome: Progressing  Goal: Absence of cardiac dysrhythmias or at baseline rhythm  Description: INTERVENTIONS:  - Continuous cardiac monitoring, vital signs, obtain 12 lead EKG if ordered  - Administer antiarrhythmic and heart rate control medications as ordered  - Monitor electrolytes and administer replacement therapy as ordered  Outcome: Progressing     Problem: RESPIRATORY - ADULT  Goal: Achieves optimal ventilation and oxygenation  Description: INTERVENTIONS:  - Assess for changes in respiratory status  - Assess for changes in mentation and behavior  - Position to facilitate oxygenation and minimize respiratory effort  - Oxygen administered by appropriate delivery if ordered  - Initiate smoking cessation education as indicated  - Encourage broncho-pulmonary hygiene including cough, deep breathe, Incentive Spirometry  - Assess the need for suctioning and aspirate as needed  - Assess and instruct to report SOB or any respiratory difficulty  - Respiratory Therapy support as indicated  Outcome: Progressing     Problem: METABOLIC, FLUID AND ELECTROLYTES - ADULT  Goal: Electrolytes maintained within normal limits  Description: INTERVENTIONS:  - Monitor labs and assess patient for signs and symptoms of electrolyte imbalances  - Administer electrolyte replacement as ordered  - Monitor response to electrolyte replacements, including repeat lab results as appropriate  - Instruct patient on fluid and nutrition as appropriate  Outcome: Progressing  Goal: Fluid balance maintained  Description: INTERVENTIONS:  - Monitor labs   - Monitor I/O and WT  - Instruct patient on fluid and nutrition as appropriate  - Assess for signs & symptoms of volume excess or deficit  Outcome: Progressing     Problem: SKIN/TISSUE INTEGRITY - ADULT  Goal: Skin Integrity remains intact(Skin Breakdown Prevention)  Description: Assess:  -Inspect skin when repositioning, toileting, and assisting with ADLS    -Assess extremities for adequate circulation and sensation     Bed Management:  -Have minimal linens on bed & keep smooth, unwrinkled  -Change linens as needed when moist or perspiring    Toileting:  -Offer bedside commode  Activity:  -Encourage activity and walks on unit  -Encourage or provide ROM exercises   -Use appropriate equipment to lift or move patient in bed    Skin Care:  -Avoid use of baby powder, tape, friction and shearing, hot water or constrictive clothing  -Do not massage red bony areas    Outcome: Progressing     Problem: MUSCULOSKELETAL - ADULT  Goal: Maintain or return mobility to safest level of function  Description: INTERVENTIONS:  - Assess patient's ability to carry out ADLs; assess patient's baseline for ADL function and identify physical deficits which impact ability to perform ADLs (bathing, care of mouth/teeth, toileting, grooming, dressing, etc )  - Assess/evaluate cause of self-care deficits   - Assess range of motion  - Assess patient's mobility  - Assess patient's need for assistive devices and provide as appropriate  - Encourage maximum independence but intervene and supervise when necessary  - Involve family in performance of ADLs  - Assess for home care needs following discharge   - Consider OT consult to assist with ADL evaluation and planning for discharge  - Provide patient education as appropriate  Outcome: Progressing  Goal: Maintain proper alignment of affected body part  Description: INTERVENTIONS:  - Support, maintain and protect limb and body alignment  - Provide patient/ family with appropriate education  Outcome: Progressing     Problem: PAIN - ADULT  Goal: Verbalizes/displays adequate comfort level or baseline comfort level  Description: Interventions:  - Encourage patient to monitor pain and request assistance  - Assess pain using appropriate pain scale  - Administer analgesics based on type and severity of pain and evaluate response  - Implement non-pharmacological measures as appropriate and evaluate response  - Consider cultural and social influences on pain and pain management  - Notify physician/advanced practitioner if interventions unsuccessful or patient reports new pain  Outcome: Progressing     Problem: INFECTION - ADULT  Goal: Absence or prevention of progression during hospitalization  Description: INTERVENTIONS:  - Assess and monitor for signs and symptoms of infection  - Monitor lab/diagnostic results  - Monitor all insertion sites, i e  indwelling lines, tubes, and drains  - Monitor endotracheal if appropriate and nasal secretions for changes in amount and color  - [place] appropriate cooling/warming therapies per order  - Administer medications as ordered  - Instruct and encourage patient and family to use good hand hygiene technique  - Identify and instruct in appropriate isolation precautions for identified infection/condition  Outcome: Progressing  Goal: Absence of fever/infection during neutropenic period  Description: INTERVENTIONS:  - Monitor WBC    Outcome: Progressing     Problem: SAFETY ADULT  Goal: Patient will remain free of falls  Description: INTERVENTIONS:  - Educate patient/family on patient safety including physical limitations  - Instruct patient to call for assistance with activity   - Consult OT/PT to assist with strengthening/mobility   - Keep Call bell within reach  - Keep bed low and locked with side rails adjusted as appropriate  - Keep care items and personal belongings within reach  - Initiate and maintain comfort rounds  - Make Fall Risk Sign visible to staff  - Apply yellow socks and bracelet for high fall risk patients  - Consider moving patient to room near nurses station  Outcome: Progressing  Goal: Maintain or return to baseline ADL function  Description: INTERVENTIONS:  -  Assess patient's ability to carry out ADLs; assess patient's baseline for ADL function and identify physical deficits which impact ability to perform ADLs (bathing, care of mouth/teeth, toileting, grooming, dressing, etc )  - Assess/evaluate cause of self-care deficits   - Assess range of motion  - Assess patient's mobility; develop plan if impaired  - Assess patient's need for assistive devices and provide as appropriate  - Encourage maximum independence but intervene and supervise when necessary  - Involve family in performance of ADLs  - Assess for home care needs following discharge   - Consider OT consult to assist with ADL evaluation and planning for discharge  - Provide patient education as appropriate  Outcome: Progressing  Goal: Maintains/Returns to pre admission functional level  Description: INTERVENTIONS:  - Perform BMAT or MOVE assessment daily    - Set and communicate daily mobility goal to care team and patient/family/caregiver     - Collaborate with rehabilitation services on mobility goals if consulted  - Out of bed for toileting  - Record patient progress and toleration of activity level   Outcome: Progressing     Problem: DISCHARGE PLANNING  Goal: Discharge to home or other facility with appropriate resources  Description: INTERVENTIONS:  - Identify barriers to discharge w/patient and caregiver  - Arrange for needed discharge resources and transportation as appropriate  - Identify discharge learning needs (meds, wound care, etc )  - Arrange for interpretive services to assist at discharge as needed  - Refer to Case Management Department for coordinating discharge planning if the patient needs post-hospital services based on physician/advanced practitioner order or complex needs related to functional status, cognitive ability, or social support system  Outcome: Progressing     Problem: Knowledge Deficit  Goal: Patient/family/caregiver demonstrates understanding of disease process, treatment plan, medications, and discharge instructions  Description: Complete learning assessment and assess knowledge base    Interventions:  - Provide teaching at level of understanding  - Provide teaching via preferred learning methods  Outcome: Progressing

## 2021-09-16 NOTE — ASSESSMENT & PLAN NOTE
Right middle lobe, lingular, and left upper lobe segmental pulmonary emboli are noted  On treatment dose Lovenox  Pending echo  Will transition to Eliquis on discharge    Will place order to check cost

## 2021-09-16 NOTE — ASSESSMENT & PLAN NOTE
Hx of MALT lymphoma of the stomach initially 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, H  Pylori +, status post triple antibiotic course at that time, who has been followed with surveillance EGD, and found to have progressive disease with second gastric lesion   Was due for an EGD yesterday, however was cancelled secondary to pt's fever, hypoxemia, and tachycardia

## 2021-09-16 NOTE — ASSESSMENT & PLAN NOTE
Criteria met by fever, leukocytosis, tachycardia    Source being possible pneumonia  Procalcitonin elevated  Continue IV ceftriaxone  Pending blood cultures  Pending ID consult

## 2021-09-16 NOTE — NURSING NOTE
Patient complaining of chest pain that is radiating to his jaw and up his ear  Patient states its worse with exertion and when he is taking deep breaths  [facility] notified  EKG and labs ordered  Will continue to monitor

## 2021-09-17 NOTE — PROGRESS NOTES
Progress Note - Infectious Disease   Kami Harris 63 y o  male MRN: [id number]  Unit/Bed#: [facility] 711-01 Encounter: [id number]      Impression/Plan:  1  SIRS vs Sepsis, POA              2/2 fever, tachycardia, and hypoxia              Fever may be secondary to malignancy verses infection, specifically pneumonia versus atelectasis  No obvious source of infection              Currently on ceftriaxone 1 g Q 24 hours day 3 on 9/17                          d/c antibiotics today as blood cx negative              blood parasite smear negative  Continue home regimen valacyclovir 1 g b i d  Lactic acid within normal limits              COVID and viral panel negative             Procalcitonin elevated at 0 93, uptrending              No hx MRSA infection              CXR 9/15 demonstrates no acute cardiopulmonary disease              CT demonstrates basilar atelectasis              Continue to monitor fever curve and leukocytosis                          Currently donwtrending and WNL     2  Metastatic prostate cancer              Mets to bone, liver and likely lung              S/p palliative radiation July 2021              Uric acid 4 5, within normal limits              Absolute neutrophils 10 28, elevated              Pain regimen schedule Tylenol 650, oxycodone 5 mg q 4 hours p r n  Moderate pain, oxycodone 10 mg q 4 hours severe pain, morphine 2 mg q 4 hours p r n  Breakthrough pain     3  Anemia              Folate, B12, iron panel pending, will follow-up              Hemoglobin currently 6 7, baseline around 14 earlier 2021              Transfuse for hemoglobin less than 7              Elevated LDH              Reticulocytes count absolute 29,700     4   Hx of non-hodgkins lymphoma, MALT Lymphoma              S/p radiation 2018              Follows Dr Valerie Maldonado outpatient    Antibiotics:  CTX 1gq24 day 3 9/17    Subjective:  Patient has no fever, chills, sweats; no nausea, vomiting, diarrhea; no cough, shortness of breath; no pain  No new symptoms  Patient febrile overnight 101 1, however did not feel chills or feverish  Patient tired appearing and asking to go home  Objective:  Vitals:  Temp:  [98 °F (36 7 °C)-101 1 °F (38 4 °C)] 98 °F (36 7 °C)  HR:  [] 98  Resp:  [16] 16  BP: ()/(62-75) 100/66  SpO2:  [93 %-98 %] 93 %  Temp (24hrs), Av 5 °F (37 5 °C), Min:98 °F (36 7 °C), Max:101 1 °F (38 4 °C)  Current: Temperature: 98 °F (36 7 °C)    Physical Exam:   General Appearance:  Alert, interactive, nontoxic, no acute distress, chronically ill appearing   Throat: Oropharynx moist without lesions  Lungs:   no wheezes, rhonchi; respirations unlabored; sligh crackles at b/l ll fields, improved   Heart:  RRR; no murmur, rub or gallop   Abdomen:   Soft, non-tender, non-distended, positive bowel sounds  Extremities: No clubbing, cyanosis or edema   Skin: No new rashes or lesions  No draining wounds noted  Labs, Imaging, & Other studies:   All pertinent labs and imaging studies were personally reviewed  Results from last 7 days   Lab Units 21  0654 21  2127 21  0525   WBC Thousand/uL 9 71 11 41* 14 00*   HEMOGLOBIN g/dL 6 7* 7 2* 7 6*   PLATELETS Thousands/uL 207 223 220     Results from last 7 days   Lab Units 21  0654 09/15/21  1648   SODIUM mmol/L 128* 126*   POTASSIUM mmol/L 3 3* 3 6   CHLORIDE mmol/L 98* 95*   CO2 mmol/L 24 24   BUN mg/dL 15 20   CREATININE mg/dL 1 10 1 40*   EGFR ml/min/1 73sq m 72 54   CALCIUM mg/dL 6 9* 7 6*   AST U/L  --  91*   ALT U/L  --  16   ALK PHOS U/L  --  638*     Results from last 7 days   Lab Units 09/15/21  1330 09/15/21  1320   BLOOD CULTURE  No Growth at 24 hrs  No Growth at 24 hrs       Results from last 7 days   Lab Units 21  0524 09/15/21  1648   PROCALCITONIN ng/ml 0 93* 0 83*

## 2021-09-17 NOTE — ASSESSMENT & PLAN NOTE
Discussed with ID  No clear source - continues to be febrile however ? Cancer/cancer therapy related/clot related  ABx discontinued  Follow up cultures

## 2021-09-17 NOTE — PROGRESS NOTES
[address] [facility]  Progress Note Kristian Frames 1960, 63 y o  male MRN: [id number]  Unit/Bed#: [facility] 711-01 Encounter: [id number]  Primary Care Provider: Yifan Hwang MD   Date and time admitted to hospital: 9/15/2021  1:30 PM    Anemia  Assessment & Plan  Multifactorial ? GI loss MALT and steroid use/marrow suppression/therapy/cancer  Transfuse 1 unit now  Continue to trend      Pulmonary embolus [facility])  Assessment & Plan  Continues with CP  ? Related to PE  Troponin x 3 negative  Continue on tele  Dilated RV on Echo   Discussed with Pulmonology would appreciate their input regarding AC and follow up as well as ongoing CP  [facility] ([facility]  Assessment & Plan  Has been having pain to his bilateral hips, radiates down his left knee  Also having pain around the upper shoulders, which has improved  It is chronic, started last year  Has received radiation to the sacrum previously, which has helped his pain in the past    Seems to be chronic in nature, will treat pt symptomatically  Takes oxycodone at home  Will start 10 mg oxycontin BID and prn oxycodone additionally  Will also consult palliative  zofran only for antiemetics for this patient   Would avoid dexamethasone in the setting of ?GIB  Prostate cancer [facility])  Assessment & Plan  I had a lengthy discussion on speaker phone with patients primary oncology team (Salty Ellison)  Primary oncology team have already cost checked arixtra and this is affordable  They would also prefer this AC in the setting of active cancer  Also anemia may be multifactorial but they agree with transfusion at this time given chest pain and possible blood loss on AC  Patient consented for one unit PRBC       MALT lymphoma ([facility] )  Assessment & Plan  Hx of MALT lymphoma of the stomach initially 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, H  Pylori +, status post triple antibiotic course at that time, who has been followed with surveillance EGD, and found to have progressive disease with second gastric lesion  Was due for an EGD yesterday, however was cancelled secondary to pt's fever, hypoxemia, and tachycardia  GI eval now given anemia  * Sepsis [facility])  Assessment & Plan  Discussed with ID  No clear source - continues to be febrile however ? Cancer/cancer therapy related/clot related  ABx discontinued  Follow up cultures  VTE Pharmacologic Prophylaxis:   on therapeutic dose  Patient Centered Rounds: I performed bedside rounds with nursing staff today  Discussions with Specialists or Other Care Team Provider: Discussed with patient and with hematology and onoclogy  Education and Discussions with Family / Patient: Updated  (wife) via phone  Time Spent for Care: 30 minutes  More than 50% of total time spent on counseling and coordination of care as described above  Current Length of Stay: 2 day(s)  Current Patient Status: Inpatient   Certification Statement: The patient will continue to require additional inpatient hospital stay due to anemia, DVT  Discharge Plan: Anticipate discharge in 48-72 hrs to home  Code Status: Level 1 - Full Code    Subjective:   Patient seen and examined   Feeling "okay"    Objective:     Vitals:   Temp (24hrs), Av 2 °F (37 3 °C), Min:98 °F (36 7 °C), Max:101 1 °F (38 4 °C)    Temp:  [98 °F (36 7 °C)-101 1 °F (38 4 °C)] 98 °F (36 7 °C)  HR:  [] 98  Resp:  [16] 16  BP: ()/(62-75) 100/66  SpO2:  [93 %-98 %] 93 %  Body mass index is 32 2 kg/m²  Input and Output Summary (last 24 hours): Intake/Output Summary (Last 24 hours) at 2021 1501  Last data filed at 2021 0119  Gross per 24 hour   Intake 2886 67 ml   Output 300 ml   Net 2586 67 ml       Physical Exam:   Physical Exam  Constitutional:       General: He is not in acute distress  Appearance: He is not ill-appearing, toxic-appearing or diaphoretic  HENT:      Head: Normocephalic        Nose: Nose normal       Mouth/Throat:      Mouth: Mucous membranes are moist    Eyes:      General: No scleral icterus  Right eye: No discharge  Left eye: No discharge  Pupils: Pupils are equal, round, and reactive to light  Cardiovascular:      Rate and Rhythm: Normal rate  Pulmonary:      Effort: No respiratory distress  Breath sounds: No stridor  No wheezing, rhonchi or rales  Abdominal:      General: Abdomen is flat  There is no distension  Palpations: There is no mass  Tenderness: There is no right CVA tenderness, left CVA tenderness, guarding or rebound  Musculoskeletal:         General: No swelling, tenderness, deformity or signs of injury  Right lower leg: No edema  Left lower leg: No edema  Skin:     Capillary Refill: Capillary refill takes less than 2 seconds  Coloration: Skin is pale  Skin is not jaundiced  Findings: No bruising, erythema, lesion or rash  Neurological:      General: No focal deficit present  Mental Status: He is alert  Cranial Nerves: No cranial nerve deficit  Sensory: No sensory deficit  Motor: No weakness        Coordination: Coordination normal       Gait: Gait normal       Deep Tendon Reflexes: Reflexes normal    Psychiatric:         Mood and Affect: Mood normal           Additional Data:     Labs:  Results from last 7 days   Lab Units 09/17/21  0654   WBC Thousand/uL 9 71   HEMOGLOBIN g/dL 6 7*   HEMATOCRIT % 20 8*   PLATELETS Thousands/uL 207   NEUTROS PCT % 86*   LYMPHS PCT % 5*   MONOS PCT % 7   EOS PCT % 1     Results from last 7 days   Lab Units 09/17/21  0654 09/15/21  1648   SODIUM mmol/L 128* 126*   POTASSIUM mmol/L 3 3* 3 6   CHLORIDE mmol/L 98* 95*   CO2 mmol/L 24 24   BUN mg/dL 15 20   CREATININE mg/dL 1 10 1 40*   ANION GAP mmol/L 6 7   CALCIUM mg/dL 6 9* 7 6*   ALBUMIN g/dL  --  2 5*   TOTAL BILIRUBIN mg/dL  --  0 83   ALK PHOS U/L  --  638*   ALT U/L  --  16   AST U/L  --  91*   GLUCOSE RANDOM mg/dL 108 113     Results from last 7 days   Lab Units 09/15/21  1648   INR  1 50*             Results from last 7 days   Lab Units 09/16/21  0524 09/15/21  1648 09/15/21  1320   LACTIC ACID mmol/L  --   --  1 2   PROCALCITONIN ng/ml 0 93* 0 83*  --        Lines/Drains:  Invasive Devices     Peripheral Intravenous Line            Peripheral IV 09/15/21 Left Hand 2 days    Peripheral IV 09/15/21 Right Antecubital 2 days                  Telemetry:  Telemetry Orders (From admission, onward)             48 Hour Telemetry Monitoring  Continuous x 48 hours     Expiring   Question:  Reason for 48 Hour Telemetry  Answer:  Acute MI, chest pain - R/O MI, or unstable angina                 Telemetry Reviewed: Normal Sinus Rhythm  Indication for Continued Telemetry Use: PE           Imaging: Reviewed radiology reports from this admission including: NA      Recent Cultures (last 7 days):   Results from last 7 days   Lab Units 09/15/21  1330 09/15/21  1320   BLOOD CULTURE  No Growth at 24 hrs  No Growth at 24 hrs         Last 24 Hours Medication List:   Current Facility-Administered Medications   Medication Dose Route Frequency Provider Last Rate    acetaminophen  650 mg Oral Q6H PRN Fransisco Castro PA-C      aspirin  81 mg Oral Daily Robin Oconnor MD      cholecalciferol  500 Units Oral Daily Robin Oconnor MD      enoxaparin  1 mg/kg Subcutaneous Q12H [address] Robin Oconnor MD      gabapentin  300 mg Oral TID Robin Oconnor MD      melatonin  3 mg Oral HS Fransisco Castro PA-C      metoprolol tartrate  25 mg Oral Q12H [address] Robin Oconnor MD      morphine injection  2 mg Intravenous Q4H PRN Andreina Samuels MD      ondansetron  4 mg Intravenous Q6H PRN Robin Oconnor MD      oxyCODONE  10 mg Oral Q4H PRN Robin Oconnor MD      oxyCODONE  5 mg Oral Q4H PRN Robin Oconnor MD      pantoprazole  40 mg Oral Early Morning Robin Oconnor MD      pravastatin  40 mg Oral Daily With MD Tessie Dietrich senna  2 tablet Oral Daily Justin Gillespie MD      sodium chloride  75 mL/hr Intravenous Continuous Lambert Nielsen MD      valACYclovir  1,000 mg Oral BID Justin Gillespie MD       Facility-Administered Medications Ordered in Other Encounters   Medication Dose Route Frequency Provider Last Rate    sodium chloride  125 mL/hr Intravenous Continuous Loi Webb MD          Today, Patient Was Seen By: Lambert Nielsen MD    **Please Note: This note may have been constructed using a voice recognition system  **

## 2021-09-17 NOTE — PLAN OF CARE
Problem: Potential for Falls  Goal: Patient will remain free of falls  Description: INTERVENTIONS:  - Educate patient/family on patient safety including physical limitations  - Instruct patient to call for assistance with activity   - Consult OT/PT to assist with strengthening/mobility   - Keep Call bell within reach  - Keep bed low and locked with side rails adjusted as appropriate  - Keep care items and personal belongings within reach  - Initiate and maintain comfort rounds  - Make Fall Risk Sign visible to staff  - Apply yellow socks and bracelet for high fall risk patients  - Consider moving patient to room near nurses station  Outcome: Progressing     Problem: CARDIOVASCULAR - ADULT  Goal: Maintains optimal cardiac output and hemodynamic stability  Description: INTERVENTIONS:  - Monitor I/O, vital signs and rhythm  - Monitor for S/S and trends of decreased cardiac output  - Administer and titrate ordered vasoactive medications to optimize hemodynamic stability  - Assess quality of pulses, skin color and temperature  - Assess for signs of decreased coronary artery perfusion  - Instruct patient to report change in severity of symptoms  Outcome: Progressing  Goal: Absence of cardiac dysrhythmias or at baseline rhythm  Description: INTERVENTIONS:  - Continuous cardiac monitoring, vital signs, obtain 12 lead EKG if ordered  - Administer antiarrhythmic and heart rate control medications as ordered  - Monitor electrolytes and administer replacement therapy as ordered  Outcome: Progressing     Problem: RESPIRATORY - ADULT  Goal: Achieves optimal ventilation and oxygenation  Description: INTERVENTIONS:  - Assess for changes in respiratory status  - Assess for changes in mentation and behavior  - Position to facilitate oxygenation and minimize respiratory effort  - Oxygen administered by appropriate delivery if ordered  - Initiate smoking cessation education as indicated  - Encourage broncho-pulmonary hygiene including cough, deep breathe, Incentive Spirometry  - Assess the need for suctioning and aspirate as needed  - Assess and instruct to report SOB or any respiratory difficulty  - Respiratory Therapy support as indicated  Outcome: Progressing     Problem: METABOLIC, FLUID AND ELECTROLYTES - ADULT  Goal: Electrolytes maintained within normal limits  Description: INTERVENTIONS:  - Monitor labs and assess patient for signs and symptoms of electrolyte imbalances  - Administer electrolyte replacement as ordered  - Monitor response to electrolyte replacements, including repeat lab results as appropriate  - Instruct patient on fluid and nutrition as appropriate  Outcome: Progressing  Goal: Fluid balance maintained  Description: INTERVENTIONS:  - Monitor labs   - Monitor I/O and WT  - Instruct patient on fluid and nutrition as appropriate  - Assess for signs & symptoms of volume excess or deficit  Outcome: Progressing     Problem: SKIN/TISSUE INTEGRITY - ADULT  Goal: Skin Integrity remains intact(Skin Breakdown Prevention)  Description: Assess:  -Assess extremities for adequate circulation and sensation     Bed Management:  -Have minimal linens on bed & keep smooth, unwrinkled  -Change linens as needed when moist or perspiring    Toileting:  -Offer bedside commode    Skin Care:  -Avoid use of baby powder, tape, friction and shearing, hot water or constrictive clothing  -Do not massage red bony areas    Outcome: Progressing     Problem: MUSCULOSKELETAL - ADULT  Goal: Maintain or return mobility to safest level of function  Description: INTERVENTIONS:  - Assess patient's ability to carry out ADLs; assess patient's baseline for ADL function and identify physical deficits which impact ability to perform ADLs (bathing, care of mouth/teeth, toileting, grooming, dressing, etc )  - Assess/evaluate cause of self-care deficits   - Assess range of motion  - Assess patient's mobility  - Assess patient's need for assistive devices and provide as appropriate  - Encourage maximum independence but intervene and supervise when necessary  - Involve family in performance of ADLs  - Assess for home care needs following discharge   - Consider OT consult to assist with ADL evaluation and planning for discharge  - Provide patient education as appropriate  Outcome: Progressing  Goal: Maintain proper alignment of affected body part  Description: INTERVENTIONS:  - Support, maintain and protect limb and body alignment  - Provide patient/ family with appropriate education  Outcome: Progressing     Problem: PAIN - ADULT  Goal: Verbalizes/displays adequate comfort level or baseline comfort level  Description: Interventions:  - Encourage patient to monitor pain and request assistance  - Assess pain using appropriate pain scale  - Administer analgesics based on type and severity of pain and evaluate response  - Implement non-pharmacological measures as appropriate and evaluate response  - Consider cultural and social influences on pain and pain management  - Notify physician/advanced practitioner if interventions unsuccessful or patient reports new pain  Outcome: Progressing     Problem: INFECTION - ADULT  Goal: Absence or prevention of progression during hospitalization  Description: INTERVENTIONS:  - Assess and monitor for signs and symptoms of infection  - Monitor lab/diagnostic results  - Monitor all insertion sites, i e  indwelling lines, tubes, and drains  - Monitor endotracheal if appropriate and nasal secretions for changes in amount and color  - Harold appropriate cooling/warming therapies per order  - Administer medications as ordered  - Instruct and encourage patient and family to use good hand hygiene technique  - Identify and instruct in appropriate isolation precautions for identified infection/condition  Outcome: Progressing  Goal: Absence of fever/infection during neutropenic period  Description: INTERVENTIONS:  - Monitor WBC    Outcome: Progressing     Problem: SAFETY ADULT  Goal: Patient will remain free of falls  Description: INTERVENTIONS:  - Educate patient/family on patient safety including physical limitations  - Instruct patient to call for assistance with activity   - Consult OT/PT to assist with strengthening/mobility   - Keep Call bell within reach  - Keep bed low and locked with side rails adjusted as appropriate  - Keep care items and personal belongings within reach  - Initiate and maintain comfort rounds  - Make Fall Risk Sign visible to staff  - Apply yellow socks and bracelet for high fall risk patients  - Consider moving patient to room near nurses station  Outcome: Progressing  Goal: Maintain or return to baseline ADL function  Description: INTERVENTIONS:  -  Assess patient's ability to carry out ADLs; assess patient's baseline for ADL function and identify physical deficits which impact ability to perform ADLs (bathing, care of mouth/teeth, toileting, grooming, dressing, etc )  - Assess/evaluate cause of self-care deficits   - Assess range of motion  - Assess patient's mobility; develop plan if impaired  - Assess patient's need for assistive devices and provide as appropriate  - Encourage maximum independence but intervene and supervise when necessary  - Involve family in performance of ADLs  - Assess for home care needs following discharge   - Consider OT consult to assist with ADL evaluation and planning for discharge  - Provide patient education as appropriate  Outcome: Progressing  Goal: Maintains/Returns to pre admission functional level  Description: INTERVENTIONS:  - Perform BMAT or MOVE assessment daily    - Set and communicate daily mobility goal to care team and patient/family/caregiver     - Collaborate with rehabilitation services on mobility goals if consulted  - Out of bed for toileting  - Record patient progress and toleration of activity level   Outcome: Progressing     Problem: DISCHARGE PLANNING  Goal: Discharge to home or other facility with appropriate resources  Description: INTERVENTIONS:  - Identify barriers to discharge w/patient and caregiver  - Arrange for needed discharge resources and transportation as appropriate  - Identify discharge learning needs (meds, wound care, etc )  - Arrange for interpretive services to assist at discharge as needed  - Refer to Case Management Department for coordinating discharge planning if the patient needs post-hospital services based on physician/advanced practitioner order or complex needs related to functional status, cognitive ability, or social support system  Outcome: Progressing     Problem: Knowledge Deficit  Goal: Patient/family/caregiver demonstrates understanding of disease process, treatment plan, medications, and discharge instructions  Description: Complete learning assessment and assess knowledge base    Interventions:  - Provide teaching at level of understanding  - Provide teaching via preferred learning methods  Outcome: Progressing     Problem: MOBILITY - ADULT  Goal: Maintain or return to baseline ADL function  Description: INTERVENTIONS:  -  Assess patient's ability to carry out ADLs; assess patient's baseline for ADL function and identify physical deficits which impact ability to perform ADLs (bathing, care of mouth/teeth, toileting, grooming, dressing, etc )  - Assess/evaluate cause of self-care deficits   - Assess range of motion  - Assess patient's mobility; develop plan if impaired  - Assess patient's need for assistive devices and provide as appropriate  - Encourage maximum independence but intervene and supervise when necessary  - Involve family in performance of ADLs  - Assess for home care needs following discharge   - Consider OT consult to assist with ADL evaluation and planning for discharge  - Provide patient education as appropriate  Outcome: Progressing  Goal: Maintains/Returns to pre admission functional level  Description: INTERVENTIONS:  - Perform BMAT or MOVE assessment daily    - Set and communicate daily mobility goal to care team and patient/family/caregiver     - Collaborate with rehabilitation services on mobility goals if consulted  - Out of bed for toileting  - Record patient progress and toleration of activity level   Outcome: Progressing

## 2021-09-17 NOTE — ASSESSMENT & PLAN NOTE
Multifactorial ? GI loss MALT and steroid use/marrow suppression/therapy/cancer  Transfuse 1 unit now     Continue to trend

## 2021-09-17 NOTE — ASSESSMENT & PLAN NOTE
Has been having pain to his bilateral hips, radiates down his left knee  Also having pain around the upper shoulders, which has improved  It is chronic, started last year  Has received radiation to the sacrum previously, which has helped his pain in the past    Seems to be chronic in nature, will treat pt symptomatically  Takes oxycodone at home  Will start 10 mg oxycontin BID and prn oxycodone additionally  Will also consult palliative  zofran only for antiemetics for this patient   Would avoid dexamethasone in the setting of ?GIB

## 2021-09-17 NOTE — CONSULTS
PULMONOLOGY CONSULT NOTE     Name: Kei Rodriguez   Age & Sex: 63 y o  male   MRN: [id number]  Unit/Bed#: [facility] 711-01   Encounter: [id number]        Reason for consultation: Pulmonary embolus    Requesting physician: Lambert Nielsen MD    Assessment:   Bilateral PE  Prostate cancer  Bone metastasis  Anemia  ? GI Bleed  MALT Lymphoma    Plan:  Would [facility] with [facility] [place] [facility] [address] for now  Originally presented for work up for anemia and was in the the GI suite about to undergo EGD when he ultimately was discovered to have his PE  He has not had any episodes of hematochezia or melena  At this point in time would consider completing work up regarding possible GI bleed  Do not think he needs further thrombolysis or thrombectomy at this time  Transition to oral as able  Will need life long anticoagulation  History of Present Illness   HPI:  Kei Rodriguez is a 63 y o  male who presented to [facility] after having chest pain, shortness of breath  and feverish  He was found to be febrile, tachycardic and hypoxic at the EGD suite where he was scheduled to undergo a EGD to evaluate anemia  Subsequently sent to the ED where he received a CT chest angio and found to have bilateral PE  He was started on anticoagulation and admitted to the floors  He was being transitioned to oral anticoagulation when ECHO was performed with dilated RV  Pulmonology was consulted for pulmonary embolism  Review of systems:  12 point review of systems was completed and was otherwise negative except as listed in HPI        Historical Information   Past Medical History:   Diagnosis Date    Cancer [facility] [address] )     Stomach    Hypertension     Lymphoma ([facility] [address] ) 05/2018    Non Hodgkin's lymphoma ([facility] [address] )     Prostate cancer ([facility] [address] ) 2020    Shingles     Thoracic aortic aneurysm [facility])      Past Surgical History:   Procedure Laterality Date    APPENDECTOMY      COLONOSCOPY      HERNIA REPAIR      B/L hernia repair    KNEE ARTHROSCOPY      KNEE ARTHROSCOPY, MEDIAL PATELLO FEMORAL LIGAMENT REPAIR Left     LINEAR ENDOSCOPIC U/S N/A 6/13/2018    Procedure: LINEAR ENDOSCOPIC U/S;  Surgeon: Jannette Matthew MD;  [facility]; Service: Gastroenterology    NM BIOPSY/EXCISION, LYMPH NODE(S) Left 6/24/2020    Procedure: EXCISION BIOPSY LYMPH NODE SUPRACLAVICULAR;  Surgeon: Marcia Adkins MD;  Location:  MAIN OR;  Service: General     Family History   Problem Relation Age of Onset   [facility] Melanoma Mother     Prostate cancer Father     Lung cancer Father     Breast cancer Sister     Pancreatic cancer Paternal Grandfather     Hodgkin's lymphoma Sister        Occupational History: diability    Social History:   Social History     Socioeconomic History    Marital status: /Civil Union     Spouse name: Not on file    Number of children: Not on file    Years of education: Not on file    Highest education level: Not on file   Occupational History    Not on file   Tobacco Use    Smoking status: Never Smoker    Smokeless tobacco: Never Used   Vaping Use    Vaping Use: Never used   Substance and Sexual Activity    Alcohol use: Not Currently    Drug use: No    Sexual activity: Yes   Other Topics Concern    Not on file   Social History Narrative    Not on file     Social Determinants of Health     Financial Resource Strain:     Difficulty of Paying Living Expenses:    Food Insecurity:     Worried About Running Out of Food in the Last Year:     [address] in the Last Year:    Transportation Needs:     Lack of Transportation (Medical):      Lack of Transportation (Non-Medical):    Physical Activity:     Days of Exercise per Week:     Minutes of Exercise per Session:    Stress:     Feeling of Stress :    Social Connections:     Frequency of Communication with Friends and Family:     Frequency of Social Gatherings with Friends and Family:     Attends Mandaen Services:     Active Member of Clubs or Organizations:     Attends Club or Organization Meetings:     Marital Status:    Intimate Partner Violence:     Fear of Current or Ex-Partner:     Emotionally Abused:     Physically Abused:     Sexually Abused:          Meds/Allergies   Current Facility-Administered Medications   Medication Dose Route Frequency    acetaminophen (TYLENOL) tablet 650 mg  650 mg Oral Q6H PRN    aspirin (ECOTRIN LOW STRENGTH) EC tablet 81 mg  81 mg Oral Daily    cholecalciferol (VITAMIN D3) tablet 500 Units  500 Units Oral Daily    enoxaparin (LOVENOX) subcutaneous injection 100 mg  1 mg/kg Subcutaneous Q12H [address]    gabapentin (NEURONTIN) capsule 300 mg  300 mg Oral TID    melatonin tablet 3 mg  3 mg Oral HS    metoprolol tartrate (LOPRESSOR) tablet 25 mg  25 mg Oral Q12H KATHARINA    morphine injection 2 mg  2 mg Intravenous Q4H PRN    ondansetron (ZOFRAN) injection 4 mg  4 mg Intravenous Q6H PRN    oxyCODONE (ROXICODONE) immediate release tablet 10 mg  10 mg Oral Q4H PRN    oxyCODONE (ROXICODONE) IR tablet 5 mg  5 mg Oral Q4H PRN    pantoprazole (PROTONIX) EC tablet 40 mg  40 mg Oral Early Morning    pravastatin (PRAVACHOL) tablet 40 mg  40 mg Oral Daily With Dinner    senna (SENOKOT) tablet 17 2 mg  2 tablet Oral Daily    sodium chloride 0 9 % infusion  75 mL/hr Intravenous Continuous    valACYclovir (VALTREX) tablet 1,000 mg  1,000 mg Oral BID     Facility-Administered Medications Ordered in Other Encounters   Medication Dose Route Frequency    sodium chloride 0 9 % infusion  125 mL/hr Intravenous Continuous     Medications Prior to Admission   Medication    aspirin (ECOTRIN LOW STRENGTH) 81 mg EC tablet    cholecalciferol (VITAMIN D3) 1,000 units tablet    gabapentin (NEURONTIN) 300 mg capsule    metoprolol tartrate (LOPRESSOR) 25 mg tablet    Nutritional Supplements (PRO-RAJINDER PLUS PO)    OXYCODONE ER PO    OXYCODONE HCL PO    oxyCODONE-acetaminophen (PERCOCET) 5-325 mg per tablet    pantoprazole (PROTONIX) 40 mg tablet    RESTASIS 0 05 % ophthalmic emulsion    rosuvastatin (CRESTOR) 5 mg tablet    valACYclovir (VALTREX) 1,000 mg tablet     No Known Allergies    Vitals: Blood pressure 100/66, pulse 98, temperature 98 °F (36 7 °C), resp  rate 16, height 5' 10" (1 778 m), weight 102 kg (224 lb 6 9 oz), SpO2 93 % , RA, Body mass index is 32 2 kg/m²  Intake/Output Summary (Last 24 hours) at 9/17/2021 1524  Last data filed at 9/17/2021 0119  Gross per 24 hour   Intake 2886 67 ml   Output 300 ml   Net 2586 67 ml       Physical Exam  Vitals and nursing note reviewed  Constitutional:       General: He is not in acute distress  Appearance: Normal appearance  He is well-developed and normal weight  He is not ill-appearing or toxic-appearing  HENT:      Head: Normocephalic and atraumatic  Right Ear: External ear normal       Left Ear: External ear normal       Nose: Nose normal  No congestion or rhinorrhea  Mouth/Throat:      Mouth: Mucous membranes are moist       Pharynx: Oropharynx is clear  Eyes:      General: No scleral icterus  Extraocular Movements: Extraocular movements intact  Conjunctiva/sclera: Conjunctivae normal       Pupils: Pupils are equal, round, and reactive to light  Cardiovascular:      Rate and Rhythm: Normal rate and regular rhythm  Pulses: Normal pulses  Heart sounds: Normal heart sounds  No murmur heard  No friction rub  No gallop  Pulmonary:      Effort: Pulmonary effort is normal  No respiratory distress  Breath sounds: Normal breath sounds  No wheezing, rhonchi or rales  Abdominal:      General: Abdomen is flat  Bowel sounds are normal  There is no distension  Palpations: Abdomen is soft  There is no mass  Tenderness: There is no abdominal tenderness  There is no guarding or rebound  Musculoskeletal:         General: No swelling or tenderness  Normal range of motion  Cervical back: Normal range of motion and neck supple  No tenderness        Right lower leg: No edema  Left lower leg: No edema  Skin:     General: Skin is warm and dry  Findings: No lesion or rash  Neurological:      General: No focal deficit present  Mental Status: He is alert and oriented to person, place, and time  Mental status is at baseline  Cranial Nerves: No cranial nerve deficit  Sensory: No sensory deficit  Motor: No weakness  Labs: I have personally reviewed pertinent lab results  Laboratory and Diagnostics  Results from last 7 days   Lab Units 09/17/21  0654 09/16/21 2127 09/16/21  0525 09/15/21  2030 09/15/21  1648   WBC Thousand/uL 9 71 11 41* 14 00*  --  12 34*   HEMOGLOBIN g/dL 6 7* 7 2* 7 6*  --  7 8*   HEMATOCRIT % 20 8* 22 4* 23 7*  --  24 5*   PLATELETS Thousands/uL 207 223 220 195 202   NEUTROS PCT % 86*  --   --   --  83*   MONOS PCT % 7  --   --   --  10     Results from last 7 days   Lab Units 09/17/21  0654 09/15/21  1648   SODIUM mmol/L 128* 126*   POTASSIUM mmol/L 3 3* 3 6   CHLORIDE mmol/L 98* 95*   CO2 mmol/L 24 24   ANION GAP mmol/L 6 7   BUN mg/dL 15 20   CREATININE mg/dL 1 10 1 40*   CALCIUM mg/dL 6 9* 7 6*   GLUCOSE RANDOM mg/dL 108 113   ALT U/L  --  16   AST U/L  --  91*   ALK PHOS U/L  --  638*   ALBUMIN g/dL  --  2 5*   TOTAL BILIRUBIN mg/dL  --  0 83          Results from last 7 days   Lab Units 09/15/21  1648   INR  1 50*   PTT seconds 35      Results from last 7 days   Lab Units 09/17/21  0102 09/16/21  2127 09/15/21  1648   TROPONIN I ng/mL <0 02 0 02 0 07*     Results from last 7 days   Lab Units 09/15/21  1320   LACTIC ACID mmol/L 1 2         Results from last 7 days   Lab Units 09/15/21  1648   LD U/L 1,494*             Results from last 7 days   Lab Units 09/16/21  0524 09/15/21  1648   PROCALCITONIN ng/ml 0 93* 0 83*       Imaging and other studies: I have personally reviewed pertinent reports     and I have personally reviewed pertinent films in PACS    Pulmonary function testing: none immediately available    EKG, Pathology, and Other Studies: I have personally reviewed pertinent reports  Code Status: Level 1 - Full Code    VTE Pharmacologic Prophylaxis: Enoxaparin (Lovenox)  VTE Mechanical Prophylaxis: sequential compression device    Disclaimer: Portions of the record may have been created with voice recognition software  Occasional wrong word or "sound a like" substitutions may have occurred due to the inherent limitations of voice recognition software  Careful consideration should be taken to recognize, using context, where substitutions have occurred      Madhav Holbrook DO   Pulmonary/Critical Care Fellowship PGY-IV  [facility]

## 2021-09-17 NOTE — ASSESSMENT & PLAN NOTE
I had a lengthy discussion on speaker phone with patients primary oncology team (Jeffrey Christopher)  Primary oncology team have already cost checked arixtra and this is affordable  They would also prefer this AC in the setting of active cancer  Also anemia may be multifactorial but they agree with transfusion at this time given chest pain and possible blood loss on AC  Patient consented for one unit PRBC

## 2021-09-17 NOTE — ASSESSMENT & PLAN NOTE
Hx of MALT lymphoma of the stomach initially 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, H  Pylori +, status post triple antibiotic course at that time, who has been followed with surveillance EGD, and found to have progressive disease with second gastric lesion  Was due for an EGD yesterday, however was cancelled secondary to pt's fever, hypoxemia, and tachycardia  GI eval now given anemia

## 2021-09-17 NOTE — CONSULTS
Consultation - Nephrology   John Logan 63 y o  male MRN: [id number]  Unit/Bed#: [facility] 711-01 Encounter: [id number]    ASSESSMENT AND PLAN:  Patient is 68-year-old male with significant medical issues of non-Hodgkin's lymphoma, CLL, metastatic prostate cancer with metastasis to bone, lymph nodes, presented with fever, hypoxia  We are consulted for hyponatremia, RANDI management  RANDI POA, baseline creatinine 0 9 in 2020  -initial creatinine 1 4 on admission seems to have improved to 1 1 with IV fluid  -RANDI suspect with component of prerenal, REINALDO (status post IV contrast on 9/14/21), component of ATN, component of obstructive nephropathy, severe anemia  -status post IV contrast with CT scan on 9/15/21 again,   -BMP in a m   -UA shows 1+ proteinuria, 2 to 4 RBCs, WBCs  Several granular cast  -CT scan shows left hydroureteronephrosis similar to prior CT  Left-sided hydroureteronephrosis suspect secondary to pelvic metastatic disease  -fortunately creatinine has improved 1 1  Consider urology evaluation  Hyponatremia  -serum sodium 126 on admission slightly improved to 128 with ongoing IV fluid  -check initial evaluation with urine sodium, urine osmolality, check serum osmolality, serum uric acid  -currently on IV normal saline  75 mL/hour   -repeat sodium level now  -if sodium dropping further, will discontinue fluid   -suspect component of hypovolemia, concern for SIADH given underlying malignancy  -strict fluid restriction 1 2 L per day    Hypokalemia, serum potassium 3 3 below goal  -will give 40 mEq potassium chloride once today    Severe anemia, transfuse p r n  For hemoglobin less than seven  PE, management as per Pulmonary, primary team  Lymphoma, metastatic prostate cancer with bone metastasis, CT scan with worsening metastatic findings  Further management as per Hematology    Initial concern for infection, now remains off antibiotic, id follow-up    Earlier fever, leukocytosis suspected secondary to PE, malignancy    Discussed above plan in detail with primary team    HISTORY OF PRESENT ILLNESS:  Requesting Physician: Yessenia Chaparro MD  Reason for Consult:  RANDI, hyponatremia    Sage Mukherjee is a 63y o  year old male who was admitted to [place] [address] after presenting with fever, hypoxia  A renal consultation is requested today for assistance in the management of RANDI, hyponatremia  Old medical records including prior creatinine values were reviewed from [facility]  Patient originally presented to endoscopy when he was found to have hypoxia, fever and was sent to the hospital for further evaluation  He was found to have worsening metastatic disease on CT scan  Also was found to have PE  Head CT scan with IV contrast upon admission  His creatinine was elevated with seems to have improved  Also found to have severe hyponatremia  At the time of my encounter he denies any nausea, vomiting  Does have underlying pain issues  Denies any headache currently  PAST MEDICAL HISTORY:  Past Medical History:   Diagnosis Date    Cancer [facility])     Stomach    Hypertension     Lymphoma ([facility] [address] ) 05/2018    Non Hodgkin's lymphoma ([facility] [address] )     Prostate cancer ([facility] [address] ) 2020    Shingles     Thoracic aortic aneurysm ([facility] [address] )        PAST SURGICAL HISTORY:  Past Surgical History:   Procedure Laterality Date    APPENDECTOMY      COLONOSCOPY      HERNIA REPAIR      B/L hernia repair    KNEE ARTHROSCOPY      KNEE ARTHROSCOPY, MEDIAL PATELLO FEMORAL LIGAMENT REPAIR Left     LINEAR ENDOSCOPIC U/S N/A 6/13/2018    Procedure: LINEAR ENDOSCOPIC U/S;  Surgeon: Bonita Velasquez MD;  Location: [facility];   Service: Gastroenterology    KS BIOPSY/EXCISION, LYMPH NODE(S) Left 6/24/2020    Procedure: EXCISION BIOPSY LYMPH NODE SUPRACLAVICULAR;  Surgeon: Kee Benitez MD;  Location: [facility];  Service: General       ALLERGIES:  No Known Allergies    SOCIAL HISTORY:  Social History     Substance and Sexual Activity   Alcohol Use Not Currently     Social History     Substance and Sexual Activity   Drug Use No     Social History     Tobacco Use   Smoking Status Never Smoker   Smokeless Tobacco Never Used       FAMILY HISTORY:  Family History   Problem Relation Age of Onset    Melanoma Mother     Prostate cancer Father     Lung cancer Father     Breast cancer Sister     Pancreatic cancer Paternal Grandfather     Hodgkin's lymphoma Sister        MEDICATIONS:    Current Facility-Administered Medications:     acetaminophen (TYLENOL) tablet 650 mg, 650 mg, Oral, Q6H PRN, Rosa Swartz PA-C, 650 mg at 09/17/21 0515    aspirin (ECOTRIN LOW STRENGTH) EC tablet 81 mg, 81 mg, Oral, Daily, Ethan Nieto MD, 81 mg at 09/17/21 0904    cholecalciferol (VITAMIN D3) tablet 500 Units, 500 Units, Oral, Daily, Ethan Nieto MD    enoxaparin (LOVENOX) subcutaneous injection 100 mg, 1 mg/kg, Subcutaneous, Q12H [address], Ethan Nieto MD, 100 mg at 09/17/21 0904    gabapentin (NEURONTIN) capsule 300 mg, 300 mg, Oral, TID, Ethan Nieto MD, 300 mg at 09/17/21 0904    melatonin tablet 3 mg, 3 mg, Oral, HS, Shari Segal PA-C, 3 mg at 09/16/21 2238    metoprolol tartrate (LOPRESSOR) tablet 25 mg, 25 mg, Oral, Q12H [address], Ethan Nieto MD, 25 mg at 09/16/21 2059    morphine injection 2 mg, 2 mg, Intravenous, Q4H PRN, Tonia Woods MD, 2 mg at 09/16/21 2239    ondansetron (ZOFRAN) injection 4 mg, 4 mg, Intravenous, Q6H PRN, Ethan Nieto MD    oxyCODONE (OxyCONTIN) 12 hr tablet 10 mg, 10 mg, Oral, Q12H [address], Josue Phillips MD    oxyCODONE (ROXICODONE) immediate release tablet 10 mg, 10 mg, Oral, Q4H PRN, Ethan Nieto MD    pantoprazole (PROTONIX) EC tablet 40 mg, 40 mg, Oral, Early Morning, Ethan Nieto MD, 40 mg at 09/17/21 0515    pravastatin (PRAVACHOL) tablet 40 mg, 40 mg, Oral, Daily With Susan Echavarria MD    [facility]) tablet 17 2 mg, 2 tablet, Oral, Daily, Ethan Nieto MD, 17 2 mg at 09/16/21 4897   sodium chloride 0 9 % infusion, 75 mL/hr, Intravenous, Continuous, Sunitha Nick MD, Last Rate: 75 mL/hr at 09/17/21 1519, 75 mL/hr at 09/17/21 1519    valACYclovir (VALTREX) tablet 1,000 mg, 1,000 mg, Oral, BID, Rachelle Walker MD, 1,000 mg at 09/17/21 1034    Facility-Administered Medications Ordered in Other Encounters:     sodium chloride 0 9 % infusion, 125 mL/hr, Intravenous, Continuous, Heike Allison MD    REVIEW OF SYSTEMS:  Complete 10 point review of systems were obtained and discussed in length with the patient  Complete review of systems were negative / unremarkable except mentioned in the HPI section       PHYSICAL EXAM:  Current Weight: Weight - Scale: 102 kg (224 lb 6 9 oz)  First Weight: Weight - Scale: 102 kg (224 lb 6 9 oz)  Vitals:    09/17/21 1603   BP: 109/68   Pulse:    Resp: 19   Temp: 99 6 °F (37 6 °C)   SpO2:        Intake/Output Summary (Last 24 hours) at 9/17/2021 1628  Last data filed at 9/17/2021 0119  Gross per 24 hour   Intake 2886 67 ml   Output 300 ml   Net 2586 67 ml     Wt Readings from Last 3 Encounters:   09/15/21 102 kg (224 lb 6 9 oz)   06/15/21 102 kg (223 lb 12 8 oz)   07/07/20 104 kg (230 lb)     Temp Readings from Last 3 Encounters:   09/17/21 99 6 °F (37 6 °C)   09/15/21 (!) 101 7 °F (38 7 °C) (Tympanic)   06/15/21 (!) 96 9 °F (36 1 °C) (Temporal)     BP Readings from Last 3 Encounters:   09/17/21 109/68   09/15/21 109/59   06/15/21 116/62     Pulse Readings from Last 3 Encounters:   09/17/21 98   09/15/21 100   06/15/21 83        Physical Examination:  General:  Lying in bed, no acute distress  Eyes:  Mild conjunctival pallor present  ENT:  External examination of ears and nose unremarkable  Neck:  No obvious lymphadenopathy appreciated  Respiratory:  Bilateral air entry present  CVS:  S1, S2 present  GI:  Soft, nontender, nondistended  CNS:  Active alert oriented x3  Extremities:  No significant edema in legs  Psych:  Conscious, coherent, oriented  Skin:  No new rash in legs    Invasive Devices:      Lab Results:   Results from last 7 days   Lab Units 09/17/21  0654 09/16/21 2127 09/16/21  0525 09/15/21  2030 09/15/21  1648   WBC Thousand/uL 9 71 11 41* 14 00*  --  12 34*   HEMOGLOBIN g/dL 6 7* 7 2* 7 6*  --  7 8*   HEMATOCRIT % 20 8* 22 4* 23 7*  --  24 5*   PLATELETS Thousands/uL 207 223 220 195 202   POTASSIUM mmol/L 3 3*  --   --   --  3 6   CHLORIDE mmol/L 98*  --   --   --  95*   CO2 mmol/L 24  --   --   --  24   BUN mg/dL 15  --   --   --  20   CREATININE mg/dL 1 10  --   --   --  1 40*   CALCIUM mg/dL 6 9*  --   --   --  7 6*       Other Studies:   CTA ED chest PE Study   Final Result by Roshan Escalante MD (09/15 2101)      Right middle lobe, lingular, and left upper lobe segmental pulmonary emboli are noted  RV LV ratio is approximately 0 9, this is the cut off for RV dysfunction/right heart strain  Recommend echocardiography for further evaluation  Mediastinal and bilateral hilar, left greater than right, adenopathy is again noted         8 mm nodule in the right middle lobe; considering the history of neoplasm this raises the possibility of metastasis  Lingular opacity abutting the left heart has increased in size suggesting developing atelectasis or infiltrate  Linear atelectatic changes in the lung bases are again noted  I personally discussed this study with Dr Demetria Vega on 9/15/2021 at 9:01 PM                Workstation performed: [id number]         XR chest portable   Final Result by Raffi Savage MD (09/15 1621)      No acute cardiopulmonary disease  Workstation performed: [id number]         Chest x-ray images personally reviewed which shows clear lungs  Portions of the record may have been created with voice recognition software  Occasional wrong word or "sound a like" substitutions may have occurred due to the inherent limitations of voice recognition software   Read the chart carefully and recognize, using context, where substitutions have occurred

## 2021-09-17 NOTE — CASE MANAGEMENT
LOS: Day 2  Bundle: pt is not a bundle  Readmission risk: pt is not a 30 day readmit    CM reviewed role with pt and spouse Sophia Boyer ([phone]) at bedside  Pt reported that he and his wife live in a 2 story home with 1 BRIAN and 12-14 steps to the 2nd floor  Pt reported that he was IPTA with ADLs  Pt drives and is employed  Pt reported that he has a walker, cane, crutches and SC in the home  Pt denied hx of VNA and STR  PCP is Dr Ania Jimenez; pharmacy of choice is [facility] in [place]  No reported hx of MH or D&A  CM reviewed d/c planning process including the following: identifying help at home, patient preference for d/c planning needs, Discharge Lounge, [facility] Meds to Bed program, availability of treatment team to discuss questions or concerns patient and/or family may have regarding understanding medications and recognizing signs and symptoms once discharged  CM also encouraged patient to follow up with all recommended appointments after discharge  Patient advised of importance for patient and family to participate in managing patients medical well being

## 2021-09-17 NOTE — PLAN OF CARE
Problem: CARDIOVASCULAR - ADULT  Goal: Maintains optimal cardiac output and hemodynamic stability  Description: INTERVENTIONS:  - Monitor I/O, vital signs and rhythm  - Monitor for S/S and trends of decreased cardiac output  - Administer and titrate ordered vasoactive medications to optimize hemodynamic stability  - Assess quality of pulses, skin color and temperature  - Assess for signs of decreased coronary artery perfusion  - Instruct patient to report change in severity of symptoms  Outcome: Progressing  Goal: Absence of cardiac dysrhythmias or at baseline rhythm  Description: INTERVENTIONS:  - Continuous cardiac monitoring, vital signs, obtain 12 lead EKG if ordered  - Administer antiarrhythmic and heart rate control medications as ordered  - Monitor electrolytes and administer replacement therapy as ordered  Outcome: Progressing     Problem: RESPIRATORY - ADULT  Goal: Achieves optimal ventilation and oxygenation  Description: INTERVENTIONS:  - Assess for changes in respiratory status  - Assess for changes in mentation and behavior  - Position to facilitate oxygenation and minimize respiratory effort  - Oxygen administered by appropriate delivery if ordered  - Initiate smoking cessation education as indicated  - Encourage broncho-pulmonary hygiene including cough, deep breathe, Incentive Spirometry  - Assess the need for suctioning and aspirate as needed  - Assess and instruct to report SOB or any respiratory difficulty  - Respiratory Therapy support as indicated  Outcome: Progressing     Problem: METABOLIC, FLUID AND ELECTROLYTES - ADULT  Goal: Electrolytes maintained within normal limits  Description: INTERVENTIONS:  - Monitor labs and assess patient for signs and symptoms of electrolyte imbalances  - Administer electrolyte replacement as ordered  - Monitor response to electrolyte replacements, including repeat lab results as appropriate  - Instruct patient on fluid and nutrition as appropriate  Outcome: Progressing  Goal: Fluid balance maintained  Description: INTERVENTIONS:  - Monitor labs   - Monitor I/O and WT  - Instruct patient on fluid and nutrition as appropriate  - Assess for signs & symptoms of volume excess or deficit  Outcome: Progressing        Problem: MUSCULOSKELETAL - ADULT  Goal: Maintain or return mobility to safest level of function  Description: INTERVENTIONS:  - Assess patient's ability to carry out ADLs; assess patient's baseline for ADL function and identify physical deficits which impact ability to perform ADLs (bathing, care of mouth/teeth, toileting, grooming, dressing, etc )  - Assess/evaluate cause of self-care deficits   - Assess range of motion  - Assess patient's mobility  - Assess patient's need for assistive devices and provide as appropriate  - Encourage maximum independence but intervene and supervise when necessary  - Involve family in performance of ADLs  - Assess for home care needs following discharge   - Consider OT consult to assist with ADL evaluation and planning for discharge  - Provide patient education as appropriate  Outcome: Progressing  Goal: Maintain proper alignment of affected body part  Description: INTERVENTIONS:  - Support, maintain and protect limb and body alignment  - Provide patient/ family with appropriate education  Outcome: Progressing     Problem: PAIN - ADULT  Goal: Verbalizes/displays adequate comfort level or baseline comfort level  Description: Interventions:  - Encourage patient to monitor pain and request assistance  - Assess pain using appropriate pain scale  - Administer analgesics based on type and severity of pain and evaluate response  - Implement non-pharmacological measures as appropriate and evaluate response  - Consider cultural and social influences on pain and pain management  - Notify physician/advanced practitioner if interventions unsuccessful or patient reports new pain  Outcome: Progressing     Problem: INFECTION - ADULT  Goal: Absence or prevention of progression during hospitalization  Description: INTERVENTIONS:  - Assess and monitor for signs and symptoms of infection  - Monitor lab/diagnostic results  - Monitor all insertion sites, i e  indwelling lines, tubes, and drains  - Monitor endotracheal if appropriate and nasal secretions for changes in amount and color  - Holmen appropriate cooling/warming therapies per order  - Administer medications as ordered  - Instruct and encourage patient and family to use good hand hygiene technique  - Identify and instruct in appropriate isolation precautions for identified infection/condition  Outcome: Progressing  Goal: Absence of fever/infection during neutropenic period  Description: INTERVENTIONS:  - Monitor WBC    Outcome: Progressing     Problem: DISCHARGE PLANNING  Goal: Discharge to home or other facility with appropriate resources  Description: INTERVENTIONS:  - Identify barriers to discharge w/patient and caregiver  - Arrange for needed discharge resources and transportation as appropriate  - Identify discharge learning needs (meds, wound care, etc )  - Arrange for interpretive services to assist at discharge as needed  - Refer to Case Management Department for coordinating discharge planning if the patient needs post-hospital services based on physician/advanced practitioner order or complex needs related to functional status, cognitive ability, or social support system  Outcome: Progressing     Problem: Knowledge Deficit  Goal: Patient/family/caregiver demonstrates understanding of disease process, treatment plan, medications, and discharge instructions  Description: Complete learning assessment and assess knowledge base    Interventions:  - Provide teaching at level of understanding  - Provide teaching via preferred learning methods  Outcome: Progressing

## 2021-09-17 NOTE — ASSESSMENT & PLAN NOTE
Continues with CP  ? Related to PE  Troponin x 3 negative  Continue on tele  Dilated RV on Echo   Discussed with Pulmonology would appreciate their input regarding AC and follow up as well as ongoing CP

## 2021-09-17 NOTE — RESTORATIVE TECHNICIAN NOTE
Restorative Technician Note      Patient Name: Krysta Singh     Note Type: Mobility  Patient Position Upon Consult: Supine  Activity Performed: Ambulated;Dangled;Stood  Assistive Device: Roller walker  Education Provided: Yes (Educated/encouraged pt to ambulate with assistance 3-4 x's/day )  Patient Position at End of Consult: Supine; All needs within reach;Bed/Chair alarm activated    Aiden ESPARZA, Restorative Technician, [employer]

## 2021-09-18 NOTE — PROGRESS NOTES
Progress Note - Pulmonary   Laquetta Retort 63 y o  male MRN: [id number]  Unit/Bed#: [facility] 711-01 Encounter: [id number]      Assessment and Plan:    Bilateral PE likely secondary to malignancy   - incidental finding   - remains on room air, able to ambulate    - Would continue with [facility] with [address] for now- eventual transition to NOAC if no evidence of bleeding found and will need lifelong barring contraindications   - no signs of ongoing bleeding   - for EGD on Monday   -2D echo with grade 1 DD, EF 64%, RV overload   - will need repeat echo in 3 months to assess RV     Incidental 8mm pulmonary nodule   - will need surveillance given malignancy history   - 8 mm nodule in the right middle lobe    Mild to moderate pulmonary HTN  - PASP 50mmHg     Anemia  - s/p transfusion overnight     GI Bleed? MALT Lymphoma  Metastatic Prostate cancer to bone        We will follow again on Monday given hemodynamic stability and tolerating therapeutic dosing of Lovenox     Subjective:   Reports he felt much better last night after he received a transfusion  No SOB or chest pain  Review of Systems   All other systems reviewed and are negative  Objective:     Vitals:    09/18/21 0100 09/18/21 0145 09/18/21 0732 09/18/21 0929   BP: 113/72 117/67 113/68 134/78   BP Location: Right arm      Pulse: 85 88  89   Resp:  20     Temp: 97 7 °F (36 5 °C) (!) 97 1 °F (36 2 °C) 98 3 °F (36 8 °C)    TempSrc: Oral      SpO2:       Weight:       Height:               Intake/Output Summary (Last 24 hours) at 9/18/2021 1151  Last data filed at 9/18/2021 0901  Gross per 24 hour   Intake 350 ml   Output 650 ml   Net -300 ml         Physical Exam  Vitals reviewed  Constitutional:       Appearance: Normal appearance  Comments: Slightly pale-appearing    HENT:      Head: Normocephalic  Nose: Nose normal       Mouth/Throat:      Mouth: Mucous membranes are moist    Eyes:      Pupils: Pupils are equal, round, and reactive to light  Cardiovascular:      Rate and Rhythm: Normal rate and regular rhythm  Pulses: Normal pulses  Heart sounds: Normal heart sounds  Pulmonary:      Effort: Pulmonary effort is normal       Breath sounds: Normal breath sounds  Abdominal:      General: Abdomen is flat  Palpations: Abdomen is soft  Musculoskeletal:         General: Normal range of motion  Skin:     General: Skin is warm  Neurological:      General: No focal deficit present  Mental Status: He is alert and oriented to person, place, and time  Labs: I have personally reviewed pertinent lab results  Results from last 7 days   Lab Units 09/18/21  1009 09/17/21  1853 09/17/21  0654 09/15/21  2030 09/15/21  1648   WBC Thousand/uL 6 48 7 59 9 71   < > 12 34*   HEMOGLOBIN g/dL 7 9* 6 4* 6 7*   < > 7 8*   HEMATOCRIT % 24 3* 20 0* 20 8*   < > 24 5*   PLATELETS Thousands/uL 207 194 207  --  202   NEUTROS PCT % 82*  --  86*  --  83*   MONOS PCT % 5  --  7  --  10   MONO PCT %  --  1*  --   --   --     < > = values in this interval not displayed  Results from last 7 days   Lab Units 09/18/21  0503 09/17/21  0654 09/15/21  1648   POTASSIUM mmol/L 3 4* 3 3* 3 6   CHLORIDE mmol/L 103 98* 95*   CO2 mmol/L 24 24 24   BUN mg/dL 11 15 20   CREATININE mg/dL 0 82 1 10 1 40*   CALCIUM mg/dL 7 5* 6 9* 7 6*   ALK PHOS U/L 307*  --  638*   ALT U/L 14  --  16   AST U/L 29  --  91*              Results from last 7 days   Lab Units 09/15/21  1648   INR  1 50*   PTT seconds 35     Results from last 7 days   Lab Units 09/15/21  1320   LACTIC ACID mmol/L 1 2     0   Lab Value Date/Time    TROPONINI <0 02 09/17/2021 0102    TROPONINI 0 02 09/16/2021 2127    TROPONINI 0 07 (H) 09/15/2021 1648       Microbiology:  None     Imaging and other studies: I have personally reviewed pertinent reports  and I have personally reviewed pertinent films in PACS  XR chest portable    Result Date: 9/15/2021  Impression: No acute cardiopulmonary disease  Workstation performed: [id number]     CTA ED chest PE Study    Result Date: 9/15/2021  Impression: Right middle lobe, lingular, and left upper lobe segmental pulmonary emboli are noted  RV LV ratio is approximately 0 9, this is the cut off for RV dysfunction/right heart strain  Recommend echocardiography for further evaluation  Mediastinal and bilateral hilar, left greater than right, adenopathy is again noted    8 mm nodule in the right middle lobe; considering the history of neoplasm this raises the possibility of metastasis  Lingular opacity abutting the left heart has increased in size suggesting developing atelectasis or infiltrate  Linear atelectatic changes in the lung bases are again noted    I personally discussed this study with Dr Luis Enrique Solis on 9/15/2021 at 9:01 PM  Workstation performed: [id number]           Elizabeth Dunlap MD   Pulmonary & Critical Care Fellow  [facility]

## 2021-09-18 NOTE — CONSULTS
Consultation - [facility]) [facility]  Preet Rivas 63 y o  male MRN: [id number]  [facility] Encounter: [id number]              Inpatient consult to gastroenterology     Performed by  Beola Kehr, MD     Authorized by Raul Yang MD              Reason for Consult / Principal Problem:     Anemia       ASSESSMENT AND PLAN:    Thomas Mendoza a 63y o  year old male with history of MALT lymphoma of the stomach initially 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 status post radiation 4/2020, Stage IV metastatic carcinoma of the prostate and recent submassive PE   GI was being consulted for new diagnosis of anemia    Normocytic anemia  Patient's admission labs showed a hemoglobin of 7 8 ( 13 6 on 3/8/21)  It down trended to 6 4 and he received 1 unit packed RBCs on 09/17  Patient states that he has not noticed any hematemesis, hematochezia or melena at home or while in the hospital   He has not been symptomatic with this anemia  He does state that he has been using Advil multiple times a day over the last few weeks for pain control  Given patient's recent use of Advil he could have peptic ulcer disease  Differential diagnosis include diverticular bleeding or metastasis to the GI tract  - Patient has not had iron studies this admission  Unfortunately iron studies ordered after he had already received blood transfusion  - Last EGD was on 6/2020 which showed resolution of MALT lymphoma  - His last colonoscopy was on 8/2018  Two polyps were seen ( 1 cm in the cecum and 5 mm in the rectum rectum, biopsy is not available for review)  Repeat colonoscopy was recommended in 5 years  - hemolysis labs pending  - we would recommend EGD +/- EUS and colonoscopy for further evaluation of his new diagnosis of anemia    Patient would like Jhoana Man do his procedures, we will reach out to him and check his availability on Monday  - Will plan for bowel prep on Sunday evening for colonoscopy and hold Monday morning dose of therapeutic Lovenox for tentative procedures on Monday    Submassive PE  Patient had incidental finding of right middle lobe, lingular, and left upper lobe segmental pulmonary emboli  Echo showed RV dilation  He is currently being followed by pulmonology for his PEs    - Will need clearance before proceeding with procedures given recent PEs with possible RV strain  - He is currently on therapeutic Lovenox and will need lifelong anticoagulation given active malignancy    H pylori - eradicated     Patient was found to have H pylori in biopsies from May 2018 which likely was the cause of MALT lymphoma  Repeat biopsies from 2020 showed eradication  Patient seen and discussed with Dr Phil Bermudez MD   Gastroenterology Fellow   ______________________________________________________________________    HPI:    Chyna Jordan a 63y o  year old male with history of MALT lymphoma of the stomach initially 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 status post radiation 4/2020, Stage IV metastatic carcinoma of the prostate and recent submassive PE  Patient was diagnosed to have MALT lymphoma on EGD in May 2018  He was found to have H pylori which was eradicated  He received radiation therapy to the stomach in April 2020  Since then he has had chronic nausea  He was later found to have stage IV metastatic carcinoma (diagnosed in June 2020)  Patient's recent since CT chest abdomen pelvis showed numerous new hypoenhancing liver lesions, worsening mediastinal and retroperitoneal adenopathy, to left external iliac lymph nodes, enlargement of left adrenal metastasis and widespread osteoblastic metastasis  He was scheduled to have an EGD/EUS with Catie Elder on 09/15 for further evaluation of mediastinal adenopathy when he was found to have a fever of 101 8 and was desatting on room air    Evaluation in the ED showed some massive PE involving the right middle lobe, lingular, and left upper lobe segmental pulmonary emboli   He was subsequently started on therapeutic Lovenox for anticoagulation  Patient's admission labs showed a hemoglobin of 7 8 ( 13 6 on 3/8/21)  It down trended to 6 4 and he received 1 unit packed RBCs on 09/17  Patient states that he has not noticed any hematemesis, hematochezia or melena at home or while in the hospital   He has not been symptomatic with this anemia  He does state that he has been using Advil multiple times a day over the last few weeks for pain control  REVIEW OF SYSTEMS:    CONSTITUTIONAL: Denies any fever, chills, rigors  HEENT: No earache or tinnitus  Denies hearing loss or visual disturbances  CARDIOVASCULAR: No chest pain or palpitations  RESPIRATORY: Denies any cough, hemoptysis, shortness of breath or dyspnea on exertion  GASTROINTESTINAL: As noted in the History of Present Illness  GENITOURINARY: No problems with urination  Denies any hematuria or dysuria  NEUROLOGIC: No dizziness or vertigo, denies headaches  MUSCULOSKELETAL: Denies any muscle or joint pain  SKIN: Denies skin rashes or itching  ENDOCRINE: Denies excessive thirst  Denies intolerance to heat or cold  PSYCHOSOCIAL: Denies depression or anxiety  Denies any recent memory loss  Historical Information   Past Medical History:   Diagnosis Date    Cancer ([facility] )     Stomach    Hypertension     Lymphoma ([facility] ) 05/2018    Non Hodgkin's lymphoma ([facility] )     Prostate cancer ([facility] ) 2020    Shingles     Thoracic aortic aneurysm [facility])      Past Surgical History:   Procedure Laterality Date    APPENDECTOMY      COLONOSCOPY      HERNIA REPAIR      B/L hernia repair    KNEE ARTHROSCOPY      KNEE ARTHROSCOPY, MEDIAL PATELLO FEMORAL LIGAMENT REPAIR Left     LINEAR ENDOSCOPIC U/S N/A 6/13/2018    Procedure: LINEAR ENDOSCOPIC U/S;  Surgeon: Greg Painter MD;  Location: [facility];   Service: Gastroenterology    NC BIOPSY/EXCISION, LYMPH NODE(S) Left 6/24/2020    Procedure: EXCISION BIOPSY LYMPH NODE SUPRACLAVICULAR;  Surgeon: Marcelino Gay MD;  Location: [facility];  Service: General     Social History   Social History     Substance and Sexual Activity   Alcohol Use Not Currently     Social History     Substance and Sexual Activity   Drug Use No     Social History     Tobacco Use   Smoking Status Never Smoker   Smokeless Tobacco Never Used     Family History   Problem Relation Age of Onset    Melanoma Mother     Prostate cancer Father     Lung cancer Father     Breast cancer Sister     Pancreatic cancer Paternal Grandfather     Hodgkin's lymphoma Sister        Meds/Allergies     Medications Prior to Admission   Medication    aspirin (ECOTRIN LOW STRENGTH) 81 mg EC tablet    cholecalciferol (VITAMIN D3) 1,000 units tablet    gabapentin (NEURONTIN) 300 mg capsule    metoprolol tartrate (LOPRESSOR) 25 mg tablet    Nutritional Supplements (PRO-RAJINDER PLUS PO)    OXYCODONE ER PO    OXYCODONE HCL PO    oxyCODONE-acetaminophen (PERCOCET) 5-325 mg per tablet    pantoprazole (PROTONIX) 40 mg tablet    RESTASIS 0 05 % ophthalmic emulsion    rosuvastatin (CRESTOR) 5 mg tablet    valACYclovir (VALTREX) 1,000 mg tablet     Current Facility-Administered Medications   Medication Dose Route Frequency    acetaminophen (TYLENOL) tablet 650 mg  650 mg Oral Q6H PRN    aspirin (ECOTRIN LOW STRENGTH) EC tablet 81 mg  81 mg Oral Daily    cholecalciferol (VITAMIN D3) tablet 500 Units  500 Units Oral Daily    enoxaparin (LOVENOX) subcutaneous injection 100 mg  1 mg/kg Subcutaneous Q12H [facility]    gabapentin (NEURONTIN) capsule 300 mg  300 mg Oral TID    melatonin tablet 3 mg  3 mg Oral HS    metoprolol tartrate (LOPRESSOR) tablet 25 mg  25 mg Oral Q12H [facility]    morphine injection 2 mg  2 mg Intravenous Q4H PRN    ondansetron (ZOFRAN) injection 4 mg  4 mg Intravenous Q6H PRN    oxyCODONE (OxyCONTIN) 12 hr tablet 10 mg  10 mg Oral Q12H [facility]    oxyCODONE (ROXICODONE) immediate release tablet 10 mg  10 mg Oral Q4H PRN    pantoprazole (PROTONIX) EC tablet 40 mg  40 mg Oral Early Morning    pravastatin (PRAVACHOL) tablet 40 mg  40 mg Oral Daily With Dinner    senna (SENOKOT) tablet 17 2 mg  2 tablet Oral Daily    sodium chloride 0 9 % infusion  75 mL/hr Intravenous Continuous    valACYclovir (VALTREX) tablet 1,000 mg  1,000 mg Oral BID     Facility-Administered Medications Ordered in Other Encounters   Medication Dose Route Frequency    sodium chloride 0 9 % infusion  125 mL/hr Intravenous Continuous       No Known Allergies        Objective     Blood pressure 113/68, pulse 88, temperature 98 3 °F (36 8 °C), resp  rate 20, height 5' 10" (1 778 m), weight 102 kg (224 lb 6 9 oz), SpO2 93 %  Body mass index is 32 2 kg/m²  Intake/Output Summary (Last 24 hours) at 9/18/2021 6437  Last data filed at 9/18/2021 0145  Gross per 24 hour   Intake 350 ml   Output --   Net 350 ml         PHYSICAL EXAM:      General Appearance:   Alert, cooperative, no distress   HEENT:   Normocephalic, atraumatic, anicteric      Neck:  Supple, symmetrical, trachea midline   Lungs:   Clear to auscultation bilaterally; no rales, rhonchi or wheezing; respirations unlabored    Heart[de-identified]   Regular rate and rhythm; no murmur, rub, or gallop  Abdomen:   Soft, non-tender, non-distended; normal bowel sounds; no masses, no organomegaly    Genitalia:   Deferred    Rectal:   Deferred    Extremities:  No cyanosis, clubbing or edema    Pulses:  2+ and symmetric all extremities    Skin:  No jaundice, rashes, or lesions    Lymph nodes:  No palpable cervical lymphadenopathy        Lab Results:   No results displayed because visit has over 200 results  Imaging Studies: I have personally reviewed pertinent imaging studies

## 2021-09-18 NOTE — PROGRESS NOTES
NEPHROLOGY PROGRESS NOTE   Jr Singh 63 y o  male MRN: [id number]  Unit/Bed#: [facility] 711-01 Encounter: [id number]  Reason for Consult: RANDI    ASSESSMENT AND PLAN:  Patient is 59-year-old male with significant medical issues of non-Hodgkin's lymphoma, CLL, metastatic prostate cancer with metastasis to bone, lymph nodes, presented with fever, hypoxia  We are consulted for hyponatremia, RANDI management      RANDI POA, baseline creatinine 0 9 in 2020  -initial creatinine 1 4 on admission now continue to improve 0 8 today  -RANDI suspect with component of prerenal, REINALDO (status post IV contrast on 9/14/21), component of ATN, component of obstructive nephropathy, severe anemia  -status post IV contrast with CT scan on 9/15/21 again  -BMP in a m   -UA shows 1+ proteinuria, 2 to 4 RBCs, WBCs  Several granular cast  -CT scan shows left hydroureteronephrosis similar to prior CT      Left-sided hydroureteronephrosis suspect secondary to pelvic metastatic disease  -fortunately creatinine has improved  Consider urology evaluation  Hypokalemia, serum potassium 3 4 still below goal   Will give 40 mEq potassium chloride once today      Hyponatremia  -serum sodium continued appropriately improved 133  -workup include serum osmolality 281, urine sodium 46, urine osmolality 285    -currently on IV normal saline, discontinue after current bag is over   -suspect component of hypovolemia  -strict fluid restriction 1 2 L per day     Severe anemia, transfuse p r n  For hemoglobin less than seven  PE, management as per Pulmonary, primary team  Lymphoma, metastatic prostate cancer with bone metastasis, CT scan with worsening metastatic findings  Further management as per Hematology     Initial concern for infection, now remains off antibiotic, id follow-up  Earlier fever, leukocytosis suspected secondary to PE, malignancy     Discussed above plan in detail with primary team    SUBJECTIVE:  Patient seen and examined at bedside   No chest pain, shortness of breath, nausea, vomiting    OBJECTIVE:  Current Weight: Weight - Scale: 102 kg (224 lb 6 9 oz)  Vitals:    09/18/21 0929   BP: 134/78   Pulse: 89   Resp:    Temp:    SpO2:        Intake/Output Summary (Last 24 hours) at 9/18/2021 1301  Last data filed at 9/18/2021 0901  Gross per 24 hour   Intake 350 ml   Output 650 ml   Net -300 ml     Wt Readings from Last 3 Encounters:   09/15/21 102 kg (224 lb 6 9 oz)   06/15/21 102 kg (223 lb 12 8 oz)   07/07/20 104 kg (230 lb)     Temp Readings from Last 3 Encounters:   09/18/21 98 3 °F (36 8 °C)   09/15/21 (!) 101 7 °F (38 7 °C) (Tympanic)   06/15/21 (!) 96 9 °F (36 1 °C) (Temporal)     BP Readings from Last 3 Encounters:   09/18/21 134/78   09/15/21 109/59   06/15/21 116/62     Pulse Readings from Last 3 Encounters:   09/18/21 89   09/15/21 100   06/15/21 83        Physical Examination:  General:  Lying in bed, no acute distress   Eyes:  Mild conjunctival pallor present  ENT:  External examination of ears and nose unremarkable  Neck:  No obvious lymphadenopathy appreciated  Respiratory:  Bilateral air entry present  CVS:  S1, S2 present  GI:  Soft, nondistended  CNS:  Active alert oriented x3  Skin:  No new rash in legs  Musculoskeletal:  No obvious new gross deformity noted    Medications:    Current Facility-Administered Medications:     acetaminophen (TYLENOL) tablet 650 mg, 650 mg, Oral, Q6H PRN, Shira Baires PA-C, 650 mg at 09/17/21 0515    aspirin (ECOTRIN LOW STRENGTH) EC tablet 81 mg, 81 mg, Oral, Daily, Gokul Snider MD, 81 mg at 09/18/21 0912    cholecalciferol (VITAMIN D3) tablet 500 Units, 500 Units, Oral, Daily, Gokul Snider MD    enoxaparin (LOVENOX) subcutaneous injection 100 mg, 1 mg/kg, Subcutaneous, Q12H [address], Gokul Snider MD, 100 mg at 09/18/21 0933    gabapentin (NEURONTIN) capsule 300 mg, 300 mg, Oral, TID, Gokul Snider MD, 300 mg at 09/18/21 0912    melatonin tablet 3 mg, 3 mg, Oral, HS, Shira Baires, PA-C, 3 mg at 09/17/21 2231    metoprolol tartrate (LOPRESSOR) tablet 25 mg, 25 mg, Oral, Q12H [address], Bladimir Pulliam MD, 25 mg at 09/18/21 0929    morphine injection 2 mg, 2 mg, Intravenous, Q4H PRN, Virginia Watson MD, 2 mg at 09/16/21 2239    ondansetron (ZOFRAN) injection 4 mg, 4 mg, Intravenous, Q6H PRN, Bladimir Pulliam MD, 4 mg at 09/18/21 1220    oxyCODONE (OxyCONTIN) 12 hr tablet 10 mg, 10 mg, Oral, Q12H [address], Raul Yang MD, 10 mg at 09/18/21 0912    oxyCODONE (ROXICODONE) immediate release tablet 10 mg, 10 mg, Oral, Q4H PRN, Bladimir Pulliam MD, 10 mg at 09/18/21 9367    pantoprazole (PROTONIX) EC tablet 40 mg, 40 mg, Oral, Early Morning, Bladimir Pulliam MD, 40 mg at 09/18/21 0030    pravastatin (PRAVACHOL) tablet 40 mg, 40 mg, Oral, Daily With Kaylee Shay MD    [facility]) tablet 17 2 mg, 2 tablet, Oral, Daily, Bladimir Pulliam MD, 17 2 mg at 09/16/21 0854    sodium chloride 0 9 % infusion, 75 mL/hr, Intravenous, Continuous, Raul Yang MD, Last Rate: 75 mL/hr at 09/18/21 0644, 75 mL/hr at 09/18/21 0644    valACYclovir (VALTREX) tablet 1,000 mg, 1,000 mg, Oral, BID, Bladimir Pulliam MD, 1,000 mg at 09/18/21 5531    Facility-Administered Medications Ordered in Other Encounters:     sodium chloride 0 9 % infusion, 125 mL/hr, Intravenous, Continuous, Ann Marie Saeed MD    Laboratory Results:  Results from last 7 days   Lab Units 09/18/21  1009 09/18/21  0503 09/17/21  1853 09/17/21  0654 09/16/21  2127 09/16/21  0525 09/15/21  2030 09/15/21  1648   WBC Thousand/uL 6 48  --  7 59 9 71 11 41* 14 00*  --  12 34*   HEMOGLOBIN g/dL 7 9*  --  6 4* 6 7* 7 2* 7 6*  --  7 8*   HEMATOCRIT % 24 3*  --  20 0* 20 8* 22 4* 23 7*  --  24 5*   PLATELETS Thousands/uL 207  --  194 207 223 220 195 202   SODIUM mmol/L  --  133* 132* 128*  --   --   --  126*   POTASSIUM mmol/L  --  3 4*  --  3 3*  --   --   --  3 6   CHLORIDE mmol/L  --  103  --  98*  --   --   --  95*   CO2 mmol/L  --  24  --  24 --   --   --  24   BUN mg/dL  --  11  --  15  --   --   --  20   CREATININE mg/dL  --  0 82  --  1 10  --   --   --  1 40*   CALCIUM mg/dL  --  7 5*  --  6 9*  --   --   --  7 6*       CTA ED chest PE Study   Final Result by Pavan Gutiérrez MD (09/15 2101)      Right middle lobe, lingular, and left upper lobe segmental pulmonary emboli are noted  RV LV ratio is approximately 0 9, this is the cut off for RV dysfunction/right heart strain  Recommend echocardiography for further evaluation  Mediastinal and bilateral hilar, left greater than right, adenopathy is again noted         8 mm nodule in the right middle lobe; considering the history of neoplasm this raises the possibility of metastasis  Lingular opacity abutting the left heart has increased in size suggesting developing atelectasis or infiltrate  Linear atelectatic changes in the lung bases are again noted  I personally discussed this study with Dr Kiara Baird on 9/15/2021 at 9:01 PM                Workstation performed: [id number]         XR chest portable   Final Result by German Garcia MD (09/15 3481)      No acute cardiopulmonary disease  Workstation performed: [id number]             Portions of the record may have been created with voice recognition software  Occasional wrong word or "sound a like" substitutions may have occurred due to the inherent limitations of voice recognition software  Read the chart carefully and recognize, using context, where substitutions have occurred

## 2021-09-18 NOTE — ASSESSMENT & PLAN NOTE
Multifactorial ? GI loss MALT and steroid use/marrow stable hemoglobin   Will continue to trend   Plan for scope on Monday

## 2021-09-18 NOTE — PLAN OF CARE
Problem: Potential for Falls  Goal: Patient will remain free of falls  Description: INTERVENTIONS:  - Educate patient/family on patient safety including physical limitations  - Instruct patient to call for assistance with activity   - Consult OT/PT to assist with strengthening/mobility   - Keep Call bell within reach  - Keep bed low and locked with side rails adjusted as appropriate  - Keep care items and personal belongings within reach  - Initiate and maintain comfort rounds  - Make Fall Risk Sign visible to staff  - Offer Toileting every 3 Hours, in advance of need  - Initiate/[id number]  - Obtain necessary fall risk management equipment: 3  - Apply yellow socks and bracelet for high fall risk patients  - Consider moving patient to room near nurses station  Outcome: Progressing

## 2021-09-18 NOTE — PROGRESS NOTES
[address]  Progress Note Kurt Medley 1960, 63 y o  male MRN: [id number]  Unit/Bed#: [facility] 711-01 Encounter: [id number]  Primary Care Provider: Heladio Cochran MD   Date and time admitted to hospital: 9/15/2021  1:30 PM    Anemia  Assessment & Plan  Multifactorial ? GI loss MALT and steroid use/marrow stable hemoglobin   Will continue to trend   Plan for scope on Monday       Pulmonary embolus [facility])  Assessment & Plan  Continues with CP  ? Related to PE  Troponin x 3 negative  Continue on tele  Dilated RV on Echo   Discussed with Pulmonology would appreciate their input regarding AC and follow up as well as ongoing CP  Prostate cancer [facility])  Assessment & Plan  Discussed with primary onoclogist     * Sepsis [facility])  Assessment & Plan  Discussed with ID  No clear source - continues to be febrile however ? Cancer/cancer therapy related/clot related  ABx discontinued  Follow up cultures  VTE Pharmacologic Prophylaxis:   High Risk (Score >/= 5) - Pharmacological DVT Prophylaxis Contraindicated  Sequential Compression Devices Ordered  Patient Centered Rounds: I performed bedside rounds with nursing staff today  Discussions with Specialists or Other Care Team Provider: Discussed with oncology     Education and Discussions with Family / Patient: Updated  (wife) at bedside  Time Spent for Care: 30 minutes  More than 50% of total time spent on counseling and coordination of care as described above  Current Length of Stay: 3 day(s)  Current Patient Status: Inpatient   Certification Statement: The patient will continue to require additional inpatient hospital stay due to awaiting scope  Discharge Plan: Anticipate discharge in 48-72 hrs to home      Code Status: Level 1 - Full Code    Subjective:   Patient seen and examined   Feeling "better"    Objective:     Vitals:   Temp (24hrs), Av 9 °F (36 6 °C), Min:97 1 °F (36 2 °C), Max:98 6 °F (37 °C)    Temp:  [97 1 °F (36 2 °C)-98 6 °F (37 °C)] 98 3 °F (36 8 °C)  HR:  [85-91] 89  Resp:  [18-20] 20  BP: (103-134)/(67-78) 134/78  Body mass index is 32 2 kg/m²  Input and Output Summary (last 24 hours): Intake/Output Summary (Last 24 hours) at 9/18/2021 1642  Last data filed at 9/18/2021 1518  Gross per 24 hour   Intake 830 ml   Output 770 ml   Net 60 ml       Physical Exam:   Physical Exam  Constitutional:       General: He is not in acute distress  Appearance: He is not ill-appearing, toxic-appearing or diaphoretic  HENT:      Head: Normocephalic  Nose: Nose normal       Mouth/Throat:      Mouth: Mucous membranes are moist    Eyes:      Pupils: Pupils are equal, round, and reactive to light  Cardiovascular:      Rate and Rhythm: Normal rate  Abdominal:      General: There is no distension  Palpations: There is no mass  Tenderness: There is no right CVA tenderness, guarding or rebound  Hernia: No hernia is present  Musculoskeletal:         General: No swelling, tenderness, deformity or signs of injury  Right lower leg: No edema  Left lower leg: No edema  Skin:     Capillary Refill: Capillary refill takes less than 2 seconds  Coloration: Skin is pale  Skin is not jaundiced  Findings: No bruising or lesion  Neurological:      General: No focal deficit present  Mental Status: He is alert  Cranial Nerves: No cranial nerve deficit  Sensory: No sensory deficit  Motor: No weakness        Coordination: Coordination normal       Gait: Gait normal    Psychiatric:         Mood and Affect: Mood normal           Additional Data:     Labs:  Results from last 7 days   Lab Units 09/18/21  1009 09/17/21  1853   WBC Thousand/uL 6 48 7 59   HEMOGLOBIN g/dL 7 9* 6 4*   HEMATOCRIT % 24 3* 20 0*   PLATELETS Thousands/uL 207 194   BANDS PCT %  --  7   NEUTROS PCT % 82*  --    LYMPHS PCT % 7*  --    LYMPHO PCT %  --  0*   MONOS PCT % 5 --    MONO PCT %  --  1*   EOS PCT % 4 4     Results from last 7 days   Lab Units 09/18/21  0503   SODIUM mmol/L 133*   POTASSIUM mmol/L 3 4*   CHLORIDE mmol/L 103   CO2 mmol/L 24   BUN mg/dL 11   CREATININE mg/dL 0 82   ANION GAP mmol/L 6   CALCIUM mg/dL 7 5*   ALBUMIN g/dL 1 9*   TOTAL BILIRUBIN mg/dL 1 11*   ALK PHOS U/L 307*   ALT U/L 14   AST U/L 29   GLUCOSE RANDOM mg/dL 102     Results from last 7 days   Lab Units 09/15/21  1648   INR  1 50*             Results from last 7 days   Lab Units 09/16/21  0524 09/15/21  1648 09/15/21  1320   LACTIC ACID mmol/L  --   --  1 2   PROCALCITONIN ng/ml 0 93* 0 83*  --        Lines/Drains:  Invasive Devices     None                       Imaging: Reviewed radiology reports from this admission including: NA    Recent Cultures (last 7 days):   Results from last 7 days   Lab Units 09/15/21  1330 09/15/21  1320   BLOOD CULTURE  No Growth at 72 hrs  No Growth at 72 hrs         Last 24 Hours Medication List:   Current Facility-Administered Medications   Medication Dose Route Frequency Provider Last Rate    acetaminophen  650 mg Oral Q6H PRN Jennifer Smith PA-C      aspirin  81 mg Oral Daily Vince Mustafa MD      cholecalciferol  500 Units Oral Daily Vince Mustafa MD      enoxaparin  1 mg/kg Subcutaneous Q12H Douglas Tolentino MD      gabapentin  300 mg Oral TID Vince Mustafa MD      melatonin  3 mg Oral HS Jennifer Smith PA-C      metoprolol tartrate  25 mg Oral Q12H [address] Vince Mustafa MD      morphine injection  2 mg Intravenous Q4H PRN Misha Herring MD      ondansetron  4 mg Intravenous Q6H PRN Vince uMstafa MD      oxyCODONE  10 mg Oral Q12H [address] Oswald Whatley MD      oxyCODONE  10 mg Oral Q4H PRN Vince Mustafa MD      pantoprazole  40 mg Oral BID AC Naveen Hummel MD      pravastatin  40 mg Oral Daily With Eri Butts MD      senna  2 tablet Oral Daily Vince Mustafa MD      valACYclovir  1,000 mg Oral BID Vince Mustafa MD Facility-Administered Medications Ordered in Other Encounters   Medication Dose Route Frequency Provider Last Rate    sodium chloride  125 mL/hr Intravenous Continuous Loi Webb MD          Today, Patient Was Seen By: Lambert Nielsen MD    **Please Note: This note may have been constructed using a voice recognition system  **

## 2021-09-19 NOTE — PLAN OF CARE
Problem: Potential for Falls  Goal: Patient will remain free of falls  Description: INTERVENTIONS:  - Educate patient/family on patient safety including physical limitations  - Instruct patient to call for assistance with activity   - Consult OT/PT to assist with strengthening/mobility   - Keep Call bell within reach  - Keep bed low and locked with side rails adjusted as appropriate  - Keep care items and personal belongings within reach  - Initiate and maintain comfort rounds  - Make Fall Risk Sign visible to staff  - Apply yellow socks and bracelet for high fall risk patients  - Consider moving patient to room near nurses station  Outcome: Progressing     Problem: CARDIOVASCULAR - ADULT  Goal: Maintains optimal cardiac output and hemodynamic stability  Description: INTERVENTIONS:  - Monitor I/O, vital signs and rhythm  - Monitor for S/S and trends of decreased cardiac output  - Administer and titrate ordered vasoactive medications to optimize hemodynamic stability  - Assess quality of pulses, skin color and temperature  - Assess for signs of decreased coronary artery perfusion  - Instruct patient to report change in severity of symptoms  Outcome: Progressing  Goal: Absence of cardiac dysrhythmias or at baseline rhythm  Description: INTERVENTIONS:  - Continuous cardiac monitoring, vital signs, obtain 12 lead EKG if ordered  - Administer antiarrhythmic and heart rate control medications as ordered  - Monitor electrolytes and administer replacement therapy as ordered  Outcome: Progressing     Problem: RESPIRATORY - ADULT  Goal: Achieves optimal ventilation and oxygenation  Description: INTERVENTIONS:  - Assess for changes in respiratory status  - Assess for changes in mentation and behavior  - Position to facilitate oxygenation and minimize respiratory effort  - Oxygen administered by appropriate delivery if ordered  - Initiate smoking cessation education as indicated  - Encourage broncho-pulmonary hygiene including cough, deep breathe, Incentive Spirometry  - Assess the need for suctioning and aspirate as needed  - Assess and instruct to report SOB or any respiratory difficulty  - Respiratory Therapy support as indicated  Outcome: Progressing     Problem: METABOLIC, FLUID AND ELECTROLYTES - ADULT  Goal: Electrolytes maintained within normal limits  Description: INTERVENTIONS:  - Monitor labs and assess patient for signs and symptoms of electrolyte imbalances  - Administer electrolyte replacement as ordered  - Monitor response to electrolyte replacements, including repeat lab results as appropriate  - Instruct patient on fluid and nutrition as appropriate  Outcome: Progressing  Goal: Fluid balance maintained  Description: INTERVENTIONS:  - Monitor labs   - Monitor I/O and WT  - Instruct patient on fluid and nutrition as appropriate  - Assess for signs & symptoms of volume excess or deficit  Outcome: Progressing     Problem: SKIN/TISSUE INTEGRITY - ADULT  Goal: Skin Integrity remains intact(Skin Breakdown Prevention)  Description: Assess:  -Assess extremities for adequate circulation and sensation         Skin Care:  -Avoid use of baby powder, tape, friction and shearing, hot water or constrictive clothing  Outcome: Progressing     Problem: MUSCULOSKELETAL - ADULT  Goal: Maintain or return mobility to safest level of function  Description: INTERVENTIONS:  - Assess patient's ability to carry out ADLs; assess patient's baseline for ADL function and identify physical deficits which impact ability to perform ADLs (bathing, care of mouth/teeth, toileting, grooming, dressing, etc )  - Assess/evaluate cause of self-care deficits   - Assess range of motion  - Assess patient's mobility  - Assess patient's need for assistive devices and provide as appropriate  - Encourage maximum independence but intervene and supervise when necessary  - Involve family in performance of ADLs  - Assess for home care needs following discharge   - Consider OT consult to assist with ADL evaluation and planning for discharge  - Provide patient education as appropriate  Outcome: Progressing  Goal: Maintain proper alignment of affected body part  Description: INTERVENTIONS:  - Support, maintain and protect limb and body alignment  - Provide patient/ family with appropriate education  Outcome: Progressing     Problem: PAIN - ADULT  Goal: Verbalizes/displays adequate comfort level or baseline comfort level  Description: Interventions:  - Encourage patient to monitor pain and request assistance  - Assess pain using appropriate pain scale  - Administer analgesics based on type and severity of pain and evaluate response  - Implement non-pharmacological measures as appropriate and evaluate response  - Consider cultural and social influences on pain and pain management  - Notify physician/advanced practitioner if interventions unsuccessful or patient reports new pain  Outcome: Progressing     Problem: INFECTION - ADULT  Goal: Absence or prevention of progression during hospitalization  Description: INTERVENTIONS:  - Assess and monitor for signs and symptoms of infection  - Monitor lab/diagnostic results  - Monitor all insertion sites, i e  indwelling lines, tubes, and drains  - Monitor endotracheal if appropriate and nasal secretions for changes in amount and color  - [place] appropriate cooling/warming therapies per order  - Administer medications as ordered  - Instruct and encourage patient and family to use good hand hygiene technique  - Identify and instruct in appropriate isolation precautions for identified infection/condition  Outcome: Progressing  Goal: Absence of fever/infection during neutropenic period  Description: INTERVENTIONS:  - Monitor WBC    Outcome: Progressing     Problem: SAFETY ADULT  Goal: Patient will remain free of falls  Description: INTERVENTIONS:  - Educate patient/family on patient safety including physical limitations  - Instruct patient to call for assistance with activity   - Consult OT/PT to assist with strengthening/mobility   - Keep Call bell within reach  - Keep bed low and locked with side rails adjusted as appropriate  - Keep care items and personal belongings within reach  - Initiate and maintain comfort rounds  - Make Fall Risk Sign visible to staff  - Apply yellow socks and bracelet for high fall risk patients  - Consider moving patient to room near nurses station  Outcome: Progressing  Goal: Maintain or return to baseline ADL function  Description: INTERVENTIONS:  -  Assess patient's ability to carry out ADLs; assess patient's baseline for ADL function and identify physical deficits which impact ability to perform ADLs (bathing, care of mouth/teeth, toileting, grooming, dressing, etc )  - Assess/evaluate cause of self-care deficits   - Assess range of motion  - Assess patient's mobility; develop plan if impaired  - Assess patient's need for assistive devices and provide as appropriate  - Encourage maximum independence but intervene and supervise when necessary  - Involve family in performance of ADLs  - Assess for home care needs following discharge   - Consider OT consult to assist with ADL evaluation and planning for discharge  - Provide patient education as appropriate  Outcome: Progressing  Goal: Maintains/Returns to pre admission functional level  Description: INTERVENTIONS:  - Perform BMAT or MOVE assessment daily    - Set and communicate daily mobility goal to care team and patient/family/caregiver     - Collaborate with rehabilitation services on mobility goals if consulted  - Out of bed for toileting  - Record patient progress and toleration of activity level   Outcome: Progressing     Problem: DISCHARGE PLANNING  Goal: Discharge to home or other facility with appropriate resources  Description: INTERVENTIONS:  - Identify barriers to discharge w/patient and caregiver  - Arrange for needed discharge resources and transportation as appropriate  - Identify discharge learning needs (meds, wound care, etc )  - Arrange for interpretive services to assist at discharge as needed  - Refer to Case Management Department for coordinating discharge planning if the patient needs post-hospital services based on physician/advanced practitioner order or complex needs related to functional status, cognitive ability, or social support system  Outcome: Progressing     Problem: Knowledge Deficit  Goal: Patient/family/caregiver demonstrates understanding of disease process, treatment plan, medications, and discharge instructions  Description: Complete learning assessment and assess knowledge base  Interventions:  - Provide teaching at level of understanding  - Provide teaching via preferred learning methods  Outcome: Progressing     Problem: MOBILITY - ADULT  Goal: Maintain or return to baseline ADL function  Description: INTERVENTIONS:  -  Assess patient's ability to carry out ADLs; assess patient's baseline for ADL function and identify physical deficits which impact ability to perform ADLs (bathing, care of mouth/teeth, toileting, grooming, dressing, etc )  - Assess/evaluate cause of self-care deficits   - Assess range of motion  - Assess patient's mobility; develop plan if impaired  - Assess patient's need for assistive devices and provide as appropriate  - Encourage maximum independence but intervene and supervise when necessary  - Involve family in performance of ADLs  - Assess for home care needs following discharge   - Consider OT consult to assist with ADL evaluation and planning for discharge  - Provide patient education as appropriate  Outcome: Progressing  Goal: Maintains/Returns to pre admission functional level  Description: INTERVENTIONS:  - Perform BMAT or MOVE assessment daily    - Set and communicate daily mobility goal to care team and patient/family/caregiver     - Collaborate with rehabilitation services on mobility goals if consulted  - Stand patient 3 times a day  - Ambulate patient 3 times a day  - Out of bed for toileting  - Record patient progress and toleration of activity level   Outcome: Progressing

## 2021-09-19 NOTE — PROGRESS NOTES
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, Stage IV metastatic carcinoma of the prostate and recent submassive PE   GI was being consulted for new diagnosis of anemia     Normocytic anemia  Patient's admission labs showed a hemoglobin of 7 8 ( 13 6 on 3/8/21)  It down trended to 6 4 and he received 1 unit packed RBCs on 09/17  Patient states that he has not noticed any hematemesis, hematochezia or melena at home or while in the hospital   He has not been symptomatic with this anemia  He does state that he has been using Advil multiple times a day over the last few weeks for pain control       Given patient's recent use of Advil he could have peptic ulcer disease  Differential diagnosis include diverticular bleeding or metastasis to the GI tract      - Unfortunately iron studies collected after he had already received blood transfusion which are not consistent with iron deficiency anemia  - He has inappropriately normal retic count, would recommend Hematology consult for further evaluation  - Last EGD was on 6/2020 which showed resolution of MALT lymphoma  - His last colonoscopy was on 8/2018  Two polyps were seen ( 1 cm in the cecum and 5 mm in the rectum rectum, biopsy is not available for review)  Repeat colonoscopy was recommended in 5 years  - hemolysis labs negative  - Plan for EGD/EUS and colonoscopy for further evaluation of his new diagnosis of anemia  Patient would like Nasima Leyva do his procedures will have next availability on Tuesday    Discussed with patient and he is agreeable to wait until Tuesday  - Will plan for bowel prep on Monday  evening for colonoscopy and hold anticoagulation on Monday for tentative procedures on Tuesday    Submassive PE  Patient had incidental finding of right middle lobe, lingular, and left upper lobe segmental pulmonary emboli  Echo showed RV dilation  He is currently being followed by pulmonology for his PEs     - Will need anesthesia clearance before proceeding with procedures given recent PEs with possible RV strain  - He is currently on therapeutic Lovenox and will need lifelong anticoagulation given active malignancy     H pylori - eradicated      Patient was found to have H pylori in biopsies from May 2018 which likely was the cause of MALT lymphoma  Repeat biopsies from 2020 showed eradication      Patient discussed with Dr Nilay Garcia MD   Gastroenterology Fellow     ______________________________________________________________________    Subjective:     Patient denies any blood in his stool  He denies any abdominal pain      Medication Administration - last 24 hours from 09/18/2021 1647 to 09/19/2021 1647       Date/Time Order Dose Route Action Action by     09/19/2021 1037 aspirin (ECOTRIN LOW STRENGTH) EC tablet 81 mg 81 mg Oral Given Juanita Davis RN     09/19/2021 1036 cholecalciferol (VITAMIN D3) tablet 500 Units 500 Units Oral Not Given Juanita Davis RN     09/19/2021 1642 gabapentin (NEURONTIN) capsule 300 mg 300 mg Oral Given Gee Pang RN     09/19/2021 1037 gabapentin (NEURONTIN) capsule 300 mg 300 mg Oral Given Juanita Davis RN     09/18/2021 2219 gabapentin (NEURONTIN) capsule 300 mg 300 mg Oral Given Gee Pang RN     09/19/2021 1031 metoprolol tartrate (LOPRESSOR) tablet 25 mg 25 mg Oral Given Juanita Davis RN     09/18/2021 2219 metoprolol tartrate (LOPRESSOR) tablet 25 mg 25 mg Oral Given Gee Pang RN     09/19/2021 1639 pravastatin (PRAVACHOL) tablet 40 mg 40 mg Oral Not Given Gee Pang RN     09/19/2021 1642 valACYclovir (VALTREX) tablet 1,000 mg 1,000 mg Oral Given Gee Pang RN     09/19/2021 1037 valACYclovir (VALTREX) tablet 1,000 mg 1,000 mg Oral Given Kimberly Massed, RN     09/18/2021 1851 valACYclovir (VALTREX) tablet 1,000 mg 1,000 mg Oral Given [facility], RN     09/19/2021 1037 enoxaparin (LOVENOX) subcutaneous injection 100 mg 100 mg Subcutaneous [facility]     09/18/2021 2219 enoxaparin (LOVENOX) subcutaneous injection 100 mg 100 mg Subcutaneous Given [facility], RN     09/18/2021 2218 melatonin tablet 3 mg 3 mg Oral Given [facility], RN     09/19/2021 1532 acetaminophen (TYLENOL) tablet 650 mg 650 mg Oral Given Kimberly Massed, RN     09/19/2021 2187 acetaminophen (TYLENOL) tablet 650 mg 650 mg Oral [facility]     09/19/2021 0001 acetaminophen (TYLENOL) tablet 650 mg 650 mg Oral Given [facility], RN     09/19/2021 1031 oxyCODONE (OxyCONTIN) 12 hr tablet 10 mg 10 mg Oral Given [facility]     09/18/2021 2219 oxyCODONE (OxyCONTIN) 12 hr tablet 10 mg 10 mg Oral Given [facility], RN     09/19/2021 1642 pantoprazole (PROTONIX) EC tablet 40 mg 40 mg Oral Given [facility], RN     09/19/2021 0635 pantoprazole (PROTONIX) EC tablet 40 mg 40 mg [facility]     09/19/2021 1038 polyethylene glycol (MIRALAX) packet 17 g 17 g Oral Not Given [facility], RN     09/19/2021 1031 polyethylene glycol (MIRALAX) packet 17 g 17 g Oral Given [facility], RN     09/18/2021 1851 polyethylene glycol (MIRALAX) packet 17 g 17 g Oral Given [facility], RN     09/19/2021 1031 ondansetron (ZOFRAN-ODT) dispersible tablet 4 mg 4 mg Oral Given Kimberly Massed, RN     09/19/2021 1038 polyethylene glycol (GLYCOLAX) bowel prep 238 g 0 g Oral Hold [facility], RN          Objective:     Vitals: Blood pressure 122/76, pulse 89, temperature 97 6 °F (36 4 °C), resp  rate 16, height 5' 10" (1 778 m), weight 102 kg (224 lb 6 9 oz), SpO2 95 %  ,Body mass index is 32 2 kg/m²      No intake or output data in the 24 hours ending 09/19/21 0707    Physical Exam:   General Appearance: Awake and alert, in no acute distress  Abdomen: Soft, non-tender, non-distended; bowel sounds normal; no masses or no organomegaly    Invasive Devices     None                 Lab Results:  No results displayed because visit has over 200 results  Imaging Studies: I have personally reviewed pertinent imaging studies

## 2021-09-19 NOTE — PLAN OF CARE
Problem: Potential for Falls  Goal: Patient will remain free of falls  Description: INTERVENTIONS:  - Educate patient/family on patient safety including physical limitations  - Instruct patient to call for assistance with activity   - Consult OT/PT to assist with strengthening/mobility   - Keep Call bell within reach  - Keep bed low and locked with side rails adjusted as appropriate  - Keep care items and personal belongings within reach  - Initiate and maintain comfort rounds  - Make Fall Risk Sign visible to staff  - Apply yellow socks and bracelet for high fall risk patients  - Consider moving patient to room near nurses station  Outcome: Progressing     Problem: CARDIOVASCULAR - ADULT  Goal: Maintains optimal cardiac output and hemodynamic stability  Description: INTERVENTIONS:  - Monitor I/O, vital signs and rhythm  - Monitor for S/S and trends of decreased cardiac output  - Administer and titrate ordered vasoactive medications to optimize hemodynamic stability  - Assess quality of pulses, skin color and temperature  - Assess for signs of decreased coronary artery perfusion  - Instruct patient to report change in severity of symptoms  Outcome: Progressing  Goal: Absence of cardiac dysrhythmias or at baseline rhythm  Description: INTERVENTIONS:  - Continuous cardiac monitoring, vital signs, obtain 12 lead EKG if ordered  - Administer antiarrhythmic and heart rate control medications as ordered  - Monitor electrolytes and administer replacement therapy as ordered  Outcome: Progressing     Problem: RESPIRATORY - ADULT  Goal: Achieves optimal ventilation and oxygenation  Description: INTERVENTIONS:  - Assess for changes in respiratory status  - Assess for changes in mentation and behavior  - Position to facilitate oxygenation and minimize respiratory effort  - Oxygen administered by appropriate delivery if ordered  - Initiate smoking cessation education as indicated  - Encourage broncho-pulmonary hygiene including cough, deep breathe, Incentive Spirometry  - Assess the need for suctioning and aspirate as needed  - Assess and instruct to report SOB or any respiratory difficulty  - Respiratory Therapy support as indicated  Outcome: Progressing     Problem: METABOLIC, FLUID AND ELECTROLYTES - ADULT  Goal: Electrolytes maintained within normal limits  Description: INTERVENTIONS:  - Monitor labs and assess patient for signs and symptoms of electrolyte imbalances  - Administer electrolyte replacement as ordered  - Monitor response to electrolyte replacements, including repeat lab results as appropriate  - Instruct patient on fluid and nutrition as appropriate  Outcome: Progressing  Goal: Fluid balance maintained  Description: INTERVENTIONS:  - Monitor labs   - Monitor I/O and WT  - Instruct patient on fluid and nutrition as appropriate  - Assess for signs & symptoms of volume excess or deficit  Outcome: Progressing     Problem: SKIN/TISSUE INTEGRITY - ADULT  Goal: Skin Integrity remains intact(Skin Breakdown Prevention)  Description: Assess:  -Inspect skin when repositioning, toileting, and assisting with ADLS  -Assess extremities for adequate circulation and sensation     Bed Management:  -Have minimal linens on bed & keep smooth, unwrinkled  -Change linens as needed when moist or perspiring    Toileting:  -Offer bedside commode    Activity:  -Encourage activity and walks on unit  -Encourage or provide ROM exercises     Skin Care:  -Avoid use of baby powder, tape, friction and shearing, hot water or constrictive clothing  -Do not massage red bony areas    Outcome: Progressing     Problem: MUSCULOSKELETAL - ADULT  Goal: Maintain or return mobility to safest level of function  Description: INTERVENTIONS:  - Assess patient's ability to carry out ADLs; assess patient's baseline for ADL function and identify physical deficits which impact ability to perform ADLs (bathing, care of mouth/teeth, toileting, grooming, dressing, etc )  - Assess/evaluate cause of self-care deficits   - Assess range of motion  - Assess patient's mobility  - Assess patient's need for assistive devices and provide as appropriate  - Encourage maximum independence but intervene and supervise when necessary  - Involve family in performance of ADLs  - Assess for home care needs following discharge   - Consider OT consult to assist with ADL evaluation and planning for discharge  - Provide patient education as appropriate  Outcome: Progressing  Goal: Maintain proper alignment of affected body part  Description: INTERVENTIONS:  - Support, maintain and protect limb and body alignment  - Provide patient/ family with appropriate education  Outcome: Progressing     Problem: PAIN - ADULT  Goal: Verbalizes/displays adequate comfort level or baseline comfort level  Description: Interventions:  - Encourage patient to monitor pain and request assistance  - Assess pain using appropriate pain scale  - Administer analgesics based on type and severity of pain and evaluate response  - Implement non-pharmacological measures as appropriate and evaluate response  - Consider cultural and social influences on pain and pain management  - Notify physician/advanced practitioner if interventions unsuccessful or patient reports new pain  Outcome: Progressing     Problem: INFECTION - ADULT  Goal: Absence or prevention of progression during hospitalization  Description: INTERVENTIONS:  - Assess and monitor for signs and symptoms of infection  - Monitor lab/diagnostic results  - Monitor all insertion sites, i e  indwelling lines, tubes, and drains  - Monitor endotracheal if appropriate and nasal secretions for changes in amount and color  - [place] appropriate cooling/warming therapies per order  - Administer medications as ordered  - Instruct and encourage patient and family to use good hand hygiene technique  - Identify and instruct in appropriate isolation precautions for identified infection/condition  Outcome: Progressing  Goal: Absence of fever/infection during neutropenic period  Description: INTERVENTIONS:  - Monitor WBC    Outcome: Progressing     Problem: SAFETY ADULT  Goal: Patient will remain free of falls  Description: INTERVENTIONS:  - Educate patient/family on patient safety including physical limitations  - Instruct patient to call for assistance with activity   - Consult OT/PT to assist with strengthening/mobility   - Keep Call bell within reach  - Keep bed low and locked with side rails adjusted as appropriate  - Keep care items and personal belongings within reach  - Initiate and maintain comfort rounds  - Make Fall Risk Sign visible to staff  - Apply yellow socks and bracelet for high fall risk patients  - Consider moving patient to room near nurses station  Outcome: Progressing  Goal: Maintain or return to baseline ADL function  Description: INTERVENTIONS:  -  Assess patient's ability to carry out ADLs; assess patient's baseline for ADL function and identify physical deficits which impact ability to perform ADLs (bathing, care of mouth/teeth, toileting, grooming, dressing, etc )  - Assess/evaluate cause of self-care deficits   - Assess range of motion  - Assess patient's mobility; develop plan if impaired  - Assess patient's need for assistive devices and provide as appropriate  - Encourage maximum independence but intervene and supervise when necessary  - Involve family in performance of ADLs  - Assess for home care needs following discharge   - Consider OT consult to assist with ADL evaluation and planning for discharge  - Provide patient education as appropriate  Outcome: Progressing  Goal: Maintains/Returns to pre admission functional level  Description: INTERVENTIONS:  - Perform BMAT or MOVE assessment daily    - Set and communicate daily mobility goal to care team and patient/family/caregiver     - Collaborate with rehabilitation services on mobility goals if consulted  - Out of bed for toileting  - Record patient progress and toleration of activity level   Outcome: Progressing     Problem: DISCHARGE PLANNING  Goal: Discharge to home or other facility with appropriate resources  Description: INTERVENTIONS:  - Identify barriers to discharge w/patient and caregiver  - Arrange for needed discharge resources and transportation as appropriate  - Identify discharge learning needs (meds, wound care, etc )  - Arrange for interpretive services to assist at discharge as needed  - Refer to Case Management Department for coordinating discharge planning if the patient needs post-hospital services based on physician/advanced practitioner order or complex needs related to functional status, cognitive ability, or social support system  Outcome: Progressing     Problem: Knowledge Deficit  Goal: Patient/family/caregiver demonstrates understanding of disease process, treatment plan, medications, and discharge instructions  Description: Complete learning assessment and assess knowledge base    Interventions:  - Provide teaching at level of understanding  - Provide teaching via preferred learning methods  Outcome: Progressing     Problem: MOBILITY - ADULT  Goal: Maintain or return to baseline ADL function  Description: INTERVENTIONS:  -  Assess patient's ability to carry out ADLs; assess patient's baseline for ADL function and identify physical deficits which impact ability to perform ADLs (bathing, care of mouth/teeth, toileting, grooming, dressing, etc )  - Assess/evaluate cause of self-care deficits   - Assess range of motion  - Assess patient's mobility; develop plan if impaired  - Assess patient's need for assistive devices and provide as appropriate  - Encourage maximum independence but intervene and supervise when necessary  - Involve family in performance of ADLs  - Assess for home care needs following discharge   - Consider OT consult to assist with ADL evaluation and planning for discharge  - Provide patient education as appropriate  Outcome: Progressing  Goal: Maintains/Returns to pre admission functional level  Description: INTERVENTIONS:  - Perform BMAT or MOVE assessment daily    - Set and communicate daily mobility goal to care team and patient/family/caregiver     - Collaborate with rehabilitation services on mobility goals if consulted  - Stand patient 3 times a day  - Ambulate patient 3 times a day  - Out of bed for toileting  - Record patient progress and toleration of activity level   Outcome: Progressing

## 2021-09-19 NOTE — ASSESSMENT & PLAN NOTE
Hx of MALT lymphoma of the stomach initially 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, H  Pylori +, status post triple antibiotic course at that time, who has been followed with surveillance EGD, and found to have progressive disease with second gastric lesion  Was due for an EGD yesterday, however was cancelled secondary to pt's fever, hypoxemia, and tachycardia  GI eval now given anemia  Await EGD and C-scope tomorrow

## 2021-09-19 NOTE — ASSESSMENT & PLAN NOTE
Continues with CP  ? Related to PE  Troponin x 3 negative  Continue on tele  Dilated RV on Echo   Discussed with Pulmonology would appreciate their input regarding AC and follow up as well as ongoing CP  As of Sunday plan is for lovenox or arixtra upon Discharge  Please tiger text Gerhardt Parr, MD, patients primary heme onc  to find out what [facility] she would prefer  Her office has already cost checked arixtra, but they may be able to do lovenox as well  Ultimate decision up to primary onc team as they will be managing

## 2021-09-19 NOTE — ASSESSMENT & PLAN NOTE
Multifactorial ? GI loss MALT and steroid use/marrow stable hemoglobin   Will continue to trend   Plan for scope on Monday   No evidence of hemolysis  If scope negative tomorrow patient would like to DC provided [facility] is set up and affordable

## 2021-09-19 NOTE — PROGRESS NOTES
[address] [facility]  Progress Note Raymundo Willams 1960, 63 y o  male MRN: [id number]  Unit/Bed#: [facility] 711-01 Encounter: [id number]  Primary Care Provider: Candice Cisse MD   Date and time admitted to hospital: 9/15/2021  1:30 PM    Anemia  Assessment & Plan  Multifactorial ? GI loss MALT and steroid use/marrow stable hemoglobin   Will continue to trend   Plan for scope on Monday   No evidence of hemolysis  If scope negative tomorrow patient would like to DC provided [facility] is set up and affordable  Pulmonary embolus (HCC)  Assessment & Plan  Continues with CP  ? 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[facility] [address] )  Assessment & Plan  Has been having pain to his bilateral hips, radiates down his left knee  Also having pain around the upper shoulders, which has improved  It is chronic, started last year  Has received radiation to the sacrum previously, which has helped his pain in the past    Seems to be chronic in nature, will treat pt symptomatically  Takes oxycodone at home  Will start 10 mg oxycontin BID and prn oxycodone additionally  Will also consult palliative  zofran only for antiemetics for this patient   Would avoid dexamethasone in the setting of ?GIB  Prostate cancer [facility])  Assessment & Plan  Discussed with primary onoclogist  - outpatient follow up  No evidence of hemolysis       MALT lymphoma ([facility] [address] )  Assessment & Plan  Hx of MALT lymphoma of the stomach initially diagnosed in 2018, H  Pylori +, status post triple antibiotic course at that time, who has been followed with surveillance EGD, and found to have progressive disease with second gastric lesion  Was due for an EGD yesterday, however was cancelled secondary to pt's fever, hypoxemia, and tachycardia  GI eval now given anemia  Await EGD and C-scope tomorrow  * Sepsis [facility])  Assessment & Plan  Discussed with ID  No clear source - continues to be febrile however ? Cancer/cancer therapy related/clot related  ABx discontinued  Follow up cultures  VTE Pharmacologic Prophylaxis:   High Risk (Score >/= 5) - Pharmacological DVT Prophylaxis Ordered: enoxaparin (Lovenox)  Sequential Compression Devices Ordered  Patient Centered Rounds: I performed bedside rounds with nursing staff today  Discussions with Specialists or Other Care Team Provider: discussed with oncologist      Education and Discussions with Family / Patient: Updated  (wife) at bedside  Time Spent for Care: 30 minutes  More than 50% of total time spent on counseling and coordination of care as described above  Current Length of Stay: 4 day(s)  Current Patient Status: Inpatient   Certification Statement: The patient will continue to require additional inpatient hospital stay due to gi scope  If negative please DC on Monday afternoon  Discharge Plan: Anticipate discharge tomorrow to home  Code Status: Level 1 - Full Code    Subjective:   Patient seen and examined  Feeling "good"    Objective:     Vitals:   Temp (24hrs), Av 7 °F (37 6 °C), Min:98 3 °F (36 8 °C), Max:100 5 °F (38 1 °C)    Temp:  [98 3 °F (36 8 °C)-100 5 °F (38 1 °C)] 98 3 °F (36 8 °C)  HR:  [89-92] 89  Resp:  [18] 18  BP: (112-131)/(70-79) 119/74  SpO2:  [95 %] 95 %  Body mass index is 32 2 kg/m²       Input and Output Summary (last 24 hours):   No intake or output data in the 24 hours ending 21 [id number]    Physical Exam:   Physical Exam  Constitutional:       General: He is not in acute distress  Appearance: He is not ill-appearing, toxic-appearing or diaphoretic  HENT:      Nose: Nose normal       Mouth/Throat:      Mouth: Mucous membranes are moist    Eyes:      General: No scleral icterus  Right eye: No discharge  Left eye: No discharge  Pupils: Pupils are equal, round, and reactive to light  Cardiovascular:      Rate and Rhythm: Normal rate  Pulmonary:      Effort: No respiratory distress  Abdominal:      General: There is no distension  Tenderness: There is no abdominal tenderness  There is no guarding  Skin:     Capillary Refill: Capillary refill takes less than 2 seconds  Coloration: Skin is pale  Findings: No lesion  Neurological:      General: No focal deficit present  Mental Status: He is alert  Sensory: No sensory deficit  Motor: No weakness        Gait: Gait normal       Deep Tendon Reflexes: Reflexes normal    Psychiatric:         Mood and Affect: Mood normal           Additional Data:     Labs:  Results from last 7 days   Lab Units 09/19/21  1101 09/17/21  1853   WBC Thousand/uL 5 56 7 59   HEMOGLOBIN g/dL 7 6* 6 4*   HEMATOCRIT % 23 6* 20 0*   PLATELETS Thousands/uL 207 194   BANDS PCT %  --  7   NEUTROS PCT % 78*  --    LYMPHS PCT % 9*  --    LYMPHO PCT %  --  0*   MONOS PCT % 6  --    MONO PCT %  --  1*   EOS PCT % 4 4     Results from last 7 days   Lab Units 09/18/21  0503   SODIUM mmol/L 133*   POTASSIUM mmol/L 3 4*   CHLORIDE mmol/L 103   CO2 mmol/L 24   BUN mg/dL 11   CREATININE mg/dL 0 82   ANION GAP mmol/L 6   CALCIUM mg/dL 7 5*   ALBUMIN g/dL 1 9*   TOTAL BILIRUBIN mg/dL 1 11*   ALK PHOS U/L 307*   ALT U/L 14   AST U/L 29   GLUCOSE RANDOM mg/dL 102     Results from last 7 days   Lab Units 09/15/21  1648   INR  1 50*             Results from last 7 days   Lab Units 09/16/21  0524 09/15/21  1648 09/15/21  1320   LACTIC ACID mmol/L  --   --  1 2 PROCALCITONIN ng/ml 0 93* 0 83*  --        Lines/Drains:  Invasive Devices     None                       Imaging: Reviewed radiology reports from this admission including: chest xray    Recent Cultures (last 7 days):   Results from last 7 days   Lab Units 09/15/21  1330 09/15/21  1320   BLOOD CULTURE  No Growth at 72 hrs  No Growth at 72 hrs  Last 24 Hours Medication List:   Current Facility-Administered Medications   Medication Dose Route Frequency Provider Last Rate    acetaminophen  650 mg Oral Q6H PRN Raul Sparks PA-C      aspirin  81 mg Oral Daily Jolene Quijano MD      cholecalciferol  500 Units Oral Daily Jolene Quijano MD      enoxaparin  1 mg/kg Subcutaneous Q12H Verona Conklin MD      gabapentin  300 mg Oral TID Jolene Quijano MD      melatonin  3 mg Oral HS Raul Sparks PA-C      metoprolol tartrate  25 mg Oral Q12H [address] Jolene Quijano MD      morphine injection  2 mg Intravenous Q4H PRN Clarence Andrew MD      ondansetron  4 mg Intravenous Q6H PRN Jolene Quijano MD      ondansetron  4 mg Oral Q6H PRN Jose J Armas MD      oxyCODONE  10 mg Oral Q12H [address] Jose J Armas MD      oxyCODONE  10 mg Oral Q4H PRN Jolene Quijano MD      pantoprazole  40 mg Oral BID AC Momo Powell MD      polyethylene glycol  17 g Oral Daily Robin Bah MD      pravastatin  40 mg Oral Daily With Sindhu Catalan MD      valACYclovir  1,000 mg Oral BID Jolene Quijano MD          Today, Patient Was Seen By: Jose J Armas MD    **Please Note: This note may have been constructed using a voice recognition system  **

## 2021-09-19 NOTE — PROGRESS NOTES
NEPHROLOGY PROGRESS NOTE   Maurizio Ly 63 y o  male MRN: [id number]  Unit/Bed#: [facility] 711-01 Encounter: [id number]  Reason for Consult: RANDI    ASSESSMENT AND PLAN:  Patient is 76-year-old male with significant medical issues of non-Hodgkin's lymphoma, CLL, metastatic prostate cancer with metastasis to bone, lymph nodes, presented with fever, hypoxia   We are consulted for hyponatremia, RANDI management      RANDI POA, baseline creatinine 0 9 in 2020  -initial creatinine 1 4 on admission now continue to improve 0 8 yesterday  No BMP available today  Check BMP in a m  [facility] -RANDI suspect with component of prerenal, REINALDO (status post IV contrast on 9/14/21), component of ATN, component of obstructive nephropathy, severe anemia  -status post IV contrast with CT scan on 9/15/21 again  -UA shows 1+ proteinuria, 2 to 4 RBCs, WBCs   Several granular cast  -CT scan shows left hydroureteronephrosis similar to prior CT      Left-sided hydroureteronephrosis suspect secondary to pelvic metastatic disease  -fortunately creatinine has improved   Consider urology evaluation      Hypokalemia, serum potassium 3 4 yesterday, status post placement  Check BMP in a m  No BMP available today       Hyponatremia  -serum sodium continued appropriately improved 133 yesterday  BMP in a [facility] -workup include serum osmolality 281, urine sodium 46, urine osmolality 285  -now remains off IV fluid     -suspect component of hypovolemia  -strict fluid restriction 1 2 L per day     Severe anemia, transfuse p r n  For hemoglobin less than seven  PE, management as per Pulmonary, primary team  Lymphoma, metastatic prostate cancer with bone metastasis, CT scan with worsening metastatic findings    Further management as per Hematology     Initial concern for infection, now remains off antibiotic, id follow-up   Earlier fever, leukocytosis suspected secondary to PE, malignancy     Discussed above plan in detail with primary team    SUBJECTIVE:  Patient seen and examined at bedside  No chest pain, shortness of breath  Had occasional episodes of nausea  Denies vomiting      OBJECTIVE:  Current Weight: Weight - Scale: 102 kg (224 lb 6 9 oz)  Vitals:    09/19/21 0842   BP: 119/74   Pulse: 89   Resp: 18   Temp: 98 3 °F (36 8 °C)   SpO2: 95%       Intake/Output Summary (Last 24 hours) at 9/19/2021 1024  Last data filed at 9/18/2021 1518  Gross per 24 hour   Intake 480 ml   Output 120 ml   Net 360 ml     Wt Readings from Last 3 Encounters:   09/15/21 102 kg (224 lb 6 9 oz)   06/15/21 102 kg (223 lb 12 8 oz)   07/07/20 104 kg (230 lb)     Temp Readings from Last 3 Encounters:   09/19/21 98 3 °F (36 8 °C) (Oral)   09/15/21 (!) 101 7 °F (38 7 °C) (Tympanic)   06/15/21 (!) 96 9 °F (36 1 °C) (Temporal)     BP Readings from Last 3 Encounters:   09/19/21 119/74   09/15/21 109/59   06/15/21 116/62     Pulse Readings from Last 3 Encounters:   09/19/21 89   09/15/21 100   06/15/21 83        Physical Examination:  General:  Lying in bed, no acute distress   Eyes:  Mild conjunctival pallor present  ENT:  External examination of ears and nose unremarkable  Neck:  No obvious lymphadenopathy appreciated  Respiratory:  Bilateral air entry present  CVS:  S1, S2 present  GI:  Soft, nondistended  CNS:  Active alert oriented x3  Skin:  No new rash in legs  Musculoskeletal:  No obvious new gross deformity noted    Medications:    Current Facility-Administered Medications:     acetaminophen (TYLENOL) tablet 650 mg, 650 mg, Oral, Q6H PRN, Ayesha Mcclendon PA-C, 650 mg at 09/19/21 4720    aspirin (ECOTRIN LOW STRENGTH) EC tablet 81 mg, 81 mg, Oral, Daily, Mt Rutledge MD, 81 mg at 09/18/21 0912    cholecalciferol (VITAMIN D3) tablet 500 Units, 500 Units, Oral, Daily, Mt Rutledge MD    enoxaparin (LOVENOX) subcutaneous injection 100 mg, 1 mg/kg, Subcutaneous, Q12H [address], Mt Rutledge MD, 100 mg at 09/18/21 3166    gabapentin (NEURONTIN) capsule 300 mg, 300 mg, Oral, TID, Mt Rutledge, MD, 300 mg at 09/18/21 2219    melatonin tablet 3 mg, 3 mg, Oral, HS, Patricia Nelson PA-C, 3 mg at 09/18/21 2218    metoprolol tartrate (LOPRESSOR) tablet 25 mg, 25 mg, Oral, Q12H [facility], John Gonzalez MD, 25 mg at 09/18/21 2219    morphine injection 2 mg, 2 mg, Intravenous, Q4H PRN, Niyah Bowser MD, 2 mg at 09/16/21 2239    ondansetron (ZOFRAN) injection 4 mg, 4 mg, Intravenous, Q6H PRN, John Gonzalez MD, 4 mg at 09/18/21 1220    ondansetron (ZOFRAN-ODT) dispersible tablet 4 mg, 4 mg, Oral, Q6H PRN, Yo Mcclain MD    oxyCODONE (OxyCONTIN) 12 hr tablet 10 mg, 10 mg, Oral, Q12H [facility], Yo Mcclain MD, 10 mg at 09/18/21 2219    oxyCODONE (ROXICODONE) immediate release tablet 10 mg, 10 mg, Oral, Q4H PRN, John Gonzalez MD, 10 mg at 09/18/21 1639    pantoprazole (PROTONIX) EC tablet 40 mg, 40 mg, Oral, BID AC, Shereen Phillips MD, 40 mg at 09/19/21 0635    polyethylene glycol (MIRALAX) packet 17 g, 17 g, Oral, Daily, Saskia Hammer MD, 17 g at 09/18/21 1851    pravastatin (PRAVACHOL) tablet 40 mg, 40 mg, Oral, Daily With Dinner, John Gonzalez MD    valACYclovir (VALTREX) tablet 1,000 mg, 1,000 mg, Oral, BID, John Gonzalez MD, 1,000 mg at 09/18/21 1851    Laboratory Results:  Results from last 7 days   Lab Units 09/18/21  1009 09/18/21  0503 09/17/21  1853 09/17/21  0654 09/16/21  2127 09/16/21  0525 09/15/21  2030 09/15/21  1648   WBC Thousand/uL 6 48  --  7 59 9 71 11 41* 14 00*  --  12 34*   HEMOGLOBIN g/dL 7 9*  --  6 4* 6 7* 7 2* 7 6*  --  7 8*   HEMATOCRIT % 24 3*  --  20 0* 20 8* 22 4* 23 7*  --  24 5*   PLATELETS Thousands/uL 207  --  194 207 223 220 195 202   SODIUM mmol/L  --  133* 132* 128*  --   --   --  126*   POTASSIUM mmol/L  --  3 4*  --  3 3*  --   --   --  3 6   CHLORIDE mmol/L  --  103  --  98*  --   --   --  95*   CO2 mmol/L  --  24  --  24  --   --   --  24   BUN mg/dL  --  11  --  15  --   --   --  20   CREATININE mg/dL  --  0 82  --  1 10  --   --   --  1 40*   CALCIUM mg/dL  --  7 5*  --  6 9*  --   --   --  7 6*       CTA ED chest PE Study   Final Result by Archie Brown MD (09/15 2101)      Right middle lobe, lingular, and left upper lobe segmental pulmonary emboli are noted  RV LV ratio is approximately 0 9, this is the cut off for RV dysfunction/right heart strain  Recommend echocardiography for further evaluation  Mediastinal and bilateral hilar, left greater than right, adenopathy is again noted         8 mm nodule in the right middle lobe; considering the history of neoplasm this raises the possibility of metastasis  Lingular opacity abutting the left heart has increased in size suggesting developing atelectasis or infiltrate  Linear atelectatic changes in the lung bases are again noted  I personally discussed this study with Dr Sergey Clarke on 9/15/2021 at 9:01 PM                Workstation performed: [id number]         XR chest portable   Final Result by Mali Cook MD (09/15 1621)      No acute cardiopulmonary disease  Workstation performed: [id number]             Portions of the record may have been created with voice recognition software  Occasional wrong word or "sound a like" substitutions may have occurred due to the inherent limitations of voice recognition software  Read the chart carefully and recognize, using context, where substitutions have occurred

## 2021-09-20 NOTE — QUICK NOTE
Renal function stable in serum sodium has improved to 135  Would continue fluid restriction 1 2 L per day and continue to monitor BMP  Nephrology will sign off  Please call or text with questions

## 2021-09-20 NOTE — ASSESSMENT & PLAN NOTE
Continues with CP  ? Related to PE  Troponin x 3 negative  Continue on tele  Dilated RV on Echo   Discussed with Pulmonology would appreciate their input regarding AC and follow up as well as ongoing CP  As of Sunday plan is for lovenox or arixtra upon Discharge  Please tiger text Gm Angel MD, patients primary heme onc  to find out what [facility] [place] by [facility] she would prefer  Her office has already cost checked arixtra, but they may be able to do lovenox as well  Ultimate decision up to primary onc team as they will be managing

## 2021-09-20 NOTE — ASSESSMENT & PLAN NOTE
Multifactorial ? GI loss MALT and steroid use/marrow stable hemoglobin   Will continue to trend, current hemoglobin 7 8, status post 1 unit of transfusion 3 days ago when hemoglobin 6 4, hemoglobin has remained stable since the time of transfusion  For EGD and colonoscopy on Tuesday September 21st   No evidence of hemolysis     If endoscopy negative consider anticoagulation to be discussed with primary oncologist

## 2021-09-20 NOTE — PROGRESS NOTES
[address] [facility]  Progress Note Miguel Thacker 1960, 63 y o  male MRN: [id number]  Unit/Bed#: [facility] 711-01 Encounter: [id number]  Primary Care Provider: Shantel Hendrickson MD   Date and time admitted to hospital: 9/15/2021  1:30 PM    Anemia  Assessment & Plan  Multifactorial ? GI loss MALT and steroid use/marrow stable hemoglobin   Will continue to trend, current hemoglobin 7 8, status post 1 unit of transfusion 3 days ago when hemoglobin 6 4, hemoglobin has remained stable since the time of transfusion  For EGD and colonoscopy on Tuesday September 21st   No evidence of hemolysis  If endoscopy negative consider anticoagulation to be discussed with primary oncologist      Pulmonary embolus [facility])  Assessment & Plan  Continues with CP  ? Related to PE  Troponin x 3 negative  Continue on tele  Dilated RV on Echo   Discussed with Pulmonology would appreciate their input regarding AC and follow up as well as ongoing CP  As of Sunday plan is for lovenox or arixtra upon Discharge  Please tiger text Marquita Espinoza MD, patients primary heme onc  to find out what [facility] she would prefer  Her office has already cost checked arixtra, but they may be able to do lovenox as well  Ultimate decision up to primary onc team as they will be managing  Osseous metastasis ([facility] [address] )  Assessment & Plan  Has been having pain to his bilateral hips, radiates down his left knee  Also having pain around the upper shoulders, which has improved  It is chronic, started last year  Has received radiation to the sacrum previously, which has helped his pain in the past    Seems to be chronic in nature, will treat pt symptomatically  Takes oxycodone at home  Will start 10 mg oxycontin BID and prn oxycodone additionally     Appreciated palliative care recommendation, no further recommendation at this time, the patient would like to follow-up with primary oncologist as an outpatient also for symptomatic management, team will continue to follow p r n  Well the patient in the hospital if any new issues  zofran only for antiemetics for this patient   Would avoid dexamethasone in the setting of ? GIB however, if endoscopy is negative consider start 4 mg of dexamethasone twice a day    Prostate cancer [facility])  Assessment & Plan  Discussed with primary onoclogist  - outpatient follow up  No evidence of hemolysis  MALT lymphoma ([address] )  Assessment & Plan  Hx of MALT lymphoma of the stomach initially 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, H  Pylori +, status post triple antibiotic course at that time, who has been followed with surveillance EGD, and found to have progressive disease with second gastric lesion  Was due for an EGD yesterday, however was cancelled secondary to pt's fever, hypoxemia, and tachycardia  GI eval now given anemia  Await EGD and C-scope tomorrow  * Sepsis [facility])  Assessment & Plan  Discussed with ID  No clear source - continues to be febrile however ? Cancer/cancer therapy related/clot related  ABx discontinued  Continue to monitor off antibiotics, so far stable  Follow up cultures  VTE Pharmacologic Prophylaxis:  No  Pharmacologic: None, possible GI bleed  Mechanical VTE Prophylaxis in Place: Yes    Patient Centered Rounds: I have performed bedside rounds with nursing staff today  Discussions with Specialists or Other Care Team Provider:  None    Education and Discussions with Family / Patient:  Patient, wife    Time Spent for Care: 30 minutes  More than 50% of total time spent on counseling and coordination of care as described above  Current Length of Stay: 5 day(s)    Current Patient Status: Inpatient   Certification Statement: The patient will continue to require additional inpatient hospital stay due to Please refer to above    Discharge Plan:  Please refer to above, to be determined    Code Status: Level 1 - Full Code      Subjective:   Pain is better control    Patient has no new complaints or concerns for me  Objective:     Vitals:   No data recorded  HR:  [88-94] 94  Resp:  [17-18] 17  BP: (111-141)/(67-85) 141/85  SpO2:  [95 %-96 %] 95 %  Body mass index is 32 2 kg/m²  Input and Output Summary (last 24 hours):     No intake or output data in the 24 hours ending 09/20/21 1609    Physical Exam:     Physical Exam  Vitals and nursing note reviewed  Constitutional:       Appearance: Normal appearance  HENT:      Head: Normocephalic and atraumatic  Eyes:      General:         Right eye: No discharge  Left eye: No discharge  Cardiovascular:      Rate and Rhythm: Normal rate and regular rhythm  Heart sounds: No murmur heard  Pulmonary:      Effort: Pulmonary effort is normal  No respiratory distress  Breath sounds: No wheezing  Abdominal:      General: Abdomen is flat  There is no distension  Tenderness: There is no abdominal tenderness  There is no guarding  Musculoskeletal:      Cervical back: Neck supple  Right lower leg: No edema  Left lower leg: No edema  Skin:     Coloration: Skin is pale  Neurological:      Mental Status: He is alert and oriented to person, place, and time  Psychiatric:         Mood and Affect: Mood normal          Behavior: Behavior normal          Thought Content:  Thought content normal          Judgment: Judgment normal          Additional Data:     Labs:    Results from last 7 days   Lab Units 09/19/21  1939   WBC Thousand/uL 5 77   HEMOGLOBIN g/dL 7 8*   HEMATOCRIT % 24 7*   PLATELETS Thousands/uL 206   NEUTROS PCT % 78*   LYMPHS PCT % 9*   MONOS PCT % 6   EOS PCT % 4     Results from last 7 days   Lab Units 09/20/21  0519   POTASSIUM mmol/L 3 5   CHLORIDE mmol/L 103   CO2 mmol/L 26   BUN mg/dL 9   CREATININE mg/dL 0 97   CALCIUM mg/dL 8 3   ALK PHOS U/L 348*   ALT U/L 20   AST U/L 33     Results from last 7 days   Lab Units 09/15/21  1648   INR  1 50*       * I Have Reviewed All Lab Data Listed Above   * Additional Pertinent Lab Tests Reviewed: [address] Admission Reviewed    Imaging:    Imaging Reports Reviewed Today Include:  Images done during hospitalization  Imaging Personally Reviewed by Myself Includes:  None    Recent Cultures (last 7 days):     Results from last 7 days   Lab Units 09/15/21  1330 09/15/21  1320   BLOOD CULTURE  No Growth After 5 Days  No Growth After 5 Days  Last 24 Hours Medication List:   Current Facility-Administered Medications   Medication Dose Route Frequency Provider Last Rate    acetaminophen  650 mg Oral Q6H PRN Lee Calloway, PA-C      aspirin  81 mg Oral Daily Madhu Cesar MD      bisacodyl  10 mg Oral Once Jess Brock MD      cholecalciferol  500 Units Oral Daily Madhu Cesar MD      enoxaparin  1 mg/kg Subcutaneous Q12H [address] Jess Brock MD      gabapentin  300 mg Oral TID Madhu Cesar MD      lidocaine   Topical 4x Daily PRN Azeem Gonzalez MD      melatonin  3 mg Oral HS Lee Calloway, PA-C      metoprolol tartrate  25 mg Oral Q12H [address] Madhu Cesar MD      morphine injection  2 mg Intravenous Q4H PRN Manan Singletary MD      ondansetron  4 mg Intravenous Q6H PRN Madhu Cesar MD      ondansetron  4 mg Oral Q6H PRN Elizabeth Collins MD      oxyCODONE  10 mg Oral Q12H [address] Elizabeth Collins MD      oxyCODONE  10 mg Oral Q4H PRN Madhu Cesar MD      pantoprazole  40 mg Oral BID AC Kim Bloom MD      polyethylene glycol  4,000 mL Oral See Admin Instructions Jess Brock MD      pravastatin  40 mg Oral Daily With Giulia Johnson MD      valACYclovir  1,000 mg Oral BID Madhu Cesar MD          Today, Patient Was Seen By: Azeem Gonzalez MD    ** Please Note: Dragon 360 Dictation voice to text software may have been used in the creation of this document   **

## 2021-09-20 NOTE — CONSULTS
Consultation - [address] 63 y o  male MRN: [id number]  Unit/Bed#: [facility] 711-01 Encounter: [id number]      Physician Requesting Consult: Isaiah Florez MD  Reason for Consult / Principal Problem: symptom management      Assessment/Plan:  1  Metastatic prostate cancer  2  Osseous metastases  3  PE  4  Cancer related pain  5  MALT lymphoma of stomach  7  Anemia  8  Sepsis  9  Numerous new liver lesions, concerning for metastatic lesions versus liver abscess on CT c/a/p 9/14/21  10 H/o herpes zoster R, with residual pain  11  Goals of care  -continue current medical care/optimization  -no limits on care  -confirmed level 1, full code status  -patient does not want to follow with our service as an outpatient for symptom management, and prefers for his outpatient Oncologist/Dr Seema Felix to manage his symptoms; he will consider in future if he pursues medical marijuana certification; office contact provided  -pain regimen recommendations       -t/c change tylenol to 975mg q8h vika       -cont gabpentin 300mg tid       -cont Oxycontin 10mg q12h vika       -t/c change oxyIR to 10-15mg po q4h prn mod-severe pain       -cont morphine 2mg IV q4h prn bt pain       -t/c adding lidocaine 4% cream 4x/day prn R flank/rib pain   -patient preferred trying cream first   -if not working, can try lidocaine patch       -after bowel prep/colonoscopy, t/c adding senna nightly to avoid opioid-induced constipation with prn daily miralax and prn dulcolax suppository  -Five Wishes document provided  -d/w primary  -will follow peripherally as needed    Met with patient and wife/Bertha at length  Discussed current condition and symptoms   Patient states that pain has been an issue, but recently started on oxycontin 10mg bid with oxyIR 10mg q4h prn pain from outpatient Oncologist  He describes pain in bilateral hips and lower back and states they have been trying to figure out the cause and why he has bouts of debilitating pain  He describes going through the cycle of reporting the pain, getting imaging, and then adjusting treatment/regimens that may be contributing to or helping with the symptom  He states that the pain medication has been helping, but was not initially ordered when admitted  His pain is also complicated by recent bout of shingles on R  States that rash is resolved, but continues to have bouts of sharp, shooting pain along rib margin  Patient does not wish to follow with us as an outpatient at this time, and prefers for his outpatient Oncologist to manage his symptom needs  We discussed medical marijuana certification at length as an adjunct to his symptom needs, and he is considering registering and possibly calling our office for an appointment if he decides to pursue this  Patient/wife report that there is a living will, but that it needs updated  Patient states his wife knows his wishes  I confirmed level 1 code status  Patient does not wish for any limits on care, and wants any treatments/technologies available to keep him living, but with a good quality of life  We discussed the Five Wishes document at length and how to complete  Patient's wife is familiar with this document  A Five Wishes document was provided to patient and wife to review/complete as they wish  At end of visit, patient experienced an acute bout of sharp, shooting, debilitating pain R flank/along rib margin  Nursing called to administer prn pain medication  It did subside  Patient agreeable to lidocaine prn, prefers cream over patch at this time to assist with pain he is experiencing  Patient also experiencing constipation, no BM since Saturday, asking about prn miralax from nursing  Nursing informed patient that he is due to start bowel prep at 4pm for EGD, colonoscopy, EUS evaluation  tomorrow  Patient will wait for bowel prep  All questions, concerns addressed  Patient/wife appreciative of discussion        Code Status: Level 1 - Full Code  Advance Directive and Living Will:    Not in chart, needs to update  Power of :  No  POLST:  No  Wife/Keelw-[phone]  4 adult children  Walks with cane    History of Present Illness   HPI: Shayna Sullivan is a 63y o  year old male who presents with fever and hypoxia from endoscopy center on 9/15/21  Patient c/o fatigue, generalized weakness, bilateral hip pain with radiation down L knee, and nausea on presentation  Patient with PMH significant for metastatic prostate cancer on Zometa + Lynparza s/p radiation to sacrum  , MALT lymphoma, non-Hodgkin's lymphoma, CLL, thoracic aortic aneurysm  Patient scheduled for outpatient EGD for intermittent nausea  Presented with a fever, tachycardia, and hypoxia  EGD cancelled and patient sent to ER  COVID-19 negative  Palliative consulted for cancer related pain  Review of Systems   Constitutional: Positive for activity change, appetite change and fatigue  Gastrointestinal: Positive for constipation and nausea  Musculoskeletal: Positive for back pain and gait problem (cane)  Skin:        R rib/flank pain, sharp, intermittent, rash resolved   Neurological: Positive for weakness and numbness (R rib, flank)  All other systems reviewed and are negative        Historical Information   Past Medical History:   Diagnosis Date    Cancer ([facility] [address] )     Stomach    Hypertension     Lymphoma ([facility] [address] ) 05/2018    Non Hodgkin's lymphoma ([facility] [address] )     Prostate cancer ([facility] [address] ) 2020    Shingles     Thoracic aortic aneurysm [facility])      Patient Active Problem List   Diagnosis    MALT lymphoma ([facility] [address] )    Prostate cancer ([facility] [address] )    Abnormal nuclear stress test    Pure hypercholesterolemia    Osseous metastasis (HCC)    Pulmonary embolus (HCC)    Sepsis ([address] )    Anemia     Past Surgical History:   Procedure Laterality Date    APPENDECTOMY      COLONOSCOPY      HERNIA REPAIR      B/L hernia repair    KNEE ARTHROSCOPY      KNEE ARTHROSCOPY, MEDIAL PATELLO FEMORAL LIGAMENT REPAIR Left     LINEAR ENDOSCOPIC U/S N/A 6/13/2018    Procedure: LINEAR ENDOSCOPIC U/S;  Surgeon: Christiano Hawthorne MD;  Location: [facility]; Service: Gastroenterology    FL BIOPSY/EXCISION, LYMPH NODE(S) Left 6/24/2020    Procedure: EXCISION BIOPSY LYMPH NODE SUPRACLAVICULAR;  Surgeon: Niharika Roman MD;  Location: [facility];  Service: General     Social History     Socioeconomic History    Marital status: /Civil Union     Spouse name: None    Number of children: None    Years of education: None    Highest education level: None   Occupational History    None   Tobacco Use    Smoking status: Never Smoker    Smokeless tobacco: Never Used   Vaping Use    Vaping Use: Never used   Substance and Sexual Activity    Alcohol use: Not Currently    Drug use: No    Sexual activity: Yes   Other Topics Concern    None   Social History Narrative    None     Social Determinants of Health     Financial Resource Strain:     Difficulty of Paying Living Expenses:    Food Insecurity:     Worried About Running Out of Food in the Last Year:     Ran Out of Food in the Last Year:    Transportation Needs:     Lack of Transportation (Medical):      Lack of Transportation (Non-Medical):    Physical Activity:     Days of Exercise per Week:     Minutes of Exercise per Session:    Stress:     Feeling of Stress :    Social Connections:     Frequency of Communication with Friends and Family:     Frequency of Social Gatherings with Friends and Family:     Attends [employer] Services:     Active Member of Clubs or Organizations:     Attends Club or Organization Meetings:     Marital Status:    Intimate Partner Violence:     Fear of Current or Ex-Partner:     Emotionally Abused:     Physically Abused:     Sexually Abused:      Family History   Problem Relation Age of Onset    Melanoma Mother     Prostate cancer Father     Lung cancer Father     Breast cancer Sister     Pancreatic cancer Paternal Grandfather     Hodgkin's lymphoma Sister        Meds/Allergies   all current active meds have been reviewed and current meds:   Current Facility-Administered Medications   Medication Dose Route Frequency    acetaminophen (TYLENOL) tablet 650 mg  650 mg Oral Q6H PRN    aspirin (ECOTRIN LOW STRENGTH) EC tablet 81 mg  81 mg Oral Daily    bisacodyl (DULCOLAX) EC tablet 10 mg  10 mg Oral Once    cholecalciferol (VITAMIN D3) tablet 500 Units  500 Units Oral Daily    enoxaparin (LOVENOX) subcutaneous injection 100 mg  1 mg/kg Subcutaneous Q12H [address]    gabapentin (NEURONTIN) capsule 300 mg  300 mg Oral TID    melatonin tablet 3 mg  3 mg Oral HS    metoprolol tartrate (LOPRESSOR) tablet 25 mg  25 mg Oral Q12H [address]    morphine injection 2 mg  2 mg Intravenous Q4H PRN    ondansetron (ZOFRAN) injection 4 mg  4 mg Intravenous Q6H PRN    ondansetron (ZOFRAN-ODT) dispersible tablet 4 mg  4 mg Oral Q6H PRN    oxyCODONE (OxyCONTIN) 12 hr tablet 10 mg  10 mg Oral Q12H KATHARINA    oxyCODONE (ROXICODONE) immediate release tablet 10 mg  10 mg Oral Q4H PRN    pantoprazole (PROTONIX) EC tablet 40 mg  40 mg Oral BID AC    polyethylene glycol (GOLYTELY) bowel prep 4,000 mL  4,000 mL Oral See Admin Instructions    pravastatin (PRAVACHOL) tablet 40 mg  40 mg Oral Daily With Dinner    valACYclovir (VALTREX) tablet 1,000 mg  1,000 mg Oral BID       No Known Allergies    I have reviewed the patient's controlled substance dispensing history in the Prescription Drug Monitoring Program in compliance with the [facility] regulations before prescribing any controlled substances  Last fill:  9/13/21: OxycontinER 10mg, #60  7/29/21f: oxyIR 10mg, #120  6/14/21: gabapentin 100mg, #540      Objective   /85   Pulse 94   Temp 97 6 °F (36 4 °C)   Resp 17   Ht 5' 10" (1 778 m)   Wt 102 kg (224 lb 6 9 oz)   SpO2 95%   BMI 32 20 kg/m²   Physical Exam  Vitals and nursing note reviewed     Constitutional:       General: He is not in acute distress  Appearance: He is not ill-appearing, toxic-appearing or diaphoretic  HENT:      Head: Normocephalic and atraumatic  Nose: Nose normal       Mouth/Throat:      Mouth: Mucous membranes are moist       Pharynx: Oropharynx is clear  Eyes:      General: No scleral icterus  Extraocular Movements: Extraocular movements intact  Cardiovascular:      Rate and Rhythm: Normal rate  Pulmonary:      Effort: Pulmonary effort is normal  No respiratory distress  Breath sounds: No stridor  Abdominal:      General: There is no distension  Palpations: Abdomen is soft  Tenderness: There is no abdominal tenderness  Musculoskeletal:         General: No swelling  Normal range of motion  Cervical back: Normal range of motion  Right lower leg: No edema  Left lower leg: No edema  Skin:     General: Skin is warm and dry  Neurological:      General: No focal deficit present  Mental Status: He is alert and oriented to person, place, and time  Psychiatric:         Mood and Affect: Mood normal          Behavior: Behavior normal          Thought Content: Thought content normal          Judgment: Judgment normal          Lab Results:   I have personally reviewed pertinent labs  , CBC:   Lab Results   Component Value Date    WBC 5 77 09/19/2021    HGB 7 8 (L) 09/19/2021    HCT 24 7 (L) 09/19/2021    MCV 91 09/19/2021     09/19/2021    MCH 28 7 09/19/2021    MCHC 31 6 09/19/2021    RDW 19 0 (H) 09/19/2021    MPV 9 6 09/19/2021    NRBC 0 09/19/2021   , CMP:   Lab Results   Component Value Date    SODIUM 135 (L) 09/20/2021    K 3 5 09/20/2021     09/20/2021    CO2 26 09/20/2021    BUN 9 09/20/2021    CREATININE 0 97 09/20/2021    CALCIUM 8 3 09/20/2021    AST 33 09/20/2021    ALT 20 09/20/2021    ALKPHOS 348 (H) 09/20/2021    EGFR 84 09/20/2021   , PT/PTT:No results found for: PT, PTT  Imaging Studies: I have personally reviewed pertinent reports      EKG, Pathology, and Other Studies: I have personally reviewed pertinent reports  Counseling / Coordination of Care  Total floor / unit time spent today 75 minutes  Greater than 50% of total time was spent with the patient and / or family counseling and / or coordination of care  A description of the counseling / coordination of care: current condition, symptom management, mmj, process, Five Wishes document overview, possible outpatient f/u      Elsi Astudillo DO

## 2021-09-20 NOTE — PROGRESS NOTES
Progress Note - Infectious Disease   Cheryl Obrien 63 y o  male MRN: [id number]  Unit/Bed#: [facility] 711-01 Encounter: [id number]      Impression/Recommendations:  1  SIRS, POA   Fever, WBC   Suspect all due to acute PE  No evidence for infection   UA, flu/RSV/COVID PCR, blood cultures, CT C/A/P unremarkable   Clinically stable and nontoxic  Fever slowly improving without antibiotics  2  Acute PE  3  Metastatic prostate cancer   With radiographic progression  4  MALT lymphoma/CLL  5  Anemia  Consider GI blood loss in setting of #4   6  Postherpetic neuralgia      Continue to follow closely   Follow temperatures closely  Anticoagulation per primary service  EGD/c-scope per GI  The patient is stable from an ID standpoint  We will sign off for now  Please call with new questions      Antibiotics:  Off antibiotics #3    Subjective:  Patient seen on PM rounds  Denies fevers, chills, sweats, nausea, vomiting, or diarrhea  Having right sided pain due to prior history of shingles 4 weeks ago  24 Hour Events:  No documented fevers, chills, sweats, nausea, vomiting, or diarrhea  Objective:  Vitals:  HR:  [88-94] 94  Resp:  [17-18] 17  BP: (111-141)/(67-85) 141/85  SpO2:  [95 %-96 %] 95 %  No data recorded  Current: Temperature: 97 6 °F (36 4 °C)    Physical Exam:   General:  No acute distress  Psychiatric:  Awake and alert  Pulmonary:  Normal respiratory excursion without accessory muscle use  Abdomen:  Soft, nontender  Extremities:  No edema  Skin:  No rashes    Lab Results:  I have personally reviewed pertinent labs    Results from last 7 days   Lab Units 09/20/21  0519 09/18/21  0503 09/17/21  0654 09/15/21  1648   POTASSIUM mmol/L 3 5 3 4* 3 3* 3 6   CHLORIDE mmol/L 103 103 98* 95*   CO2 mmol/L 26 24 24 24   BUN mg/dL 9 11 15 20   CREATININE mg/dL 0 97 0 82 1 10 1 40*   EGFR ml/min/1 73sq m 84 95 72 54   CALCIUM mg/dL 8 3 7 5* 6 9* 7 6*   AST U/L 33 29  --  91*   ALT U/L 20 14  --  16   ALK PHOS U/L 348* 307*  --  638*     Results from last 7 days   Lab Units 09/19/21  1939 09/19/21  1101 09/18/21  1009   WBC Thousand/uL 5 77 5 56 6 48   HEMOGLOBIN g/dL 7 8* 7 6* 7 9*   PLATELETS Thousands/uL 206 207 207     Results from last 7 days   Lab Units 09/15/21  1330 09/15/21  1320   BLOOD CULTURE  No Growth After 5 Days  No Growth After 5 Days  Imaging Studies:   I have personally reviewed pertinent imaging study reports and images in PACS  EKG, Pathology, and Other Studies:   I have personally reviewed pertinent reports

## 2021-09-20 NOTE — ASSESSMENT & PLAN NOTE
Discussed with ID  No clear source - continues to be febrile however ? Cancer/cancer therapy related/clot related  ABx discontinued  Continue to monitor off antibiotics, so far stable  Follow up cultures

## 2021-09-20 NOTE — PROGRESS NOTES
PULMONOLOGY PROGRESS NOTE     Name: Ewa Ramos   Age & Sex: 63 y o  male   MRN: [id number]  Unit/Bed#: [facility] 711-01   Encounter: [id number]    PATIENT INFORMATION     Name: Ewa Ramos   Age & Sex: 63 y o  male   MRN: [id number]  Hospital Stay Days: 5    ASSESSMENT/PLAN     Assessment:  Bilateral PE  Anemia of unknown origin  Metastatic prostate cancer  Bone metastatsis  MALT lymphoma    Plan:  Continue with LMWH for now  For EGD tomorrow  Hold anticoagulation as per GI recommendations  Will need life long anticoagulation  Change to oral NOAC if able pending EGD findings  If no immediate causes of anemia present from EGD nor any other evidence of GI bleeding would proceed with anticoagulation in the form of a NOAC agent  Only if there is an obvious reason to not systemically anticoagulate would the consideration for IVC filter be applicable  SUBJECTIVE     Patient seen and examined  No acute events overnight  No acute complaints  ROS unremarkable  OBJECTIVE     Vitals:    21 2217 21 0749 21 0800   BP: 111/68 111/67 141/85 141/85   Pulse:  93 94    Resp:  18 17    Temp:       TempSrc:       SpO2:  96% 95%    Weight:       Height:          Temperature:   Temp (24hrs), Av 6 °F (36 4 °C), Min:97 6 °F (36 4 °C), Max:97 6 °F (36 4 °C)    Temperature: 97 6 °F (36 4 °C)  Intake & Output:  I/O        07 -  0700  07 -  0700  0700    P  O  480      Blood       Total Intake(mL/kg) 480 (4 7)      Urine (mL/kg/hr) 770 (0 3)      Total Output 770      Net -290                 Weights:   IBW (Ideal Body Weight): 73 kg    Body mass index is 32 2 kg/m²  Weight (last 2 days)     None        Physical Exam  Vitals and nursing note reviewed  Constitutional:       General: He is not in acute distress  Appearance: Normal appearance  He is well-developed and normal weight  He is not ill-appearing or toxic-appearing     HENT:      Head: Normocephalic and atraumatic  Right Ear: External ear normal       Left Ear: External ear normal       Nose: Nose normal  No congestion or rhinorrhea  Mouth/Throat:      Mouth: Mucous membranes are moist       Pharynx: Oropharynx is clear  Eyes:      General: No scleral icterus  Extraocular Movements: Extraocular movements intact  Conjunctiva/sclera: Conjunctivae normal       Pupils: Pupils are equal, round, and reactive to light  Cardiovascular:      Rate and Rhythm: Normal rate and regular rhythm  Pulses: Normal pulses  Heart sounds: Normal heart sounds  No murmur heard  No friction rub  No gallop  Pulmonary:      Effort: Pulmonary effort is normal  No respiratory distress  Breath sounds: Normal breath sounds  No wheezing, rhonchi or rales  Abdominal:      General: Abdomen is flat  Bowel sounds are normal  There is no distension  Palpations: Abdomen is soft  There is no mass  Tenderness: There is no abdominal tenderness  There is no guarding or rebound  Musculoskeletal:         General: No swelling or tenderness  Normal range of motion  Cervical back: Normal range of motion and neck supple  No tenderness  Right lower leg: No edema  Left lower leg: No edema  Skin:     General: Skin is warm and dry  Findings: No lesion or rash  Neurological:      General: No focal deficit present  Mental Status: He is alert and oriented to person, place, and time  Mental status is at baseline  Cranial Nerves: No cranial nerve deficit  Sensory: No sensory deficit  Motor: No weakness  LABORATORY DATA     Labs: I have personally reviewed pertinent reports    Results from last 7 days   Lab Units 09/19/21  1939 09/19/21  1101 09/18/21  1009   WBC Thousand/uL 5 77 5 56 6 48   HEMOGLOBIN g/dL 7 8* 7 6* 7 9*   HEMATOCRIT % 24 7* 23 6* 24 3*   PLATELETS Thousands/uL 206 207 207   NEUTROS PCT % 78* 78* 82*   MONOS PCT % 6 6 5      Results from last 7 days   Lab Units 09/20/21  0519 09/18/21  0503 09/17/21  0654 09/15/21  1648   POTASSIUM mmol/L 3 5 3 4* 3 3* 3 6   CHLORIDE mmol/L 103 103 98* 95*   CO2 mmol/L 26 24 24 24   BUN mg/dL 9 11 15 20   CREATININE mg/dL 0 97 0 82 1 10 1 40*   CALCIUM mg/dL 8 3 7 5* 6 9* 7 6*   ALK PHOS U/L 348* 307*  --  638*   ALT U/L 20 14  --  16   AST U/L 33 29  --  91*              Results from last 7 days   Lab Units 09/15/21  1648   INR  1 50*   PTT seconds 35     Results from last 7 days   Lab Units 09/15/21  1320   LACTIC ACID mmol/L 1 2     Results from last 7 days   Lab Units 09/17/21  0102 09/16/21  2127 09/15/21  1648   TROPONIN I ng/mL <0 02 0 02 0 07*       IMAGING & DIAGNOSTIC TESTING     Radiology Results: I have personally reviewed pertinent reports  XR chest portable    Result Date: 9/15/2021  Impression: No acute cardiopulmonary disease  Workstation performed: [id number]     CTA ED chest PE Study    Result Date: 9/15/2021  Impression: Right middle lobe, lingular, and left upper lobe segmental pulmonary emboli are noted  RV LV ratio is approximately 0 9, this is the cut off for RV dysfunction/right heart strain  Recommend echocardiography for further evaluation  Mediastinal and bilateral hilar, left greater than right, adenopathy is again noted    8 mm nodule in the right middle lobe; considering the history of neoplasm this raises the possibility of metastasis  Lingular opacity abutting the left heart has increased in size suggesting developing atelectasis or infiltrate  Linear atelectatic changes in the lung bases are again noted  I personally discussed this study with Dr Angelique Bailey on 9/15/2021 at 9:01 PM  Workstation performed: [id number]     Other Diagnostic Testing: I have personally reviewed pertinent reports      ACTIVE MEDICATIONS     Current Facility-Administered Medications   Medication Dose Route Frequency    acetaminophen (TYLENOL) tablet 650 mg  650 mg Oral Q6H PRN    aspirin (ECOTRIN LOW STRENGTH) EC tablet 81 mg  81 mg Oral Daily    bisacodyl (DULCOLAX) EC tablet 10 mg  10 mg Oral Once    cholecalciferol (VITAMIN D3) tablet 500 Units  500 Units Oral Daily    enoxaparin (LOVENOX) subcutaneous injection 100 mg  1 mg/kg Subcutaneous Q12H [facility]    gabapentin (NEURONTIN) capsule 300 mg  300 mg Oral TID    lidocaine (LMX) 4 % cream   Topical 4x Daily PRN    melatonin tablet 3 mg  3 mg Oral HS    metoprolol tartrate (LOPRESSOR) tablet 25 mg  25 mg Oral Q12H [facility]    morphine injection 2 mg  2 mg Intravenous Q4H PRN    ondansetron (ZOFRAN) injection 4 mg  4 mg Intravenous Q6H PRN    ondansetron (ZOFRAN-ODT) dispersible tablet 4 mg  4 mg Oral Q6H PRN    oxyCODONE (OxyCONTIN) 12 hr tablet 10 mg  10 mg Oral Q12H KATHARINA    oxyCODONE (ROXICODONE) immediate release tablet 10 mg  10 mg Oral Q4H PRN    pantoprazole (PROTONIX) EC tablet 40 mg  40 mg Oral BID AC    polyethylene glycol (GOLYTELY) bowel prep 4,000 mL  4,000 mL Oral See Admin Instructions    pravastatin (PRAVACHOL) tablet 40 mg  40 mg Oral Daily With Dinner    valACYclovir (VALTREX) tablet 1,000 mg  1,000 mg Oral BID       VTE Pharmacologic Prophylaxis: Enoxaparin (Lovenox)  VTE Mechanical Prophylaxis: sequential compression device      Disclaimer: Portions of the record may have been created with voice recognition software  Occasional wrong word or "sound a like" substitutions may have occurred due to the inherent limitations of voice recognition software  Careful consideration should be taken to recognize, using context, where substitutions have occurred  Devaughn Owens DO  Pulmonary and Critical Care Fellow, PGY-IV  [facility]

## 2021-09-20 NOTE — RESTORATIVE TECHNICIAN NOTE
Restorative Technician Note      Patient Name: Mando Alonzo     Note Type: Mobility (Educated/encouraged pt to ambulate with assistance 3-4 x's/day, pt states he is ambulating Independently in the halls with his family nursing aware )  Patient Position Upon Consult: Supine      Clifton ESPARZA, Restorative Technician, [employer] [facility]

## 2021-09-20 NOTE — ASSESSMENT & PLAN NOTE
Has been having pain to his bilateral hips, radiates down his left knee  Also having pain around the upper shoulders, which has improved  It is chronic, started last year  Has received radiation to the sacrum previously, which has helped his pain in the past    Seems to be chronic in nature, will treat pt symptomatically  Takes oxycodone at home  Will start 10 mg oxycontin BID and prn oxycodone additionally  Appreciated palliative care recommendation, no further recommendation at this time, the patient would like to follow-up with primary oncologist as an outpatient also for symptomatic management, team will continue to follow p r n  Well the patient in the hospital if any new issues  zofran only for antiemetics for this patient   Would avoid dexamethasone in the setting of ? GIB however, if endoscopy is negative consider start 4 mg of dexamethasone twice a day

## 2021-09-21 PROBLEM — R65.10 SIRS (SYSTEMIC INFLAMMATORY RESPONSE SYNDROME) (HCC): Status: ACTIVE | Noted: 2021-01-01

## 2021-09-21 NOTE — ASSESSMENT & PLAN NOTE
POA, fever with leukocytosis  Suspected old due to acute PE  No evidence of infection   Continue to monitor off antibiotics  ID signed off

## 2021-09-21 NOTE — ASSESSMENT & PLAN NOTE
Multifactorial ? GI loss MALT and steroid use/marrow stable hemoglobin   Will continue to trend, , status post 1 unit of transfusion 3 days ago when hemoglobin 6 4, hemoglobin has remained stable since the time of transfusion  For EGD and colonoscopy  today  No evidence of hemolysis     If endoscopy negative consider anticoagulation to be discussed with primary oncologist

## 2021-09-21 NOTE — ASSESSMENT & PLAN NOTE
Has been having pain to his bilateral hips, radiates down his left knee  Also having pain around the upper shoulders, which has improved  It is chronic, started last year  Has received radiation to the sacrum previously, which has helped his pain in the past    Seems to be chronic in nature, will treat pt symptomatically  Takes oxycodone at home    Palliative care input appreciated

## 2021-09-21 NOTE — PLAN OF CARE
Problem: RESPIRATORY - ADULT  Goal: Achieves optimal ventilation and oxygenation  Description: INTERVENTIONS:  - Assess for changes in respiratory status  - Assess for changes in mentation and behavior  - Position to facilitate oxygenation and minimize respiratory effort  - Oxygen administered by appropriate delivery if ordered  - Initiate smoking cessation education as indicated  - Encourage broncho-pulmonary hygiene including cough, deep breathe, Incentive Spirometry  - Assess the need for suctioning and aspirate as needed  - Assess and instruct to report SOB or any respiratory difficulty  - Respiratory Therapy support as indicated  Outcome: Progressing     Problem: METABOLIC, FLUID AND ELECTROLYTES - ADULT  Goal: Electrolytes maintained within normal limits  Description: INTERVENTIONS:  - Monitor labs and assess patient for signs and symptoms of electrolyte imbalances  - Administer electrolyte replacement as ordered  - Monitor response to electrolyte replacements, including repeat lab results as appropriate  - Instruct patient on fluid and nutrition as appropriate  Outcome: Progressing

## 2021-09-21 NOTE — ASSESSMENT & PLAN NOTE
Hx of MALT lymphoma of the stomach initially 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, H  Pylori +, status post triple antibiotic course at that time, who has been followed with surveillance EGD, and found to have progressive disease with second gastric lesion   Was due for an EGD on 920 however was cancelled secondary to pt's fever, hypoxemia, and tachycardia  Await EGD and C-scope today

## 2021-09-21 NOTE — PROGRESS NOTES
[address] [facility]  Progress Note Jacqueline Guillen 1960, 63 y o  male MRN: [id number]  Unit/Bed#: [facility] 804-01 Encounter: [id number]  Primary Care Provider: Oscar Ritchie MD   Date and time admitted to hospital: 9/15/2021  1:30 PM    * SIRS (systemic inflammatory response syndrome) ([facility] [address] )  Assessment & Plan  POA, fever with leukocytosis  Suspected old due to acute PE  No evidence of infection   Continue to monitor off antibiotics  ID signed off      Pulmonary embolus ([facility] [address] )  Assessment & Plan  Continues with CP  ? Related to PE  Troponin x 3 negative  Continue on tele  Dilated RV on Echo   As of Sunday plan is for lovenox or arixtra upon Discharge  Please tiger text Patel Alaniz MD, patients primary heme onc  to find out what [facility] she would prefer  Her office has already cost checked arixtra, but they may be able to do lovenox as well  Ultimate decision up to primary onc team as they will be managing  Currently on Lovenox  Pulmonology is following      Anemia  Assessment & Plan  Multifactorial ? 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)  Assessment & Plan  Has been having pain to his bilateral hips, radiates down his left knee  Also having pain around the upper shoulders, which has improved  It is chronic, started last year  Has received radiation to the sacrum previously, which has helped his pain in the past    Seems to be chronic in nature, will treat pt symptomatically  Takes oxycodone at home    Palliative care input appreciated    Prostate cancer [facility])  Assessment & Plan  Discussed with primary onoclogist  - outpatient follow up  No evidence of hemolysis  MALT lymphoma ([facility] [address] )  Assessment & Plan  Hx of MALT lymphoma of the stomach initially diagnosed in 2018, H  Pylori +, status post triple antibiotic course at that time, who has been followed with surveillance EGD, and found to have progressive disease with second gastric lesion  Was due for an EGD on 920 however was cancelled secondary to pt's fever, hypoxemia, and tachycardia  Await EGD and C-scope today        VTE Pharmacologic Prophylaxis:   Lovenox    Patient Centered Rounds: I performed bedside rounds with nursing staff today  Discussions with Specialists or Other Care Team Provider:     Education and Discussions with Family / Patient:  Patient    Time Spent for Care: 45 minutes  More than 50% of total time spent on counseling and coordination of care as described above  Current Length of Stay: 6 day(s)  Current Patient Status: Inpatient   Certification Statement: The patient will continue to require additional inpatient hospital stay due to Above  Discharge Plan: Anticipate discharge tomorrow to home  Code Status: Level 1 - Full Code    Subjective:   Patient seen and examined  Comfortable in bed  NPO for EGD and colonoscopy today    Objective:     Vitals:   Temp (24hrs), Av 7 °F (37 1 °C), Min:98 2 °F (36 8 °C), Max:99 6 °F (37 6 °C)    Temp:  [98 2 °F (36 8 °C)-99 6 °F (37 6 °C)] 98 5 °F (36 9 °C)  HR:  [81-91] 81  Resp:  [16-20] 16  BP: (120-144)/(79-88) 135/88  SpO2:  [94 %-98 %] 94 %  Body mass index is 32 2 kg/m²       Input and Output Summary (last 24 hours):   No intake or output data in the 24 hours ending 21 0844    Physical Exam:   Physical Exam   Patient is awake alert oriented in no acute distress  Lung clear to auscultation bilateral anteriorly  Heart positive S1-S2 no murmur  Abdomen soft nontender  Lower extremities no edema    Additional Data:     Labs:  Results from last 7 days   Lab Units 21  1939 21  1853   WBC Thousand/uL 5 77 7 59   HEMOGLOBIN g/dL 7 8* 6 4*   HEMATOCRIT % 24 7* 20 0*   PLATELETS Thousands/uL 206 194   BANDS PCT %  --  7   NEUTROS PCT % 78*  --    LYMPHS PCT % 9*  --    LYMPHO PCT %  --  0*   MONOS PCT % 6  --    MONO PCT %  --  1*   EOS PCT % 4 4     Results from last 7 days   Lab Units 09/20/21  0519   SODIUM mmol/L 135*   POTASSIUM mmol/L 3 5   CHLORIDE mmol/L 103   CO2 mmol/L 26   BUN mg/dL 9   CREATININE mg/dL 0 97   ANION GAP mmol/L 6   CALCIUM mg/dL 8 3   ALBUMIN g/dL 2 2*   TOTAL BILIRUBIN mg/dL 0 48   ALK PHOS U/L 348*   ALT U/L 20   AST U/L 33   GLUCOSE RANDOM mg/dL 98     Results from last 7 days   Lab Units 09/15/21  1648   INR  1 50*             Results from last 7 days   Lab Units 09/16/21  0524 09/15/21  1648 09/15/21  1320   LACTIC ACID mmol/L  --   --  1 2   PROCALCITONIN ng/ml 0 93* 0 83*  --        Lines/Drains:  Invasive Devices     Peripheral Intravenous Line            Peripheral IV 09/21/21 Distal;Left;Ventral (anterior) Forearm <1 day                      Imaging: No pertinent imaging reviewed  Recent Cultures (last 7 days):   Results from last 7 days   Lab Units 09/15/21  1330 09/15/21  1320   BLOOD CULTURE  No Growth After 5 Days  No Growth After 5 Days         Last 24 Hours Medication List:   Current Facility-Administered Medications   Medication Dose Route Frequency Provider Last Rate    acetaminophen  650 mg Oral Q6H PRN Jhony Lawrence, PA-C      aspirin  81 mg Oral Daily Mira Velarde MD      cholecalciferol  500 Units Oral Daily Mira Velarde MD      dextrose 5 % and sodium chloride 0 45 %  50 mL/hr Intravenous Continuous Leopoldo Conquest, DO      enoxaparin  1 mg/kg Subcutaneous Q12H [address] Allie Quintero MD      gabapentin  300 mg Oral TID Mira Velarde MD      lidocaine   Topical 4x Daily PRN Unk MD Mahad      melatonin  3 mg Oral HS Jhony Lawrence, PA-C      metoprolol tartrate  25 mg Oral Q12H Lillian Bob MD      morphine injection  2 mg Intravenous Q4H PRN Hortencia Mckeon MD      ondansetron  4 mg Intravenous Q6H PRN Charles Perez MD      ondansetron  4 mg Oral Q6H PRN Roly Gomez MD      oxyCODONE  10 mg Oral Q12H [address] Roly Gomez MD      oxyCODONE  10 mg Oral Q4H PRN Charles Perez MD      pantoprazole  40 mg Oral BID AC Alcira Liz MD      polyethylene glycol  4,000 mL Oral See Admin Instructions Kenisha Avilez MD      pravastatin  40 mg Oral Daily With Alethea Hightower MD      valACYclovir  1,000 mg Oral BID Charles Perez MD          Today, Patient Was Seen By: Carolyn Lugo DO    **Please Note: This note may have been constructed using a voice recognition system  **

## 2021-09-21 NOTE — DISCHARGE INSTRUCTIONS
Upper Endoscopy   WHAT YOU NEED TO KNOW:   An upper endoscopy is also called an upper gastrointestinal (GI) endoscopy, or an esophagogastroduodenoscopy (EGD)  It is a procedure to examine the inside of your esophagus, stomach, and duodenum (first part of the small intestine) with a scope  You may feel bloated, gassy, or have some abdominal discomfort after your procedure  Your throat may be sore for 24 to 36 hours  You may burp or pass gas from air that is still inside your body  DISCHARGE INSTRUCTIONS:   Seek care immediately if:    You have sudden, severe abdominal pain   You have problems swallowing   You have a large amount of black, sticky bowel movements or blood in your bowel movements   You have sudden trouble breathing   You feel weak, lightheaded, or faint or your heart beats faster than normal for you  Contact your healthcare provider if:    You have a fever and chills   You have nausea or are vomiting   Your abdomen is bloated or feels full and hard   You have abdominal pain   You have black, sticky bowel movements or blood in your bowel movements   You have not had a bowel movement for 3 days after your procedure   You have rash or hives   You have questions or concerns about your procedure  Activity:    Do not lift, strain, or run for 24 hours after your procedure   Rest after your procedure  You have been given medicine to relax you  Do not drive or make important decisions until the day after your procedure  Return to your normal activity as directed   Relieve gas and discomfort from bloating by lying on your right side with a heating pad on your abdomen  You may need to take short walks to help the gas move out  Eat small meals until bloating is relieved  Follow up with your healthcare provider as directed: Write down your questions so you remember to ask them during your visits       If you take a blood thinner, please review the specific instructions from your endoscopist about when you should resume it  These can be found in the Recommendation and Your Medication list sections of this After Visit Summary  Upper Endoscopic Gastrointestinal Ultrasonography   WHAT YOU NEED TO KNOW:   An upper gastrointestinal endoscopic ultrasound is done to look at the different parts of your upper gastrointestinal (GI) tract  The upper GI tract includes the esophagus, stomach, and duodenum (first part of the small intestine)  This procedure is used to help diagnose and treat diseases that affect the upper GI tract  DISCHARGE INSTRUCTIONS:   Seek care immediately if:   · You have sudden, severe abdominal pain  · You have problems swallowing  · You have a large amount of black, sticky bowel movements or blood in your bowel movements  · You have sudden trouble breathing  · You feel weak, lightheaded, or faint or your heart beats faster than normal for you  Contact your healthcare provider if:   · You have a fever and chills  · You have nausea or are vomiting  · Your abdomen is bloated or feels full and hard  · You have abdominal pain  · You have a large amount of black, sticky bowel movements or blood in your bowel movements  · You have not had a bowel movement for 3 days after your procedure  · You have rash or hives  · You lose your appetite, your skin feels itchy, and your skin turns yellow  · You have questions or concerns about your procedure  Self-care:   ·      Rest when you feel it is needed  You may be drowsy for up to 24 hours after your procedure  Return to your daily activities as directed  ·       Ask when you can eat regular foods  Healthy foods include fruits, vegetables, whole-grain breads, low-fat dairy products, beans, lean meats, and fish  ·       Relieve a sore throat with ice chips, liquids, or lozenges as directed       Follow up with your healthcare provider as directed: Write down your questions so you remember to ask them during your visits  If you take a blood thinner, please review the specific instructions from your endoscopist about when you should resume it  These can be found in the Recommendation and Your Medication list sections of this After Visit Summary  Colonoscopy   WHAT YOU NEED TO KNOW:   A colonoscopy is a procedure to examine the inside of your colon (intestine) with a scope  Polyps or tissue growths may have been removed during your colonoscopy  It is normal to feel bloated and to have some abdominal discomfort  You should be passing gas  If you have hemorrhoids or you had polyps removed, you may have a small amount of bleeding  DISCHARGE INSTRUCTIONS:   Seek care immediately if:    You have sudden, severe abdominal pain   You have problems swallowing   You have a large amount of black, sticky bowel movements or blood in your bowel movements   You have sudden trouble breathing   You feel weak, lightheaded, or faint or your heart beats faster than normal for you  Contact your healthcare provider if:    You have a fever and chills   You have nausea or are vomiting   Your abdomen is bloated or feels full and hard   You have abdominal pain   You have black, sticky bowel movements or blood in your bowel movements   You have not had a bowel movement for 3 days after your procedure   You have rash or hives   You have questions or concerns about your procedure  Activity:    Do not lift, strain, or run for 24 hours after your procedure   Rest after your procedure  You have been given medicine to relax you  Do not drive or make important decisions until the day after your procedure  Return to your normal activity as directed   Relieve gas and discomfort from bloating by lying on your right side with a heating pad on your abdomen   You may need to take short walks to help the gas move out  Eat small meals until bloating is relieved  Follow up with your healthcare provider as directed: Write down your questions so you remember to ask them during your visits  If you take a blood thinner, please review the specific instructions from your endoscopist about when you should resume it  These can be found in the Recommendation and Your Medication list sections of this After Visit Summary

## 2021-09-21 NOTE — ASSESSMENT & PLAN NOTE
Continues with CP  ? Related to PE  Troponin x 3 negative  Continue on tele  Dilated RV on Echo   As of Sunday plan is for lovenox or arixtra upon Discharge  Please tiger text Rosa Dasilva MD, patients primary heme onc  to find out what [facility] [place] she would prefer  Her office has already cost checked arixtra, but they may be able to do lovenox as well  Ultimate decision up to primary onc team as they will be managing       Currently on Lovenox  Pulmonology is following

## 2021-09-21 NOTE — ANESTHESIA POSTPROCEDURE EVALUATION
Post-Op Assessment Note    CV Status:  Stable  Pain Score: 0    Pain management: adequate     Mental Status:  Alert and sleepy   Hydration Status:  Euvolemic   PONV Controlled:  None   Airway Patency:  Patent      Post Op Vitals Reviewed: Yes      Staff: CRNA         No complications documented      BP (!) 91/48 (09/21/21 1649)    Temp (!) 96 6 °F (35 9 °C) (09/21/21 1649)    Pulse 74 (09/21/21 1649)   Resp 18 (09/21/21 1649)    SpO2 97 % (09/21/21 1649)

## 2021-09-21 NOTE — ANESTHESIA PREPROCEDURE EVALUATION
Procedure:  EGD  COLONOSCOPY  ENDOSCOPIC ULTRASOUND (UPPER)    Relevant Problems   ANESTHESIA (within normal limits)      CARDIO   (+) Pure hypercholesterolemia      ENDO   (-) Diabetes mellitus, type 2 (HCC)   (-) Hyperthyroidism   (-) Hypothyroidism      GI/HEPATIC   (-) Gastroesophageal reflux disease      /RENAL   (+) Prostate cancer (HCC)      HEMATOLOGY  Current PE, on AC   (+) Anemia   (+) MALT lymphoma (HCC)      MUSCULOSKELETAL (within normal limits)      NEURO/PSYCH   (-) CVA (cerebral vascular accident) ([address] )   (-) Seizures ([facility] [address] )      PULMONARY  Acute PE, on AC, ECHO reviewed   (-) Asthma   (-) Chronic obstructive pulmonary disease (HCC)   (-) Sleep apnea        TTE 9/16/2021   Indications: Pulmonary Embolism     Diagnoses: I26 09 - Other pulmonary embolism with acute cor pulmonale      SUMMARY     LEFT VENTRICLE:  Systolic function was normal  Ejection fraction was estimated to be 64 %  There were no regional wall motion abnormalities  Wall thickness was mildly increased  There was mild concentric hypertrophy  Doppler parameters were consistent with abnormal left ventricular relaxation (grade 1 diastolic dysfunction)      VENTRICULAR SEPTUM:  There was dyssynergic motion  These changes are consistent with RV pressure overload      RIGHT VENTRICLE:  The ventricle was moderately dilated      LEFT ATRIUM:  The atrium was mildly dilated      MITRAL VALVE:  There was mild regurgitation      AORTIC VALVE:  There was mild regurgitation      TRICUSPID VALVE:  There was mild to moderate regurgitation  Estimated peak PA pressure was 50 mmHg      AORTA:  There was mild dilatation of the ascending aorta  The ascending aortic AP dimension was 40 mm        Physical Exam    Airway    Mallampati score: III  TM Distance: >3 FB  Neck ROM: full     Dental       Cardiovascular  Rhythm: regular, Rate: normal, No murmur,     Pulmonary  Breath sounds clear to auscultation,     Other Findings        Anesthesia Plan  ASA Score- 3     Anesthesia Type- IV sedation with anesthesia with ASA Monitors  Additional Monitors:   Airway Plan:           Plan Factors-Exercise tolerance (METS): >4 METS  Chart reviewed  EKG reviewed  Existing labs reviewed  Patient summary reviewed  Patient is not a current smoker  Induction- intravenous  Postoperative Plan-     Informed Consent- Anesthetic plan and risks discussed with patient  I personally reviewed this patient with the CRNA  Discussed and agreed on the Anesthesia Plan with the CRNA  Lalitha Salazar

## 2021-09-22 NOTE — DISCHARGE SUMMARY
[address] [facility]  Discharge- Jennifer Phi 1960, 63 y o  male MRN: [id number]  Unit/Bed#: [facility] 804-01 Encounter: [id number]  Primary Care Provider: Tyrone Ordoñez MD   Date and time admitted to hospital: 9/15/2021  1:30 PM    * SIRS (systemic inflammatory response syndrome) ([facility] [address] )  Assessment & Plan  POA, fever with leukocytosis  Suspected old due to acute PE  No evidence of infection   Continue to monitor off antibiotics  ID signed off      Pulmonary embolus ([facility] [address] )  Assessment & Plan  Continues with CP  ? Related to PE  Troponin x 3 negative  Continue on tele  Dilated RV on Echo   As of Sunday plan is for lovenox or arixtra upon Discharge  Please tiger text Ike Wilson MD, patients primary heme onc  to find out what [facility] she would prefer  Her office has already cost checked arixtra, but they may be able to do lovenox as well  Ultimate decision up to primary onc team as they will be managing  Currently on Lovenox  Pulmonology is following  Patient will be discharged on Lovenox to follow-up with oncology as an outpatient to decide about the anticoagulation course      Anemia  Assessment & Plan  Multifactorial ? GI loss MALT and steroid use/marrow stable hemoglobin   Will continue to trend, , status post 1 unit of transfusion 3 days ago when hemoglobin 6 4, hemoglobin has remained stable since the time of transfusion  Status post normal EGD and colonoscopy on 921  No evidence of hemolysis  Osseous metastasis ([facility] [address] )  Assessment & Plan  Has been having pain to his bilateral hips, radiates down his left knee  Also having pain around the upper shoulders, which has improved  It is chronic, started last year  Has received radiation to the sacrum previously, which has helped his pain in the past    Seems to be chronic in nature, will treat pt symptomatically  Takes oxycodone at home    Palliative care input appreciated    Prostate cancer [facility])  Assessment & Plan  Discussed with primary onoclogist  - outpatient follow up  No evidence of hemolysis  MALT lymphoma ([facility] [address] )  Assessment & Plan  Hx of MALT lymphoma of the stomach initially 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, H  Pylori +, status post triple antibiotic course at that time, who has been followed with surveillance EGD, and found to have progressive disease with second gastric lesion  Was due for an EGD on 9/20 however was cancelled secondary to pt's fever, hypoxemia, and tachycardia  Status post EUS lymphadenopathy biopsy  Outpatient Oncology follow-up    Medical Problems     Resolved Problems  Date Reviewed: 9/22/2021        Resolved    Fever 9/16/2021     Resolved by  Virginia Watson MD              Discharging Physician / Practitioner: Shanita Rodas DO  PCP: Janna Lennox, MD  Admission Date:   Admission Orders (From admission, onward)     Ordered        09/15/21 1825  Inpatient Admission  Once                   Discharge Date: 09/21/21    Consultations During Hospital Stay:  · GI  · Nephrology  · Pulmonology  · Palliative care  · Infectious disease    Procedures Performed:   · Normal EGD and colonoscopy   · Cardiac echo with EF 64% right ventricle moderately dilated  · Endoscopy ultrasound mediastinal lymphadenopathy fine needle biopsy    Significant Findings / Test Results:   · As above    Incidental Findings:   · As above    Test Results Pending at Discharge (will require follow up):    · Fine-needle biopsy     Outpatient Tests Requested:  · Outpatient Oncology follow-up    Complications:  None    Reason for Admission:   SIRS secondary to acute PE    Hospital Course:   Preet Rivas is a 63 y o  male patient who originally presented to the hospital on 9/15/2021 due to fever and tachycardia  Evaluation in the ED showed massive PE  Patient was treated with Lovenox  Patient was evaluated by GI due to anemia, he underwent EGD and colonoscopy with normal finding he also underwent EUS biopsy for lymphadenopathy    Patient was stable after the scope and he was discharged home by my partner on Lovenox to follow-up with his oncologist as an outpatient      Please see above list of diagnoses and related plan for additional information  Condition at Discharge: stable      Discharge instructions/Information to patient and family:   See after visit summary for information provided to patient and family  Provisions for Follow-Up Care:  See after visit summary for information related to follow-up care and any pertinent home health orders  Disposition:   Home    Planned Readmission: no     Discharge Statement:  I spent 45 minutes discharging the patient  This time was spent on the day of discharge  I had direct contact with the patient on the day of discharge  Greater than 50% of the total time was spent examining patient, answering all patient questions, arranging and discussing plan of care with patient as well as directly providing post-discharge instructions  Additional time then spent on discharge activities  Discharge Medications:  See after visit summary for reconciled discharge medications provided to patient and/or family        **Please Note: This note may have been constructed using a voice recognition system**

## 2021-09-22 NOTE — ASSESSMENT & PLAN NOTE
Hx of MALT lymphoma of the stomach initially 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, H  Pylori +, status post triple antibiotic course at that time, who has been followed with surveillance EGD, and found to have progressive disease with second gastric lesion   Was due for an EGD on 9/20 however was cancelled secondary to pt's fever, hypoxemia, and tachycardia  Status post EUS lymphadenopathy biopsy  Outpatient Oncology follow-up

## 2021-09-22 NOTE — ASSESSMENT & PLAN NOTE
Continues with CP  ? Related to PE  Troponin x 3 negative  Continue on tele  Dilated RV on Echo   As of Sunday plan is for lovenox or arixtra upon Discharge  Please tiger text Anthony Lucero MD, patients primary heme onc  to find out what [facility] she would prefer  Her office has already cost checked arixtra, but they may be able to do lovenox as well  Ultimate decision up to primary onc team as they will be managing       Currently on Lovenox  Pulmonology is following  Patient will be discharged on Lovenox to follow-up with oncology as an outpatient to decide about the anticoagulation course

## 2021-09-22 NOTE — UTILIZATION REVIEW
Notification of Discharge   This is a Notification of Discharge from our facility [address]  Please be advised that this patient has been discharge from our facility  Below you will find the admission and discharge date and time including the patients disposition  UTILIZATION REVIEW CONTACT:  Armando Borges  Utilization   Network Utilization Review Department  Phone: [phone] x carefully listen to the prompts  All voicemails are confidential   Email: [email or web address]     PHYSICIAN ADVISORY SERVICES:  FOR [id number] REVIEW - MEDICAL NECESSITY DENIAL  Phone: [phone]  Fax: [phone]  Email: [email or web address]     PRESENTATION DATE: 9/15/2021  1:30 PM  OBERVATION ADMISSION DATE:  INPATIENT ADMISSION DATE: 9/15/21  6:25 PM   DISCHARGE DATE: 9/21/2021  8:51 PM  DISPOSITION: Home/Self Care Home/Self Care      IMPORTANT INFORMATION:  Send all requests for admission clinical reviews, approved or denied determinations and any other requests to dedicated fax number below belonging to the campus where the patient is receiving treatment   List of dedicated fax numbers:  [address] DENIALS (Administrative/Medical Necessity) [phone]   [address] (Maternity/NICU/Pediatrics) [phone]   Sabas Orn [phone]   Daron Levin [phone]   Joe Wellington [phone]   Sami Mcqueen [facility] [address] [place] [address] [phone]   [facility]  [phone]   [address] [facility]  [address] [facility] [address] [facility] [phone]

## 2021-09-22 NOTE — ASSESSMENT & PLAN NOTE
Multifactorial ? GI loss MALT and steroid use/marrow stable hemoglobin   Will continue to trend, , status post 1 unit of transfusion 3 days ago when hemoglobin 6 4, hemoglobin has remained stable since the time of transfusion  Status post normal EGD and colonoscopy on 921  No evidence of hemolysis

## 2022-01-01 ENCOUNTER — HOSPITAL ENCOUNTER (OUTPATIENT)
Dept: INFUSION CENTER | Facility: HOSPITAL | Age: 62
Discharge: HOME | End: 2022-03-01
Payer: COMMERCIAL

## 2022-01-01 ENCOUNTER — ANESTHESIA EVENT (INPATIENT)
Dept: RADIOLOGY | Facility: HOSPITAL | Age: 62
DRG: 871 | End: 2022-01-01
Payer: COMMERCIAL

## 2022-01-01 ENCOUNTER — ANESTHESIA (INPATIENT)
Dept: RADIOLOGY | Facility: HOSPITAL | Age: 62
DRG: 871 | End: 2022-01-01
Payer: COMMERCIAL

## 2022-01-01 ENCOUNTER — HOSPITAL ENCOUNTER (INPATIENT)
Facility: HOSPITAL | Age: 62
LOS: 5 days | Discharge: HOSPICE | DRG: 871 | End: 2022-03-16
Attending: FAMILY MEDICINE | Admitting: EMERGENCY MEDICINE
Payer: COMMERCIAL

## 2022-01-01 ENCOUNTER — LAB REQUISITION (OUTPATIENT)
Dept: LAB | Facility: HOSPITAL | Age: 62
End: 2022-01-01
Payer: COMMERCIAL

## 2022-01-01 ENCOUNTER — HOSPITAL ENCOUNTER (OUTPATIENT)
Dept: INFUSION CENTER | Facility: HOSPITAL | Age: 62
Discharge: HOME | End: 2022-02-09
Payer: COMMERCIAL

## 2022-01-01 ENCOUNTER — APPOINTMENT (INPATIENT)
Dept: NON INVASIVE DIAGNOSTICS | Facility: HOSPITAL | Age: 62
DRG: 808 | End: 2022-01-01
Payer: COMMERCIAL

## 2022-01-01 ENCOUNTER — APPOINTMENT (INPATIENT)
Dept: RADIOLOGY | Facility: HOSPITAL | Age: 62
DRG: 871 | End: 2022-01-01
Payer: COMMERCIAL

## 2022-01-01 ENCOUNTER — HOSPITAL ENCOUNTER (OUTPATIENT)
Dept: INFUSION CENTER | Facility: HOSPITAL | Age: 62
Discharge: HOME | End: 2022-01-21
Payer: COMMERCIAL

## 2022-01-01 ENCOUNTER — TELEPHONE (OUTPATIENT)
Dept: OTHER | Facility: HOSPITAL | Age: 62
End: 2022-01-01

## 2022-01-01 ENCOUNTER — APPOINTMENT (INPATIENT)
Dept: CT IMAGING | Facility: HOSPITAL | Age: 62
DRG: 808 | End: 2022-01-01
Payer: COMMERCIAL

## 2022-01-01 ENCOUNTER — HOSPITAL ENCOUNTER (INPATIENT)
Facility: HOSPITAL | Age: 62
LOS: 3 days | Discharge: HOME HEALTH | DRG: 808 | End: 2022-03-06
Attending: EMERGENCY MEDICINE | Admitting: HOSPITALIST
Payer: COMMERCIAL

## 2022-01-01 ENCOUNTER — HOSPITAL ENCOUNTER (OUTPATIENT)
Dept: INFUSION CENTER | Facility: HOSPITAL | Age: 62
Discharge: HOME | End: 2022-01-12
Payer: COMMERCIAL

## 2022-01-01 ENCOUNTER — HOSPITAL ENCOUNTER (OUTPATIENT)
Dept: NUCLEAR MEDICINE | Facility: HOSPITAL | Age: 62
Discharge: HOME | End: 2022-01-14
Payer: COMMERCIAL

## 2022-01-01 ENCOUNTER — HOSPITAL ENCOUNTER (OUTPATIENT)
Dept: INFUSION CENTER | Facility: HOSPITAL | Age: 62
Discharge: HOME | End: 2022-03-04

## 2022-01-01 ENCOUNTER — HOSPITAL ENCOUNTER (INPATIENT)
Facility: HOSPITAL | Age: 62
LOS: 1 days | DRG: 871 | End: 2022-03-11
Attending: EMERGENCY MEDICINE | Admitting: STUDENT IN AN ORGANIZED HEALTH CARE EDUCATION/TRAINING PROGRAM
Payer: COMMERCIAL

## 2022-01-01 ENCOUNTER — HOSPITAL ENCOUNTER (OUTPATIENT)
Dept: INFUSION CENTER | Facility: HOSPITAL | Age: 62
Discharge: HOME | End: 2022-02-09

## 2022-01-01 ENCOUNTER — HOSPITAL ENCOUNTER (OUTPATIENT)
Dept: CT IMAGING | Facility: HOSPITAL | Age: 62
Discharge: HOME | End: 2022-01-06
Payer: COMMERCIAL

## 2022-01-01 ENCOUNTER — APPOINTMENT (INPATIENT)
Dept: CT IMAGING | Facility: HOSPITAL | Age: 62
DRG: 871 | End: 2022-01-01
Payer: COMMERCIAL

## 2022-01-01 ENCOUNTER — HOSPITAL ENCOUNTER (OUTPATIENT)
Dept: CT IMAGING | Facility: HOSPITAL | Age: 62
Discharge: HOME | End: 2022-01-22
Payer: COMMERCIAL

## 2022-01-01 ENCOUNTER — HOSPITAL ENCOUNTER (OUTPATIENT)
Dept: INFUSION CENTER | Facility: HOSPITAL | Age: 62
Discharge: HOME | End: 2022-02-12
Payer: COMMERCIAL

## 2022-01-01 ENCOUNTER — APPOINTMENT (EMERGENCY)
Dept: RADIOLOGY | Facility: HOSPITAL | Age: 62
DRG: 871 | End: 2022-01-01
Payer: COMMERCIAL

## 2022-01-01 ENCOUNTER — HOSPITAL ENCOUNTER (OUTPATIENT)
Dept: INFUSION CENTER | Facility: HOSPITAL | Age: 62
Discharge: HOME | End: 2022-01-18
Payer: COMMERCIAL

## 2022-01-01 ENCOUNTER — HOSPITAL ENCOUNTER (OUTPATIENT)
Dept: INFUSION CENTER | Facility: HOSPITAL | Age: 62
Discharge: HOME | End: 2022-02-05

## 2022-01-01 ENCOUNTER — HOSPITAL ENCOUNTER (INPATIENT)
Facility: HOSPITAL | Age: 62
LOS: 4 days | Discharge: HOME HEALTH | DRG: 808 | End: 2022-02-17
Attending: EMERGENCY MEDICINE | Admitting: STUDENT IN AN ORGANIZED HEALTH CARE EDUCATION/TRAINING PROGRAM
Payer: COMMERCIAL

## 2022-01-01 VITALS
DIASTOLIC BLOOD PRESSURE: 58 MMHG | HEART RATE: 123 BPM | SYSTOLIC BLOOD PRESSURE: 90 MMHG | TEMPERATURE: 97 F | RESPIRATION RATE: 20 BRPM

## 2022-01-01 VITALS
SYSTOLIC BLOOD PRESSURE: 100 MMHG | DIASTOLIC BLOOD PRESSURE: 69 MMHG | BODY MASS INDEX: 21.47 KG/M2 | TEMPERATURE: 98.2 F | OXYGEN SATURATION: 97 % | HEART RATE: 131 BPM | HEIGHT: 70 IN | RESPIRATION RATE: 18 BRPM | WEIGHT: 150 LBS

## 2022-01-01 VITALS
RESPIRATION RATE: 20 BRPM | SYSTOLIC BLOOD PRESSURE: 90 MMHG | HEART RATE: 89 BPM | TEMPERATURE: 97.9 F | DIASTOLIC BLOOD PRESSURE: 60 MMHG

## 2022-01-01 VITALS
DIASTOLIC BLOOD PRESSURE: 60 MMHG | HEART RATE: 139 BPM | RESPIRATION RATE: 18 BRPM | SYSTOLIC BLOOD PRESSURE: 105 MMHG | HEIGHT: 70 IN | BODY MASS INDEX: 21.47 KG/M2 | TEMPERATURE: 99.1 F | WEIGHT: 150 LBS | OXYGEN SATURATION: 98 %

## 2022-01-01 VITALS
RESPIRATION RATE: 18 BRPM | HEART RATE: 92 BPM | TEMPERATURE: 97.9 F | SYSTOLIC BLOOD PRESSURE: 119 MMHG | DIASTOLIC BLOOD PRESSURE: 78 MMHG

## 2022-01-01 VITALS
TEMPERATURE: 98.9 F | RESPIRATION RATE: 18 BRPM | HEART RATE: 112 BPM | SYSTOLIC BLOOD PRESSURE: 95 MMHG | DIASTOLIC BLOOD PRESSURE: 80 MMHG

## 2022-01-01 VITALS
HEART RATE: 108 BPM | SYSTOLIC BLOOD PRESSURE: 110 MMHG | RESPIRATION RATE: 18 BRPM | TEMPERATURE: 97.9 F | DIASTOLIC BLOOD PRESSURE: 76 MMHG

## 2022-01-01 VITALS
BODY MASS INDEX: 22.62 KG/M2 | TEMPERATURE: 97.7 F | OXYGEN SATURATION: 95 % | WEIGHT: 157.63 LBS | DIASTOLIC BLOOD PRESSURE: 62 MMHG | RESPIRATION RATE: 18 BRPM | HEART RATE: 102 BPM | SYSTOLIC BLOOD PRESSURE: 93 MMHG

## 2022-01-01 VITALS
DIASTOLIC BLOOD PRESSURE: 60 MMHG | SYSTOLIC BLOOD PRESSURE: 97 MMHG | RESPIRATION RATE: 16 BRPM | HEART RATE: 117 BPM | TEMPERATURE: 98.7 F

## 2022-01-01 VITALS
SYSTOLIC BLOOD PRESSURE: 109 MMHG | TEMPERATURE: 97.7 F | RESPIRATION RATE: 16 BRPM | DIASTOLIC BLOOD PRESSURE: 77 MMHG | OXYGEN SATURATION: 98 % | HEART RATE: 90 BPM

## 2022-01-01 DIAGNOSIS — N30.91 HEMORRHAGIC CYSTITIS: Primary | ICD-10-CM

## 2022-01-01 DIAGNOSIS — C79.51 SECONDARY MALIGNANT NEOPLASM OF BONE (HCC): ICD-10-CM

## 2022-01-01 DIAGNOSIS — C79.51 OSSEOUS METASTASIS (HCC): ICD-10-CM

## 2022-01-01 DIAGNOSIS — E86.0 DEHYDRATION: ICD-10-CM

## 2022-01-01 DIAGNOSIS — R20.2 PARESTHESIA OF SKIN: ICD-10-CM

## 2022-01-01 DIAGNOSIS — Z51.11 ENCOUNTER FOR ANTINEOPLASTIC CHEMOTHERAPY: ICD-10-CM

## 2022-01-01 DIAGNOSIS — G89.3 NEOPLASM RELATED PAIN (ACUTE) (CHRONIC): ICD-10-CM

## 2022-01-01 DIAGNOSIS — R31.9 HEMATURIA: ICD-10-CM

## 2022-01-01 DIAGNOSIS — D64.81 ANEMIA DUE TO ANTINEOPLASTIC CHEMOTHERAPY (CODE): ICD-10-CM

## 2022-01-01 DIAGNOSIS — C61 MALIGNANT NEOPLASM OF PROSTATE (HCC): ICD-10-CM

## 2022-01-01 DIAGNOSIS — C61 PROSTATE CANCER (HCC): ICD-10-CM

## 2022-01-01 DIAGNOSIS — C85.90 NON-HODGKIN LYMPHOMA, UNSPECIFIED, UNSPECIFIED SITE (HCC): ICD-10-CM

## 2022-01-01 DIAGNOSIS — D51.8 OTHER VITAMIN B12 DEFICIENCY ANEMIAS: ICD-10-CM

## 2022-01-01 DIAGNOSIS — R33.8 ACUTE URINARY RETENTION: ICD-10-CM

## 2022-01-01 DIAGNOSIS — C88.4 MALT LYMPHOMA (HCC): ICD-10-CM

## 2022-01-01 DIAGNOSIS — R00.0 SINUS TACHYCARDIA: ICD-10-CM

## 2022-01-01 DIAGNOSIS — R78.81 BACTEREMIA: ICD-10-CM

## 2022-01-01 DIAGNOSIS — M13.89 OTHER SPECIFIED ARTHRITIS, MULTIPLE SITES: ICD-10-CM

## 2022-01-01 DIAGNOSIS — C79.51 MALIGNANT NEOPLASM OF PROSTATE METASTATIC TO BONE (HCC): ICD-10-CM

## 2022-01-01 DIAGNOSIS — D69.6 THROMBOCYTOPENIA, UNSPECIFIED (HCC): ICD-10-CM

## 2022-01-01 DIAGNOSIS — D61.818 PANCYTOPENIA (HCC): ICD-10-CM

## 2022-01-01 DIAGNOSIS — R00.0 TACHYCARDIA: Primary | ICD-10-CM

## 2022-01-01 DIAGNOSIS — Z51.5 HOSPICE CARE PATIENT: ICD-10-CM

## 2022-01-01 DIAGNOSIS — I95.9 HYPOTENSION: ICD-10-CM

## 2022-01-01 DIAGNOSIS — A41.9 SEPSIS (HCC): Primary | ICD-10-CM

## 2022-01-01 DIAGNOSIS — D64.9 ANEMIA: ICD-10-CM

## 2022-01-01 DIAGNOSIS — A41.9 SEPSIS (HCC): ICD-10-CM

## 2022-01-01 DIAGNOSIS — D72.819 DECREASED WHITE BLOOD CELL COUNT, UNSPECIFIED: ICD-10-CM

## 2022-01-01 DIAGNOSIS — C85.90 LEUCOSARCOMA (HCC): ICD-10-CM

## 2022-01-01 DIAGNOSIS — C61 MALIGNANT NEOPLASM OF PROSTATE METASTATIC TO BONE (HCC): ICD-10-CM

## 2022-01-01 DIAGNOSIS — N17.9 ACUTE KIDNEY INJURY (HCC): ICD-10-CM

## 2022-01-01 DIAGNOSIS — D61.818 PANCYTOPENIA (HCC): Primary | ICD-10-CM

## 2022-01-01 DIAGNOSIS — N13.30 HYDRONEPHROSIS OF RIGHT KIDNEY: ICD-10-CM

## 2022-01-01 LAB
2HR DELTA HS TROPONIN: 4 NG/L
4HR DELTA HS TROPONIN: 10 NG/L
ABO GROUP BLD BPU: NORMAL
ABO GROUP BLD: NORMAL
ALBUMIN SERPL BCP-MCNC: 1.6 G/DL (ref 3.5–5)
ALBUMIN SERPL BCP-MCNC: 1.8 G/DL (ref 3.5–5)
ALBUMIN SERPL BCP-MCNC: 1.9 G/DL (ref 3.5–5)
ALBUMIN SERPL BCP-MCNC: 2 G/DL (ref 3.5–5)
ALBUMIN SERPL BCP-MCNC: 2.2 G/DL (ref 3.5–5)
ALBUMIN SERPL BCP-MCNC: 2.3 G/DL (ref 3.5–5)
ALBUMIN SERPL BCP-MCNC: 2.4 G/DL (ref 3.5–5)
ALP SERPL-CCNC: 1165 U/L (ref 46–116)
ALP SERPL-CCNC: 328 U/L (ref 46–116)
ALP SERPL-CCNC: 377 U/L (ref 46–116)
ALP SERPL-CCNC: 379 U/L (ref 46–116)
ALP SERPL-CCNC: 384 U/L (ref 46–116)
ALP SERPL-CCNC: 494 U/L (ref 46–116)
ALP SERPL-CCNC: 558 U/L (ref 46–116)
ALP SERPL-CCNC: 601 U/L (ref 46–116)
ALP SERPL-CCNC: 614 U/L (ref 46–116)
ALP SERPL-CCNC: 616 U/L (ref 46–116)
ALP SERPL-CCNC: 671 U/L (ref 46–116)
ALP SERPL-CCNC: 957 U/L (ref 46–116)
ALT SERPL W P-5'-P-CCNC: 12 U/L (ref 12–78)
ALT SERPL W P-5'-P-CCNC: 14 U/L (ref 12–78)
ALT SERPL W P-5'-P-CCNC: 14 U/L (ref 12–78)
ALT SERPL W P-5'-P-CCNC: 17 U/L (ref 12–78)
ALT SERPL W P-5'-P-CCNC: 19 U/L (ref 12–78)
ALT SERPL W P-5'-P-CCNC: 22 U/L (ref 12–78)
ALT SERPL W P-5'-P-CCNC: 23 U/L (ref 12–78)
ALT SERPL W P-5'-P-CCNC: 38 U/L (ref 12–78)
ALT SERPL W P-5'-P-CCNC: 78 U/L (ref 12–78)
ALT SERPL W P-5'-P-CCNC: 9 U/L (ref 12–78)
AMMONIA PLAS-SCNC: 80 UMOL/L (ref 11–35)
ANION GAP SERPL CALCULATED.3IONS-SCNC: 10 MMOL/L (ref 4–13)
ANION GAP SERPL CALCULATED.3IONS-SCNC: 10 MMOL/L (ref 4–13)
ANION GAP SERPL CALCULATED.3IONS-SCNC: 11 MMOL/L (ref 4–13)
ANION GAP SERPL CALCULATED.3IONS-SCNC: 12 MMOL/L (ref 4–13)
ANION GAP SERPL CALCULATED.3IONS-SCNC: 12 MMOL/L (ref 4–13)
ANION GAP SERPL CALCULATED.3IONS-SCNC: 13 MMOL/L (ref 4–13)
ANION GAP SERPL CALCULATED.3IONS-SCNC: 14 MMOL/L (ref 4–13)
ANION GAP SERPL CALCULATED.3IONS-SCNC: 6 MMOL/L (ref 4–13)
ANION GAP SERPL CALCULATED.3IONS-SCNC: 7 MMOL/L (ref 4–13)
ANION GAP SERPL CALCULATED.3IONS-SCNC: 7 MMOL/L (ref 4–13)
ANION GAP SERPL CALCULATED.3IONS-SCNC: 8 MMOL/L (ref 4–13)
ANION GAP SERPL CALCULATED.3IONS-SCNC: 9 MMOL/L (ref 4–13)
ANISOCYTOSIS BLD QL SMEAR: PRESENT
AORTIC ROOT: 3.9 CM
AORTIC VALVE MEAN VELOCITY: 7.3 M/S
APICAL FOUR CHAMBER EJECTION FRACTION: 69 %
APTT PPP: 33 SECONDS (ref 23–37)
APTT PPP: 36 SECONDS (ref 23–37)
ARTERIAL PATENCY WRIST A: YES
ARTIFACT: PRESENT
AST SERPL W P-5'-P-CCNC: 100 U/L (ref 5–45)
AST SERPL W P-5'-P-CCNC: 178 U/L (ref 5–45)
AST SERPL W P-5'-P-CCNC: 205 U/L (ref 5–45)
AST SERPL W P-5'-P-CCNC: 237 U/L (ref 5–45)
AST SERPL W P-5'-P-CCNC: 276 U/L (ref 5–45)
AST SERPL W P-5'-P-CCNC: 31 U/L (ref 5–45)
AST SERPL W P-5'-P-CCNC: 431 U/L (ref 5–45)
AST SERPL W P-5'-P-CCNC: 50 U/L (ref 5–45)
AST SERPL W P-5'-P-CCNC: 65 U/L (ref 5–45)
AST SERPL W P-5'-P-CCNC: 69 U/L (ref 5–45)
AST SERPL W P-5'-P-CCNC: 72 U/L (ref 5–45)
AST SERPL W P-5'-P-CCNC: 90 U/L (ref 5–45)
ATRIAL RATE: 104 BPM
ATRIAL RATE: 119 BPM
ATRIAL RATE: 126 BPM
ATRIAL RATE: 150 BPM
ATRIAL RATE: 151 BPM
ATRIAL RATE: 151 BPM
ATRIAL RATE: 169 BPM
ATRIAL RATE: 170 BPM
AV LVOT MEAN GRADIENT: 2 MMHG
AV LVOT PEAK GRADIENT: 3 MMHG
AV MEAN GRADIENT: 2 MMHG
AV PEAK GRADIENT: 4 MMHG
AV VELOCITY RATIO: 0.82
BACTERIA BLD CULT: ABNORMAL
BACTERIA BLD CULT: ABNORMAL
BACTERIA BLD CULT: NORMAL
BACTERIA UR CULT: NORMAL
BACTERIA UR QL AUTO: ABNORMAL /HPF
BACTERIA UR QL AUTO: ABNORMAL /HPF
BACTERIA UR QL AUTO: NORMAL /HPF
BASE EXCESS BLDA CALC-SCNC: -4.8 MMOL/L
BASOPHILS # BLD MANUAL: 0 THOUSAND/UL (ref 0–0.1)
BASOPHILS NFR MAR MANUAL: 0 % (ref 0–1)
BILIRUB SERPL-MCNC: 0.3 MG/DL (ref 0.2–1)
BILIRUB SERPL-MCNC: 0.3 MG/DL (ref 0.2–1)
BILIRUB SERPL-MCNC: 0.4 MG/DL (ref 0.2–1)
BILIRUB SERPL-MCNC: 0.4 MG/DL (ref 0.2–1)
BILIRUB SERPL-MCNC: 0.5 MG/DL (ref 0.2–1)
BILIRUB SERPL-MCNC: 0.6 MG/DL (ref 0.2–1)
BILIRUB SERPL-MCNC: 0.6 MG/DL (ref 0.2–1)
BILIRUB SERPL-MCNC: 0.9 MG/DL (ref 0.2–1)
BILIRUB SERPL-MCNC: 1.2 MG/DL (ref 0.2–1)
BILIRUB SERPL-MCNC: 1.22 MG/DL (ref 0.2–1)
BILIRUB SERPL-MCNC: 1.4 MG/DL (ref 0.2–1)
BILIRUB SERPL-MCNC: 1.61 MG/DL (ref 0.2–1)
BILIRUB UR QL STRIP: NEGATIVE
BLD GP AB SCN SERPL QL: NEGATIVE
BPU ID: NORMAL
BUN SERPL-MCNC: 14 MG/DL (ref 5–25)
BUN SERPL-MCNC: 17 MG/DL (ref 5–25)
BUN SERPL-MCNC: 17 MG/DL (ref 5–25)
BUN SERPL-MCNC: 19 MG/DL (ref 5–25)
BUN SERPL-MCNC: 20 MG/DL (ref 5–25)
BUN SERPL-MCNC: 21 MG/DL (ref 5–25)
BUN SERPL-MCNC: 23 MG/DL (ref 5–25)
BUN SERPL-MCNC: 23 MG/DL (ref 5–25)
BUN SERPL-MCNC: 24 MG/DL (ref 5–25)
BUN SERPL-MCNC: 26 MG/DL (ref 5–25)
BUN SERPL-MCNC: 28 MG/DL (ref 5–25)
BUN SERPL-MCNC: 28 MG/DL (ref 5–25)
CALCIUM ALBUM COR SERPL-MCNC: 7.6 MG/DL (ref 8.3–10.1)
CALCIUM ALBUM COR SERPL-MCNC: 8 MG/DL (ref 8.3–10.1)
CALCIUM ALBUM COR SERPL-MCNC: 8 MG/DL (ref 8.3–10.1)
CALCIUM ALBUM COR SERPL-MCNC: 8.2 MG/DL (ref 8.3–10.1)
CALCIUM ALBUM COR SERPL-MCNC: 8.4 MG/DL (ref 8.3–10.1)
CALCIUM ALBUM COR SERPL-MCNC: 8.5 MG/DL (ref 8.3–10.1)
CALCIUM ALBUM COR SERPL-MCNC: 8.6 MG/DL (ref 8.3–10.1)
CALCIUM ALBUM COR SERPL-MCNC: 8.7 MG/DL (ref 8.3–10.1)
CALCIUM ALBUM COR SERPL-MCNC: 8.7 MG/DL (ref 8.3–10.1)
CALCIUM ALBUM COR SERPL-MCNC: 8.9 MG/DL (ref 8.3–10.1)
CALCIUM ALBUM COR SERPL-MCNC: 9.3 MG/DL (ref 8.3–10.1)
CALCIUM ALBUM COR SERPL-MCNC: 9.5 MG/DL (ref 8.3–10.1)
CALCIUM SERPL-MCNC: 6 MG/DL (ref 8.3–10.1)
CALCIUM SERPL-MCNC: 6.5 MG/DL (ref 8.3–10.1)
CALCIUM SERPL-MCNC: 6.6 MG/DL (ref 8.3–10.1)
CALCIUM SERPL-MCNC: 6.6 MG/DL (ref 8.3–10.1)
CALCIUM SERPL-MCNC: 6.8 MG/DL (ref 8.3–10.1)
CALCIUM SERPL-MCNC: 7 MG/DL (ref 8.3–10.1)
CALCIUM SERPL-MCNC: 7.1 MG/DL (ref 8.3–10.1)
CALCIUM SERPL-MCNC: 7.1 MG/DL (ref 8.3–10.1)
CALCIUM SERPL-MCNC: 7.2 MG/DL (ref 8.3–10.1)
CALCIUM SERPL-MCNC: 7.2 MG/DL (ref 8.3–10.1)
CALCIUM SERPL-MCNC: 7.3 MG/DL (ref 8.3–10.1)
CALCIUM SERPL-MCNC: 7.4 MG/DL (ref 8.3–10.1)
CALCIUM SERPL-MCNC: 7.5 MG/DL (ref 8.3–10.1)
CALCIUM SERPL-MCNC: 7.7 MG/DL (ref 8.3–10.1)
CARDIAC TROPONIN I PNL SERPL HS: 38 NG/L
CARDIAC TROPONIN I PNL SERPL HS: 42 NG/L
CARDIAC TROPONIN I PNL SERPL HS: 48 NG/L
CARDIAC TROPONIN I PNL SERPL HS: 81 NG/L (ref 8–18)
CHLORIDE SERPL-SCNC: 101 MMOL/L (ref 100–108)
CHLORIDE SERPL-SCNC: 102 MMOL/L (ref 100–108)
CHLORIDE SERPL-SCNC: 103 MMOL/L (ref 100–108)
CHLORIDE SERPL-SCNC: 104 MMOL/L (ref 100–108)
CHLORIDE SERPL-SCNC: 104 MMOL/L (ref 100–108)
CHLORIDE SERPL-SCNC: 105 MMOL/L (ref 100–108)
CHLORIDE SERPL-SCNC: 105 MMOL/L (ref 100–108)
CHLORIDE SERPL-SCNC: 106 MMOL/L (ref 100–108)
CHLORIDE SERPL-SCNC: 107 MMOL/L (ref 100–108)
CHLORIDE SERPL-SCNC: 107 MMOL/L (ref 100–108)
CHLORIDE SERPL-SCNC: 108 MMOL/L (ref 100–108)
CHLORIDE SERPL-SCNC: 109 MMOL/L (ref 100–108)
CHLORIDE SERPL-SCNC: 110 MMOL/L (ref 100–108)
CHLORIDE SERPL-SCNC: 110 MMOL/L (ref 100–108)
CHLORIDE SERPL-SCNC: 112 MMOL/L (ref 100–108)
CHLORIDE SERPL-SCNC: 99 MMOL/L (ref 100–108)
CLARITY UR: ABNORMAL
CLARITY UR: CLEAR
CLARITY UR: CLEAR
CO2 SERPL-SCNC: 17 MMOL/L (ref 21–32)
CO2 SERPL-SCNC: 18 MMOL/L (ref 21–32)
CO2 SERPL-SCNC: 18 MMOL/L (ref 21–32)
CO2 SERPL-SCNC: 20 MMOL/L (ref 21–32)
CO2 SERPL-SCNC: 21 MMOL/L (ref 21–32)
CO2 SERPL-SCNC: 22 MMOL/L (ref 21–32)
CO2 SERPL-SCNC: 23 MMOL/L (ref 21–32)
CO2 SERPL-SCNC: 24 MMOL/L (ref 21–32)
COARSE GRAN CASTS URNS QL MICRO: NORMAL /LPF
COLOR UR: ABNORMAL
COLOR UR: YELLOW
COLOR UR: YELLOW
CREAT SERPL-MCNC: 0.63 MG/DL (ref 0.6–1.3)
CREAT SERPL-MCNC: 0.7 MG/DL (ref 0.6–1.3)
CREAT SERPL-MCNC: 0.74 MG/DL (ref 0.6–1.3)
CREAT SERPL-MCNC: 0.76 MG/DL (ref 0.6–1.3)
CREAT SERPL-MCNC: 0.77 MG/DL (ref 0.6–1.3)
CREAT SERPL-MCNC: 0.85 MG/DL (ref 0.6–1.3)
CREAT SERPL-MCNC: 0.9 MG/DL (ref 0.6–1.3)
CREAT SERPL-MCNC: 0.93 MG/DL (ref 0.6–1.3)
CREAT SERPL-MCNC: 0.96 MG/DL (ref 0.6–1.3)
CREAT SERPL-MCNC: 0.98 MG/DL (ref 0.6–1.3)
CREAT SERPL-MCNC: 0.99 MG/DL (ref 0.6–1.3)
CREAT SERPL-MCNC: 1 MG/DL (ref 0.6–1.3)
CREAT SERPL-MCNC: 1.01 MG/DL (ref 0.6–1.3)
CREAT SERPL-MCNC: 1.09 MG/DL (ref 0.6–1.3)
CREAT SERPL-MCNC: 1.15 MG/DL (ref 0.6–1.3)
CREAT SERPL-MCNC: 1.26 MG/DL (ref 0.6–1.3)
CROSSMATCH: NORMAL
DOHLE BOD BLD QL SMEAR: PRESENT
DOP CALC AO PEAK VEL: 1.05 M/S
DOP CALC AO VTI: 13.33 CM
DOP CALC LVOT PEAK VEL VTI: 10.36 CM
DOP CALC LVOT PEAK VEL: 0.86 M/S
EOSINOPHIL # BLD MANUAL: 0 THOUSAND/UL (ref 0–0.4)
EOSINOPHIL # BLD MANUAL: 0.03 THOUSAND/UL (ref 0–0.4)
EOSINOPHIL # BLD MANUAL: 0.04 THOUSAND/UL (ref 0–0.4)
EOSINOPHIL NFR BLD MANUAL: 0 % (ref 0–6)
EOSINOPHIL NFR BLD MANUAL: 1 % (ref 0–6)
EOSINOPHIL NFR BLD MANUAL: 2 % (ref 0–6)
ERYTHROCYTE [DISTWIDTH] IN BLOOD BY AUTOMATED COUNT: 15 % (ref 11.6–15.1)
ERYTHROCYTE [DISTWIDTH] IN BLOOD BY AUTOMATED COUNT: 15.4 % (ref 11.6–15.1)
ERYTHROCYTE [DISTWIDTH] IN BLOOD BY AUTOMATED COUNT: 15.5 % (ref 11.6–15.1)
ERYTHROCYTE [DISTWIDTH] IN BLOOD BY AUTOMATED COUNT: 15.6 % (ref 11.6–15.1)
ERYTHROCYTE [DISTWIDTH] IN BLOOD BY AUTOMATED COUNT: 15.6 % (ref 11.6–15.1)
ERYTHROCYTE [DISTWIDTH] IN BLOOD BY AUTOMATED COUNT: 15.7 % (ref 11.6–15.1)
ERYTHROCYTE [DISTWIDTH] IN BLOOD BY AUTOMATED COUNT: 15.8 % (ref 11.6–15.1)
ERYTHROCYTE [DISTWIDTH] IN BLOOD BY AUTOMATED COUNT: 15.8 % (ref 11.6–15.1)
ERYTHROCYTE [DISTWIDTH] IN BLOOD BY AUTOMATED COUNT: 15.9 % (ref 11.6–15.1)
ERYTHROCYTE [DISTWIDTH] IN BLOOD BY AUTOMATED COUNT: 16.4 % (ref 11.6–15.1)
ERYTHROCYTE [DISTWIDTH] IN BLOOD BY AUTOMATED COUNT: 16.5 % (ref 11.6–15.1)
ERYTHROCYTE [DISTWIDTH] IN BLOOD BY AUTOMATED COUNT: 17.2 % (ref 11.6–15.1)
ERYTHROCYTE [DISTWIDTH] IN BLOOD BY AUTOMATED COUNT: 17.2 % (ref 11.6–15.1)
ERYTHROCYTE [DISTWIDTH] IN BLOOD BY AUTOMATED COUNT: 17.3 % (ref 11.6–15.1)
ERYTHROCYTE [DISTWIDTH] IN BLOOD BY AUTOMATED COUNT: 17.5 % (ref 11.6–15.1)
GFR SERPL CREATININE-BSD FRML MDRD: 101 ML/MIN/1.73SQ M
GFR SERPL CREATININE-BSD FRML MDRD: 105 ML/MIN/1.73SQ M
GFR SERPL CREATININE-BSD FRML MDRD: 60 ML/MIN/1.73SQ M
GFR SERPL CREATININE-BSD FRML MDRD: 67 ML/MIN/1.73SQ M
GFR SERPL CREATININE-BSD FRML MDRD: 72 ML/MIN/1.73SQ M
GFR SERPL CREATININE-BSD FRML MDRD: 79 ML/MIN/1.73SQ M
GFR SERPL CREATININE-BSD FRML MDRD: 80 ML/MIN/1.73SQ M
GFR SERPL CREATININE-BSD FRML MDRD: 81 ML/MIN/1.73SQ M
GFR SERPL CREATININE-BSD FRML MDRD: 82 ML/MIN/1.73SQ M
GFR SERPL CREATININE-BSD FRML MDRD: 84 ML/MIN/1.73SQ M
GFR SERPL CREATININE-BSD FRML MDRD: 87 ML/MIN/1.73SQ M
GFR SERPL CREATININE-BSD FRML MDRD: 91 ML/MIN/1.73SQ M
GFR SERPL CREATININE-BSD FRML MDRD: 93 ML/MIN/1.73SQ M
GFR SERPL CREATININE-BSD FRML MDRD: 97 ML/MIN/1.73SQ M
GFR SERPL CREATININE-BSD FRML MDRD: 97 ML/MIN/1.73SQ M
GFR SERPL CREATININE-BSD FRML MDRD: 98 ML/MIN/1.73SQ M
GLUCOSE SERPL-MCNC: 100 MG/DL (ref 65–140)
GLUCOSE SERPL-MCNC: 100 MG/DL (ref 65–140)
GLUCOSE SERPL-MCNC: 106 MG/DL (ref 65–140)
GLUCOSE SERPL-MCNC: 109 MG/DL (ref 65–140)
GLUCOSE SERPL-MCNC: 113 MG/DL (ref 65–140)
GLUCOSE SERPL-MCNC: 119 MG/DL (ref 65–140)
GLUCOSE SERPL-MCNC: 128 MG/DL (ref 65–140)
GLUCOSE SERPL-MCNC: 139 MG/DL (ref 65–140)
GLUCOSE SERPL-MCNC: 74 MG/DL (ref 65–140)
GLUCOSE SERPL-MCNC: 76 MG/DL (ref 65–140)
GLUCOSE SERPL-MCNC: 81 MG/DL (ref 65–140)
GLUCOSE SERPL-MCNC: 82 MG/DL (ref 65–140)
GLUCOSE SERPL-MCNC: 84 MG/DL (ref 65–140)
GLUCOSE SERPL-MCNC: 89 MG/DL (ref 65–140)
GLUCOSE SERPL-MCNC: 90 MG/DL (ref 65–140)
GLUCOSE SERPL-MCNC: 94 MG/DL (ref 65–140)
GLUCOSE SERPL-MCNC: 99 MG/DL (ref 65–140)
GLUCOSE UR STRIP-MCNC: NEGATIVE MG/DL
GRAM STN SPEC: ABNORMAL
GRAM STN SPEC: ABNORMAL
HCO3 BLDA-SCNC: 18.7 MMOL/L (ref 22–28)
HCT VFR BLD AUTO: 19.6 % (ref 36.5–49.3)
HCT VFR BLD AUTO: 19.9 % (ref 36.5–49.3)
HCT VFR BLD AUTO: 21.1 % (ref 36.5–49.3)
HCT VFR BLD AUTO: 22.7 % (ref 36.5–49.3)
HCT VFR BLD AUTO: 23.3 % (ref 36.5–49.3)
HCT VFR BLD AUTO: 23.4 % (ref 36.5–49.3)
HCT VFR BLD AUTO: 24 % (ref 36.5–49.3)
HCT VFR BLD AUTO: 24.3 % (ref 36.5–49.3)
HCT VFR BLD AUTO: 25.2 % (ref 36.5–49.3)
HCT VFR BLD AUTO: 27.1 % (ref 36.5–49.3)
HCT VFR BLD AUTO: 27.2 % (ref 36.5–49.3)
HCT VFR BLD AUTO: 27.6 % (ref 36.5–49.3)
HCT VFR BLD AUTO: 28.7 % (ref 36.5–49.3)
HCT VFR BLD AUTO: 29 % (ref 36.5–49.3)
HCT VFR BLD AUTO: 29.4 % (ref 36.5–49.3)
HGB BLD-MCNC: 6.2 G/DL (ref 12–17)
HGB BLD-MCNC: 6.4 G/DL (ref 12–17)
HGB BLD-MCNC: 6.7 G/DL (ref 12–17)
HGB BLD-MCNC: 7 G/DL (ref 12–17)
HGB BLD-MCNC: 7.2 G/DL (ref 12–17)
HGB BLD-MCNC: 7.4 G/DL (ref 12–17)
HGB BLD-MCNC: 7.4 G/DL (ref 12–17)
HGB BLD-MCNC: 7.7 G/DL (ref 12–17)
HGB BLD-MCNC: 7.8 G/DL (ref 12–17)
HGB BLD-MCNC: 7.9 G/DL (ref 12–17)
HGB BLD-MCNC: 7.9 G/DL (ref 12–17)
HGB BLD-MCNC: 8.4 G/DL (ref 12–17)
HGB BLD-MCNC: 8.5 G/DL (ref 12–17)
HGB BLD-MCNC: 8.6 G/DL (ref 12–17)
HGB BLD-MCNC: 9 G/DL (ref 12–17)
HGB BLD-MCNC: 9.1 G/DL (ref 12–17)
HGB BLD-MCNC: 9.1 G/DL (ref 12–17)
HGB UR QL STRIP.AUTO: ABNORMAL
HYPERCHROMIA BLD QL SMEAR: PRESENT
HYPERCHROMIA BLD QL SMEAR: PRESENT
INR PPP: 1.21 (ref 0.84–1.19)
INR PPP: 1.35 (ref 0.84–1.19)
KETONES UR STRIP-MCNC: ABNORMAL MG/DL
KETONES UR STRIP-MCNC: NEGATIVE MG/DL
KETONES UR STRIP-MCNC: NEGATIVE MG/DL
LAAS-AP2: 26 CM2
LAAS-AP4: 20.8 CM2
LACTATE SERPL-SCNC: 1.6 MMOL/L (ref 0.5–2)
LACTATE SERPL-SCNC: 1.7 MMOL/L (ref 0.5–2)
LACTATE SERPL-SCNC: 2.5 MMOL/L (ref 0.5–2)
LACTATE SERPL-SCNC: 2.6 MMOL/L (ref 0.5–2)
LACTATE SERPL-SCNC: 3.5 MMOL/L (ref 0.5–2)
LACTATE SERPL-SCNC: 3.5 MMOL/L (ref 0.5–2)
LACTATE SERPL-SCNC: 3.9 MMOL/L (ref 0.5–2)
LEUKOCYTE ESTERASE UR QL STRIP: ABNORMAL
LEUKOCYTE ESTERASE UR QL STRIP: NEGATIVE
LEUKOCYTE ESTERASE UR QL STRIP: NEGATIVE
LYMPHOCYTES # BLD AUTO: 0 % (ref 14–44)
LYMPHOCYTES # BLD AUTO: 0 THOUSAND/UL (ref 0.6–4.47)
LYMPHOCYTES # BLD AUTO: 0.2 THOUSAND/UL (ref 0.6–4.47)
LYMPHOCYTES # BLD AUTO: 0.41 THOUSAND/UL (ref 0.6–4.47)
LYMPHOCYTES # BLD AUTO: 0.44 THOUSAND/UL (ref 0.6–4.47)
LYMPHOCYTES # BLD AUTO: 0.55 THOUSAND/UL (ref 0.6–4.47)
LYMPHOCYTES # BLD AUTO: 0.59 THOUSAND/UL (ref 0.6–4.47)
LYMPHOCYTES # BLD AUTO: 0.64 THOUSAND/UL (ref 0.6–4.47)
LYMPHOCYTES # BLD AUTO: 0.76 THOUSAND/UL (ref 0.6–4.47)
LYMPHOCYTES # BLD AUTO: 0.8 THOUSAND/UL (ref 0.6–4.47)
LYMPHOCYTES # BLD AUTO: 0.85 THOUSAND/UL (ref 0.6–4.47)
LYMPHOCYTES # BLD AUTO: 0.96 THOUSAND/UL (ref 0.6–4.47)
LYMPHOCYTES # BLD AUTO: 10 % (ref 14–44)
LYMPHOCYTES # BLD AUTO: 22 % (ref 14–44)
LYMPHOCYTES # BLD AUTO: 22 % (ref 14–44)
LYMPHOCYTES # BLD AUTO: 24 % (ref 14–44)
LYMPHOCYTES # BLD AUTO: 28 % (ref 14–44)
LYMPHOCYTES # BLD AUTO: 36 % (ref 14–44)
LYMPHOCYTES # BLD AUTO: 39 % (ref 14–44)
LYMPHOCYTES # BLD AUTO: 42 % (ref 14–44)
LYMPHOCYTES # BLD AUTO: 42 % (ref 14–44)
LYMPHOCYTES # BLD AUTO: 48 % (ref 14–44)
MACROCYTES BLD QL AUTO: PRESENT
MAGNESIUM SERPL-MCNC: 1.7 MG/DL (ref 1.6–2.6)
MAGNESIUM SERPL-MCNC: 1.8 MG/DL (ref 1.6–2.6)
MAGNESIUM SERPL-MCNC: 2 MG/DL (ref 1.6–2.6)
MAGNESIUM SERPL-MCNC: 2.1 MG/DL (ref 1.6–2.6)
MAGNESIUM SERPL-MCNC: 2.2 MG/DL (ref 1.6–2.6)
MCH RBC QN AUTO: 27.6 PG (ref 26.8–34.3)
MCH RBC QN AUTO: 27.7 PG (ref 26.8–34.3)
MCH RBC QN AUTO: 27.9 PG (ref 26.8–34.3)
MCH RBC QN AUTO: 28 PG (ref 26.8–34.3)
MCH RBC QN AUTO: 28.1 PG (ref 26.8–34.3)
MCH RBC QN AUTO: 28.1 PG (ref 26.8–34.3)
MCH RBC QN AUTO: 28.2 PG (ref 26.8–34.3)
MCH RBC QN AUTO: 28.3 PG (ref 26.8–34.3)
MCH RBC QN AUTO: 28.3 PG (ref 26.8–34.3)
MCH RBC QN AUTO: 28.6 PG (ref 26.8–34.3)
MCH RBC QN AUTO: 28.7 PG (ref 26.8–34.3)
MCH RBC QN AUTO: 28.7 PG (ref 26.8–34.3)
MCH RBC QN AUTO: 28.8 PG (ref 26.8–34.3)
MCH RBC QN AUTO: 28.9 PG (ref 26.8–34.3)
MCH RBC QN AUTO: 29 PG (ref 26.8–34.3)
MCH RBC QN AUTO: 29.2 PG (ref 26.8–34.3)
MCH RBC QN AUTO: 29.3 PG (ref 26.8–34.3)
MCHC RBC AUTO-ENTMCNC: 29.5 G/DL (ref 31.4–37.4)
MCHC RBC AUTO-ENTMCNC: 29.6 G/DL (ref 31.4–37.4)
MCHC RBC AUTO-ENTMCNC: 30.4 G/DL (ref 31.4–37.4)
MCHC RBC AUTO-ENTMCNC: 30.6 G/DL (ref 31.4–37.4)
MCHC RBC AUTO-ENTMCNC: 31 G/DL (ref 31.4–37.4)
MCHC RBC AUTO-ENTMCNC: 31.2 G/DL (ref 31.4–37.4)
MCHC RBC AUTO-ENTMCNC: 31.3 G/DL (ref 31.4–37.4)
MCHC RBC AUTO-ENTMCNC: 31.4 G/DL (ref 31.4–37.4)
MCHC RBC AUTO-ENTMCNC: 31.6 G/DL (ref 31.4–37.4)
MCHC RBC AUTO-ENTMCNC: 31.7 G/DL (ref 31.4–37.4)
MCHC RBC AUTO-ENTMCNC: 31.8 G/DL (ref 31.4–37.4)
MCHC RBC AUTO-ENTMCNC: 32.5 G/DL (ref 31.4–37.4)
MCHC RBC AUTO-ENTMCNC: 32.7 G/DL (ref 31.4–37.4)
MCHC RBC AUTO-ENTMCNC: 32.9 G/DL (ref 31.4–37.4)
MCHC RBC AUTO-ENTMCNC: 33.2 G/DL (ref 31.4–37.4)
MCV RBC AUTO: 87 FL (ref 82–98)
MCV RBC AUTO: 88 FL (ref 82–98)
MCV RBC AUTO: 89 FL (ref 82–98)
MCV RBC AUTO: 90 FL (ref 82–98)
MCV RBC AUTO: 90 FL (ref 82–98)
MCV RBC AUTO: 91 FL (ref 82–98)
MCV RBC AUTO: 92 FL (ref 82–98)
MCV RBC AUTO: 93 FL (ref 82–98)
MCV RBC AUTO: 93 FL (ref 82–98)
MCV RBC AUTO: 95 FL (ref 82–98)
METAMYELOCYTES NFR BLD MANUAL: 3 % (ref 0–1)
MICROCYTES BLD QL AUTO: PRESENT
MONOCYTES # BLD AUTO: 0.02 THOUSAND/UL (ref 0–1.22)
MONOCYTES # BLD AUTO: 0.05 THOUSAND/UL (ref 0–1.22)
MONOCYTES # BLD AUTO: 0.08 THOUSAND/UL (ref 0–1.22)
MONOCYTES # BLD AUTO: 0.12 THOUSAND/UL (ref 0–1.22)
MONOCYTES # BLD AUTO: 0.12 THOUSAND/UL (ref 0–1.22)
MONOCYTES # BLD AUTO: 0.13 THOUSAND/UL (ref 0–1.22)
MONOCYTES # BLD AUTO: 0.13 THOUSAND/UL (ref 0–1.22)
MONOCYTES # BLD AUTO: 0.16 THOUSAND/UL (ref 0–1.22)
MONOCYTES # BLD AUTO: 0.25 THOUSAND/UL (ref 0–1.22)
MONOCYTES # BLD AUTO: 0.36 THOUSAND/UL (ref 0–1.22)
MONOCYTES # BLD AUTO: 1.16 THOUSAND/UL (ref 0–1.22)
MONOCYTES NFR BLD: 10 % (ref 4–12)
MONOCYTES NFR BLD: 10 % (ref 4–12)
MONOCYTES NFR BLD: 12 % (ref 4–12)
MONOCYTES NFR BLD: 13 % (ref 4–12)
MONOCYTES NFR BLD: 2 % (ref 4–12)
MONOCYTES NFR BLD: 3 % (ref 4–12)
MONOCYTES NFR BLD: 3 % (ref 4–12)
MONOCYTES NFR BLD: 4 % (ref 4–12)
MONOCYTES NFR BLD: 5 % (ref 4–12)
MONOCYTES NFR BLD: 6 % (ref 4–12)
MONOCYTES NFR BLD: 7 % (ref 4–12)
MYELOCYTES NFR BLD MANUAL: 1 % (ref 0–1)
MYELOCYTES NFR BLD MANUAL: 2 % (ref 0–1)
MYELOCYTES NFR BLD MANUAL: 3 % (ref 0–1)
NEUTROPHILS # BLD MANUAL: 0.16 THOUSAND/UL (ref 1.85–7.62)
NEUTROPHILS # BLD MANUAL: 0.56 THOUSAND/UL (ref 1.85–7.62)
NEUTROPHILS # BLD MANUAL: 0.62 THOUSAND/UL (ref 1.85–7.62)
NEUTROPHILS # BLD MANUAL: 0.86 THOUSAND/UL (ref 1.85–7.62)
NEUTROPHILS # BLD MANUAL: 1.05 THOUSAND/UL (ref 1.85–7.62)
NEUTROPHILS # BLD MANUAL: 1.33 THOUSAND/UL (ref 1.85–7.62)
NEUTROPHILS # BLD MANUAL: 1.65 THOUSAND/UL (ref 1.85–7.62)
NEUTROPHILS # BLD MANUAL: 1.76 THOUSAND/UL (ref 1.85–7.62)
NEUTROPHILS # BLD MANUAL: 1.76 THOUSAND/UL (ref 1.85–7.62)
NEUTROPHILS # BLD MANUAL: 10.97 THOUSAND/UL (ref 1.85–7.62)
NEUTROPHILS # BLD MANUAL: 7.51 THOUSAND/UL (ref 1.85–7.62)
NEUTS BAND NFR BLD MANUAL: 1 % (ref 0–8)
NEUTS BAND NFR BLD MANUAL: 13 % (ref 0–8)
NEUTS BAND NFR BLD MANUAL: 2 % (ref 0–8)
NEUTS BAND NFR BLD MANUAL: 3 % (ref 0–8)
NEUTS BAND NFR BLD MANUAL: 3 % (ref 0–8)
NEUTS BAND NFR BLD MANUAL: 4 % (ref 0–8)
NEUTS BAND NFR BLD MANUAL: 8 % (ref 0–8)
NEUTS SEG NFR BLD AUTO: 29 % (ref 43–75)
NEUTS SEG NFR BLD AUTO: 41 % (ref 43–75)
NEUTS SEG NFR BLD AUTO: 42 % (ref 43–75)
NEUTS SEG NFR BLD AUTO: 50 % (ref 43–75)
NEUTS SEG NFR BLD AUTO: 51 % (ref 43–75)
NEUTS SEG NFR BLD AUTO: 54 % (ref 43–75)
NEUTS SEG NFR BLD AUTO: 61 % (ref 43–75)
NEUTS SEG NFR BLD AUTO: 63 % (ref 43–75)
NEUTS SEG NFR BLD AUTO: 68 % (ref 43–75)
NEUTS SEG NFR BLD AUTO: 78 % (ref 43–75)
NEUTS SEG NFR BLD AUTO: 90 % (ref 43–75)
NITRITE UR QL STRIP: NEGATIVE
NITRITE UR QL STRIP: NEGATIVE
NITRITE UR QL STRIP: POSITIVE
NON VENT ROOM AIR: 21 %
NON-SQ EPI CELLS URNS QL MICRO: ABNORMAL /HPF
NON-SQ EPI CELLS URNS QL MICRO: ABNORMAL /HPF
NON-SQ EPI CELLS URNS QL MICRO: NORMAL /HPF
NRBC BLD AUTO-RTO: 1 /100 WBC (ref 0–2)
NRBC BLD AUTO-RTO: 1 /100 WBCS
NRBC BLD AUTO-RTO: 2 /100 WBCS
O2 CT BLDA-SCNC: 10.6 ML/DL (ref 16–23)
OXYHGB MFR BLDA: 94.3 % (ref 94–97)
P AXIS: 26 DEGREES
P AXIS: 34 DEGREES
P AXIS: 48 DEGREES
P AXIS: 54 DEGREES
PCO2 BLDA: 28.4 MM HG (ref 36–44)
PH BLDA: 7.44 [PH] (ref 7.35–7.45)
PH UR STRIP.AUTO: 5.5 [PH]
PH UR STRIP.AUTO: 6 [PH]
PH UR STRIP.AUTO: 6.5 [PH]
PHOSPHATE SERPL-MCNC: 1.8 MG/DL (ref 2.3–4.1)
PHOSPHATE SERPL-MCNC: 2.4 MG/DL (ref 2.3–4.1)
PHOSPHATE SERPL-MCNC: 3.6 MG/DL (ref 2.3–4.1)
PLATELET # BLD AUTO: 10 THOUSANDS/UL (ref 149–390)
PLATELET # BLD AUTO: 12 THOUSANDS/UL (ref 149–390)
PLATELET # BLD AUTO: 19 THOUSANDS/UL (ref 149–390)
PLATELET # BLD AUTO: 25 THOUSANDS/UL (ref 149–390)
PLATELET # BLD AUTO: 26 THOUSANDS/UL (ref 149–390)
PLATELET # BLD AUTO: 29 THOUSANDS/UL (ref 149–390)
PLATELET # BLD AUTO: 33 THOUSANDS/UL (ref 149–390)
PLATELET # BLD AUTO: 37 THOUSANDS/UL (ref 149–390)
PLATELET # BLD AUTO: 39 THOUSANDS/UL (ref 149–390)
PLATELET # BLD AUTO: 4 THOUSANDS/UL (ref 149–390)
PLATELET # BLD AUTO: 43 THOUSANDS/UL (ref 149–390)
PLATELET # BLD AUTO: 45 THOUSANDS/UL (ref 149–390)
PLATELET # BLD AUTO: 51 THOUSANDS/UL (ref 149–390)
PLATELET # BLD AUTO: 52 THOUSANDS/UL (ref 149–390)
PLATELET # BLD AUTO: 53 THOUSANDS/UL (ref 149–390)
PLATELET # BLD AUTO: 58 THOUSANDS/UL (ref 149–390)
PLATELET # BLD AUTO: 69 THOUSANDS/UL (ref 149–390)
PLATELET BLD QL SMEAR: ABNORMAL
PMV BLD AUTO: 10 FL (ref 8.9–12.7)
PMV BLD AUTO: 10.2 FL (ref 8.9–12.7)
PMV BLD AUTO: 10.7 FL (ref 8.9–12.7)
PMV BLD AUTO: 11.4 FL (ref 8.9–12.7)
PMV BLD AUTO: 11.5 FL (ref 8.9–12.7)
PMV BLD AUTO: 11.7 FL (ref 8.9–12.7)
PMV BLD AUTO: 12 FL (ref 8.9–12.7)
PMV BLD AUTO: 12.1 FL (ref 8.9–12.7)
PMV BLD AUTO: 12.1 FL (ref 8.9–12.7)
PMV BLD AUTO: 12.4 FL (ref 8.9–12.7)
PMV BLD AUTO: 8.8 FL (ref 8.9–12.7)
PMV BLD AUTO: 9 FL (ref 8.9–12.7)
PMV BLD AUTO: 9.4 FL (ref 8.9–12.7)
PMV BLD AUTO: 9.5 FL (ref 8.9–12.7)
PMV BLD AUTO: 9.6 FL (ref 8.9–12.7)
PMV BLD AUTO: 9.6 FL (ref 8.9–12.7)
PMV BLD AUTO: 9.7 FL (ref 8.9–12.7)
PO2 BLDA: 77.1 MM HG (ref 75–129)
POLYCHROMASIA BLD QL SMEAR: PRESENT
POTASSIUM SERPL-SCNC: 3.6 MMOL/L (ref 3.5–5.3)
POTASSIUM SERPL-SCNC: 4 MMOL/L (ref 3.5–5.3)
POTASSIUM SERPL-SCNC: 4.1 MMOL/L (ref 3.5–5.3)
POTASSIUM SERPL-SCNC: 4.2 MMOL/L (ref 3.5–5.3)
POTASSIUM SERPL-SCNC: 4.3 MMOL/L (ref 3.5–5.3)
POTASSIUM SERPL-SCNC: 4.4 MMOL/L (ref 3.5–5.3)
POTASSIUM SERPL-SCNC: 4.6 MMOL/L (ref 3.5–5.3)
POTASSIUM SERPL-SCNC: 4.6 MMOL/L (ref 3.5–5.3)
POTASSIUM SERPL-SCNC: 4.7 MMOL/L (ref 3.5–5.3)
POTASSIUM SERPL-SCNC: 4.7 MMOL/L (ref 3.5–5.3)
POTASSIUM SERPL-SCNC: 4.9 MMOL/L (ref 3.5–5.3)
POTASSIUM SERPL-SCNC: 5 MMOL/L (ref 3.5–5.3)
PR INTERVAL: 124 MS
PR INTERVAL: 124 MS
PR INTERVAL: 128 MS
PR INTERVAL: 130 MS
PR INTERVAL: 130 MS
PR INTERVAL: 64 MS
PR INTERVAL: 68 MS
PR INTERVAL: 70 MS
PR INTERVAL: 80 MS
PROCALCITONIN SERPL-MCNC: 1.72 NG/ML
PROCALCITONIN SERPL-MCNC: 5.38 NG/ML
PROT SERPL-MCNC: 4.8 G/DL (ref 6.4–8.2)
PROT SERPL-MCNC: 5.1 G/DL (ref 6.4–8.2)
PROT SERPL-MCNC: 5.2 G/DL (ref 6.4–8.2)
PROT SERPL-MCNC: 5.2 G/DL (ref 6.4–8.2)
PROT SERPL-MCNC: 5.4 G/DL (ref 6.4–8.2)
PROT SERPL-MCNC: 5.4 G/DL (ref 6.4–8.2)
PROT SERPL-MCNC: 5.8 G/DL (ref 6.4–8.2)
PROT SERPL-MCNC: 5.8 G/DL (ref 6.4–8.2)
PROT SERPL-MCNC: 5.9 G/DL (ref 6.4–8.2)
PROT SERPL-MCNC: 5.9 G/DL (ref 6.4–8.2)
PROT UR STRIP-MCNC: ABNORMAL MG/DL
PROT UR STRIP-MCNC: ABNORMAL MG/DL
PROT UR STRIP-MCNC: NEGATIVE MG/DL
PROTHROMBIN TIME: 15.2 SECONDS (ref 11.6–14.5)
PROTHROMBIN TIME: 16.5 SECONDS (ref 11.6–14.5)
QRS AXIS: -20 DEGREES
QRS AXIS: -31 DEGREES
QRS AXIS: -32 DEGREES
QRS AXIS: -40 DEGREES
QRS AXIS: -41 DEGREES
QRS AXIS: -44 DEGREES
QRS AXIS: -48 DEGREES
QRS AXIS: -54 DEGREES
QRS AXIS: 238 DEGREES
QRS AXIS: 24 DEGREES
QRSD INTERVAL: 114 MS
QRSD INTERVAL: 122 MS
QRSD INTERVAL: 124 MS
QRSD INTERVAL: 124 MS
QRSD INTERVAL: 126 MS
QRSD INTERVAL: 126 MS
QRSD INTERVAL: 132 MS
QRSD INTERVAL: 134 MS
QRSD INTERVAL: 136 MS
QRSD INTERVAL: 152 MS
QT INTERVAL: 334 MS
QT INTERVAL: 336 MS
QT INTERVAL: 338 MS
QT INTERVAL: 340 MS
QT INTERVAL: 344 MS
QT INTERVAL: 362 MS
QT INTERVAL: 368 MS
QT INTERVAL: 380 MS
QTC INTERVAL: 476 MS
QTC INTERVAL: 483 MS
QTC INTERVAL: 492 MS
QTC INTERVAL: 498 MS
QTC INTERVAL: 527 MS
QTC INTERVAL: 532 MS
QTC INTERVAL: 545 MS
QTC INTERVAL: 563 MS
QTC INTERVAL: 568 MS
QTC INTERVAL: 602 MS
RBC # BLD AUTO: 2.16 MILLION/UL (ref 3.88–5.62)
RBC # BLD AUTO: 2.19 MILLION/UL (ref 3.88–5.62)
RBC # BLD AUTO: 2.39 MILLION/UL (ref 3.88–5.62)
RBC # BLD AUTO: 2.43 MILLION/UL (ref 3.88–5.62)
RBC # BLD AUTO: 2.55 MILLION/UL (ref 3.88–5.62)
RBC # BLD AUTO: 2.58 MILLION/UL (ref 3.88–5.62)
RBC # BLD AUTO: 2.63 MILLION/UL (ref 3.88–5.62)
RBC # BLD AUTO: 2.72 MILLION/UL (ref 3.88–5.62)
RBC # BLD AUTO: 2.73 MILLION/UL (ref 3.88–5.62)
RBC # BLD AUTO: 2.74 MILLION/UL (ref 3.88–5.62)
RBC # BLD AUTO: 2.78 MILLION/UL (ref 3.88–5.62)
RBC # BLD AUTO: 3.01 MILLION/UL (ref 3.88–5.62)
RBC # BLD AUTO: 3.02 MILLION/UL (ref 3.88–5.62)
RBC # BLD AUTO: 3.04 MILLION/UL (ref 3.88–5.62)
RBC # BLD AUTO: 3.15 MILLION/UL (ref 3.88–5.62)
RBC # BLD AUTO: 3.18 MILLION/UL (ref 3.88–5.62)
RBC # BLD AUTO: 3.25 MILLION/UL (ref 3.88–5.62)
RBC #/AREA URNS AUTO: ABNORMAL /HPF
RBC #/AREA URNS AUTO: ABNORMAL /HPF
RBC #/AREA URNS AUTO: NORMAL /HPF
RBC MORPH BLD: PRESENT
RH BLD: NEGATIVE
RIGHT ATRIAL 2D VOLUME: 53 ML
RIGHT ATRIUM AREA SYSTOLE A4C: 20.1 CM2
RIGHT VENTRICLE ID DIMENSION: 4.9 CM
SL CV LEFT ATRIUM LENGTH A2C: 5.5 CM
SL CV LV EF: 65
SODIUM SERPL-SCNC: 133 MMOL/L (ref 136–145)
SODIUM SERPL-SCNC: 134 MMOL/L (ref 136–145)
SODIUM SERPL-SCNC: 135 MMOL/L (ref 136–145)
SODIUM SERPL-SCNC: 136 MMOL/L (ref 136–145)
SODIUM SERPL-SCNC: 137 MMOL/L (ref 136–145)
SODIUM SERPL-SCNC: 138 MMOL/L (ref 136–145)
SODIUM SERPL-SCNC: 139 MMOL/L (ref 136–145)
SODIUM SERPL-SCNC: 140 MMOL/L (ref 136–145)
SODIUM SERPL-SCNC: 142 MMOL/L (ref 136–145)
SODIUM SERPL-SCNC: 142 MMOL/L (ref 136–145)
SP GR UR STRIP.AUTO: 1.01 (ref 1–1.03)
SP GR UR STRIP.AUTO: 1.01 (ref 1–1.03)
SP GR UR STRIP.AUTO: 1.02 (ref 1–1.03)
SPECIMEN EXPIRATION DATE: NORMAL
SPECIMEN SOURCE: ABNORMAL
T WAVE AXIS: -22 DEGREES
T WAVE AXIS: -40 DEGREES
T WAVE AXIS: -41 DEGREES
T WAVE AXIS: -51 DEGREES
T WAVE AXIS: 0 DEGREES
T WAVE AXIS: 16 DEGREES
T WAVE AXIS: 198 DEGREES
T WAVE AXIS: 29 DEGREES
T WAVE AXIS: 36 DEGREES
T WAVE AXIS: 6 DEGREES
TOXIC GRANULES BLD QL SMEAR: PRESENT
TR MAX PG: 23 MMHG
TR PEAK VELOCITY: 2.4 M/S
TRICUSPID VALVE PEAK REGURGITATION VELOCITY: 2.39 M/S
TSH SERPL DL<=0.05 MIU/L-ACNC: 0.83 UIU/ML (ref 0.36–3.74)
UNIT DISPENSE STATUS: NORMAL
UNIT PRODUCT CODE: NORMAL
UNIT PRODUCT VOLUME: 241 ML
UNIT PRODUCT VOLUME: 243 ML
UNIT PRODUCT VOLUME: 245 ML
UNIT PRODUCT VOLUME: 250 ML
UNIT PRODUCT VOLUME: 253 ML
UNIT PRODUCT VOLUME: 279 ML
UNIT PRODUCT VOLUME: 300 ML
UNIT PRODUCT VOLUME: 350 ML
UNIT RH: NORMAL
UROBILINOGEN UR QL STRIP.AUTO: 0.2 E.U./DL
UROBILINOGEN UR QL STRIP.AUTO: 1 E.U./DL
UROBILINOGEN UR QL STRIP.AUTO: 1 E.U./DL
VANCOMYCIN SERPL-MCNC: 28.8 UG/ML (ref 10–20)
VANCOMYCIN TROUGH SERPL-MCNC: 24.2 UG/ML (ref 10–20)
VANCOMYCIN TROUGH SERPL-MCNC: 31.7 UG/ML (ref 10–20)
VARIANT LYMPHS # BLD AUTO: 1 %
VARIANT LYMPHS # BLD AUTO: 25 %
VARIANT LYMPHS # BLD AUTO: 3 %
VARIANT LYMPHS # BLD AUTO: 6 %
VENTRICULAR RATE: 104 BPM
VENTRICULAR RATE: 119 BPM
VENTRICULAR RATE: 126 BPM
VENTRICULAR RATE: 150 BPM
VENTRICULAR RATE: 151 BPM
VENTRICULAR RATE: 151 BPM
VENTRICULAR RATE: 169 BPM
VENTRICULAR RATE: 170 BPM
WBC # BLD AUTO: 0.5 THOUSAND/UL (ref 4.31–10.16)
WBC # BLD AUTO: 1.22 THOUSAND/UL (ref 4.31–10.16)
WBC # BLD AUTO: 1.33 THOUSAND/UL (ref 4.31–10.16)
WBC # BLD AUTO: 1.87 THOUSAND/UL (ref 4.31–10.16)
WBC # BLD AUTO: 1.9 THOUSAND/UL (ref 4.31–10.16)
WBC # BLD AUTO: 10.37 THOUSAND/UL (ref 4.31–10.16)
WBC # BLD AUTO: 11.14 THOUSAND/UL (ref 4.31–10.16)
WBC # BLD AUTO: 11.32 THOUSAND/UL (ref 4.31–10.16)
WBC # BLD AUTO: 11.92 THOUSAND/UL (ref 4.31–10.16)
WBC # BLD AUTO: 12.49 THOUSAND/UL (ref 4.31–10.16)
WBC # BLD AUTO: 2.02 THOUSAND/UL (ref 4.31–10.16)
WBC # BLD AUTO: 2.03 THOUSAND/UL (ref 4.31–10.16)
WBC # BLD AUTO: 2.46 THOUSAND/UL (ref 4.31–10.16)
WBC # BLD AUTO: 2.51 THOUSAND/UL (ref 4.31–10.16)
WBC # BLD AUTO: 2.56 THOUSAND/UL (ref 4.31–10.16)
WBC # BLD AUTO: 2.71 THOUSAND/UL (ref 4.31–10.16)
WBC # BLD AUTO: 9.63 THOUSAND/UL (ref 4.31–10.16)
WBC #/AREA URNS AUTO: ABNORMAL /HPF
WBC #/AREA URNS AUTO: ABNORMAL /HPF
WBC #/AREA URNS AUTO: NORMAL /HPF

## 2022-01-01 PROCEDURE — 86900 BLOOD TYPING SEROLOGIC ABO: CPT | Performed by: INTERNAL MEDICINE

## 2022-01-01 PROCEDURE — 30233R1 TRANSFUSION OF NONAUTOLOGOUS PLATELETS INTO PERIPHERAL VEIN, PERCUTANEOUS APPROACH: ICD-10-PCS | Performed by: HOSPITALIST

## 2022-01-01 PROCEDURE — 85027 COMPLETE CBC AUTOMATED: CPT | Performed by: EMERGENCY MEDICINE

## 2022-01-01 PROCEDURE — P9040 RBC LEUKOREDUCED IRRADIATED: HCPCS

## 2022-01-01 PROCEDURE — 84100 ASSAY OF PHOSPHORUS: CPT | Performed by: STUDENT IN AN ORGANIZED HEALTH CARE EDUCATION/TRAINING PROGRAM

## 2022-01-01 PROCEDURE — 50432 PLMT NEPHROSTOMY CATHETER: CPT | Performed by: STUDENT IN AN ORGANIZED HEALTH CARE EDUCATION/TRAINING PROGRAM

## 2022-01-01 PROCEDURE — P9058 RBC, L/R, CMV-NEG, IRRAD: HCPCS

## 2022-01-01 PROCEDURE — 85027 COMPLETE CBC AUTOMATED: CPT | Performed by: PHYSICIAN ASSISTANT

## 2022-01-01 PROCEDURE — P9053 PLT, PHER, L/R CMV-NEG, IRR: HCPCS

## 2022-01-01 PROCEDURE — 99232 SBSQ HOSP IP/OBS MODERATE 35: CPT | Performed by: INTERNAL MEDICINE

## 2022-01-01 PROCEDURE — 36430 TRANSFUSION BLD/BLD COMPNT: CPT

## 2022-01-01 PROCEDURE — 71045 X-RAY EXAM CHEST 1 VIEW: CPT

## 2022-01-01 PROCEDURE — 99232 SBSQ HOSP IP/OBS MODERATE 35: CPT | Performed by: HOSPITALIST

## 2022-01-01 PROCEDURE — 86900 BLOOD TYPING SEROLOGIC ABO: CPT | Performed by: EMERGENCY MEDICINE

## 2022-01-01 PROCEDURE — 86923 COMPATIBILITY TEST ELECTRIC: CPT

## 2022-01-01 PROCEDURE — 80048 BASIC METABOLIC PNL TOTAL CA: CPT | Performed by: NURSE PRACTITIONER

## 2022-01-01 PROCEDURE — 99233 SBSQ HOSP IP/OBS HIGH 50: CPT | Performed by: INTERNAL MEDICINE

## 2022-01-01 PROCEDURE — 83735 ASSAY OF MAGNESIUM: CPT | Performed by: EMERGENCY MEDICINE

## 2022-01-01 PROCEDURE — 99284 EMERGENCY DEPT VISIT MOD MDM: CPT | Performed by: EMERGENCY MEDICINE

## 2022-01-01 PROCEDURE — 86850 RBC ANTIBODY SCREEN: CPT | Performed by: INTERNAL MEDICINE

## 2022-01-01 PROCEDURE — 96365 THER/PROPH/DIAG IV INF INIT: CPT

## 2022-01-01 PROCEDURE — 70487 CT MAXILLOFACIAL W/DYE: CPT

## 2022-01-01 PROCEDURE — 93010 ELECTROCARDIOGRAM REPORT: CPT | Performed by: INTERNAL MEDICINE

## 2022-01-01 PROCEDURE — 78306 BONE IMAGING WHOLE BODY: CPT

## 2022-01-01 PROCEDURE — 93005 ELECTROCARDIOGRAM TRACING: CPT

## 2022-01-01 PROCEDURE — 30233R1 TRANSFUSION OF NONAUTOLOGOUS PLATELETS INTO PERIPHERAL VEIN, PERCUTANEOUS APPROACH: ICD-10-PCS | Performed by: STUDENT IN AN ORGANIZED HEALTH CARE EDUCATION/TRAINING PROGRAM

## 2022-01-01 PROCEDURE — 86901 BLOOD TYPING SEROLOGIC RH(D): CPT | Performed by: INTERNAL MEDICINE

## 2022-01-01 PROCEDURE — 80048 BASIC METABOLIC PNL TOTAL CA: CPT | Performed by: EMERGENCY MEDICINE

## 2022-01-01 PROCEDURE — 87086 URINE CULTURE/COLONY COUNT: CPT | Performed by: STUDENT IN AN ORGANIZED HEALTH CARE EDUCATION/TRAINING PROGRAM

## 2022-01-01 PROCEDURE — 36415 COLL VENOUS BLD VENIPUNCTURE: CPT | Performed by: EMERGENCY MEDICINE

## 2022-01-01 PROCEDURE — 80053 COMPREHEN METABOLIC PANEL: CPT | Performed by: STUDENT IN AN ORGANIZED HEALTH CARE EDUCATION/TRAINING PROGRAM

## 2022-01-01 PROCEDURE — 99285 EMERGENCY DEPT VISIT HI MDM: CPT

## 2022-01-01 PROCEDURE — 99254 IP/OBS CNSLTJ NEW/EST MOD 60: CPT | Performed by: PHYSICIAN ASSISTANT

## 2022-01-01 PROCEDURE — 85027 COMPLETE CBC AUTOMATED: CPT | Performed by: INTERNAL MEDICINE

## 2022-01-01 PROCEDURE — 85007 BL SMEAR W/DIFF WBC COUNT: CPT | Performed by: HOSPITALIST

## 2022-01-01 PROCEDURE — 99284 EMERGENCY DEPT VISIT MOD MDM: CPT

## 2022-01-01 PROCEDURE — 80053 COMPREHEN METABOLIC PANEL: CPT | Performed by: INTERNAL MEDICINE

## 2022-01-01 PROCEDURE — 83735 ASSAY OF MAGNESIUM: CPT | Performed by: INTERNAL MEDICINE

## 2022-01-01 PROCEDURE — 80053 COMPREHEN METABOLIC PANEL: CPT | Performed by: HOSPITALIST

## 2022-01-01 PROCEDURE — 96375 TX/PRO/DX INJ NEW DRUG ADDON: CPT

## 2022-01-01 PROCEDURE — 36600 WITHDRAWAL OF ARTERIAL BLOOD: CPT

## 2022-01-01 PROCEDURE — 99222 1ST HOSP IP/OBS MODERATE 55: CPT | Performed by: INTERNAL MEDICINE

## 2022-01-01 PROCEDURE — 74177 CT ABD & PELVIS W/CONTRAST: CPT

## 2022-01-01 PROCEDURE — 85027 COMPLETE CBC AUTOMATED: CPT | Performed by: HOSPITALIST

## 2022-01-01 PROCEDURE — 99223 1ST HOSP IP/OBS HIGH 75: CPT | Performed by: STUDENT IN AN ORGANIZED HEALTH CARE EDUCATION/TRAINING PROGRAM

## 2022-01-01 PROCEDURE — 99356 PR PROLONGED SVC I/P OR OBS SETTING 1ST HOUR: CPT | Performed by: INTERNAL MEDICINE

## 2022-01-01 PROCEDURE — 97163 PT EVAL HIGH COMPLEX 45 MIN: CPT

## 2022-01-01 PROCEDURE — 85007 BL SMEAR W/DIFF WBC COUNT: CPT | Performed by: EMERGENCY MEDICINE

## 2022-01-01 PROCEDURE — 0T943ZZ DRAINAGE OF LEFT KIDNEY PELVIS, PERCUTANEOUS APPROACH: ICD-10-PCS | Performed by: STUDENT IN AN ORGANIZED HEALTH CARE EDUCATION/TRAINING PROGRAM

## 2022-01-01 PROCEDURE — C1729 CATH, DRAINAGE: HCPCS

## 2022-01-01 PROCEDURE — 30233N1 TRANSFUSION OF NONAUTOLOGOUS RED BLOOD CELLS INTO PERIPHERAL VEIN, PERCUTANEOUS APPROACH: ICD-10-PCS | Performed by: INTERNAL MEDICINE

## 2022-01-01 PROCEDURE — 84145 PROCALCITONIN (PCT): CPT | Performed by: EMERGENCY MEDICINE

## 2022-01-01 PROCEDURE — 80053 COMPREHEN METABOLIC PANEL: CPT | Performed by: EMERGENCY MEDICINE

## 2022-01-01 PROCEDURE — 99223 1ST HOSP IP/OBS HIGH 75: CPT | Performed by: FAMILY MEDICINE

## 2022-01-01 PROCEDURE — 85027 COMPLETE CBC AUTOMATED: CPT | Performed by: STUDENT IN AN ORGANIZED HEALTH CARE EDUCATION/TRAINING PROGRAM

## 2022-01-01 PROCEDURE — 84100 ASSAY OF PHOSPHORUS: CPT | Performed by: PHYSICIAN ASSISTANT

## 2022-01-01 PROCEDURE — G1004 CDSM NDSC: HCPCS

## 2022-01-01 PROCEDURE — 85007 BL SMEAR W/DIFF WBC COUNT: CPT | Performed by: INTERNAL MEDICINE

## 2022-01-01 PROCEDURE — 93325 DOPPLER ECHO COLOR FLOW MAPG: CPT | Performed by: INTERNAL MEDICINE

## 2022-01-01 PROCEDURE — 84484 ASSAY OF TROPONIN QUANT: CPT | Performed by: STUDENT IN AN ORGANIZED HEALTH CARE EDUCATION/TRAINING PROGRAM

## 2022-01-01 PROCEDURE — 83735 ASSAY OF MAGNESIUM: CPT | Performed by: STUDENT IN AN ORGANIZED HEALTH CARE EDUCATION/TRAINING PROGRAM

## 2022-01-01 PROCEDURE — NC001 PR NO CHARGE: Performed by: PHYSICIAN ASSISTANT

## 2022-01-01 PROCEDURE — 99239 HOSP IP/OBS DSCHRG MGMT >30: CPT | Performed by: HOSPITALIST

## 2022-01-01 PROCEDURE — 83735 ASSAY OF MAGNESIUM: CPT | Performed by: PHYSICIAN ASSISTANT

## 2022-01-01 PROCEDURE — 86850 RBC ANTIBODY SCREEN: CPT | Performed by: EMERGENCY MEDICINE

## 2022-01-01 PROCEDURE — 86901 BLOOD TYPING SEROLOGIC RH(D): CPT | Performed by: FAMILY MEDICINE

## 2022-01-01 PROCEDURE — 87186 SC STD MICRODIL/AGAR DIL: CPT | Performed by: EMERGENCY MEDICINE

## 2022-01-01 PROCEDURE — 87086 URINE CULTURE/COLONY COUNT: CPT | Performed by: EMERGENCY MEDICINE

## 2022-01-01 PROCEDURE — 83605 ASSAY OF LACTIC ACID: CPT | Performed by: PHYSICIAN ASSISTANT

## 2022-01-01 PROCEDURE — 80202 ASSAY OF VANCOMYCIN: CPT | Performed by: STUDENT IN AN ORGANIZED HEALTH CARE EDUCATION/TRAINING PROGRAM

## 2022-01-01 PROCEDURE — 30233R1 TRANSFUSION OF NONAUTOLOGOUS PLATELETS INTO PERIPHERAL VEIN, PERCUTANEOUS APPROACH: ICD-10-PCS | Performed by: FAMILY MEDICINE

## 2022-01-01 PROCEDURE — 30233N1 TRANSFUSION OF NONAUTOLOGOUS RED BLOOD CELLS INTO PERIPHERAL VEIN, PERCUTANEOUS APPROACH: ICD-10-PCS | Performed by: EMERGENCY MEDICINE

## 2022-01-01 PROCEDURE — A9503 TC99M MEDRONATE: HCPCS

## 2022-01-01 PROCEDURE — P9037 PLATE PHERES LEUKOREDU IRRAD: HCPCS

## 2022-01-01 PROCEDURE — 86920 COMPATIBILITY TEST SPIN: CPT

## 2022-01-01 PROCEDURE — 85730 THROMBOPLASTIN TIME PARTIAL: CPT | Performed by: EMERGENCY MEDICINE

## 2022-01-01 PROCEDURE — 87040 BLOOD CULTURE FOR BACTERIA: CPT | Performed by: EMERGENCY MEDICINE

## 2022-01-01 PROCEDURE — 50432 PLMT NEPHROSTOMY CATHETER: CPT

## 2022-01-01 PROCEDURE — 85610 PROTHROMBIN TIME: CPT | Performed by: EMERGENCY MEDICINE

## 2022-01-01 PROCEDURE — 0T933ZZ DRAINAGE OF RIGHT KIDNEY PELVIS, PERCUTANEOUS APPROACH: ICD-10-PCS | Performed by: STUDENT IN AN ORGANIZED HEALTH CARE EDUCATION/TRAINING PROGRAM

## 2022-01-01 PROCEDURE — 84443 ASSAY THYROID STIM HORMONE: CPT | Performed by: STUDENT IN AN ORGANIZED HEALTH CARE EDUCATION/TRAINING PROGRAM

## 2022-01-01 PROCEDURE — 80048 BASIC METABOLIC PNL TOTAL CA: CPT | Performed by: STUDENT IN AN ORGANIZED HEALTH CARE EDUCATION/TRAINING PROGRAM

## 2022-01-01 PROCEDURE — 99223 1ST HOSP IP/OBS HIGH 75: CPT | Performed by: HOSPITALIST

## 2022-01-01 PROCEDURE — 99285 EMERGENCY DEPT VISIT HI MDM: CPT | Performed by: EMERGENCY MEDICINE

## 2022-01-01 PROCEDURE — 99024 POSTOP FOLLOW-UP VISIT: CPT | Performed by: STUDENT IN AN ORGANIZED HEALTH CARE EDUCATION/TRAINING PROGRAM

## 2022-01-01 PROCEDURE — 71275 CT ANGIOGRAPHY CHEST: CPT

## 2022-01-01 PROCEDURE — 81001 URINALYSIS AUTO W/SCOPE: CPT | Performed by: EMERGENCY MEDICINE

## 2022-01-01 PROCEDURE — 93308 TTE F-UP OR LMTD: CPT | Performed by: INTERNAL MEDICINE

## 2022-01-01 PROCEDURE — 80202 ASSAY OF VANCOMYCIN: CPT | Performed by: INTERNAL MEDICINE

## 2022-01-01 PROCEDURE — 99232 SBSQ HOSP IP/OBS MODERATE 35: CPT | Performed by: UROLOGY

## 2022-01-01 PROCEDURE — 99232 SBSQ HOSP IP/OBS MODERATE 35: CPT | Performed by: PHYSICIAN ASSISTANT

## 2022-01-01 PROCEDURE — 80048 BASIC METABOLIC PNL TOTAL CA: CPT | Performed by: INTERNAL MEDICINE

## 2022-01-01 PROCEDURE — 84484 ASSAY OF TROPONIN QUANT: CPT | Performed by: INTERNAL MEDICINE

## 2022-01-01 PROCEDURE — NC001 PR NO CHARGE: Performed by: NURSE PRACTITIONER

## 2022-01-01 PROCEDURE — 83605 ASSAY OF LACTIC ACID: CPT | Performed by: INTERNAL MEDICINE

## 2022-01-01 PROCEDURE — 99233 SBSQ HOSP IP/OBS HIGH 50: CPT | Performed by: HOSPITALIST

## 2022-01-01 PROCEDURE — 86901 BLOOD TYPING SEROLOGIC RH(D): CPT | Performed by: EMERGENCY MEDICINE

## 2022-01-01 PROCEDURE — 99223 1ST HOSP IP/OBS HIGH 75: CPT | Performed by: INTERNAL MEDICINE

## 2022-01-01 PROCEDURE — 99291 CRITICAL CARE FIRST HOUR: CPT | Performed by: EMERGENCY MEDICINE

## 2022-01-01 PROCEDURE — 86850 RBC ANTIBODY SCREEN: CPT | Performed by: FAMILY MEDICINE

## 2022-01-01 PROCEDURE — 93308 TTE F-UP OR LMTD: CPT

## 2022-01-01 PROCEDURE — 36415 COLL VENOUS BLD VENIPUNCTURE: CPT

## 2022-01-01 PROCEDURE — NC001 PR NO CHARGE: Performed by: INTERNAL MEDICINE

## 2022-01-01 PROCEDURE — 85007 BL SMEAR W/DIFF WBC COUNT: CPT | Performed by: PHYSICIAN ASSISTANT

## 2022-01-01 PROCEDURE — 87040 BLOOD CULTURE FOR BACTERIA: CPT | Performed by: INTERNAL MEDICINE

## 2022-01-01 PROCEDURE — 80053 COMPREHEN METABOLIC PANEL: CPT | Performed by: PHYSICIAN ASSISTANT

## 2022-01-01 PROCEDURE — 93321 DOPPLER ECHO F-UP/LMTD STD: CPT | Performed by: INTERNAL MEDICINE

## 2022-01-01 PROCEDURE — 87077 CULTURE AEROBIC IDENTIFY: CPT | Performed by: EMERGENCY MEDICINE

## 2022-01-01 PROCEDURE — 99239 HOSP IP/OBS DSCHRG MGMT >30: CPT | Performed by: INTERNAL MEDICINE

## 2022-01-01 PROCEDURE — 84100 ASSAY OF PHOSPHORUS: CPT | Performed by: INTERNAL MEDICINE

## 2022-01-01 PROCEDURE — 70470 CT HEAD/BRAIN W/O & W/DYE: CPT

## 2022-01-01 PROCEDURE — 83605 ASSAY OF LACTIC ACID: CPT | Performed by: NURSE PRACTITIONER

## 2022-01-01 PROCEDURE — 99223 1ST HOSP IP/OBS HIGH 75: CPT | Performed by: PHYSICIAN ASSISTANT

## 2022-01-01 PROCEDURE — C1894 INTRO/SHEATH, NON-LASER: HCPCS

## 2022-01-01 PROCEDURE — 99232 SBSQ HOSP IP/OBS MODERATE 35: CPT | Performed by: NURSE PRACTITIONER

## 2022-01-01 PROCEDURE — 30233N1 TRANSFUSION OF NONAUTOLOGOUS RED BLOOD CELLS INTO PERIPHERAL VEIN, PERCUTANEOUS APPROACH: ICD-10-PCS | Performed by: HOSPITALIST

## 2022-01-01 PROCEDURE — 82805 BLOOD GASES W/O2 SATURATION: CPT | Performed by: INTERNAL MEDICINE

## 2022-01-01 PROCEDURE — 83605 ASSAY OF LACTIC ACID: CPT | Performed by: EMERGENCY MEDICINE

## 2022-01-01 PROCEDURE — 85025 COMPLETE CBC W/AUTO DIFF WBC: CPT | Performed by: STUDENT IN AN ORGANIZED HEALTH CARE EDUCATION/TRAINING PROGRAM

## 2022-01-01 PROCEDURE — 82948 REAGENT STRIP/BLOOD GLUCOSE: CPT

## 2022-01-01 PROCEDURE — 86900 BLOOD TYPING SEROLOGIC ABO: CPT | Performed by: FAMILY MEDICINE

## 2022-01-01 PROCEDURE — 84484 ASSAY OF TROPONIN QUANT: CPT | Performed by: EMERGENCY MEDICINE

## 2022-01-01 PROCEDURE — 97167 OT EVAL HIGH COMPLEX 60 MIN: CPT

## 2022-01-01 PROCEDURE — 71260 CT THORAX DX C+: CPT

## 2022-01-01 PROCEDURE — NC001 PR NO CHARGE: Performed by: STUDENT IN AN ORGANIZED HEALTH CARE EDUCATION/TRAINING PROGRAM

## 2022-01-01 PROCEDURE — 82140 ASSAY OF AMMONIA: CPT | Performed by: INTERNAL MEDICINE

## 2022-01-01 PROCEDURE — 96361 HYDRATE IV INFUSION ADD-ON: CPT

## 2022-01-01 PROCEDURE — 96367 TX/PROPH/DG ADDL SEQ IV INF: CPT

## 2022-01-01 RX ORDER — GABAPENTIN 300 MG/1
600 CAPSULE ORAL 3 TIMES DAILY
Status: CANCELLED | OUTPATIENT
Start: 2022-01-01

## 2022-01-01 RX ORDER — LORAZEPAM 2 MG/ML
1 INJECTION INTRAMUSCULAR EVERY 4 HOURS PRN
Status: DISCONTINUED | OUTPATIENT
Start: 2022-01-01 | End: 2022-01-01

## 2022-01-01 RX ORDER — SUCCINYLCHOLINE/SOD CL,ISO/PF 100 MG/5ML
SYRINGE (ML) INTRAVENOUS AS NEEDED
Status: DISCONTINUED | OUTPATIENT
Start: 2022-01-01 | End: 2022-01-01

## 2022-01-01 RX ORDER — HALOPERIDOL 5 MG/ML
0.5 INJECTION INTRAMUSCULAR EVERY 2 HOUR PRN
Status: DISCONTINUED | OUTPATIENT
Start: 2022-01-01 | End: 2022-01-01 | Stop reason: HOSPADM

## 2022-01-01 RX ORDER — ACETAMINOPHEN 325 MG/1
650 TABLET ORAL ONCE
Status: COMPLETED | OUTPATIENT
Start: 2022-01-01 | End: 2022-01-01

## 2022-01-01 RX ORDER — METHYLPREDNISOLONE SODIUM SUCCINATE 40 MG/ML
40 INJECTION, POWDER, LYOPHILIZED, FOR SOLUTION INTRAMUSCULAR; INTRAVENOUS EVERY 8 HOURS SCHEDULED
Status: CANCELLED | OUTPATIENT
Start: 2022-01-01

## 2022-01-01 RX ORDER — ACETAMINOPHEN 325 MG/1
325 TABLET ORAL ONCE
Status: COMPLETED | OUTPATIENT
Start: 2022-01-01 | End: 2022-01-01

## 2022-01-01 RX ORDER — SODIUM CHLORIDE 9 MG/ML
100 INJECTION, SOLUTION INTRAVENOUS CONTINUOUS
Status: CANCELLED | OUTPATIENT
Start: 2022-01-01 | End: 2022-01-01

## 2022-01-01 RX ORDER — CEFAZOLIN SODIUM 1 G/3ML
INJECTION, POWDER, FOR SOLUTION INTRAMUSCULAR; INTRAVENOUS AS NEEDED
Status: DISCONTINUED | OUTPATIENT
Start: 2022-01-01 | End: 2022-01-01

## 2022-01-01 RX ORDER — FAMOTIDINE 20 MG/1
20 TABLET, FILM COATED ORAL 2 TIMES DAILY
Status: DISCONTINUED | OUTPATIENT
Start: 2022-01-01 | End: 2022-01-01 | Stop reason: HOSPADM

## 2022-01-01 RX ORDER — CALCIUM CARBONATE 200(500)MG
1000 TABLET,CHEWABLE ORAL DAILY PRN
Status: DISCONTINUED | OUTPATIENT
Start: 2022-01-01 | End: 2022-01-01 | Stop reason: HOSPADM

## 2022-01-01 RX ORDER — ONDANSETRON 2 MG/ML
4 INJECTION INTRAMUSCULAR; INTRAVENOUS EVERY 6 HOURS PRN
Status: DISCONTINUED | OUTPATIENT
Start: 2022-01-01 | End: 2022-01-01

## 2022-01-01 RX ORDER — SENNOSIDES 8.6 MG
1 TABLET ORAL
Status: DISCONTINUED | OUTPATIENT
Start: 2022-01-01 | End: 2022-01-01

## 2022-01-01 RX ORDER — LOPERAMIDE HYDROCHLORIDE 2 MG/1
2 CAPSULE ORAL 4 TIMES DAILY PRN
Status: DISCONTINUED | OUTPATIENT
Start: 2022-01-01 | End: 2022-01-01

## 2022-01-01 RX ORDER — KETOCONAZOLE 200 MG/1
200 TABLET ORAL DAILY
Status: DISCONTINUED | OUTPATIENT
Start: 2022-01-01 | End: 2022-01-01 | Stop reason: HOSPADM

## 2022-01-01 RX ORDER — LIDOCAINE HYDROCHLORIDE 20 MG/ML
1 JELLY TOPICAL ONCE
Status: COMPLETED | OUTPATIENT
Start: 2022-01-01 | End: 2022-01-01

## 2022-01-01 RX ORDER — MAGNESIUM 30 MG
30 TABLET ORAL DAILY
COMMUNITY
End: 2022-01-01 | Stop reason: HOSPADM

## 2022-01-01 RX ORDER — ACETAMINOPHEN 325 MG/1
650 TABLET ORAL EVERY 6 HOURS PRN
Status: DISCONTINUED | OUTPATIENT
Start: 2022-01-01 | End: 2022-01-01 | Stop reason: HOSPADM

## 2022-01-01 RX ORDER — DEXAMETHASONE 1 MG
1 TABLET ORAL 2 TIMES DAILY WITH MEALS
COMMUNITY
End: 2022-01-01 | Stop reason: HOSPADM

## 2022-01-01 RX ORDER — FAMOTIDINE 20 MG/1
20 TABLET, FILM COATED ORAL
Status: DISCONTINUED | OUTPATIENT
Start: 2022-01-01 | End: 2022-01-01

## 2022-01-01 RX ORDER — PANTOPRAZOLE SODIUM 40 MG/1
40 TABLET, DELAYED RELEASE ORAL
Status: DISCONTINUED | OUTPATIENT
Start: 2022-01-01 | End: 2022-01-01 | Stop reason: HOSPADM

## 2022-01-01 RX ORDER — FENTANYL CITRATE 50 UG/ML
50 INJECTION, SOLUTION INTRAMUSCULAR; INTRAVENOUS EVERY 2 HOUR PRN
Status: DISCONTINUED | OUTPATIENT
Start: 2022-01-01 | End: 2022-01-01

## 2022-01-01 RX ORDER — DIPHENHYDRAMINE HCL 25 MG
50 TABLET ORAL ONCE
Status: COMPLETED | OUTPATIENT
Start: 2022-01-01 | End: 2022-01-01

## 2022-01-01 RX ORDER — METOCLOPRAMIDE HYDROCHLORIDE 5 MG/ML
10 INJECTION INTRAMUSCULAR; INTRAVENOUS ONCE AS NEEDED
Status: DISCONTINUED | OUTPATIENT
Start: 2022-01-01 | End: 2022-01-01

## 2022-01-01 RX ORDER — OXYCODONE HYDROCHLORIDE 5 MG/1
10 TABLET ORAL EVERY 4 HOURS PRN
Status: DISCONTINUED | OUTPATIENT
Start: 2022-01-01 | End: 2022-01-01

## 2022-01-01 RX ORDER — CEFEPIME HYDROCHLORIDE 1 G/50ML
1000 INJECTION, SOLUTION INTRAVENOUS EVERY 12 HOURS
Status: CANCELLED | OUTPATIENT
Start: 2022-01-01

## 2022-01-01 RX ORDER — ONDANSETRON 4 MG/1
4 TABLET, ORALLY DISINTEGRATING ORAL EVERY 4 HOURS PRN
Status: DISCONTINUED | OUTPATIENT
Start: 2022-01-01 | End: 2022-01-01 | Stop reason: HOSPADM

## 2022-01-01 RX ORDER — OXYCODONE HYDROCHLORIDE 10 MG/1
10 TABLET, FILM COATED, EXTENDED RELEASE ORAL EVERY 12 HOURS PRN
COMMUNITY
Start: 2021-01-01 | End: 2022-01-01 | Stop reason: HOSPADM

## 2022-01-01 RX ORDER — LOPERAMIDE HYDROCHLORIDE 2 MG/1
2 CAPSULE ORAL 4 TIMES DAILY PRN
COMMUNITY
End: 2022-01-01 | Stop reason: HOSPADM

## 2022-01-01 RX ORDER — PANTOPRAZOLE SODIUM 40 MG/1
40 TABLET, DELAYED RELEASE ORAL
Status: DISCONTINUED | OUTPATIENT
Start: 2022-01-01 | End: 2022-01-01

## 2022-01-01 RX ORDER — SODIUM CHLORIDE 9 MG/ML
100 INJECTION, SOLUTION INTRAVENOUS CONTINUOUS
Status: DISCONTINUED | OUTPATIENT
Start: 2022-01-01 | End: 2022-01-01 | Stop reason: HOSPADM

## 2022-01-01 RX ORDER — OXYCODONE HYDROCHLORIDE 10 MG/1
10 TABLET ORAL EVERY 4 HOURS PRN
Status: DISCONTINUED | OUTPATIENT
Start: 2022-01-01 | End: 2022-01-01

## 2022-01-01 RX ORDER — ONDANSETRON 4 MG/1
4 TABLET, ORALLY DISINTEGRATING ORAL EVERY 6 HOURS PRN
Status: DISCONTINUED | OUTPATIENT
Start: 2022-01-01 | End: 2022-01-01 | Stop reason: HOSPADM

## 2022-01-01 RX ORDER — LORAZEPAM 2 MG/ML
1 INJECTION INTRAMUSCULAR
Status: DISCONTINUED | OUTPATIENT
Start: 2022-01-01 | End: 2022-01-01 | Stop reason: HOSPADM

## 2022-01-01 RX ORDER — SODIUM CHLORIDE 1000 MG
1 TABLET, SOLUBLE MISCELLANEOUS 2 TIMES DAILY
Status: DISCONTINUED | OUTPATIENT
Start: 2022-01-01 | End: 2022-01-01 | Stop reason: HOSPADM

## 2022-01-01 RX ORDER — SACCHAROMYCES BOULARDII 250 MG
250 CAPSULE ORAL 2 TIMES DAILY
Status: DISCONTINUED | OUTPATIENT
Start: 2022-01-01 | End: 2022-01-01 | Stop reason: HOSPADM

## 2022-01-01 RX ORDER — LANOLIN ALCOHOL/MO/W.PET/CERES
6 CREAM (GRAM) TOPICAL
Status: DISCONTINUED | OUTPATIENT
Start: 2022-01-01 | End: 2022-01-01 | Stop reason: HOSPADM

## 2022-01-01 RX ORDER — LORAZEPAM 2 MG/ML
1 INJECTION INTRAMUSCULAR EVERY 4 HOURS PRN
Status: CANCELLED | OUTPATIENT
Start: 2022-01-01

## 2022-01-01 RX ORDER — OXYCODONE HYDROCHLORIDE 5 MG/1
10 TABLET ORAL EVERY 4 HOURS PRN
Status: DISCONTINUED | OUTPATIENT
Start: 2022-01-01 | End: 2022-01-01 | Stop reason: HOSPADM

## 2022-01-01 RX ORDER — KETOCONAZOLE 200 MG/1
200 TABLET ORAL DAILY
Status: CANCELLED | OUTPATIENT
Start: 2022-01-01

## 2022-01-01 RX ORDER — METOPROLOL TARTRATE 5 MG/5ML
2.5 INJECTION INTRAVENOUS EVERY 6 HOURS PRN
Status: DISCONTINUED | OUTPATIENT
Start: 2022-01-01 | End: 2022-01-01

## 2022-01-01 RX ORDER — OXYCODONE HCL 10 MG/1
10 TABLET, FILM COATED, EXTENDED RELEASE ORAL EVERY 12 HOURS PRN
Status: DISCONTINUED | OUTPATIENT
Start: 2022-01-01 | End: 2022-01-01

## 2022-01-01 RX ORDER — DIPHENHYDRAMINE HCL 25 MG
25 TABLET ORAL ONCE
Status: COMPLETED | OUTPATIENT
Start: 2022-01-01 | End: 2022-01-01

## 2022-01-01 RX ORDER — ONDANSETRON 2 MG/ML
4 INJECTION INTRAMUSCULAR; INTRAVENOUS EVERY 6 HOURS PRN
Status: CANCELLED | OUTPATIENT
Start: 2022-01-01

## 2022-01-01 RX ORDER — METHYLPREDNISOLONE SODIUM SUCCINATE 40 MG/ML
40 INJECTION, POWDER, LYOPHILIZED, FOR SOLUTION INTRAMUSCULAR; INTRAVENOUS EVERY 8 HOURS SCHEDULED
Status: DISCONTINUED | OUTPATIENT
Start: 2022-01-01 | End: 2022-01-01 | Stop reason: HOSPADM

## 2022-01-01 RX ORDER — LIDOCAINE HYDROCHLORIDE 20 MG/ML
1 JELLY TOPICAL ONCE
Status: DISCONTINUED | OUTPATIENT
Start: 2022-01-01 | End: 2022-01-01

## 2022-01-01 RX ORDER — DEXAMETHASONE 0.5 MG/1
1 TABLET ORAL 2 TIMES DAILY WITH MEALS
Status: DISCONTINUED | OUTPATIENT
Start: 2022-01-01 | End: 2022-01-01

## 2022-01-01 RX ORDER — POLYETHYLENE GLYCOL 3350 17 G/17G
17 POWDER, FOR SOLUTION ORAL DAILY PRN
Status: DISCONTINUED | OUTPATIENT
Start: 2022-01-01 | End: 2022-01-01

## 2022-01-01 RX ORDER — BACITRACIN 500 [USP'U]/G
OINTMENT TOPICAL AS NEEDED
COMMUNITY
End: 2022-01-01 | Stop reason: HOSPADM

## 2022-01-01 RX ORDER — OXYCODONE HCL 20 MG/1
20 TABLET, FILM COATED, EXTENDED RELEASE ORAL EVERY 12 HOURS SCHEDULED
Status: DISCONTINUED | OUTPATIENT
Start: 2022-01-01 | End: 2022-01-01

## 2022-01-01 RX ORDER — POLYETHYLENE GLYCOL 3350 17 G/17G
17 POWDER, FOR SOLUTION ORAL DAILY PRN
Status: CANCELLED | OUTPATIENT
Start: 2022-01-01

## 2022-01-01 RX ORDER — GABAPENTIN 300 MG/1
600 CAPSULE ORAL 3 TIMES DAILY
Status: DISCONTINUED | OUTPATIENT
Start: 2022-01-01 | End: 2022-01-01 | Stop reason: HOSPADM

## 2022-01-01 RX ORDER — ETOMIDATE 2 MG/ML
INJECTION INTRAVENOUS AS NEEDED
Status: DISCONTINUED | OUTPATIENT
Start: 2022-01-01 | End: 2022-01-01

## 2022-01-01 RX ORDER — SENNOSIDES 8.6 MG
1 TABLET ORAL
Status: DISCONTINUED | OUTPATIENT
Start: 2022-01-01 | End: 2022-01-01 | Stop reason: HOSPADM

## 2022-01-01 RX ORDER — GLYCOPYRROLATE 0.2 MG/ML
0.1 INJECTION INTRAMUSCULAR; INTRAVENOUS EVERY 4 HOURS PRN
Status: DISCONTINUED | OUTPATIENT
Start: 2022-01-01 | End: 2022-01-01 | Stop reason: HOSPADM

## 2022-01-01 RX ORDER — LOPERAMIDE HYDROCHLORIDE 2 MG/1
2 CAPSULE ORAL EVERY 4 HOURS PRN
Status: DISCONTINUED | OUTPATIENT
Start: 2022-01-01 | End: 2022-01-01 | Stop reason: HOSPADM

## 2022-01-01 RX ORDER — METOPROLOL TARTRATE 5 MG/5ML
5 INJECTION INTRAVENOUS ONCE
Status: COMPLETED | OUTPATIENT
Start: 2022-01-01 | End: 2022-01-01

## 2022-01-01 RX ORDER — HYDROMORPHONE HCL/PF 1 MG/ML
1 SYRINGE (ML) INJECTION ONCE
Status: COMPLETED | OUTPATIENT
Start: 2022-01-01 | End: 2022-01-01

## 2022-01-01 RX ORDER — LANOLIN ALCOHOL/MO/W.PET/CERES
6 CREAM (GRAM) TOPICAL
Status: CANCELLED | OUTPATIENT
Start: 2022-01-01

## 2022-01-01 RX ORDER — OXYCODONE HYDROCHLORIDE 10 MG/1
10 TABLET ORAL EVERY 4 HOURS
Status: DISCONTINUED | OUTPATIENT
Start: 2022-01-01 | End: 2022-01-01

## 2022-01-01 RX ORDER — MORPHINE SULFATE 4 MG/ML
4 INJECTION, SOLUTION INTRAMUSCULAR; INTRAVENOUS
Status: DISCONTINUED | OUTPATIENT
Start: 2022-01-01 | End: 2022-01-01

## 2022-01-01 RX ORDER — KETOCONAZOLE 200 MG/1
200 TABLET ORAL DAILY
Status: DISCONTINUED | OUTPATIENT
Start: 2022-01-01 | End: 2022-01-01

## 2022-01-01 RX ORDER — CEFEPIME HYDROCHLORIDE 2 G/50ML
2000 INJECTION, SOLUTION INTRAVENOUS ONCE
Status: COMPLETED | OUTPATIENT
Start: 2022-01-01 | End: 2022-01-01

## 2022-01-01 RX ORDER — APALUTAMIDE 60 MG/1
TABLET, FILM COATED ORAL
COMMUNITY
Start: 2022-01-01 | End: 2022-01-01 | Stop reason: HOSPADM

## 2022-01-01 RX ORDER — SODIUM CHLORIDE 9 MG/ML
10 INJECTION INTRAVENOUS DAILY
Qty: 1200 ML | Refills: 0 | Status: SHIPPED | OUTPATIENT
Start: 2022-01-01 | End: 2022-07-10

## 2022-01-01 RX ORDER — FENTANYL CITRATE 50 UG/ML
50 INJECTION, SOLUTION INTRAMUSCULAR; INTRAVENOUS ONCE
Status: DISCONTINUED | OUTPATIENT
Start: 2022-01-01 | End: 2022-01-01

## 2022-01-01 RX ORDER — SODIUM CHLORIDE 1000 MG
1 TABLET, SOLUBLE MISCELLANEOUS 2 TIMES DAILY WITH MEALS
Status: DISCONTINUED | OUTPATIENT
Start: 2022-01-01 | End: 2022-01-01 | Stop reason: HOSPADM

## 2022-01-01 RX ORDER — FONDAPARINUX SODIUM 7.5 MG/.6ML
INJECTION SUBCUTANEOUS
Status: ON HOLD | COMMUNITY
Start: 2021-01-01 | End: 2022-01-01 | Stop reason: ALTCHOICE

## 2022-01-01 RX ORDER — LORAZEPAM 2 MG/ML
1 INJECTION INTRAMUSCULAR EVERY 4 HOURS PRN
Status: DISCONTINUED | OUTPATIENT
Start: 2022-01-01 | End: 2022-01-01 | Stop reason: HOSPADM

## 2022-01-01 RX ORDER — SODIUM CHLORIDE 9 MG/ML
100 INJECTION, SOLUTION INTRAVENOUS CONTINUOUS
Status: DISCONTINUED | OUTPATIENT
Start: 2022-01-01 | End: 2022-01-01

## 2022-01-01 RX ORDER — PANTOPRAZOLE SODIUM 40 MG/1
40 TABLET, DELAYED RELEASE ORAL
Status: CANCELLED | OUTPATIENT
Start: 2022-01-01

## 2022-01-01 RX ORDER — DOCUSATE SODIUM 100 MG/1
100 CAPSULE, LIQUID FILLED ORAL 2 TIMES DAILY
Status: DISCONTINUED | OUTPATIENT
Start: 2022-01-01 | End: 2022-01-01 | Stop reason: HOSPADM

## 2022-01-01 RX ORDER — KETOCONAZOLE 200 MG/1
200 TABLET ORAL DAILY
COMMUNITY
Start: 2022-01-01 | End: 2022-01-01 | Stop reason: HOSPADM

## 2022-01-01 RX ORDER — SODIUM CHLORIDE 1000 MG
1 TABLET, SOLUBLE MISCELLANEOUS 2 TIMES DAILY
COMMUNITY
Start: 2022-01-01 | End: 2022-01-01 | Stop reason: HOSPADM

## 2022-01-01 RX ORDER — OXYCODONE HCL 10 MG/1
10 TABLET, FILM COATED, EXTENDED RELEASE ORAL EVERY 12 HOURS PRN
Status: DISCONTINUED | OUTPATIENT
Start: 2022-01-01 | End: 2022-01-01 | Stop reason: HOSPADM

## 2022-01-01 RX ORDER — LANOLIN ALCOHOL/MO/W.PET/CERES
6 CREAM (GRAM) TOPICAL
Status: DISCONTINUED | OUTPATIENT
Start: 2022-01-01 | End: 2022-01-01

## 2022-01-01 RX ORDER — PROPOFOL 10 MG/ML
INJECTION, EMULSION INTRAVENOUS AS NEEDED
Status: DISCONTINUED | OUTPATIENT
Start: 2022-01-01 | End: 2022-01-01

## 2022-01-01 RX ORDER — ACETAMINOPHEN 325 MG/1
650 TABLET ORAL EVERY 6 HOURS PRN
Status: DISCONTINUED | OUTPATIENT
Start: 2022-01-01 | End: 2022-01-01

## 2022-01-01 RX ORDER — MORPHINE SULFATE 4 MG/ML
4 INJECTION, SOLUTION INTRAMUSCULAR; INTRAVENOUS
Status: DISCONTINUED | OUTPATIENT
Start: 2022-01-01 | End: 2022-01-01 | Stop reason: HOSPADM

## 2022-01-01 RX ORDER — OXYCODONE HYDROCHLORIDE 5 MG/1
10 TABLET ORAL EVERY 4 HOURS PRN
COMMUNITY
End: 2022-01-01 | Stop reason: HOSPADM

## 2022-01-01 RX ORDER — LORAZEPAM 0.5 MG/1
0.5 TABLET ORAL EVERY 6 HOURS PRN
Qty: 30 TABLET | Refills: 0 | Status: SHIPPED | OUTPATIENT
Start: 2022-01-01 | End: 2022-03-25

## 2022-01-01 RX ORDER — POLYETHYLENE GLYCOL 3350 17 G/17G
17 POWDER, FOR SOLUTION ORAL DAILY
Status: DISCONTINUED | OUTPATIENT
Start: 2022-01-01 | End: 2022-01-01 | Stop reason: HOSPADM

## 2022-01-01 RX ORDER — PREDNISONE 1 MG/1
TABLET ORAL
Status: ON HOLD | COMMUNITY
Start: 2021-01-01 | End: 2022-01-01 | Stop reason: ALTCHOICE

## 2022-01-01 RX ORDER — LANOLIN ALCOHOL/MO/W.PET/CERES
12 CREAM (GRAM) TOPICAL
Status: DISCONTINUED | OUTPATIENT
Start: 2022-01-01 | End: 2022-01-01

## 2022-01-01 RX ORDER — FAMOTIDINE 20 MG/1
20 TABLET, FILM COATED ORAL 2 TIMES DAILY
Status: DISCONTINUED | OUTPATIENT
Start: 2022-01-01 | End: 2022-01-01

## 2022-01-01 RX ORDER — PREDNISONE 1 MG/1
TABLET ORAL 2 TIMES DAILY WITH MEALS
COMMUNITY
Start: 2022-01-01 | End: 2022-01-01 | Stop reason: HOSPADM

## 2022-01-01 RX ORDER — ASCORBIC ACID 500 MG
1000 TABLET ORAL DAILY
Status: DISCONTINUED | OUTPATIENT
Start: 2022-01-01 | End: 2022-01-01 | Stop reason: HOSPADM

## 2022-01-01 RX ORDER — LIDOCAINE WITH 8.4% SOD BICARB 0.9%(10ML)
SYRINGE (ML) INJECTION CODE/TRAUMA/SEDATION MEDICATION
Status: COMPLETED | OUTPATIENT
Start: 2022-01-01 | End: 2022-01-01

## 2022-01-01 RX ORDER — FAMOTIDINE 20 MG/1
20 TABLET, FILM COATED ORAL
Status: DISCONTINUED | OUTPATIENT
Start: 2022-01-01 | End: 2022-01-01 | Stop reason: HOSPADM

## 2022-01-01 RX ORDER — SODIUM CHLORIDE 9 MG/ML
125 INJECTION, SOLUTION INTRAVENOUS CONTINUOUS
Status: DISPENSED | OUTPATIENT
Start: 2022-01-01 | End: 2022-01-01

## 2022-01-01 RX ORDER — ONDANSETRON 2 MG/ML
4 INJECTION INTRAMUSCULAR; INTRAVENOUS EVERY 6 HOURS PRN
Status: DISCONTINUED | OUTPATIENT
Start: 2022-01-01 | End: 2022-01-01 | Stop reason: HOSPADM

## 2022-01-01 RX ORDER — VANCOMYCIN HYDROCHLORIDE 1 G/200ML
1000 INJECTION, SOLUTION INTRAVENOUS EVERY 24 HOURS
Status: DISCONTINUED | OUTPATIENT
Start: 2022-01-01 | End: 2022-01-01

## 2022-01-01 RX ORDER — ACETAMINOPHEN 325 MG/1
650 TABLET ORAL EVERY 6 HOURS PRN
Status: CANCELLED | OUTPATIENT
Start: 2022-01-01

## 2022-01-01 RX ORDER — CEFEPIME HYDROCHLORIDE 1 G/50ML
1000 INJECTION, SOLUTION INTRAVENOUS EVERY 12 HOURS
Status: DISCONTINUED | OUTPATIENT
Start: 2022-01-01 | End: 2022-01-01 | Stop reason: HOSPADM

## 2022-01-01 RX ORDER — HYDROMORPHONE HCL/PF 1 MG/ML
1 SYRINGE (ML) INJECTION EVERY 4 HOURS PRN
Status: DISCONTINUED | OUTPATIENT
Start: 2022-01-01 | End: 2022-01-01

## 2022-01-01 RX ORDER — MAGNESIUM HYDROXIDE/ALUMINUM HYDROXICE/SIMETHICONE 120; 1200; 1200 MG/30ML; MG/30ML; MG/30ML
30 SUSPENSION ORAL EVERY 6 HOURS PRN
Status: DISCONTINUED | OUTPATIENT
Start: 2022-01-01 | End: 2022-01-01 | Stop reason: HOSPADM

## 2022-01-01 RX ORDER — CIPROFLOXACIN 2 MG/ML
400 INJECTION, SOLUTION INTRAVENOUS ONCE
Status: COMPLETED | OUTPATIENT
Start: 2022-01-01 | End: 2022-01-01

## 2022-01-01 RX ORDER — ONDANSETRON HYDROCHLORIDE 8 MG/1
TABLET, FILM COATED ORAL
COMMUNITY
Start: 2022-01-01 | End: 2022-01-01 | Stop reason: HOSPADM

## 2022-01-01 RX ORDER — FENTANYL CITRATE 50 UG/ML
INJECTION, SOLUTION INTRAMUSCULAR; INTRAVENOUS AS NEEDED
Status: DISCONTINUED | OUTPATIENT
Start: 2022-01-01 | End: 2022-01-01

## 2022-01-01 RX ORDER — POLYETHYLENE GLYCOL 3350 17 G/17G
17 POWDER, FOR SOLUTION ORAL DAILY
Status: DISCONTINUED | OUTPATIENT
Start: 2022-01-01 | End: 2022-01-01

## 2022-01-01 RX ORDER — OXYCODONE HYDROCHLORIDE 5 MG/1
5 TABLET ORAL EVERY 6 HOURS PRN
Status: DISCONTINUED | OUTPATIENT
Start: 2022-01-01 | End: 2022-01-01 | Stop reason: HOSPADM

## 2022-01-01 RX ORDER — GABAPENTIN 300 MG/1
600 CAPSULE ORAL 3 TIMES DAILY
Status: DISCONTINUED | OUTPATIENT
Start: 2022-01-01 | End: 2022-01-01

## 2022-01-01 RX ORDER — SODIUM CHLORIDE, SODIUM GLUCONATE, SODIUM ACETATE, POTASSIUM CHLORIDE, MAGNESIUM CHLORIDE, SODIUM PHOSPHATE, DIBASIC, AND POTASSIUM PHOSPHATE .53; .5; .37; .037; .03; .012; .00082 G/100ML; G/100ML; G/100ML; G/100ML; G/100ML; G/100ML; G/100ML
500 INJECTION, SOLUTION INTRAVENOUS ONCE
Status: COMPLETED | OUTPATIENT
Start: 2022-01-01 | End: 2022-01-01

## 2022-01-01 RX ORDER — UREA 10 %
6 LOTION (ML) TOPICAL
COMMUNITY
End: 2022-01-01 | Stop reason: HOSPADM

## 2022-01-01 RX ORDER — LORAZEPAM 2 MG/ML
0.5 CONCENTRATE ORAL EVERY 6 HOURS PRN
Qty: 30 ML | Refills: 0 | Status: SHIPPED | OUTPATIENT
Start: 2022-01-01 | End: 2022-03-25

## 2022-01-01 RX ORDER — METHYLPREDNISOLONE SODIUM SUCCINATE 40 MG/ML
40 INJECTION, POWDER, LYOPHILIZED, FOR SOLUTION INTRAMUSCULAR; INTRAVENOUS EVERY 8 HOURS SCHEDULED
Status: DISCONTINUED | OUTPATIENT
Start: 2022-01-01 | End: 2022-01-01

## 2022-01-01 RX ORDER — LOPERAMIDE HYDROCHLORIDE 2 MG/1
2 CAPSULE ORAL 4 TIMES DAILY PRN
Status: DISCONTINUED | OUTPATIENT
Start: 2022-01-01 | End: 2022-01-01 | Stop reason: HOSPADM

## 2022-01-01 RX ORDER — CEFAZOLIN SODIUM 2 G/50ML
2000 SOLUTION INTRAVENOUS EVERY 8 HOURS
Status: DISCONTINUED | OUTPATIENT
Start: 2022-01-01 | End: 2022-01-01

## 2022-01-01 RX ORDER — CIPROFLOXACIN 2 MG/ML
400 INJECTION, SOLUTION INTRAVENOUS EVERY 12 HOURS
Status: DISCONTINUED | OUTPATIENT
Start: 2022-01-01 | End: 2022-01-01 | Stop reason: HOSPADM

## 2022-01-01 RX ORDER — OXYCODONE HYDROCHLORIDE 5 MG/1
10 TABLET ORAL EVERY 4 HOURS PRN
Status: CANCELLED | OUTPATIENT
Start: 2022-01-01

## 2022-01-01 RX ORDER — PREDNISONE 1 MG/1
5 TABLET ORAL DAILY
Status: DISCONTINUED | OUTPATIENT
Start: 2022-01-01 | End: 2022-01-01

## 2022-01-01 RX ORDER — SODIUM CHLORIDE 9 MG/ML
125 INJECTION, SOLUTION INTRAVENOUS CONTINUOUS
Status: DISCONTINUED | OUTPATIENT
Start: 2022-01-01 | End: 2022-01-01

## 2022-01-01 RX ORDER — POLYETHYLENE GLYCOL 3350 17 G/17G
17 POWDER, FOR SOLUTION ORAL DAILY PRN
Status: DISCONTINUED | OUTPATIENT
Start: 2022-01-01 | End: 2022-01-01 | Stop reason: HOSPADM

## 2022-01-01 RX ORDER — SODIUM CHLORIDE 1000 MG
1 TABLET, SOLUBLE MISCELLANEOUS 2 TIMES DAILY WITH MEALS
Status: DISCONTINUED | OUTPATIENT
Start: 2022-01-01 | End: 2022-01-01

## 2022-01-01 RX ORDER — LOPERAMIDE HYDROCHLORIDE 2 MG/1
2 CAPSULE ORAL 4 TIMES DAILY PRN
Status: CANCELLED | OUTPATIENT
Start: 2022-01-01

## 2022-01-01 RX ORDER — DEXAMETHASONE 4 MG/1
4 TABLET ORAL 2 TIMES DAILY WITH MEALS
COMMUNITY
End: 2022-01-01 | Stop reason: HOSPADM

## 2022-01-01 RX ORDER — PREDNISONE 1 MG/1
5 TABLET ORAL DAILY
Status: DISCONTINUED | OUTPATIENT
Start: 2022-01-01 | End: 2022-01-01 | Stop reason: HOSPADM

## 2022-01-01 RX ORDER — SODIUM CHLORIDE 1000 MG
1 TABLET, SOLUBLE MISCELLANEOUS 2 TIMES DAILY WITH MEALS
Status: CANCELLED | OUTPATIENT
Start: 2022-01-01

## 2022-01-01 RX ORDER — OXYCODONE HYDROCHLORIDE 10 MG/1
10 TABLET ORAL
COMMUNITY
Start: 2021-01-01 | End: 2022-01-01 | Stop reason: HOSPADM

## 2022-01-01 RX ORDER — FENTANYL CITRATE/PF 50 MCG/ML
50 SYRINGE (ML) INJECTION
Status: DISCONTINUED | OUTPATIENT
Start: 2022-01-01 | End: 2022-01-01

## 2022-01-01 RX ORDER — VANCOMYCIN HYDROCHLORIDE 1 G/200ML
15 INJECTION, SOLUTION INTRAVENOUS EVERY 12 HOURS
Status: DISCONTINUED | OUTPATIENT
Start: 2022-01-01 | End: 2022-01-01 | Stop reason: SDUPTHER

## 2022-01-01 RX ORDER — SENNOSIDES 8.6 MG
1 TABLET ORAL DAILY
Status: DISCONTINUED | OUTPATIENT
Start: 2022-01-01 | End: 2022-01-01 | Stop reason: HOSPADM

## 2022-01-01 RX ORDER — OXYCODONE HYDROCHLORIDE 10 MG/1
10 TABLET ORAL EVERY 4 HOURS PRN
Qty: 30 TABLET | Refills: 0 | Status: SHIPPED | OUTPATIENT
Start: 2022-01-01 | End: 2022-03-25

## 2022-01-01 RX ORDER — DEXAMETHASONE 4 MG/1
4 TABLET ORAL EVERY 12 HOURS SCHEDULED
Status: DISCONTINUED | OUTPATIENT
Start: 2022-01-01 | End: 2022-01-01

## 2022-01-01 RX ORDER — HYDROMORPHONE HCL/PF 1 MG/ML
0.5 SYRINGE (ML) INJECTION ONCE
Status: DISCONTINUED | OUTPATIENT
Start: 2022-01-01 | End: 2022-01-01

## 2022-01-01 RX ORDER — ONDANSETRON 2 MG/ML
4 INJECTION INTRAMUSCULAR; INTRAVENOUS EVERY 6 HOURS PRN
Status: DISCONTINUED | OUTPATIENT
Start: 2022-01-01 | End: 2022-01-01 | Stop reason: SDUPTHER

## 2022-01-01 RX ORDER — VANCOMYCIN HYDROCHLORIDE 1 G/200ML
15 INJECTION, SOLUTION INTRAVENOUS ONCE
Status: COMPLETED | OUTPATIENT
Start: 2022-01-01 | End: 2022-01-01

## 2022-01-01 RX ORDER — SODIUM CHLORIDE, SODIUM GLUCONATE, SODIUM ACETATE, POTASSIUM CHLORIDE, MAGNESIUM CHLORIDE, SODIUM PHOSPHATE, DIBASIC, AND POTASSIUM PHOSPHATE .53; .5; .37; .037; .03; .012; .00082 G/100ML; G/100ML; G/100ML; G/100ML; G/100ML; G/100ML; G/100ML
100 INJECTION, SOLUTION INTRAVENOUS CONTINUOUS
Status: DISPENSED | OUTPATIENT
Start: 2022-01-01 | End: 2022-01-01

## 2022-01-01 RX ORDER — OXYCODONE HCL 20 MG/ML
10 CONCENTRATE, ORAL ORAL EVERY 4 HOURS PRN
Qty: 30 ML | Refills: 0 | Status: SHIPPED | OUTPATIENT
Start: 2022-01-01 | End: 2022-03-25

## 2022-01-01 RX ORDER — FAMOTIDINE 20 MG/1
20 TABLET, FILM COATED ORAL
Status: CANCELLED | OUTPATIENT
Start: 2022-01-01

## 2022-01-01 RX ADMIN — ACETAMINOPHEN 650 MG: 325 TABLET, FILM COATED ORAL at 13:31

## 2022-01-01 RX ADMIN — OXYCODONE HYDROCHLORIDE 10 MG: 10 TABLET ORAL at 20:01

## 2022-01-01 RX ADMIN — KETOCONAZOLE 200 MG: 200 TABLET ORAL at 11:25

## 2022-01-01 RX ADMIN — SODIUM CHLORIDE 1 G: 1 TABLET ORAL at 21:15

## 2022-01-01 RX ADMIN — CEFEPIME HYDROCHLORIDE 2000 MG: 2 INJECTION, POWDER, FOR SOLUTION INTRAVENOUS at 14:23

## 2022-01-01 RX ADMIN — VANCOMYCIN HYDROCHLORIDE 750 MG: 750 INJECTION, SOLUTION INTRAVENOUS at 16:47

## 2022-01-01 RX ADMIN — GABAPENTIN 600 MG: 300 CAPSULE ORAL at 22:51

## 2022-01-01 RX ADMIN — Medication 250 MG: at 08:16

## 2022-01-01 RX ADMIN — Medication 6 MG: at 20:45

## 2022-01-01 RX ADMIN — CEFEPIME HYDROCHLORIDE 2000 MG: 2 INJECTION, SOLUTION INTRAVENOUS at 12:29

## 2022-01-01 RX ADMIN — FAMOTIDINE 20 MG: 20 TABLET ORAL at 09:30

## 2022-01-01 RX ADMIN — PANTOPRAZOLE SODIUM 40 MG: 40 TABLET, DELAYED RELEASE ORAL at 06:00

## 2022-01-01 RX ADMIN — OXYCODONE HYDROCHLORIDE 10 MG: 10 TABLET, FILM COATED, EXTENDED RELEASE ORAL at 01:02

## 2022-01-01 RX ADMIN — HYDROMORPHONE HYDROCHLORIDE 1 MG: 1 INJECTION, SOLUTION INTRAMUSCULAR; INTRAVENOUS; SUBCUTANEOUS at 06:02

## 2022-01-01 RX ADMIN — ACETAMINOPHEN 650 MG: 325 TABLET, FILM COATED ORAL at 18:56

## 2022-01-01 RX ADMIN — SODIUM CHLORIDE 125 ML/HR: 0.9 INJECTION, SOLUTION INTRAVENOUS at 11:45

## 2022-01-01 RX ADMIN — GABAPENTIN 600 MG: 300 CAPSULE ORAL at 09:17

## 2022-01-01 RX ADMIN — ACETAMINOPHEN 650 MG: 325 TABLET ORAL at 18:51

## 2022-01-01 RX ADMIN — SODIUM CHLORIDE 100 ML/HR: 0.9 INJECTION, SOLUTION INTRAVENOUS at 23:49

## 2022-01-01 RX ADMIN — OXYCODONE HYDROCHLORIDE 10 MG: 10 TABLET ORAL at 05:00

## 2022-01-01 RX ADMIN — FAMOTIDINE 20 MG: 20 TABLET ORAL at 08:36

## 2022-01-01 RX ADMIN — Medication 6 MG: at 21:55

## 2022-01-01 RX ADMIN — APALUTAMIDE 240 MG: 60 TABLET, FILM COATED ORAL at 09:32

## 2022-01-01 RX ADMIN — METHYLPREDNISOLONE SODIUM SUCCINATE 40 MG: 40 INJECTION, POWDER, FOR SOLUTION INTRAMUSCULAR; INTRAVENOUS at 14:59

## 2022-01-01 RX ADMIN — CEFEPIME HYDROCHLORIDE 2000 MG: 2 INJECTION, POWDER, FOR SOLUTION INTRAVENOUS at 02:29

## 2022-01-01 RX ADMIN — Medication 6 MG: at 22:01

## 2022-01-01 RX ADMIN — GABAPENTIN 600 MG: 300 CAPSULE ORAL at 15:31

## 2022-01-01 RX ADMIN — IOHEXOL 100 ML: 350 INJECTION, SOLUTION INTRAVENOUS at 14:40

## 2022-01-01 RX ADMIN — GABAPENTIN 600 MG: 300 CAPSULE ORAL at 09:30

## 2022-01-01 RX ADMIN — CEFAZOLIN 1000 MG: 1 INJECTION, POWDER, FOR SOLUTION INTRAMUSCULAR; INTRAVENOUS at 00:04

## 2022-01-01 RX ADMIN — PANTOPRAZOLE SODIUM 40 MG: 40 TABLET, DELAYED RELEASE ORAL at 06:36

## 2022-01-01 RX ADMIN — OXYCODONE HYDROCHLORIDE 10 MG: 10 TABLET ORAL at 15:32

## 2022-01-01 RX ADMIN — Medication 12.5 MG: at 09:30

## 2022-01-01 RX ADMIN — DIPHENHYDRAMINE HCL 25 MG: 25 TABLET, COATED ORAL at 09:56

## 2022-01-01 RX ADMIN — GABAPENTIN 600 MG: 300 CAPSULE ORAL at 20:10

## 2022-01-01 RX ADMIN — GABAPENTIN 600 MG: 300 CAPSULE ORAL at 08:35

## 2022-01-01 RX ADMIN — METOPROLOL TARTRATE 25 MG: 25 TABLET, FILM COATED ORAL at 00:12

## 2022-01-01 RX ADMIN — LIDOCAINE HYDROCHLORIDE 1 APPLICATION: 20 JELLY TOPICAL at 17:10

## 2022-01-01 RX ADMIN — CEFEPIME HYDROCHLORIDE 1000 MG: 1 INJECTION, POWDER, FOR SOLUTION INTRAMUSCULAR; INTRAVENOUS at 02:25

## 2022-01-01 RX ADMIN — GABAPENTIN 600 MG: 300 CAPSULE ORAL at 17:22

## 2022-01-01 RX ADMIN — GABAPENTIN 600 MG: 300 CAPSULE ORAL at 22:06

## 2022-01-01 RX ADMIN — SODIUM CHLORIDE 1 G: 1 TABLET ORAL at 22:19

## 2022-01-01 RX ADMIN — ACETAMINOPHEN 650 MG: 325 TABLET, FILM COATED ORAL at 13:20

## 2022-01-01 RX ADMIN — MORPHINE SULFATE 5 MG: 2 INJECTION, SOLUTION INTRAMUSCULAR; INTRAVENOUS at 00:52

## 2022-01-01 RX ADMIN — GABAPENTIN 600 MG: 300 CAPSULE ORAL at 09:02

## 2022-01-01 RX ADMIN — SODIUM CHLORIDE 1 G: 1 TABLET ORAL at 08:16

## 2022-01-01 RX ADMIN — SODIUM CHLORIDE TAB 1 GM 1 G: 1 TAB at 08:27

## 2022-01-01 RX ADMIN — FAMOTIDINE 20 MG: 20 TABLET ORAL at 17:13

## 2022-01-01 RX ADMIN — OXYCODONE HYDROCHLORIDE 5 MG: 5 TABLET ORAL at 22:57

## 2022-01-01 RX ADMIN — GABAPENTIN 600 MG: 300 CAPSULE ORAL at 16:31

## 2022-01-01 RX ADMIN — Medication 12.5 MG: at 08:17

## 2022-01-01 RX ADMIN — FAMOTIDINE 20 MG: 20 TABLET ORAL at 18:21

## 2022-01-01 RX ADMIN — MORPHINE SULFATE 2 MG: 2 INJECTION, SOLUTION INTRAMUSCULAR; INTRAVENOUS at 02:56

## 2022-01-01 RX ADMIN — FAMOTIDINE 20 MG: 20 TABLET ORAL at 16:30

## 2022-01-01 RX ADMIN — ONDANSETRON 4 MG: 4 TABLET, ORALLY DISINTEGRATING ORAL at 09:41

## 2022-01-01 RX ADMIN — Medication 100 MG: at 23:54

## 2022-01-01 RX ADMIN — Medication 6 MG: at 21:39

## 2022-01-01 RX ADMIN — Medication 6 MG: at 00:18

## 2022-01-01 RX ADMIN — CIPROFLOXACIN 400 MG: 2 INJECTION, SOLUTION INTRAVENOUS at 08:35

## 2022-01-01 RX ADMIN — PREDNISONE 5 MG: 5 TABLET ORAL at 09:49

## 2022-01-01 RX ADMIN — PANTOPRAZOLE SODIUM 40 MG: 40 TABLET, DELAYED RELEASE ORAL at 04:56

## 2022-01-01 RX ADMIN — Medication 12 MG: at 21:38

## 2022-01-01 RX ADMIN — FENTANYL CITRATE 50 MCG: 50 INJECTION INTRAMUSCULAR; INTRAVENOUS at 15:00

## 2022-01-01 RX ADMIN — OXYCODONE HYDROCHLORIDE 5 MG: 5 TABLET ORAL at 08:16

## 2022-01-01 RX ADMIN — CIPROFLOXACIN 400 MG: 2 INJECTION, SOLUTION INTRAVENOUS at 19:55

## 2022-01-01 RX ADMIN — FAMOTIDINE 20 MG: 20 TABLET ORAL at 08:16

## 2022-01-01 RX ADMIN — SODIUM CHLORIDE 1 G: 1 TABLET ORAL at 09:02

## 2022-01-01 RX ADMIN — ACETAMINOPHEN 650 MG: 325 TABLET, FILM COATED ORAL at 08:22

## 2022-01-01 RX ADMIN — SODIUM CHLORIDE 100 ML/HR: 0.9 INJECTION, SOLUTION INTRAVENOUS at 20:08

## 2022-01-01 RX ADMIN — APALUTAMIDE 240 MG: 60 TABLET, FILM COATED ORAL at 09:00

## 2022-01-01 RX ADMIN — VANCOMYCIN HYDROCHLORIDE 1000 MG: 1 INJECTION, SOLUTION INTRAVENOUS at 13:53

## 2022-01-01 RX ADMIN — OXYCODONE HYDROCHLORIDE 10 MG: 10 TABLET ORAL at 15:07

## 2022-01-01 RX ADMIN — IODIXANOL 40 ML: 320 INJECTION, SOLUTION INTRAVASCULAR at 18:27

## 2022-01-01 RX ADMIN — GABAPENTIN 600 MG: 300 CAPSULE ORAL at 08:16

## 2022-01-01 RX ADMIN — ACETAMINOPHEN 650 MG: 325 TABLET, FILM COATED ORAL at 08:32

## 2022-01-01 RX ADMIN — MORPHINE SULFATE 4 MG: 4 INJECTION INTRAVENOUS at 11:49

## 2022-01-01 RX ADMIN — MORPHINE SULFATE 2 MG: 2 INJECTION, SOLUTION INTRAMUSCULAR; INTRAVENOUS at 01:48

## 2022-01-01 RX ADMIN — Medication 250 MG: at 17:22

## 2022-01-01 RX ADMIN — OXYCODONE HYDROCHLORIDE 20 MG: 20 TABLET, FILM COATED, EXTENDED RELEASE ORAL at 09:35

## 2022-01-01 RX ADMIN — CIPROFLOXACIN 400 MG: 2 INJECTION, SOLUTION INTRAVENOUS at 20:11

## 2022-01-01 RX ADMIN — METOPROLOL SUCCINATE 75 MG: 50 TABLET, EXTENDED RELEASE ORAL at 07:39

## 2022-01-01 RX ADMIN — Medication 12.5 MG: at 08:46

## 2022-01-01 RX ADMIN — OXYCODONE HYDROCHLORIDE 10 MG: 5 TABLET ORAL at 20:20

## 2022-01-01 RX ADMIN — SODIUM CHLORIDE 1 G: 1 TABLET ORAL at 20:18

## 2022-01-01 RX ADMIN — CEFEPIME HYDROCHLORIDE 2000 MG: 2 INJECTION, POWDER, FOR SOLUTION INTRAVENOUS at 02:19

## 2022-01-01 RX ADMIN — SODIUM CHLORIDE TAB 1 GM 1 G: 1 TAB at 15:32

## 2022-01-01 RX ADMIN — SODIUM CHLORIDE 1 G: 1 TABLET ORAL at 20:20

## 2022-01-01 RX ADMIN — OXYCODONE HYDROCHLORIDE 10 MG: 10 TABLET ORAL at 03:23

## 2022-01-01 RX ADMIN — VANCOMYCIN HYDROCHLORIDE 1000 MG: 5 INJECTION, POWDER, LYOPHILIZED, FOR SOLUTION INTRAVENOUS at 15:00

## 2022-01-01 RX ADMIN — GABAPENTIN 600 MG: 300 CAPSULE ORAL at 08:45

## 2022-01-01 RX ADMIN — FAMOTIDINE 20 MG: 20 TABLET ORAL at 17:25

## 2022-01-01 RX ADMIN — OXYCODONE HYDROCHLORIDE 5 MG: 5 TABLET ORAL at 06:17

## 2022-01-01 RX ADMIN — GABAPENTIN 600 MG: 300 CAPSULE ORAL at 21:37

## 2022-01-01 RX ADMIN — CEFEPIME HYDROCHLORIDE 2000 MG: 2 INJECTION, POWDER, FOR SOLUTION INTRAVENOUS at 03:14

## 2022-01-01 RX ADMIN — FAMOTIDINE 20 MG: 20 TABLET ORAL at 17:54

## 2022-01-01 RX ADMIN — OXYCODONE HYDROCHLORIDE 10 MG: 10 TABLET ORAL at 13:54

## 2022-01-01 RX ADMIN — ETOMIDATE 16 MG: 20 INJECTION, SOLUTION INTRAVENOUS at 23:54

## 2022-01-01 RX ADMIN — PROPOFOL 50 MG: 10 INJECTION, EMULSION INTRAVENOUS at 23:59

## 2022-01-01 RX ADMIN — SODIUM CHLORIDE TAB 1 GM 1 G: 1 TAB at 08:17

## 2022-01-01 RX ADMIN — VANCOMYCIN HYDROCHLORIDE 1000 MG: 5 INJECTION, POWDER, LYOPHILIZED, FOR SOLUTION INTRAVENOUS at 02:50

## 2022-01-01 RX ADMIN — Medication 12.5 MG: at 21:16

## 2022-01-01 RX ADMIN — PANTOPRAZOLE SODIUM 40 MG: 40 TABLET, DELAYED RELEASE ORAL at 05:17

## 2022-01-01 RX ADMIN — OXYCODONE HYDROCHLORIDE AND ACETAMINOPHEN 1000 MG: 500 TABLET ORAL at 10:31

## 2022-01-01 RX ADMIN — VANCOMYCIN HYDROCHLORIDE 750 MG: 750 INJECTION, SOLUTION INTRAVENOUS at 03:23

## 2022-01-01 RX ADMIN — IODIXANOL 45 ML: 320 INJECTION, SOLUTION INTRAVASCULAR at 18:25

## 2022-01-01 RX ADMIN — MORPHINE SULFATE 4 MG: 4 INJECTION INTRAVENOUS at 13:56

## 2022-01-01 RX ADMIN — OXYCODONE HYDROCHLORIDE 20 MG: 20 TABLET, FILM COATED, EXTENDED RELEASE ORAL at 11:26

## 2022-01-01 RX ADMIN — OXYCODONE HYDROCHLORIDE AND ACETAMINOPHEN 1000 MG: 500 TABLET ORAL at 09:01

## 2022-01-01 RX ADMIN — Medication 6 MG: at 22:07

## 2022-01-01 RX ADMIN — APALUTAMIDE 240 MG: 60 TABLET, FILM COATED ORAL at 09:16

## 2022-01-01 RX ADMIN — GABAPENTIN 600 MG: 300 CAPSULE ORAL at 21:38

## 2022-01-01 RX ADMIN — ACETAMINOPHEN 650 MG: 325 TABLET, FILM COATED ORAL at 06:00

## 2022-01-01 RX ADMIN — STANDARDIZED SENNA CONCENTRATE 8.6 MG: 8.6 TABLET ORAL at 21:37

## 2022-01-01 RX ADMIN — GABAPENTIN 600 MG: 300 CAPSULE ORAL at 10:31

## 2022-01-01 RX ADMIN — PANTOPRAZOLE SODIUM 40 MG: 40 TABLET, DELAYED RELEASE ORAL at 06:06

## 2022-01-01 RX ADMIN — ACETAMINOPHEN 650 MG: 325 TABLET ORAL at 11:25

## 2022-01-01 RX ADMIN — APALUTAMIDE 240 MG/DAY: 60 TABLET, FILM COATED ORAL at 16:50

## 2022-01-01 RX ADMIN — Medication 250 MG: at 09:17

## 2022-01-01 RX ADMIN — FAMOTIDINE 20 MG: 20 TABLET ORAL at 09:17

## 2022-01-01 RX ADMIN — ACETAMINOPHEN 650 MG: 325 TABLET ORAL at 20:22

## 2022-01-01 RX ADMIN — CEFEPIME HYDROCHLORIDE 2000 MG: 2 INJECTION, POWDER, FOR SOLUTION INTRAVENOUS at 15:04

## 2022-01-01 RX ADMIN — SODIUM CHLORIDE 125 ML/HR: 0.9 INJECTION, SOLUTION INTRAVENOUS at 16:47

## 2022-01-01 RX ADMIN — GABAPENTIN 600 MG: 300 CAPSULE ORAL at 00:18

## 2022-01-01 RX ADMIN — OXYCODONE HYDROCHLORIDE 10 MG: 10 TABLET ORAL at 09:48

## 2022-01-01 RX ADMIN — ACETAMINOPHEN 325MG 650 MG: 325 TABLET ORAL at 19:52

## 2022-01-01 RX ADMIN — SODIUM CHLORIDE: 0.9 INJECTION, SOLUTION INTRAVENOUS at 23:48

## 2022-01-01 RX ADMIN — GABAPENTIN 600 MG: 300 CAPSULE ORAL at 08:27

## 2022-01-01 RX ADMIN — APALUTAMIDE 240 MG/DAY: 60 TABLET, FILM COATED ORAL at 13:42

## 2022-01-01 RX ADMIN — SODIUM CHLORIDE 1 G: 1 TABLET ORAL at 21:38

## 2022-01-01 RX ADMIN — OXYCODONE HYDROCHLORIDE 5 MG: 5 TABLET ORAL at 04:53

## 2022-01-01 RX ADMIN — OXYCODONE HYDROCHLORIDE 10 MG: 10 TABLET, FILM COATED, EXTENDED RELEASE ORAL at 04:56

## 2022-01-01 RX ADMIN — SODIUM CHLORIDE 1 G: 1 TABLET ORAL at 20:45

## 2022-01-01 RX ADMIN — GABAPENTIN 600 MG: 300 CAPSULE ORAL at 21:15

## 2022-01-01 RX ADMIN — OXYCODONE HYDROCHLORIDE 10 MG: 10 TABLET ORAL at 17:13

## 2022-01-01 RX ADMIN — GABAPENTIN 600 MG: 300 CAPSULE ORAL at 17:13

## 2022-01-01 RX ADMIN — OXYCODONE HYDROCHLORIDE 10 MG: 10 TABLET ORAL at 21:34

## 2022-01-01 RX ADMIN — IOHEXOL 20 ML: 350 INJECTION, SOLUTION INTRAVENOUS at 01:00

## 2022-01-01 RX ADMIN — OXYCODONE HYDROCHLORIDE 10 MG: 10 TABLET ORAL at 11:13

## 2022-01-01 RX ADMIN — MORPHINE SULFATE 4 MG: 4 INJECTION INTRAVENOUS at 09:05

## 2022-01-01 RX ADMIN — SODIUM CHLORIDE 1000 ML: 0.9 INJECTION, SOLUTION INTRAVENOUS at 11:11

## 2022-01-01 RX ADMIN — SODIUM CHLORIDE, SODIUM GLUCONATE, SODIUM ACETATE, POTASSIUM CHLORIDE AND MAGNESIUM CHLORIDE 100 ML/HR: 526; 502; 368; 37; 30 INJECTION, SOLUTION INTRAVENOUS at 08:38

## 2022-01-01 RX ADMIN — GABAPENTIN 600 MG: 300 CAPSULE ORAL at 20:45

## 2022-01-01 RX ADMIN — VANCOMYCIN HYDROCHLORIDE 750 MG: 750 INJECTION, SOLUTION INTRAVENOUS at 15:13

## 2022-01-01 RX ADMIN — FENTANYL CITRATE 50 MCG: 50 INJECTION INTRAMUSCULAR; INTRAVENOUS at 12:02

## 2022-01-01 RX ADMIN — SODIUM CHLORIDE TAB 1 GM 1 G: 1 TAB at 16:46

## 2022-01-01 RX ADMIN — NOREPINEPHRINE BITARTRATE 1 MCG/MIN: 1 INJECTION, SOLUTION, CONCENTRATE INTRAVENOUS at 23:57

## 2022-01-01 RX ADMIN — PREDNISONE 5 MG: 5 TABLET ORAL at 08:46

## 2022-01-01 RX ADMIN — FENTANYL CITRATE 50 MCG: 50 INJECTION INTRAMUSCULAR; INTRAVENOUS at 00:06

## 2022-01-01 RX ADMIN — ONDANSETRON 4 MG: 4 TABLET, ORALLY DISINTEGRATING ORAL at 16:51

## 2022-01-01 RX ADMIN — OXYCODONE HYDROCHLORIDE 20 MG: 20 TABLET, FILM COATED, EXTENDED RELEASE ORAL at 22:51

## 2022-01-01 RX ADMIN — OXYCODONE HYDROCHLORIDE 5 MG: 5 TABLET ORAL at 15:16

## 2022-01-01 RX ADMIN — SODIUM CHLORIDE 1000 ML: 0.9 INJECTION, SOLUTION INTRAVENOUS at 17:43

## 2022-01-01 RX ADMIN — OXYCODONE HYDROCHLORIDE AND ACETAMINOPHEN 1000 MG: 500 TABLET ORAL at 09:30

## 2022-01-01 RX ADMIN — ACETAMINOPHEN 650 MG: 325 TABLET ORAL at 21:37

## 2022-01-01 RX ADMIN — PREDNISONE 7.5 MG: 2.5 TABLET ORAL at 22:20

## 2022-01-01 RX ADMIN — FAMOTIDINE 20 MG: 20 TABLET ORAL at 06:00

## 2022-01-01 RX ADMIN — Medication 12.5 MG: at 09:02

## 2022-01-01 RX ADMIN — SODIUM CHLORIDE 1 G: 1 TABLET ORAL at 08:36

## 2022-01-01 RX ADMIN — OXYCODONE HYDROCHLORIDE 10 MG: 10 TABLET ORAL at 19:20

## 2022-01-01 RX ADMIN — METOPROLOL TARTRATE 25 MG: 25 TABLET, FILM COATED ORAL at 16:46

## 2022-01-01 RX ADMIN — CIPROFLOXACIN 400 MG: 2 INJECTION, SOLUTION INTRAVENOUS at 20:45

## 2022-01-01 RX ADMIN — CIPROFLOXACIN 400 MG: 2 INJECTION, SOLUTION INTRAVENOUS at 10:06

## 2022-01-01 RX ADMIN — OXYCODONE HYDROCHLORIDE 10 MG: 10 TABLET, FILM COATED, EXTENDED RELEASE ORAL at 16:30

## 2022-01-01 RX ADMIN — FAMOTIDINE 20 MG: 20 TABLET ORAL at 17:22

## 2022-01-01 RX ADMIN — OXYCODONE HYDROCHLORIDE 10 MG: 10 TABLET ORAL at 01:43

## 2022-01-01 RX ADMIN — SODIUM CHLORIDE, SODIUM GLUCONATE, SODIUM ACETATE, POTASSIUM CHLORIDE, MAGNESIUM CHLORIDE, SODIUM PHOSPHATE, DIBASIC, AND POTASSIUM PHOSPHATE 500 ML: .53; .5; .37; .037; .03; .012; .00082 INJECTION, SOLUTION INTRAVENOUS at 02:22

## 2022-01-01 RX ADMIN — DIPHENHYDRAMINE HCL 25 MG: 25 TABLET, COATED ORAL at 09:31

## 2022-01-01 RX ADMIN — FAMOTIDINE 20 MG: 20 TABLET ORAL at 18:56

## 2022-01-01 RX ADMIN — ACETAMINOPHEN 650 MG: 325 TABLET, FILM COATED ORAL at 09:55

## 2022-01-01 RX ADMIN — OXYCODONE HYDROCHLORIDE AND ACETAMINOPHEN 1000 MG: 500 TABLET ORAL at 08:35

## 2022-01-01 RX ADMIN — VANCOMYCIN HYDROCHLORIDE 750 MG: 750 INJECTION, SOLUTION INTRAVENOUS at 15:52

## 2022-01-01 RX ADMIN — DIPHENHYDRAMINE HYDROCHLORIDE 25 MG: 50 INJECTION, SOLUTION INTRAMUSCULAR; INTRAVENOUS at 08:40

## 2022-01-01 RX ADMIN — ACETAMINOPHEN 650 MG: 325 TABLET ORAL at 15:23

## 2022-01-01 RX ADMIN — ACETAMINOPHEN 650 MG: 325 TABLET, FILM COATED ORAL at 09:23

## 2022-01-01 RX ADMIN — SODIUM CHLORIDE 1 G: 1 TABLET ORAL at 08:46

## 2022-01-01 RX ADMIN — HYDROMORPHONE HYDROCHLORIDE 1 MG: 1 INJECTION, SOLUTION INTRAMUSCULAR; INTRAVENOUS; SUBCUTANEOUS at 09:40

## 2022-01-01 RX ADMIN — Medication 12 MG: at 22:50

## 2022-01-01 RX ADMIN — PREDNISONE 7.5 MG: 2.5 TABLET ORAL at 21:38

## 2022-01-01 RX ADMIN — ACETAMINOPHEN 650 MG: 325 TABLET, FILM COATED ORAL at 08:46

## 2022-01-01 RX ADMIN — CIPROFLOXACIN 400 MG: 2 INJECTION, SOLUTION INTRAVENOUS at 09:17

## 2022-01-01 RX ADMIN — OXYCODONE HYDROCHLORIDE 10 MG: 10 TABLET ORAL at 06:08

## 2022-01-01 RX ADMIN — OXYCODONE HYDROCHLORIDE 10 MG: 10 TABLET ORAL at 00:22

## 2022-01-01 RX ADMIN — SODIUM CHLORIDE 125 ML/HR: 0.9 INJECTION, SOLUTION INTRAVENOUS at 00:50

## 2022-01-01 RX ADMIN — FAMOTIDINE 20 MG: 20 TABLET ORAL at 10:31

## 2022-01-01 RX ADMIN — SODIUM CHLORIDE 1 G: 1 TABLET ORAL at 10:31

## 2022-01-01 RX ADMIN — OXYCODONE HYDROCHLORIDE AND ACETAMINOPHEN 1000 MG: 500 TABLET ORAL at 08:16

## 2022-01-01 RX ADMIN — STANDARDIZED SENNA CONCENTRATE 8.6 MG: 8.6 TABLET ORAL at 22:06

## 2022-01-01 RX ADMIN — GABAPENTIN 600 MG: 300 CAPSULE ORAL at 20:18

## 2022-01-01 RX ADMIN — SODIUM CHLORIDE 1 G: 1 TABLET ORAL at 09:30

## 2022-01-01 RX ADMIN — ACETAMINOPHEN 325MG 325 MG: 325 TABLET ORAL at 23:48

## 2022-01-01 RX ADMIN — MORPHINE SULFATE 4 MG: 4 INJECTION INTRAVENOUS at 03:56

## 2022-01-01 RX ADMIN — HYDROMORPHONE HYDROCHLORIDE 1 MG: 1 INJECTION, SOLUTION INTRAMUSCULAR; INTRAVENOUS; SUBCUTANEOUS at 11:43

## 2022-01-01 RX ADMIN — METOROPROLOL TARTRATE 5 MG: 5 INJECTION, SOLUTION INTRAVENOUS at 14:33

## 2022-01-01 RX ADMIN — GABAPENTIN 600 MG: 300 CAPSULE ORAL at 15:33

## 2022-01-01 RX ADMIN — METHYLPREDNISOLONE SODIUM SUCCINATE 40 MG: 40 INJECTION, POWDER, FOR SOLUTION INTRAMUSCULAR; INTRAVENOUS at 22:08

## 2022-01-01 RX ADMIN — Medication 250 MG: at 08:36

## 2022-01-01 RX ADMIN — ACETAMINOPHEN 650 MG: 325 TABLET, FILM COATED ORAL at 05:17

## 2022-01-01 RX ADMIN — MORPHINE SULFATE 5 MG: 2 INJECTION, SOLUTION INTRAMUSCULAR; INTRAVENOUS at 00:46

## 2022-01-01 RX ADMIN — VANCOMYCIN HYDROCHLORIDE 1000 MG: 5 INJECTION, POWDER, LYOPHILIZED, FOR SOLUTION INTRAVENOUS at 03:15

## 2022-01-01 RX ADMIN — FAMOTIDINE 20 MG: 20 TABLET ORAL at 06:06

## 2022-01-01 RX ADMIN — SODIUM CHLORIDE, SODIUM GLUCONATE, SODIUM ACETATE, POTASSIUM CHLORIDE AND MAGNESIUM CHLORIDE 100 ML/HR: 526; 502; 368; 37; 30 INJECTION, SOLUTION INTRAVENOUS at 02:22

## 2022-01-01 RX ADMIN — OXYCODONE HYDROCHLORIDE 10 MG: 10 TABLET ORAL at 18:14

## 2022-01-01 RX ADMIN — METHYLPREDNISOLONE SODIUM SUCCINATE 40 MG: 40 INJECTION, POWDER, FOR SOLUTION INTRAMUSCULAR; INTRAVENOUS at 06:08

## 2022-01-01 RX ADMIN — SODIUM CHLORIDE TAB 1 GM 1 G: 1 TAB at 16:22

## 2022-01-01 RX ADMIN — SODIUM CHLORIDE 1000 ML: 0.9 INJECTION, SOLUTION INTRAVENOUS at 14:40

## 2022-01-01 RX ADMIN — IOHEXOL 65 ML: 350 INJECTION, SOLUTION INTRAVENOUS at 23:48

## 2022-01-01 RX ADMIN — APALUTAMIDE 240 MG/DAY: 60 TABLET, FILM COATED ORAL at 12:00

## 2022-01-01 RX ADMIN — PANTOPRAZOLE SODIUM 40 MG: 40 TABLET, DELAYED RELEASE ORAL at 05:40

## 2022-01-01 RX ADMIN — Medication 250 MG: at 10:31

## 2022-01-01 RX ADMIN — HYDROMORPHONE HYDROCHLORIDE 1 MG: 1 INJECTION, SOLUTION INTRAMUSCULAR; INTRAVENOUS; SUBCUTANEOUS at 18:49

## 2022-01-01 RX ADMIN — ONDANSETRON 4 MG: 4 TABLET, ORALLY DISINTEGRATING ORAL at 13:22

## 2022-01-01 RX ADMIN — HYDROCORTISONE SODIUM SUCCINATE 50 MG: 100 INJECTION, POWDER, FOR SOLUTION INTRAMUSCULAR; INTRAVENOUS at 00:07

## 2022-01-01 RX ADMIN — OXYCODONE HYDROCHLORIDE AND ACETAMINOPHEN 1000 MG: 500 TABLET ORAL at 09:17

## 2022-01-01 RX ADMIN — OXYCODONE HYDROCHLORIDE 20 MG: 20 TABLET, FILM COATED, EXTENDED RELEASE ORAL at 20:21

## 2022-01-01 RX ADMIN — CIPROFLOXACIN 400 MG: 2 INJECTION, SOLUTION INTRAVENOUS at 08:15

## 2022-01-01 RX ADMIN — GABAPENTIN 600 MG: 300 CAPSULE ORAL at 22:19

## 2022-01-01 RX ADMIN — DEXAMETHASONE 4 MG: 4 TABLET ORAL at 21:37

## 2022-01-01 RX ADMIN — OXYCODONE HYDROCHLORIDE AND ACETAMINOPHEN 1000 MG: 500 TABLET ORAL at 08:46

## 2022-01-01 RX ADMIN — OXYCODONE HYDROCHLORIDE 10 MG: 10 TABLET ORAL at 23:05

## 2022-01-01 RX ADMIN — FAMOTIDINE 20 MG: 20 TABLET ORAL at 18:41

## 2022-01-01 RX ADMIN — ACETAMINOPHEN 650 MG: 325 TABLET, FILM COATED ORAL at 20:10

## 2022-01-01 RX ADMIN — OXYCODONE HYDROCHLORIDE 20 MG: 20 TABLET, FILM COATED, EXTENDED RELEASE ORAL at 08:28

## 2022-01-01 RX ADMIN — OXYCODONE HYDROCHLORIDE 20 MG: 20 TABLET, FILM COATED, EXTENDED RELEASE ORAL at 22:08

## 2022-01-01 RX ADMIN — MORPHINE SULFATE 2 MG: 2 INJECTION, SOLUTION INTRAMUSCULAR; INTRAVENOUS at 00:12

## 2022-01-01 RX ADMIN — Medication 250 MG: at 16:32

## 2022-01-01 RX ADMIN — SODIUM CHLORIDE 1 G: 1 TABLET ORAL at 20:11

## 2022-01-01 RX ADMIN — CEFEPIME HYDROCHLORIDE 2000 MG: 2 INJECTION, POWDER, FOR SOLUTION INTRAVENOUS at 14:08

## 2022-01-01 RX ADMIN — PANTOPRAZOLE SODIUM 40 MG: 40 TABLET, DELAYED RELEASE ORAL at 04:53

## 2022-01-01 RX ADMIN — GABAPENTIN 600 MG: 300 CAPSULE ORAL at 08:19

## 2022-01-01 RX ADMIN — IODIXANOL 100 ML: 320 INJECTION, SOLUTION INTRAVASCULAR at 13:35

## 2022-01-01 RX ADMIN — CIPROFLOXACIN 400 MG: 2 INJECTION, SOLUTION INTRAVENOUS at 20:18

## 2022-01-01 RX ADMIN — OXYCODONE HYDROCHLORIDE 20 MG: 20 TABLET, FILM COATED, EXTENDED RELEASE ORAL at 21:38

## 2022-01-01 RX ADMIN — HYDROMORPHONE HYDROCHLORIDE 1 MG: 1 INJECTION, SOLUTION INTRAMUSCULAR; INTRAVENOUS; SUBCUTANEOUS at 13:52

## 2022-01-01 RX ADMIN — Medication 6 MG: at 21:15

## 2022-01-01 RX ADMIN — Medication 6 MG: at 22:19

## 2022-01-01 RX ADMIN — METHYLPREDNISOLONE SODIUM SUCCINATE 40 MG: 40 INJECTION, POWDER, FOR SOLUTION INTRAMUSCULAR; INTRAVENOUS at 06:00

## 2022-01-01 RX ADMIN — ACETAMINOPHEN 325MG 325 MG: 325 TABLET ORAL at 19:52

## 2022-01-01 RX ADMIN — Medication 10 ML: at 00:10

## 2022-01-01 RX ADMIN — GABAPENTIN 600 MG: 300 CAPSULE ORAL at 16:22

## 2022-01-01 RX ADMIN — GABAPENTIN 600 MG: 300 CAPSULE ORAL at 16:05

## 2022-01-01 RX ADMIN — SODIUM CHLORIDE 1000 ML: 0.9 INJECTION, SOLUTION INTRAVENOUS at 12:31

## 2022-01-01 RX ADMIN — GABAPENTIN 600 MG: 300 CAPSULE ORAL at 16:46

## 2022-01-01 RX ADMIN — OXYCODONE HYDROCHLORIDE 20 MG: 20 TABLET, FILM COATED, EXTENDED RELEASE ORAL at 09:48

## 2022-01-01 RX ADMIN — SODIUM CHLORIDE 1 G: 1 TABLET ORAL at 09:17

## 2022-01-01 RX ADMIN — DEXAMETHASONE 4 MG: 4 TABLET ORAL at 10:00

## 2022-01-01 RX ADMIN — Medication 12.5 MG: at 21:38

## 2022-01-01 RX ADMIN — FAMOTIDINE 20 MG: 20 TABLET ORAL at 08:46

## 2022-01-01 RX ADMIN — CEFEPIME HYDROCHLORIDE 2000 MG: 2 INJECTION, POWDER, FOR SOLUTION INTRAVENOUS at 14:09

## 2022-01-01 RX ADMIN — FENTANYL CITRATE 50 MCG: 50 INJECTION INTRAMUSCULAR; INTRAVENOUS at 00:01

## 2022-01-01 RX ADMIN — FENTANYL CITRATE 50 MCG: 50 INJECTION INTRAMUSCULAR; INTRAVENOUS at 08:17

## 2022-01-01 RX ADMIN — Medication 12.5 MG: at 22:50

## 2022-01-01 RX ADMIN — DIPHENHYDRAMINE HCL 50 MG: 25 TABLET ORAL at 08:22

## 2022-01-01 RX ADMIN — FAMOTIDINE 20 MG: 20 TABLET ORAL at 09:02

## 2022-01-01 RX ADMIN — DIPHENHYDRAMINE HCL 25 MG: 25 TABLET, COATED ORAL at 08:32

## 2022-01-01 RX ADMIN — METOPROLOL TARTRATE 25 MG: 25 TABLET, FILM COATED ORAL at 15:32

## 2022-01-01 RX ADMIN — ACETAMINOPHEN 650 MG: 325 TABLET, FILM COATED ORAL at 09:31

## 2022-01-01 RX ADMIN — Medication 250 MG: at 17:13

## 2022-01-01 RX ADMIN — SODIUM CHLORIDE 125 ML/HR: 0.9 INJECTION, SOLUTION INTRAVENOUS at 21:34

## 2022-01-01 RX ADMIN — GABAPENTIN 600 MG: 300 CAPSULE ORAL at 20:19

## 2022-01-01 RX ADMIN — PREDNISONE 5 MG: 5 TABLET ORAL at 09:30

## 2022-01-12 NOTE — PLAN OF CARE
Problem: INFECTION - ADULT  Goal: Absence or prevention of progression during hospitalization  Description: INTERVENTIONS:  - Assess and monitor for signs and symptoms of infection  - Monitor lab/diagnostic results  - Monitor all insertion sites, i e  indwelling lines, tubes, and drains  - Monitor endotracheal if appropriate and nasal secretions for changes in amount and color  - [place] appropriate cooling/warming therapies per order  - Administer medications as ordered  - Instruct and encourage patient and family to use good hand hygiene technique  - Identify and instruct in appropriate isolation precautions for identified infection/condition  Outcome: Progressing  Goal: Absence of fever/infection during neutropenic period  Description: INTERVENTIONS:  - Monitor WBC    Outcome: Progressing     Problem: SAFETY ADULT  Goal: Patient will remain free of falls  Description: INTERVENTIONS:  - Educate patient/family on patient safety including physical limitations  - Instruct patient to call for assistance with activity   - Consult OT/PT to assist with strengthening/mobility   - Keep Call bell within reach  - Keep bed low and locked with side rails adjusted as appropriate  - Keep care items and personal belongings within reach  - Initiate and maintain comfort rounds  - Make Fall Risk Sign visible to staff  - Apply yellow socks and bracelet for high fall risk patients  - Consider moving patient to room near nurses station  Outcome: Progressing  Goal: Maintain or return to baseline ADL function  Description: INTERVENTIONS:  -  Assess patient's ability to carry out ADLs; assess patient's baseline for ADL function and identify physical deficits which impact ability to perform ADLs (bathing, care of mouth/teeth, toileting, grooming, dressing, etc )  - Assess/evaluate cause of self-care deficits   - Assess range of motion  - Assess patient's mobility; develop plan if impaired  - Assess patient's need for assistive devices and provide as appropriate  - Encourage maximum independence but intervene and supervise when necessary  - Involve family in performance of ADLs  - Assess for home care needs following discharge   - Consider OT consult to assist with ADL evaluation and planning for discharge  - Provide patient education as appropriate  Outcome: Progressing  Goal: Maintains/Returns to pre admission functional level  Description: INTERVENTIONS:  - Perform BMAT or MOVE assessment daily    - Set and communicate daily mobility goal to care team and patient/family/caregiver     - Collaborate with rehabilitation services on mobility goals if consulted  - Out of bed for toileting  - Record patient progress and toleration of activity level   Outcome: Progressing

## 2022-01-12 NOTE — PLAN OF CARE
Problem: INFECTION - ADULT  Goal: Absence or prevention of progression during hospitalization  Description: INTERVENTIONS:  - Assess and monitor for signs and symptoms of infection  - Monitor lab/diagnostic results  - Monitor all insertion sites, i e  indwelling lines, tubes, and drains  - Monitor endotracheal if appropriate and nasal secretions for changes in amount and color  - [place] appropriate cooling/warming therapies per order  - Administer medications as ordered  - Instruct and encourage patient and family to use good hand hygiene technique  - Identify and instruct in appropriate isolation precautions for identified infection/condition  1/12/2022 1022 by Terra Snellen, RN  Outcome: Progressing  1/12/2022 1021 by Terra Snellen, RN  Outcome: Progressing  Goal: Absence of fever/infection during neutropenic period  Description: INTERVENTIONS:  - Monitor WBC    1/12/2022 1022 by Terra Snellen, RN  Outcome: Progressing  1/12/2022 1021 by Terra Snellen, RN  Outcome: Progressing     Problem: SAFETY ADULT  Goal: Patient will remain free of falls  Description: INTERVENTIONS:  - Educate patient/family on patient safety including physical limitations  - Instruct patient to call for assistance with activity   - Consult OT/PT to assist with strengthening/mobility   - Keep Call bell within reach  - Keep bed low and locked with side rails adjusted as appropriate  - Keep care items and personal belongings within reach  - Initiate and maintain comfort rounds  - Make Fall Risk Sign visible to staff  - Apply yellow socks and bracelet for high fall risk patients  - Consider moving patient to room near nurses station  1/12/2022 1022 by Terra Snellen, RN  Outcome: Progressing  1/12/2022 1021 by Terra Snellen, RN  Outcome: Progressing  Goal: Maintain or return to baseline ADL function  Description: INTERVENTIONS:  -  Assess patient's ability to carry out ADLs; assess patient's baseline for ADL function and identify physical deficits which impact ability to perform ADLs (bathing, care of mouth/teeth, toileting, grooming, dressing, etc )  - Assess/evaluate cause of self-care deficits   - Assess range of motion  - Assess patient's mobility; develop plan if impaired  - Assess patient's need for assistive devices and provide as appropriate  - Encourage maximum independence but intervene and supervise when necessary  - Involve family in performance of ADLs  - Assess for home care needs following discharge   - Consider OT consult to assist with ADL evaluation and planning for discharge  - Provide patient education as appropriate  1/12/2022 1022 by Franck Garcia RN  Outcome: Progressing  1/12/2022 1021 by Franck Garcia RN  Outcome: Progressing  Goal: Maintains/Returns to pre admission functional level  Description: INTERVENTIONS:  - Perform BMAT or MOVE assessment daily    - Set and communicate daily mobility goal to care team and patient/family/caregiver     - Collaborate with rehabilitation services on mobility goals if consulted  - Out of bed for toileting  - Record patient progress and toleration of activity level   1/12/2022 1022 by Franck Garcia RN  Outcome: Progressing  1/12/2022 1021 by Franck Garcia RN  Outcome: Progressing

## 2022-01-12 NOTE — PROGRESS NOTES
Pt received PRBC transfusion without incident  Port dressing changed and chg dressing applied   Declines AVS

## 2022-01-14 NOTE — PROGRESS NOTES
Patient arrived for nuclear scan  No peripheral access available  R chest port already accessed by [employer] for treatment/flushes  Per Dr Pantoja Dear, ok to use port for nuclear injection then de access port for imaging  Spoke with Nawaf Medina NP at [facility] to use port for injection and de access  Blood return obtained, nuclear injection and 20 ml flush by Nuc med technologist done  Port de accessed  Pt tolerated well with no complaints  Guaze dressing applied, site clean and dry  [employer] [phone]

## 2022-01-18 NOTE — PROGRESS NOTES
Pt received 2xPRBC and 1xPlatelet infusion without incident  Only vital to slightly improve is heart rate, however, pt reports feeling slightly better during last attempt to ambulate to bathroom  Skin appears less pale  Pt has appointment scheduled with Dr Anne Adan tomorrow morning at 0900  Pt given directions to go to ED if symptoms worsen or new symptoms appear   Declines AVS

## 2022-01-18 NOTE — PROGRESS NOTES
Adela Uribe present for 1 unit PRBC and 1 unit platelet  Upon initial assessment, pt hypotensive 99/62 with  at rest  Respirations 16 and pt declines difficulty breathing  Does report being very "tired" and difficulty with transfers from wheelchair to chair for treatment  After transfusions, Pt remained hypotensive 80-90's/60-70's  Pt reports having hematuria yesterday with some resolution to only "pink" urine today  Reports urgency, which is his baseline, but denies dysuria and no fever  Pt appears pale and fatigued  Called oncologist, Dr Gabriella Elliott office to discuss pt's appearance, vitals and symptoms  MD agreeable to give an additional unit of PRBCs prior to leaving infusion center today  Will proceed once order is received from her office via fax  Pt updated on plan and agreeable  Will transfuse with second unit of PRBCs upon receipt of order  Pt scheduled to see oncologist tomorrow

## 2022-01-21 NOTE — PROGRESS NOTES
One unit of platelets administered to patient today as ordered without incident  Patient's SBP in the 90's post infusion, patient asymptomatic Patient declined AVS, aware of next appointment

## 2022-01-21 NOTE — PROGRESS NOTES
Pt here for 1 unit platelets  Platelets received from BB were approx  165 ml, reached out to Dr Bladimir Benavides office to see if she would like pt to receive 2nd unit as the unit we received was less than normal   Per Dr Andino Fuel, she does not want pt to receive 2nd unit of platelets today  CBC to be rechecked in her office Monday and will send pt back to us next week if needed for transfusion  Explained above to pt

## 2022-02-09 NOTE — PROGRESS NOTES
1 unit of platelets and 1 unit of prbc given without incident  Patient blood pressure low but similar to arrival bp  Patient denies symptoms  Port deaccessed per patient request, states no appointment at Dr Merlin Conch office until Friday

## 2022-02-13 PROBLEM — N30.90 CYSTITIS: Status: ACTIVE | Noted: 2022-01-01

## 2022-02-13 PROBLEM — D61.818 PANCYTOPENIA (HCC): Status: ACTIVE | Noted: 2022-01-01

## 2022-02-13 PROBLEM — R31.9 HEMATURIA: Status: ACTIVE | Noted: 2022-01-01

## 2022-02-13 PROBLEM — R00.0 TACHYCARDIA: Status: ACTIVE | Noted: 2022-01-01

## 2022-02-13 PROBLEM — D69.6 THROMBOCYTOPENIA (HCC): Status: ACTIVE | Noted: 2022-01-01

## 2022-02-13 NOTE — ED PROVIDER NOTES
History  Chief Complaint   Patient presents with    Blood in Urine     To ED with c/o hematuria states that he has had blood since last night  States that he has had retension today  Denies any fever of chills  57year old male presents for evaluation of hematuria which began last night  Patient had been able to pass urine and clots until this morning when he no longer felt he was unable to empty his bladder  Patient states he has only been able to dribble a small amount of bright red blood from his penis  History of transient hematuria once in the past which resolved without intervention  Prior episode of urinary retention following surgery for lymph node resection a year ago which required temporary ferris placement  Patient is currently receiving chemotherapy for metastatic prostate cancer  He had routine labs performed by his oncologist with pancytopenia requiring 2 units PRBCs and a pack of platelets which were infused yesterday  Patient's oncologist is Dr Meme Cruz  Last chemotherapy 2/4/22  Patient had been diagnosed with PE 9/15/21, but stopped anticoagulation 2 months ago due to pancytopenia  He finished a 10 day course of ciprofloxacin for infection prophylaxis due to leukopenia  Patient had a CT abd/pelvis on 1/22/22 which showed: "Improved mediastinal and hilar adenopathy  Unchanged lingular nodule  Worsening hepatic metastatic disease  New right adrenal metastatic nodule  Unchanged left adrenal metastatic disease  New upper abdominal retroperitoneal adenopathy  Slightly worse moderate to severe left hydroureteronephrosis, with increased irregular mass involving the left side of the urinary bladder and left seminal vesicle  Increased adenopathy along the left pelvic sidewall  Diffuse widespread osseous metastatic disease "      History provided by:  Patient  Blood in Urine  This is a new problem  The current episode started yesterday   The problem has been gradually worsening since onset  He describes the hematuria as gross hematuria  The hematuria occurs throughout his entire urinary stream  He reports clotting at the middle of his urine stream  He is experiencing no pain  He describes his urine color as dark red  Irritative symptoms include frequency  Obstructive symptoms include dribbling and incomplete emptying  Associated symptoms include abdominal pain (mild tenderness at site of recent depot injection)  Pertinent negatives include no chills, fever, nausea or vomiting  There is no history of  trauma  Prior to Admission Medications   Prescriptions Last Dose Informant Patient Reported? Taking?    Ascorbic Acid (vitamin C) 1000 MG tablet   Yes No   Sig: Take 1,000 mg by mouth daily   Bacillus Coagulans-Inulin (Probiotic) 1-250 BILLION-MG CAPS   Yes No   Sig: Take by mouth 50 billion   Nutritional Supplements (PRO-RAJINDER PLUS PO)  Self Yes No   Sig: Take by mouth   Omega-3 Fatty Acids (Fish Oil) 1200 MG CAPS   Yes No   Sig: Take by mouth   OxyCONTIN 10 MG 12 hr tablet   Yes No   RESTASIS 0 05 % ophthalmic emulsion   Yes No   Sig: Administer 1 drop to both eyes every 12 (twelve) hours   cholecalciferol (VITAMIN D3) 1,000 units tablet  Self Yes No   Sig: Take 500 Units by mouth daily 50,000 1x week   famotidine (PEPCID) 20 mg tablet   Yes No   Sig: Take 20 mg by mouth 2 (two) times a day   fondaparinux (ARIXTRA) 7 5 mg/0 6 mL   Yes No   gabapentin (NEURONTIN) 300 mg capsule   Yes No   Sig: Take 300 mg by mouth 3 (three) times a day   metoprolol tartrate (LOPRESSOR) 25 mg tablet   Yes No   Sig: Take 25 mg by mouth every 12 (twelve) hours   ondansetron (ZOFRAN) 4 mg tablet   Yes No   Sig: Take 4 mg by mouth every 8 (eight) hours as needed for nausea or vomiting   pantoprazole (PROTONIX) 40 mg tablet  Self Yes No   predniSONE 5 mg tablet   Yes No   silver sulfadiazine (SILVADENE,SSD) 1 % cream   Yes No   sodium chloride 1 g tablet   Yes No   zinc gluconate 50 mg tablet   Yes No   Sig: Take 50 mg by mouth daily      Facility-Administered Medications: None       Past Medical History:   Diagnosis Date    Cancer ([facility] [address] )     Stomach    Hypertension     Lymphoma [facility] ) 05/2018    Non Hodgkin's lymphoma ([facility] )     Prostate cancer ([facility] ) 2020    Shingles     Thoracic aortic aneurysm [facility])        Past Surgical History:   Procedure Laterality Date    APPENDECTOMY      COLONOSCOPY      HERNIA REPAIR      B/L hernia repair    IR PORT PLACEMENT  11/4/2021    KNEE ARTHROSCOPY      KNEE ARTHROSCOPY, MEDIAL PATELLO FEMORAL LIGAMENT REPAIR Left     LINEAR ENDOSCOPIC U/S N/A 6/13/2018    Procedure: LINEAR ENDOSCOPIC U/S;  Surgeon: Maranda Montes MD;  Location: [facility]; Service: Gastroenterology    SD BIOPSY/EXCISION, LYMPH NODE(S) Left 6/24/2020    Procedure: EXCISION BIOPSY LYMPH NODE SUPRACLAVICULAR;  Surgeon: Power Blanco MD;  Location: [facility];  Service: General       Family History   Problem Relation Age of Onset   [facility] Melanoma Mother     Prostate cancer Father     Lung cancer Father     Breast cancer Sister     Pancreatic cancer Paternal Grandfather     Hodgkin's lymphoma Sister      I have reviewed and agree with the history as documented  E-Cigarette/Vaping    E-Cigarette Use Never User      E-Cigarette/Vaping Substances    Nicotine No     Flavoring No      Social History     Tobacco Use    Smoking status: Never Smoker    Smokeless tobacco: Never Used   Vaping Use    Vaping Use: Never used   Substance Use Topics    Alcohol use: Not Currently    Drug use: No       Review of Systems   Constitutional: Negative for chills and fever  HENT: Negative for congestion  Respiratory: Negative for cough  Cardiovascular: Negative for chest pain  Gastrointestinal: Positive for abdominal pain (mild tenderness at site of recent depot injection)  Negative for diarrhea, nausea and vomiting  Genitourinary: Positive for frequency, hematuria and incomplete emptying     Skin: Negative for rash  Neurological: Negative for syncope and headaches  All other systems reviewed and are negative  Physical Exam  Physical Exam  Vitals and nursing note reviewed  Constitutional:       General: He is not in acute distress  Appearance: He is well-developed  He is not toxic-appearing or diaphoretic  HENT:      Head: Normocephalic and atraumatic  Right Ear: External ear normal       Left Ear: External ear normal       Nose: Nose normal    Eyes:      General: No scleral icterus  Cardiovascular:      Rate and Rhythm: Normal rate and regular rhythm  Pulses: Normal pulses  Pulmonary:      Effort: Pulmonary effort is normal  No respiratory distress  Abdominal:      General: There is no distension  Musculoskeletal:         General: No deformity  Normal range of motion  Skin:     Coloration: Skin is pale  Findings: No rash  Neurological:      General: No focal deficit present  Mental Status: He is alert        Gait: Gait normal    Psychiatric:         Mood and Affect: Mood normal          Vital Signs  ED Triage Vitals [02/13/22 1557]   Temperature Pulse Respirations Blood Pressure SpO2   98 1 °F (36 7 °C) 98 20 132/70 98 %      Temp Source Heart Rate Source Patient Position - Orthostatic VS BP Location FiO2 (%)   Tympanic Monitor Sitting Right arm --      Pain Score       No Pain           Vitals:    02/13/22 1557 02/13/22 1800   BP: 132/70 100/68   Pulse: 98 (!) 130   Patient Position - Orthostatic VS: Sitting Lying         Visual Acuity      ED Medications  Medications   ciprofloxacin (CIPRO) IVPB (premix in 5% dextrose) 400 mg 200 mL (400 mg Intravenous New Bag 2/13/22 1955)   lidocaine (URO-JET) 2 % urethral/mucosal gel 1 application (1 application Urethral Given 2/13/22 1710)       Diagnostic Studies  Results Reviewed     Procedure Component Value Units Date/Time    Urine Microscopic [[id number]]  (Abnormal) Collected: 02/13/22 1810    Lab Status: Final result Specimen: Urine, Indwelling [place] Catheter Updated: 02/13/22 1917     RBC, UA Innumerable /hpf      WBC, UA       Field obscured, unable to enumerate     /hpf     Epithelial Cells       Field obscured, unable to enumerate     /hpf     Bacteria, UA       Field obscured, unable to enumerate     /hpf    Urine culture [[id number]] Collected: 02/13/22 1810    Lab Status:  In process Specimen: Urine, Indwelling [place] Catheter Updated: 02/13/22 1917    UA w Reflex to Microscopic w Reflex to Culture [[id number]]  (Abnormal) Collected: 02/13/22 1810    Lab Status: Final result Specimen: Urine, Indwelling [place] Catheter Updated: 02/13/22 1856     Color, UA Red     Clarity, UA Slightly Cloudy     Specific [place], UA 1 020     pH, UA 6 5     Leukocytes, UA Negative     Nitrite, UA Positive     Protein,  (2+) mg/dl      Glucose, UA Negative mg/dl      Ketones, UA Trace mg/dl      Urobilinogen, UA 1 0 E U /dl      Bilirubin, UA Negative     Blood, UA Large    CBC and differential [[id number]]  (Abnormal) Collected: 02/13/22 1704    Lab Status: Final result Specimen: Blood from Central Venous Line Updated: 02/13/22 1816     WBC 1 22 Thousand/uL      RBC 2 78 Million/uL      Hemoglobin 7 7 g/dL      Hematocrit 24 3 %      MCV 87 fL      MCH 27 7 pg      MCHC 31 7 g/dL      RDW 16 4 %      MPV 9 4 fL      Platelets 25 Thousands/uL     Manual Differential(PHLEBS Do Not Order) [[id number]]  (Abnormal) Collected: 02/13/22 1704    Lab Status: Final result Specimen: Blood from Central Venous Line Updated: 02/13/22 1816     Segmented % 51 %      Lymphocytes % 36 %      Monocytes % 10 %      Eosinophils, % 0 %      Basophils % 0 %      Myelocytes % 3 %      Absolute Neutrophils 0 62 Thousand/uL      Lymphocytes Absolute 0 44 Thousand/uL      Monocytes Absolute 0 12 Thousand/uL      Eosinophils Absolute 0 00 Thousand/uL      Basophils Absolute 0 00 Thousand/uL      Total Counted --     Anisocytosis Present     Platelet Estimate Decreased Basic metabolic panel [[id number]]  (Abnormal) Collected: 02/13/22 1704    Lab Status: Final result Specimen: Blood from Central Venous Line Updated: 02/13/22 1722     Sodium 134 mmol/L      Potassium 4 6 mmol/L      Chloride 101 mmol/L      CO2 23 mmol/L      ANION GAP 10 mmol/L      BUN 23 mg/dL      Creatinine 0 74 mg/dL      Glucose 90 mg/dL      Calcium 7 5 mg/dL      eGFR 98 ml/min/1 73sq m     Narrative:      [place] guidelines for Chronic Kidney Disease (CKD):     Stage 1 with normal or high GFR (GFR > 90 mL/min/1 73 square meters)    Stage 2 Mild CKD (GFR = 60-89 mL/min/1 73 square meters)    Stage 3A Moderate CKD (GFR = 45-59 mL/min/1 73 square meters)    Stage 3B Moderate CKD (GFR = 30-44 mL/min/1 73 square meters)    Stage 4 Severe CKD (GFR = 15-29 mL/min/1 73 square meters)    Stage 5 End Stage CKD (GFR <15 mL/min/1 73 square meters)  Note: GFR calculation is accurate only with a steady state creatinine                 No orders to display              Procedures  Procedures         ED Course  ED Course as of 02/13/22 2022   Sun Feb 13, 2022   [address] [place] text sent to hem/onc on call who recommends I speak with Dr Tricia Burton as she is not a part of their group  [id number] Discussed with urology who recommends admission with CBI, Q4H manual irrigation and correction of platelets  1857 Discussed with patient's oncologist, Dr Tricia Burton [phone] who recommends 1 unit platelets and 1 unit PRBCs  Restart cipro  SBIRT 20yo+      Most Recent Value   SBIRT (24 yo +)    In order to provide better care to our patients, we are screening all of our patients for alcohol and drug use  Would it be okay to ask you these screening questions? No Filed at: 02/13/2022 1907   Initial Alcohol Screen: US AUDIT-C     1   How often do you have a drink containing alcohol? 0 Filed at: 02/13/2022 1907   Audit-C Score 0 Filed at: 02/13/2022 1907   NATHALIA: How many times in the past year have you    Used an illegal drug or used a prescription medication for non-medical reasons? Never Filed at: 02/13/2022 1907                    [facility]  Number of Diagnoses or Management Options  Acute urinary retention: new and requires workup  Hematuria: new and requires workup  Hemorrhagic cystitis: new and requires workup  Pancytopenia [facility]): new and requires workup  Diagnosis management comments: 57year old male with metastatic prostate cancer presents for evaluation of hematuria and urinary retention  3 way ferris placed and bladder irrigated at bedside by nursing  Labs with pancytopenia improved since transfusion yesterday; however, platelets remain <27H  Discussed with patient's oncologist who recommends 1 unit PRBCs and 1 unit platelets as well as restarting ciprofloxacin  Discussed with urology who recommends admission for CBI and correction of thrombocytopenia  Patient admitted for further evaluation and management         Amount and/or Complexity of Data Reviewed  Clinical lab tests: ordered and reviewed  Discuss the patient with other providers: yes    Patient Progress  Patient progress: stable      Disposition  Final diagnoses:   Acute urinary retention   Hematuria   Pancytopenia ([facility] [address] )   Hemorrhagic cystitis     Time reflects when diagnosis was documented in both MDM as applicable and the Disposition within this note     Time User Action Codes Description Comment    2/13/2022  3:55 PM Mcnmaara Krill Add [R33 8] Acute urinary retention     2/13/2022  3:55 PM Mcnamara Krill Add [R31 9] Hematuria     2/13/2022  6:20 PM Mcnamara Krill Add [D61 818] Pancytopenia ([address] )     2/13/2022  7:06 PM Mcnamara Krill Add [N30 91] Hemorrhagic cystitis     2/13/2022  7:21 PM Mcnamara Krill Modify [R33 8] Acute urinary retention     2/13/2022  7:21 PM Mcnamara Krill Modify [N30 91] Hemorrhagic cystitis       ED Disposition     ED Disposition Condition Date/Time Comment    Admit Stable Hurshel Distad Feb 13, 2022  7:22 PM Case was discussed with MALIK and the patient's admission status was agreed to be Admission Status: inpatient status to the service of Dr Jenkins People   Follow-up Information    None         Patient's Medications   Discharge Prescriptions    No medications on file       No discharge procedures on file      PDMP Review     None          ED Provider  Electronically Signed by           Rachael Rincon MD  02/13/22 2022

## 2022-02-14 NOTE — ASSESSMENT & PLAN NOTE
· Platelets 78A on admission with active bleeding (hematuria)  · 1U platelets ordered in ED per guidance from patient's oncologist, Dr Lisa Vo   · Improved to greater than 50k    Transfuse additional unit PRBC today    · WBC 1 33  · Cipro restarted per oncologist guidance by ED (pt had just finished 10 day oral cipro for infection prophylaxis last night)   - for prophylaxis

## 2022-02-14 NOTE — ASSESSMENT & PLAN NOTE
· Platelets 04S on admission with active bleeding (hematuria)  · 1U platelets ordered in ED per guidance from patient's oncologist, Dr Mccartney Both   · Platelets were 31V on 2/12 and pt given 1U platelets yesterday ordered by oncologist   · Hgb 7 7 on admission   · 1U PRBCs ordered in ED   · Hgb 6 2 2/12 and received 2U PRBCs ordered by oncologist   · WBC 1 22 - ANC 0 62  · Cipro restarted per oncologist guidance by ED (pt had just finished 10 day oral cipro for infection prophylaxis last night)    · Recheck CBC later tonight

## 2022-02-14 NOTE — PROGRESS NOTES
Progress Note - Urology  William See 57 y o  male MRN: [id number]  Unit/Bed#: -01 Encounter: [id number]    Assessment/Plan:  Hematuria with retention  - CBI started, will continue to monitor if urine starts a clear can transition to he and irrigation  - will need [place] for outpatient voiding trial  - trend hemoglobin  - transfuse as needed    Cystitis  -urine with nitrates but negative leukocytes  - started on Cipro per oncologist  - urine cultures pending    Anemia  -hemoglobin 6 2 on admission  - continue to trend  - transfuse as needed    Prostate cancer with metastasis  - bone, liver and adrenals  - followed by Dr Bekah Pearce  - chemotherapy, last dose 02/04/2022    Pancytopenia  - 1 unit platelets given  - continue monitor    Subjective/Objective   Chief Complaint: heamturia    Subjective:  59-year-old male with metastatic prostate cancer to bone, liver adrenals and mediastinal lymphadenopathy diagnosed in May of 2020 presented to the emergency department with inability to urinate  Patient has noted hematuria difficult to urinate since last evening  He had passed some blood clots in his urine but was able dip year normally without any recurrence until the morning he could no longer urinate  He denies any abdominal pain, fevers, chills, shortness breath chest pain or leg swelling  Patient last chemotherapy was 02/04/2022  Objective:     Blood pressure 93/64, pulse (!) 119, temperature 97 9 °F (36 6 °C), temperature source Oral, resp  rate 18, height 5' 10" (1 778 m), weight 68 kg (150 lb), SpO2 97 %  ,Body mass index is 21 52 kg/m²        Intake/Output Summary (Last 24 hours) at 2/14/2022 1338  Last data filed at 2/14/2022 1245  Gross per 24 hour   Intake 1175 ml   Output 2325 ml   Net -1150 ml       Invasive Devices  Report    Central Venous Catheter Line            Port A Cath 11/04/21 Right Chest 102 days          Drain            Continuous Bladder Irrigation Three-way <1 day    Urethral Catheter Three way 18 Fr  <1 day                Physical Exam: BP 93/64   Pulse (!) 119   Temp 97 9 °F (36 6 °C) (Oral)   Resp 18   Ht 5' 10" (1 778 m)   Wt 68 kg (150 lb)   SpO2 97%   BMI 21 52 kg/m²   General appearance: alert and oriented, in no acute distress and alert  Lungs: clear to auscultation bilaterally  Heart: regular rate and rhythm, S1, S2 normal, no murmur, click, rub or gallop  Abdomen: soft, non-tender; bowel sounds normal; no masses,  no organomegaly  [place] with bloody urine with some clots    Lab, Imaging and other studies:  CBC:   Lab Results   Component Value Date    WBC 1 33 (LL) 02/14/2022    HGB 7 4 (L) 02/14/2022    HCT 23 3 (L) 02/14/2022    MCV 89 02/14/2022    PLT 52 (L) 02/14/2022    MCH 28 1 02/14/2022    MCHC 31 8 02/14/2022    RDW 15 8 (H) 02/14/2022    MPV 8 8 (L) 02/14/2022   , CMP:   Lab Results   Component Value Date    SODIUM 138 02/14/2022    K 4 0 02/14/2022     02/14/2022    CO2 23 02/14/2022    BUN 21 02/14/2022    CREATININE 0 76 02/14/2022    CALCIUM 7 1 (L) 02/14/2022    AST 31 02/14/2022    ALT 14 02/14/2022    ALKPHOS 328 (H) 02/14/2022    EGFR 97 02/14/2022   , Coagulation: No results found for: PT, INR, APTT, Urinalysis:   Lab Results   Component Value Date    COLORU Red 02/13/2022    CLARITYU Slightly Cloudy 02/13/2022    SPECGRAV 1 020 02/13/2022    PHUR 6 5 02/13/2022    LEUKOCYTESUR Negative 02/13/2022    NITRITE Positive (A) 02/13/2022    GLUCOSEU Negative 02/13/2022    KETONESU Trace (A) 02/13/2022    BILIRUBINUR Negative 02/13/2022    BLOODU Large (A) 02/13/2022   , Amylase: No results found for: AMYLASE, Lipase: No results found for: LIPASE  VTE Pharmacologic Prophylaxis: Heparin  VTE Mechanical Prophylaxis: sequential compression device

## 2022-02-14 NOTE — UTILIZATION REVIEW
Initial Clinical Review    Admission: Date/Time/Statement:   Admission Orders (From admission, onward)     Ordered        02/13/22 1922  INPATIENT ADMISSION  Once                      Orders Placed This Encounter   Procedures    INPATIENT ADMISSION     Standing Status:   Standing     Number of Occurrences:   1     Order Specific Question:   Level of Care     Answer:   Med Surg [16]     Order Specific Question:   Estimated length of stay     Answer:   More than 2 Midnights     Order Specific Question:   Certification     Answer:   I certify that inpatient services are medically necessary for this patient for a duration of greater than two midnights  See H&P and MD Progress Notes for additional information about the patient's course of treatment  ED Arrival Information     Expected Arrival Acuity    - 2/13/2022 15:07 Urgent         Means of arrival Escorted by Service Admission type    Wheelchair Spouse Hospitalist Urgent         Arrival complaint    blood in urine        Chief Complaint   Patient presents with    Blood in Urine     To ED with c/o hematuria states that he has had blood since last night  States that he has had retension today  Denies any fever of chills  Initial Presentation: 57 y o  male who presented  to [facility] ED  Inpatient admission for evaluation and treatment of  Hemorrhagic cystitis, with  Acute urinary retention, Hematuria, and Pancytopenia  He has a past medical history of Cancer , Lymphoma (05/2018), Non Hodgkin's lymphoma , Prostate cancer (2020), Shingles, and Thoracic aortic aneurysm  He presented for evaluation of hematuria which began last night  He hd been able to pass urine and clots until this morning when he no longer felt he was unable to empty his bladder  He has a prior history of transient hematuria which resolved without intervention  He has had prior episodes of urinary retentiopn  Following surgery for lymph node resection which required temporary ferris placement  He is currently receiving chemotherapy( last dose  2/4/22) for metastatic prostate cancer  He received 2 units of PRBC's and a pack of platlets yesterday  He is s/p a 10 day course of cipro for infection prophylaxis due to leukopenia  On exam,  He has abdominal pain( tenderness at site of recent depot injection  + for urinary frequency, hematuria and incomplete emptying  Plan  Admission for  CBI, q4 manual irrigation and correction of platlets  In discussion with Dr Dragan Taylor, 1 unit of platlets,1 unit of PRBC's restart Cipro  Due to platlets 25K,, Hg 7 7  wbc 1 22, ANC 0 62            Date:2/14/2022   Day 2: Pt continues with hematuria, continue on CBI, transfuse platelets as required   On telemetry tachy  Related to anemia  Urology  Consult - 2/14 = Hematuria with retention, CBI started, monitor, if urine clears can transition to hand irrigation  Will need Ferris for OP voiding trial/ Transfuse as needed  Cystitis- urine with nitrated but negative leukocytes  On Cipro  Cultures pending  Anemia 6 2 on admit  Trend transfuse as needed  Prostate cancer with mets ( bone, liver and adrenals  On chemo  Pancytopenia - s/p   Unit of platlets             ED Triage Vitals [02/13/22 1557]   Temperature Pulse Respirations Blood Pressure SpO2   98 1 °F (36 7 °C) 98 20 132/70 98 %      Temp Source Heart Rate Source Patient Position - Orthostatic VS BP Location FiO2 (%)   Tympanic Monitor Sitting Right arm --      Pain Score       No Pain          Wt Readings from Last 1 Encounters:   02/13/22 68 kg (150 lb)     Additional Vital Signs:   Patient Position - Orthostatic VS           02/14/22 14:25:53 97 7 °F (36 5 °C) 122 Abnormal  18 105/72 83 99 % -- --   02/14/22 1256 97 9 °F (36 6 °C) 119 Abnormal  -- 93/64 -- -- -- --   02/14/22 1237 98 °F (36 7 °C) 122 Abnormal  18 102/70 -- 97 % -- --   02/14/22 0745 -- -- -- -- -- 97 % None (Room air) --   02/14/22 07:10:35 98 1 °F (36 7 °C) 118 Abnormal  18 100/69 79 98 % -- --   02/14/22 04:24:08 -- 119 Abnormal  -- 101/69 80 96 % -- --   02/14/22 0215 97 6 °F (36 4 °C) 124 Abnormal  16 102/69 -- 96 % None (Room air) --   02/14/22 01:14:28 97 6 °F (36 4 °C) 90 18 103/69 80 96 % -- --   02/14/22 0046 98 4 °F (36 9 °C) 128 Abnormal  16 102/69 -- 94 % None (Room air) --   02/14/22 00:35:48 98 4 °F (36 9 °C) 125 Abnormal  16 102/69 80 97 % -- --   02/14/22 0030 -- -- -- -- -- -- None (Room air) --   02/13/22 23:49:34 98 7 °F (37 1 °C) 122 Abnormal  20 101/69 80 98 % -- --   02/13/22 2127 98 5 °F (36 9 °C) 127 Abnormal  20 103/62 -- 99 % None (Room air) Lying   02/13/22 2109 98 4 °F (36 9 °C) 129 Abnormal  18 100/63 -- -- -- --   02/13/22 1930 -- 137 Abnormal  18 107/72 84 99 % -- --   02/13/22 1800 -- 130 Abnormal  18 100/68 79 99 % None (Room air) Lying             Pertinent Labs/Diagnostic Test Results:       Results from last 7 days   Lab Units 02/14/22  0542 02/13/22  1704 02/12/22  0829   WBC Thousand/uL 1 33* 1 22* 0 50*   HEMOGLOBIN g/dL 7 4* 7 7* 6 2*   HEMATOCRIT % 23 3* 24 3* 19 9*   PLATELETS Thousands/uL 52* 25* 10*   BANDS PCT % 1  --  3         Results from last 7 days   Lab Units 02/14/22  0542 02/13/22  1704   SODIUM mmol/L 138 134*   POTASSIUM mmol/L 4 0 4 6   CHLORIDE mmol/L 104 101   CO2 mmol/L 23 23   ANION GAP mmol/L 11 10   BUN mg/dL 21 23   CREATININE mg/dL 0 76 0 74   EGFR ml/min/1 73sq m 97 98   CALCIUM mg/dL 7 1* 7 5*   MAGNESIUM mg/dL 1 7  --      Results from last 7 days   Lab Units 02/14/22  0542   AST U/L 31   ALT U/L 14   ALK PHOS U/L 328*   TOTAL PROTEIN g/dL 5 1*   ALBUMIN g/dL 2 3*   TOTAL BILIRUBIN mg/dL 0 50         Results from last 7 days   Lab Units 02/14/22  0542 02/13/22  1704   GLUCOSE RANDOM mg/dL 81 90       Results from last 7 days   Lab Units 02/13/22  1810   CLARITY UA  Slightly Cloudy   COLOR UA  Red   SPEC GRAV UA  1 020   PH UA  6 5   GLUCOSE UA mg/dl Negative   KETONES UA mg/dl Trace*   BLOOD UA  Large*   PROTEIN UA mg/dl 100 (2+)*   NITRITE UA  Positive*   BILIRUBIN UA  Negative   UROBILINOGEN UA E U /dl 1 0   LEUKOCYTES UA  Negative   WBC UA /hpf Field obscured, unable to enumerate*   RBC UA /hpf Innumerable*   BACTERIA UA /hpf Field obscured, unable to enumerate*   EPITHELIAL CELLS WET PREP /hpf Field obscured, unable to enumerate*     CTA chest - 2/14 - Some images are suboptimal secondary to respiratory motion which decreases sensitivity for evaluation of peripheral pulmonary emboli, subject to this, no pulmonary embolism is seen  Extensive hepatic and osseous metastatic disease as described   Prominent hilar and mediastinal lymph nodes may represent lymphatic metastatic disease as well     Coronary atherosclerosis,        ED Treatment:   Medication Administration from 02/13/2022 1506 to 02/13/2022 2038       Date/Time Order Dose Route Action Comments     02/13/2022 1710 lidocaine (URO-JET) 2 % urethral/mucosal gel 1 application 1 application Urethral Given      02/13/2022 1955 ciprofloxacin (CIPRO) IVPB (premix in 5% dextrose) 400 mg 200 mL 400 mg Intravenous New Bag         Past Medical History:   Diagnosis Date    Cancer [facility] )     Stomach    Lymphoma [facility] ) 05/2018    Non Hodgkin's lymphoma [facility] )     Prostate cancer ([facility] ) 2020    Shingles     Thoracic aortic aneurysm ([facility] )     PATIENT DENIES     Present on Admission:   MALT lymphoma ([facility] )   Prostate cancer [facility] )   Osseous metastasis (HCC)      Admitting Diagnosis: Hemorrhagic cystitis [N30 91]  Blood in urine [R31 9]  Hematuria [R31 9]  Pancytopenia ([facility] ) [D61 818]  Acute urinary retention [R33 8]  Age/Sex: 57 y o  male         Admission Orders:  Scheduled Medications:  vitamin C, 1,000 mg, Oral, Daily  ciprofloxacin, 400 mg, Intravenous, Q12H  famotidine, 20 mg, Oral, BID  gabapentin, 600 mg, Oral, TID  melatonin, 6 mg, Oral, HS  pantoprazole, 40 mg, Oral, Early Morning  patient supplied medication, 1 each, Apply externally, TID  saccharomyces boulardii, 250 mg, Oral, BID  sodium chloride, 1 g, Oral, BID  Plasmalyte 500ml Bolus 2/14x 1     Continuous IV Infusions:    multi-electrolyte (PLASMALYTE-A/ISOLYTE-S PH 7 4) IV solution  Rate: 100 mL/hr Dose: 100 mL/hr  Freq: Continuous Route: IV  Last Dose: Stopped (02/14/22 1230)  Start: 02/13/22 2115 End: 02/14/22 1421      PRN Meds:  acetaminophen, 650 mg, Oral, Q6H PRN  calcium carbonate, 1,000 mg, Oral, Daily PRN  glycerin-hypromellose-, 1 drop, Both Eyes, Q4H PRN  ondansetron, 4 mg, Oral, Q6H PRN - 2/14 x1   oxyCODONE, 10 mg, Oral, Q12H PRN - 2/14 x1   oxyCODONE, 5 mg, Oral, Q6H PRN - 2/13x 1 - 2/14 x 1  polyethylene glycol, 17 g, Oral, Daily PRN  senna, 1 tablet, Oral, HS PRN            Network Utilization Review Department  ATTENTION: Please call with any questions or concerns to [phone] and carefully listen to the prompts so that you are directed to the right person  All voicemails are confidential   Cleotis Dirk all requests for admission clinical reviews, approved or denied determinations and any other requests to dedicated fax number below belonging to the campus where the patient is receiving treatment   List of dedicated fax numbers for the Facilities:  [address] DENIALS (Administrative/Medical Necessity) [phone]   [address] (Maternity/NICU/Pediatrics) [phone]   [address] [place] [address]  [phone]   [address] [id number] [place] [address] [address] 830 [facility] [address] [place] [address] [phone]

## 2022-02-14 NOTE — ASSESSMENT & PLAN NOTE
· Urine dipstick with positive nitrite but negative leukocytes   · Will restart cipro per patient's oncologist, Dr Erica Ziegler, guidance   · Urine culture, pending

## 2022-02-14 NOTE — CASE MANAGEMENT
Case Management Assessment & Discharge Planning Note    Patient name Angus Hopper  Location [address] [id number] MRN [id number]  : 1960 Date 2022       Current Admission Date: 2022  Current Admission Diagnosis:Hematuria   Patient Active Problem List    Diagnosis Date Noted    Pancytopenia ([facility] ) 2022    Hematuria 2022    Cystitis 2022    Tachycardia 2022    SIRS (systemic inflammatory response syndrome) ([facility] ) 2021    Anemia 2021    Osseous metastasis ([facility] ) 09/15/2021    Pulmonary embolus ([facility] ) 09/15/2021    Prostate cancer ([facility] ) 2020    MALT lymphoma ([facility] ) 2019    Abnormal nuclear stress test 2019    Pure hypercholesterolemia 10/19/2012      LOS (days): 1  Geometric Mean LOS (GMLOS) (days):   Days to GMLOS:     OBJECTIVE:    Risk of Unplanned Readmission Score: 24     Current admission status: Inpatient    Preferred Pharmacy:   [address] [place] [address]  Phone: [phone] Fax: [phone]    Primary Care Provider: Emily Alcala MD    Primary Insurance: BLUE CROSS  Secondary Insurance:     ASSESSMENT:  [facility], [address] Representative - Spouse   Primary Phone: [phone] (Mobile)               Advance Directives  Does patient have a [address] [facility] [address]?: No  Was patient offered paperwork?: Yes (Declined)  Does patient currently have a Health Care decision maker?: Yes, please see Health Care Proxy section  Does patient have Advance Directives?: No  Was patient offered paperwork?: Yes (Declined)  Primary Contact: Julia Ceballos: wife: [phone]    Patient Information  Admitted from[de-identified] Home  Mental Status: Alert  During Assessment patient was accompanied by: Spouse  Assessment information provided by[de-identified] Patient  Primary Caregiver: Self  Support Systems: Spouse/significant other  [place] of Residence: [address] [employer] [facility] ([place], [place]) do you live in?: [place]  Type of Current Residence: 2 story home  Upon entering residence, is there a bedroom on the main floor (no further steps)?: No  A bedroom is located on the following floor levels of residence (select all that apply):: 2nd Floor  Upon entering residence, is there a bathroom on the main floor (no further steps)?: Yes  Number of steps to 2nd floor from main floor: One Flight  In the last 12 months, was there a time when you were not able to pay the mortgage or rent on time?: No  In the last 12 months, how many places have you lived?: 1  In the last 12 months, was there a time when you did not have a steady place to sleep or slept in a shelter (including now)?: No  Homeless/housing insecurity resource given?: N/A  Living Arrangements: Lives w/ Spouse/significant other  Is patient a ?: No    Activities of Daily Living Prior to Admission  Functional Status: Independent  Completes ADLs independently?: Yes  Ambulates independently?: No  Level of ambulatory dependence: Assistance  Does patient use assisted devices?: Yes  Assisted Devices (DME) used:  Coral Kendrick  Does patient currently own DME?: Yes  What DME does the patient currently own?: [employer]  Does patient have a history of Outpatient Therapy (PT/OT)?: No  Does the patient have a history of Short-Term Rehab?: No  Does patient have a history of HHC?: No  Does patient currently have [facility]?: No    Patient Information Continued  Income Source: Employed  Does patient have prescription coverage?: Yes  Within the past 12 months, you worried that your food would run out before you got the money to buy more : Never true  Within the past 12 months, the food you bought just didnt last and you didnt have money to get more : Never true  Food insecurity resource given?: N/A  Does patient receive dialysis treatments?: No  Does patient have a history of substance abuse?: No  Does patient have a history of Mental Health Diagnosis?: No    Means of Transportation  Means of Transport to Appts[de-identified] Family transport  In the past 12 months, has lack of transportation kept you from medical appointments or from getting medications?: No  In the past 12 months, has lack of transportation kept you from meetings, work, or from getting things needed for daily living?: No  Was application for public transport provided?: N/A    DISCHARGE DETAILS:    Discharge planning discussed with[de-identified] patient and patient's wife  Freedom of Choice: Yes  Comments - Freedom of Choice: Pt reports his plan to return home and declines discharge needs at this time  CM contacted family/caregiver?: Yes (Pt presents AA&Ox3  Pt's wife at bedside)  Were Treatment Team discharge recommendations reviewed with patient/caregiver?: Yes  Did patient/caregiver verbalize understanding of patient care needs?: Yes  Were patient/caregiver advised of the risks associated with not following Treatment Team discharge recommendations?: Yes    Contacts  Patient Contacts: Alena Thomson: wife  Relationship to Patient[de-identified] Family  Contact Method: [address] [place] [address] Person  Phone Number: [phone]  Reason/Outcome: Emergency Contact,Continuity of   [address]         Is the patient interested in [address] at discharge?: No    Other Referral/Resources/Interventions Provided:  Referral Comments: Lives with wife in [address] [place] [address] 17 room on 1st  Pt reports he uses walker, cane and wheelchair  Pt reports that the 1st floor of their home is almost finished with remodel with bedroom and full bathroom on 1st floor   Pt reports his wife will transport him home and declines discharge needs    Treatment Team Recommendation: Home  Discharge Destination Plan[de-identified] Home  Transport at Discharge : Auto with designated

## 2022-02-14 NOTE — ASSESSMENT & PLAN NOTE
· Metastatic to bone (see below), liver, b/l adrenals  · CT 1/22: "Improved mediastinal and hilar adenopathy  Unchanged lingular nodule  Worsening hepatic metastatic disease  New right adrenal metastatic nodule  Unchanged left adrenal metastatic disease  New upper abdominal retroperitoneal adenopathy  Slightly worse moderate to severe left hydroureteronephrosis, with increased irregular mass involving the left side of the urinary bladder and left seminal vesicle  Increased adenopathy along the left pelvic sidewall  Diffuse widespread osseous metastatic disease    · EUS lymphadenopathy biopsy 9/21: "metastatic carcinoma compatible with prostatic primary "  · Follows with Dr Chirag Callejas outpatient - ED spoke with her on admission   · Undergoing chemotherapy - last 2/4/22

## 2022-02-14 NOTE — ASSESSMENT & PLAN NOTE
· Urine dipstick with positive nitrite but negative leukocytes   · Will restart cipro per patient's oncologist, Dr Davis Shetty, guidance   · Urine culture, pending

## 2022-02-14 NOTE — ASSESSMENT & PLAN NOTE
· Hx of MALT lymphoma 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 s/p triple abx course for H  Pylori at that time and then found to have progressive disease with second gastric lesions with resolution on EGD from 9/21

## 2022-02-14 NOTE — PROGRESS NOTES
[place]  Progress Note Elgin Claros 1960, 57 y o  male MRN: [id number]  Unit/Bed#: -01 Encounter: [id number]  Primary Care Provider: Vasiliy Echavarria MD   Date and time admitted to hospital: 2/13/2022  3:38 PM    * Hematuria  Assessment & Plan  · Hematuria  - able to urinate and passing some clots, however he then developed urinary retention at 0800 so presented to the ED   · Persists, cont on cbi  · apprec urology  ·  thrombocytopenia contributory - transfused platelets as required, currently >50k  Tachycardia  Assessment & Plan  · Noted on VS and auscultation  · Related to severe anemia  · On chart review - pt tachycardiac 120-130s since 1/18 at transfusion therapy, however pt states ongoing for months especially with any small amount of activity   · Did have PE in Sept 2021 and had to stop anticoagulation 2 months ago due to pancytopenia   · 09/21 CT PE: "Right middle lobe, lingular, and left upper lobe segmental pulmonary emboli are noted  RV LV ratio is approximately 0 9, this is the cut off for RV dysfunction/right heart strain"  · ECHO 09/21: G1DD of LV  RV with moderate dilation and RV pressure overload     · Obtain STAT CT PE study - negative  · Monitor on  tele    Cystitis  Assessment & Plan  · Urine dipstick with positive nitrite but negative leukocytes   · Will restart cipro per patient's oncologist, Dr Carmel Jarvis, guidance   · Urine culture, pending     Pancytopenia ([facility] [address] )  Assessment & Plan  · Platelets 04P on admission with active bleeding (hematuria)  · 1U platelets ordered in ED per guidance from patient's oncologist, Dr Carmel Jarvis   · Improved to greater than 50k    Transfuse additional unit PRBC today    · WBC 1 33  · Cipro restarted per oncologist guidance by ED (pt had just finished 10 day oral cipro for infection prophylaxis last night)   - for prophylaxis    Osseous metastasis ([address] )  Assessment & Plan  · Diffuse osseous mets  · Had severe symptomatic disease of the lumbosacral spine so was treated with palliative radiation therapy of L2-S2 with last dose on 7/20/2021 with improvement in pain   · Pain management - PDMP reviewed:  · oxycontin ER 10mg, 60 pills, last fill 12/2 (uses PRN as first line)  · Oxycodone 10mg, 120 pills, last fill 12/2 (PRN as breakthrough)    Prostate cancer ([facility] [address] )  Assessment & Plan  · Metastatic to bone (see below), liver, b/l adrenals  · CT 1/22: "Improved mediastinal and hilar adenopathy  Unchanged lingular nodule  Worsening hepatic metastatic disease  New right adrenal metastatic nodule  Unchanged left adrenal metastatic disease  New upper abdominal retroperitoneal adenopathy  Slightly worse moderate to severe left hydroureteronephrosis, with increased irregular mass involving the left side of the urinary bladder and left seminal vesicle  Increased adenopathy along the left pelvic sidewall  Diffuse widespread osseous metastatic disease  · EUS lymphadenopathy biopsy 9/21: "metastatic carcinoma compatible with prostatic primary "  · Follows with Dr Rg Rubin outpatient - ED spoke with her on admission   · Undergoing chemotherapy - last 2/4/22    MALT [facility]  Assessment & Plan  · Hx of MALT lymphoma 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 s/p triple abx course for H  Pylori at that time and then found to have progressive disease with second gastric lesions with resolution on EGD from 9/21      VTE  Prophylaxis:   Pharmacologic: in place    Patient Centered Rounds: I have performed bedside rounds with nursing staff today  Discussions with Specialists or Other Care Team Provider: case management    Education and Discussions with Family / Patient:  Pt       Current Length of Stay: 1 day(s)    Current Patient Status: Inpatient        Code Status: Level 1 - Full Code      Subjective:   Remains tachycardic  Hematuria continues   No chest pain  No sob  No other complaints      Patient is seen and examined at bedside    All other ROS are negative  Objective:     Vitals:   Temp (24hrs), Av 2 °F (36 8 °C), Min:97 6 °F (36 4 °C), Max:98 7 °F (37 1 °C)    Temp:  [97 6 °F (36 4 °C)-98 7 °F (37 1 °C)] 98 1 °F (36 7 °C)  HR:  [] 118  Resp:  [16-20] 18  BP: (100-132)/(62-72) 100/69  SpO2:  [94 %-99 %] 97 %  Body mass index is 21 52 kg/m²  Input and Output Summary (last 24 hours): Intake/Output Summary (Last 24 hours) at 2022 1044  Last data filed at 2022 0945  Gross per 24 hour   Intake 1175 ml   Output 2325 ml   Net -1150 ml       Physical Exam:       GEN: No acute distress, comfortable  HEEENT: No JVD, PERRLA, no scleral icterus  RESP: Lungs clear to auscultation bilaterally  CV tachycardic  ABD: SOFT NON TENDER, POSITIVE BOWEL SOUNDS, NO DISTENTION  PSYCH: CALM  Gu: hematuria, on CBI  NEURO: A X O X 3, NO FOCAL DEFICITS  SKIN: NO RASH  EXTREM: NO EDEMA    Additional Data:     Labs:    Results from last 7 days   Lab Units 22  0542   WBC Thousand/uL 1 33*   HEMOGLOBIN g/dL 7 4*   HEMATOCRIT % 23 3*   PLATELETS Thousands/uL 52*   BANDS PCT % 1   LYMPHO PCT % 48*   MONO PCT % 10   EOS PCT % 0     Results from last 7 days   Lab Units 22  0542   SODIUM mmol/L 138   POTASSIUM mmol/L 4 0   CHLORIDE mmol/L 104   CO2 mmol/L 23   BUN mg/dL 21   CREATININE mg/dL 0 76   ANION GAP mmol/L 11   CALCIUM mg/dL 7 1*   ALBUMIN g/dL 2 3*   TOTAL BILIRUBIN mg/dL 0 50   ALK PHOS U/L 328*   ALT U/L 14   AST U/L 31   GLUCOSE RANDOM mg/dL 81                           * I Have Reviewed All Lab Data Listed Above  Imaging:     Results for orders placed during the hospital encounter of 09/15/21    XR chest portable    Narrative  CHEST    INDICATION:   Hypoxia  COMPARISON:  CT chest abdomen pelvis 2021    EXAM PERFORMED/VIEWS:  XR CHEST PORTABLE      FINDINGS:    Cardiomediastinal silhouette appears unremarkable  The lungs are clear  No pneumothorax or pleural effusion      Osseous structures appear within normal limits for patient age  Impression  No acute cardiopulmonary disease  Workstation performed: [id number]    No results found for this or any previous visit  *I have reviewed all imaging reports listed above      Recent Cultures (last 7 days):           Last 24 Hours Medication List:   Current Facility-Administered Medications   Medication Dose Route Frequency Provider Last Rate    acetaminophen  650 mg Oral Q6H PRN Crow Formosa, PA-C      vitamin C  1,000 mg Oral Daily Crow Formosa, PA-C      calcium carbonate  1,000 mg Oral Daily PRN Crow Formosa, PA-C      ciprofloxacin  400 mg Intravenous Q12H Crow Formosa, PA-C Stopped (02/14/22 0935)    famotidine  20 mg Oral BID Crow Formosa, PA-C      gabapentin  600 mg Oral TID Crow Formosa, PA-C      glycerin-hypromellose-  1 drop Both Eyes Q4H PRN Crow Formosa, PA-C      melatonin  6 mg Oral HS Crow Formosa, PA-C      multi-electrolyte  100 mL/hr Intravenous Continuous Crow Formosa, PA-C 100 mL/hr (02/14/22 0838)    ondansetron  4 mg Oral Q6H PRN Crow Formosa, PA-C      oxyCODONE  10 mg Oral Q12H PRN Crow Formosa, PA-C      oxyCODONE  5 mg Oral Q6H PRN Crow Formosa, PA-C      pantoprazole  40 mg Oral Early Morning Crow Formosa, PA-C      polyethylene glycol  17 g Oral Daily PRN Crow Formosa, PA-C      saccharomyces boulardii  250 mg Oral BID Crow Formosa, PA-C      senna  1 tablet Oral HS PRN Crow Formosa, PA-C      sodium chloride  1 g Oral BID Crow Formosa, PA-C          Today, Patient Was Seen By: Tim Maria MD    ** Please Note: Dictation voice to text software may have been used in the creation of this document   **

## 2022-02-14 NOTE — ASSESSMENT & PLAN NOTE
· Hematuria that onset last night - able to urinate and passing some clots, however he then developed urinary retention at 0800 so presented to the ED   · Urology recommends CBI and q4H manual irrigation   · Urology consult   · thrombocytopenia contributory

## 2022-02-14 NOTE — ASSESSMENT & PLAN NOTE
· Diffuse osseous mets  · Had severe symptomatic disease of the lumbosacral spine so was treated with palliative radiation therapy of L2-S2 with last dose on 7/20/2021 with improvement in pain   · Pain management - PDMP reviewed:  · oxycontin ER 10mg, 60 pills, last fill 12/2 (uses PRN as first line)  · Oxycodone 10mg, 120 pills, last fill 12/2 (PRN as breakthrough)

## 2022-02-14 NOTE — ASSESSMENT & PLAN NOTE
· Noted on VS and auscultation  · Related to severe anemia  · On chart review - pt tachycardiac 120-130s since 1/18 at transfusion therapy, however pt states ongoing for months especially with any small amount of activity   · Did have PE in Sept 2021 and had to stop anticoagulation 2 months ago due to pancytopenia   · 09/21 CT PE: "Right middle lobe, lingular, and left upper lobe segmental pulmonary emboli are noted  RV LV ratio is approximately 0 9, this is the cut off for RV dysfunction/right heart strain"  · ECHO 09/21: G1DD of LV  RV with moderate dilation and RV pressure overload     · Obtain STAT CT PE study - negative  · Monitor on  tele

## 2022-02-14 NOTE — MALNUTRITION/BMI
This medical record reflects one or more clinical indicators suggestive of malnutrition and/or morbid obesity  Malnutrition Findings:   Adult Malnutrition type: Chronic illness  Adult Degree of Malnutrition: Other severe protein calorie malnutrition (Pt presents with severe protein calorie malnutrition as evidenced by temporal depressions, clavicle protrusion, sunken orbitals, < 75% po intake > 1 month and 33% wt loss in 5 months (9/5/21 102 kg, 2/14/22 68kg)  )  Malnutrition Characteristics: Fat loss,Muscle loss,Inadequate energy,Weight loss     Treat with Regular diet, Ensure Plant Based supplement BID and Ensure MAX 1x daily  BMI Findings: Body mass index is 21 52 kg/m²  See Nutrition note dated 2/14/22 for additional details  Completed nutrition assessment is viewable in the nutrition documentation

## 2022-02-14 NOTE — ASSESSMENT & PLAN NOTE
· Noted on VS and auscultation  · EKG reveals  · On chart review - pt tachycardiac 120-130s since 1/18 at transfusion therapy, however pt states ongoing for months especially with any small amount of activity   · Did have PE in Sept 2021 and had to stop anticoagulation 2 months ago due to pancytopenia   · 09/21 CT PE: "Right middle lobe, lingular, and left upper lobe segmental pulmonary emboli are noted  RV LV ratio is approximately 0 9, this is the cut off for RV dysfunction/right heart strain"  · ECHO 09/21: G1DD of LV  RV with moderate dilation and RV pressure overload     · Obtain STAT CT PE study   · Upgrade to telemetry floor

## 2022-02-14 NOTE — ASSESSMENT & PLAN NOTE
· Metastatic to bone (see below), liver, b/l adrenals  · CT 1/22: "Improved mediastinal and hilar adenopathy  Unchanged lingular nodule  Worsening hepatic metastatic disease  New right adrenal metastatic nodule  Unchanged left adrenal metastatic disease  New upper abdominal retroperitoneal adenopathy  Slightly worse moderate to severe left hydroureteronephrosis, with increased irregular mass involving the left side of the urinary bladder and left seminal vesicle  Increased adenopathy along the left pelvic sidewall  Diffuse widespread osseous metastatic disease    · EUS lymphadenopathy biopsy 9/21: "metastatic carcinoma compatible with prostatic primary "  · Follows with Dr Wayne Field outpatient - ED spoke with her on admission   · Undergoing chemotherapy - last 2/4/22

## 2022-02-14 NOTE — CONSULTS
Consultation - Urology   Graciela Rodriguez 57 y o  male MRN: [id number]  Unit/Bed#: -01 Encounter: [id number]      Assessment/Plan      Assessment/Plan:  Hematuria with retention  - CBI started, will continue to monitor if urine starts a clear can transition to he and irrigation  - will need [place] for outpatient voiding trial  - trend hemoglobin  - transfuse as needed     Cystitis  -urine with nitrates but negative leukocytes  - started on Cipro per oncologist  - urine cultures pending     Anemia  -hemoglobin 6 2 on admission  - continue to trend  - transfuse as needed     Prostate cancer with metastasis  - bone, liver and adrenals  - followed by Dr Sukh Cruz  - chemotherapy, last dose 02/04/2022     Pancytopenia  - 1 unit platelets given  - continue monitor       History of Present Illness   Attending: Earle Galaviz MD  Reason for Consult / Principal Problem:  Hematuria with retention  HPI: Graciela Rodriguez is a 57y o  year old male who presents with to the ED due to persistent hematuria and urinary retention  Patient with past medical history of metastatic prostate cancer to bone, liver and adrenals, tachycardia, MALT lymphoma who is currently undergoing chemotherapy with last dose on 02/04/2022  Patient also has a previous history of a PE in which he was on anticoagulation which has been stopped  Patient denies any previous history of urinating or blood in his urine until this episode  Patient admits to passing some clots last night with his urine was unable to pass anything the next morning  Denies fevers, chills, shortness of breath or chest pain  Inpatient consult to Urology  Consult performed by: Cheo Montenegro PA-C  Consult ordered by: Laura Hair PA-C          Review of Systems   Constitutional: Negative for activity change, appetite change, fatigue and fever  HENT: Negative for tinnitus and trouble swallowing  Respiratory: Negative for apnea and shortness of breath  Cardiovascular: Negative for chest pain  Gastrointestinal: Negative for abdominal distention, abdominal pain, blood in stool and nausea  Genitourinary: Positive for difficulty urinating, dysuria and hematuria  Musculoskeletal: Negative for arthralgias  Skin: Negative for color change  Neurological: Negative for dizziness  Hematological: Negative for adenopathy  Psychiatric/Behavioral: Negative for agitation  Historical Information   Past Medical History:   Diagnosis Date    Cancer ([place] [address] )     Stomach    Lymphoma ([place] [address] ) 05/2018    Non Hodgkin's lymphoma ([place] [address] )     Prostate cancer ([place] [address] ) 2020    Shingles     Thoracic aortic aneurysm ([place] [address] )     PATIENT DENIES     Past Surgical History:   Procedure Laterality Date    APPENDECTOMY      COLONOSCOPY      HERNIA REPAIR      B/L hernia repair    IR PORT PLACEMENT  11/4/2021    KNEE ARTHROSCOPY      KNEE ARTHROSCOPY, MEDIAL PATELLO FEMORAL LIGAMENT REPAIR Left     LINEAR ENDOSCOPIC U/S N/A 6/13/2018    Procedure: LINEAR ENDOSCOPIC U/S;  Surgeon: Keenan Sanchez MD;  Location: [facility];   Service: Gastroenterology    DE BIOPSY/EXCISION, LYMPH NODE(S) Left 6/24/2020    Procedure: EXCISION BIOPSY LYMPH NODE SUPRACLAVICULAR;  Surgeon: Ciro Wagner MD;  Location: [facility];  Service: General     Social History   Social History     Substance and Sexual Activity   Alcohol Use Not Currently     @DRUGHX  E-Cigarette/Vaping    E-Cigarette Use Never User      E-Cigarette/Vaping Substances    Nicotine No     Flavoring No      Social History     Tobacco Use   Smoking Status Never Smoker   Smokeless Tobacco Never Used     Family History: non-contributory    Meds/Allergies   all current active meds have been reviewed  Allergies   Allergen Reactions    Granisetron Nausea Only     Weakness/nausea    Tetracycline Other (See Comments)     Increase LFT's    Valtrex [Valacyclovir] Nausea Only       Objective   Vitals: Blood pressure 93/64, pulse (!) 119, temperature 97 9 °F (36 6 °C), temperature source Oral, resp  rate 18, height 5' 10" (1 778 m), weight 68 kg (150 lb), SpO2 97 %  I/O last 24 hours: In: 1175 [I V :625; Blood:350; IV Piggyback:200]  Out: 1474 [Urine:2875]    Invasive Devices  Report    Central Venous Catheter Line            Port A Cath 11/04/21 Right Chest 102 days          Drain            Continuous Bladder Irrigation Three-way <1 day    Urethral Catheter Three way 18 Fr  <1 day                Physical Exam  Vitals reviewed  Constitutional:       Appearance: Normal appearance  Eyes:      Conjunctiva/sclera: Conjunctivae normal    Cardiovascular:      Rate and Rhythm: Tachycardia present  Pulses: Normal pulses  Pulmonary:      Effort: Pulmonary effort is normal       Breath sounds: Normal breath sounds  Abdominal:      General: Abdomen is flat  Bowel sounds are normal  There is no distension  Palpations: Abdomen is soft  Tenderness: There is no abdominal tenderness  Genitourinary:     Comments: [place] in place with bloody urine   Musculoskeletal:      Cervical back: Normal range of motion  Skin:     General: Skin is warm  Neurological:      General: No focal deficit present  Mental Status: He is alert     Psychiatric:         Mood and Affect: Mood normal          Lab Results:   CBC:   Lab Results   Component Value Date    WBC 1 33 (LL) 02/14/2022    HGB 7 4 (L) 02/14/2022    HCT 23 3 (L) 02/14/2022    MCV 89 02/14/2022    PLT 52 (L) 02/14/2022    MCH 28 1 02/14/2022    MCHC 31 8 02/14/2022    RDW 15 8 (H) 02/14/2022    MPV 8 8 (L) 02/14/2022     CMP:   Lab Results   Component Value Date    SODIUM 138 02/14/2022     02/14/2022    CO2 23 02/14/2022    BUN 21 02/14/2022    CREATININE 0 76 02/14/2022    CALCIUM 7 1 (L) 02/14/2022    AST 31 02/14/2022    ALT 14 02/14/2022    ALKPHOS 328 (H) 02/14/2022    EGFR 97 02/14/2022     Urinalysis:   Lab Results   Component Value Date    COLORU Red 02/13/2022    CLARITYU Slightly Cloudy 02/13/2022    SPECGRAV 1 020 02/13/2022    PHUR 6 5 02/13/2022    LEUKOCYTESUR Negative 02/13/2022    NITRITE Positive (A) 02/13/2022    GLUCOSEU Negative 02/13/2022    KETONESU Trace (A) 02/13/2022    BILIRUBINUR Negative 02/13/2022    BLOODU Large (A) 02/13/2022     Urine Culture: No results found for: URINECX  Imaging Studies: I have personally reviewed pertinent reports  EKG, Pathology, and Other Studies: I have personally reviewed pertinent reports      VTE Prophylaxis: Heparin    Code Status: Level 1 - Full Code  Advance Directive and Living Will:      Power of :    POLST:      Counseling / Coordination of Care

## 2022-02-14 NOTE — PLAN OF CARE
Problem: Potential for Falls  Goal: Patient will remain free of falls  Description: INTERVENTIONS:  - Educate patient/family on patient safety including physical limitations  - Instruct patient to call for assistance with activity   - Consult OT/PT to assist with strengthening/mobility   - Keep Call bell within reach  - Keep bed low and locked with side rails adjusted as appropriate  - Keep care items and personal belongings within reach  - Initiate and maintain comfort rounds  - Make Fall Risk Sign visible to staff  - Apply yellow socks and bracelet for high fall risk patients  - Consider moving patient to room near nurses station  Outcome: Progressing     Problem: MOBILITY - ADULT  Goal: Maintain or return to baseline ADL function  Description: INTERVENTIONS:  -  Assess patient's ability to carry out ADLs; assess patient's baseline for ADL function and identify physical deficits which impact ability to perform ADLs (bathing, care of mouth/teeth, toileting, grooming, dressing, etc )  - Assess/evaluate cause of self-care deficits   - Assess range of motion  - Assess patient's mobility; develop plan if impaired  - Assess patient's need for assistive devices and provide as appropriate  - Encourage maximum independence but intervene and supervise when necessary  - Involve family in performance of ADLs  - Assess for home care needs following discharge   - Consider OT consult to assist with ADL evaluation and planning for discharge  - Provide patient education as appropriate  Outcome: Progressing  Goal: Maintains/Returns to pre admission functional level  Description: INTERVENTIONS:  - Perform BMAT or MOVE assessment daily    - Set and communicate daily mobility goal to care team and patient/family/caregiver     - Collaborate with rehabilitation services on mobility goals if consulted  - Out of bed for toileting  - Record patient progress and toleration of activity level   Outcome: Progressing     Problem: Nutrition/Hydration-ADULT  Goal: Nutrient/Hydration intake appropriate for improving, restoring or maintaining nutritional needs  Description: Monitor and assess patient's nutrition/hydration status for malnutrition  Collaborate with interdisciplinary team and initiate plan and interventions as ordered  Monitor patient's weight and dietary intake as ordered or per policy  Utilize nutrition screening tool and intervene as necessary  Determine patient's food preferences and provide high-protein, high-caloric foods as appropriate       INTERVENTIONS:  - Monitor oral intake, urinary output, labs, and treatment plans  - Assess nutrition and hydration status and recommend course of action  - Evaluate amount of meals eaten  - Assist patient with eating if necessary   - Allow adequate time for meals  - Recommend/ encourage appropriate diets, oral nutritional supplements, and vitamin/mineral supplements  - Order, calculate, and assess calorie counts as needed  - Recommend, monitor, and adjust tube feedings and TPN/PPN based on assessed needs  - Assess need for intravenous fluids  - Provide specific nutrition/hydration education as appropriate  - Include patient/family/caregiver in decisions related to nutrition  Outcome: Progressing

## 2022-02-14 NOTE — ASSESSMENT & PLAN NOTE
· Hematuria  - able to urinate and passing some clots, however he then developed urinary retention at 0800 so presented to the ED   · Persists, cont on cbi  · apprec urology  ·  thrombocytopenia contributory - transfused platelets as required, currently >50k

## 2022-02-14 NOTE — H&P
[place]  H&P- Pati Generous 1960, 57 y o  male MRN: [id number]  Unit/Bed#: -01 Encounter: [id number]  Primary Care Provider: Mikel Bennett MD   Date and time admitted to hospital: 2/13/2022  3:38 PM    * Hematuria  Assessment & Plan  · Hematuria that onset last night - able to urinate and passing some clots, however he then developed urinary retention at 0800 so presented to the ED   · Urology recommends CBI and q4H manual irrigation   · Urology consult   · thrombocytopenia contributory     Tachycardia  Assessment & Plan  · Noted on VS and auscultation  · EKG reveals  · On chart review - pt tachycardiac 120-130s since 1/18 at transfusion therapy, however pt states ongoing for months especially with any small amount of activity   · Did have PE in Sept 2021 and had to stop anticoagulation 2 months ago due to pancytopenia   · 09/21 CT PE: "Right middle lobe, lingular, and left upper lobe segmental pulmonary emboli are noted  RV LV ratio is approximately 0 9, this is the cut off for RV dysfunction/right heart strain"  · ECHO 09/21: G1DD of LV  RV with moderate dilation and RV pressure overload     · Obtain STAT CT PE study   · Upgrade to telemetry floor     Cystitis  Assessment & Plan  · Urine dipstick with positive nitrite but negative leukocytes   · Will restart cipro per patient's oncologist, Dr Emiliana Foster, guidance   · Urine culture, pending     Pancytopenia [facility])  Assessment & Plan  · Platelets 70Z on admission with active bleeding (hematuria)  · 1U platelets ordered in ED per guidance from patient's oncologist, Dr Emiliana Foster   · Platelets were 72O on 2/12 and pt given 1U platelets yesterday ordered by oncologist   · Hgb 7 7 on admission   · 1U PRBCs ordered in ED   · Hgb 6 2 2/12 and received 2U PRBCs ordered by oncologist   · WBC 1 22 - ANC 0 62  · Cipro restarted per oncologist guidance by ED (pt had just finished 10 day oral cipro for infection prophylaxis last night) · Recheck CBC later tonight     Prostate cancer [facility])  Assessment & Plan  · Metastatic to bone (see below), liver, b/l adrenals  · CT 1/22: "Improved mediastinal and hilar adenopathy  Unchanged lingular nodule  Worsening hepatic metastatic disease  New right adrenal metastatic nodule  Unchanged left adrenal metastatic disease  New upper abdominal retroperitoneal adenopathy  Slightly worse moderate to severe left hydroureteronephrosis, with increased irregular mass involving the left side of the urinary bladder and left seminal vesicle  Increased adenopathy along the left pelvic sidewall  Diffuse widespread osseous metastatic disease  · EUS lymphadenopathy biopsy 9/21: "metastatic carcinoma compatible with prostatic primary "  · Follows with Dr Stanley Simon outpatient - ED spoke with her on admission   · Undergoing chemotherapy - last 2/4/22    Osseous metastasis ([facility] [address] )  Assessment & Plan  · Diffuse osseous mets  · Had severe symptomatic disease of the lumbosacral spine so was treated with palliative radiation therapy of L2-S2 with last dose on 7/20/2021 with improvement in pain   · Pain management - PDMP reviewed:  · oxycontin ER 10mg, 60 pills, last fill 12/2 (uses PRN as first line)  · Oxycodone 10mg, 120 pills, last fill 12/2 (PRN as breakthrough)    MALT lymphoma ([facility] [address] )  Assessment & Plan  · Hx of MALT lymphoma 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 s/p triple abx course for H  Pylori at that time and then found to have progressive disease with second gastric lesions with resolution on EGD from 9/21    VTE Pharmacologic Prophylaxis: VTE Score: 5 High Risk (Score >/= 5) - Pharmacological DVT Prophylaxis Contraindicated  Sequential Compression Devices Ordered  Code Status: Level 1 - Full Code   Discussion with family: Updated  (wife) at bedside      Anticipated Length of Stay: Patient will be admitted on an inpatient basis with an anticipated length of stay of greater than 2 midnights secondary to hematuria, pancytopenia, metastatic prostate cancer  Total Time for Visit, including Counseling / Coordination of Care: 60 minutes Greater than 50% of this total time spent on direct patient counseling and coordination of care  Chief Complaint: "I couldn't urinate"    History of Present Illness:  Aura Jeronimo is a 57 y o  male with a PMH of stage IV metastatic prostate cancer to bone, liver, adrenals, and mediastinal lymphadenopathy diagnosed in May 2020, MALT lymphadenopathy, and prior PE (no longer on anticoagulation due to pancytopenia), who presents with hematuria and inability to urinate  Patient reports he 1st noted hematuria and some difficulty urinating around 2100 last night  He passed some blood clots in his urine but then was able to urinate normally overnight without any recurrences  This morning around 8:00 a m  he was no longer able to urinate, so he presented to the ED  denies any suprapubic pain or abdominal fullness  Received blood and platelets transfusion yesterday without issue  Denies recent fever, chills, chest pain, cough, dyspnea, focal abdominal pain, or leg swelling  Patient admits to intermittent loose stools and constipation dependent upon intake of oxycodone  Last chemo on 2/4  Just finished cipro for infection prophylaxis last night  Reports ongoing tachycardia since September, especially with any activity  Anticoagulation for PE from September was stopped 2 months ago due to pancytopenia  Review of Systems:  Review of Systems   Constitutional: Positive for fatigue  Negative for chills and fever  HENT: Negative for congestion  Respiratory: Negative for cough and shortness of breath  Cardiovascular: Positive for palpitations  Negative for chest pain and leg swelling  Gastrointestinal: Positive for constipation, diarrhea and nausea  Negative for abdominal pain and vomiting  Genitourinary: Positive for difficulty urinating and hematuria     Neurological: Positive for weakness (generalized)  All other systems reviewed and are negative  Past Medical and Surgical History:   Past Medical History:   Diagnosis Date    Cancer [facility])     Stomach    Lymphoma ([facility] [address] ) 05/2018    Non Hodgkin's lymphoma ([facility] [address] )     Prostate cancer ([facility] [address] ) 2020    Shingles     Thoracic aortic aneurysm ([facility] [address] )     PATIENT DENIES       Past Surgical History:   Procedure Laterality Date    APPENDECTOMY      COLONOSCOPY      HERNIA REPAIR      B/L hernia repair    IR PORT PLACEMENT  11/4/2021    KNEE ARTHROSCOPY      KNEE ARTHROSCOPY, MEDIAL PATELLO FEMORAL LIGAMENT REPAIR Left     LINEAR ENDOSCOPIC U/S N/A 6/13/2018    Procedure: LINEAR ENDOSCOPIC U/S;  Surgeon: Cris Childs MD;  Location: [facility]; Service: Gastroenterology    AK BIOPSY/EXCISION, LYMPH NODE(S) Left 6/24/2020    Procedure: EXCISION BIOPSY LYMPH NODE SUPRACLAVICULAR;  Surgeon: Olinda Bailey MD;  Location: [facility];  Service: General       Meds/Allergies:  Prior to Admission medications    Medication Sig Start Date End Date Taking?  Authorizing Provider   dexamethasone (DECADRON) 1 mg tablet Take 1 mg by mouth 2 (two) times a day with meals   Yes Historical Provider, MD   zinc oxide 20 % ointment Apply topically as needed   Yes Historical Provider, MD   Ascorbic Acid (vitamin C) 1000 MG tablet Take 1,000 mg by mouth daily    Historical Provider, MD   Bacillus Coagulans-Inulin (Probiotic) 1-250 BILLION-MG CAPS Take by mouth 50 billion    Historical Provider, MD   cholecalciferol (VITAMIN D3) 1,000 units tablet Take 500 Units by mouth daily 50,000 1x week    Historical Provider, MD   famotidine (PEPCID) 20 mg tablet Take 20 mg by mouth 2 (two) times a day    Historical Provider, MD   gabapentin (NEURONTIN) 300 mg capsule Take 600 mg by mouth 3 (three) times a day      Historical Provider, MD   metoprolol tartrate (LOPRESSOR) 25 mg tablet Take 25 mg by mouth every 12 (twelve) hours    Historical Provider, MD   Nutritional Supplements (PRO-RAJINDER PLUS PO) Take by mouth    Historical Provider, MD   Omega-3 Fatty Acids (Fish Oil) 1200 MG CAPS Take by mouth    Historical Provider, MD   ondansetron (ZOFRAN) 4 mg tablet Take 4 mg by mouth every 8 (eight) hours as needed for nausea or vomiting    Historical Provider, MD   OxyCONTIN 10 MG 12 hr tablet Take 10 mg by mouth every 12 (twelve) hours as needed   12/2/21   Historical Provider, MD   pantoprazole (PROTONIX) 40 mg tablet Take 40 mg by mouth as needed   3/7/20   Historical Provider, MD   RESTASIS 0 05 % ophthalmic emulsion Administer 1 drop to both eyes every 12 (twelve) hours 6/9/20   Historical Provider, MD   silver sulfadiazine (SILVADENE,SSD) 1 % cream Apply topically 2 (two) times a day   1/19/22   Historical Provider, MD   sodium chloride 1 g tablet 1 g 2 (two) times a day   1/19/22   Historical Provider, MD   zinc gluconate 50 mg tablet Take 50 mg by mouth daily    Historical Provider, MD   fondaparinux (ARIXTRA) 7 5 mg/0 6 mL  12/2/21 2/13/22  Historical Provider, MD   predniSONE 5 mg tablet  12/2/21 2/13/22  Historical Provider, MD     I have reviewed home medications with patient personally  Allergies:    Allergies   Allergen Reactions    Granisetron Nausea Only     Weakness/nausea    Tetracycline Other (See Comments)     Increase LFT's    Valtrex [Valacyclovir] Nausea Only       Social History:  Marital Status: /Civil Union   Occupation:  for [employer]   Patient Pre-hospital Living Situation: Home, With spouse  Patient Pre-hospital Level of Mobility: walks - using wheelchair lately due to fatigue with any activity   Patient Pre-hospital Diet Restrictions: none   Substance Use History:   Social History     Substance and Sexual Activity   Alcohol Use Not Currently     Social History     Tobacco Use   Smoking Status Never Smoker   Smokeless Tobacco Never Used     Social History     Substance and Sexual Activity   Drug Use No       Family History:  Family History   Problem Relation Age of Onset   Denice Calle Melanoma Mother     Prostate cancer Father     Lung cancer Father     Breast cancer Sister     Pancreatic cancer Paternal Grandfather     Hodgkin's lymphoma Sister        Physical Exam:     Vitals:   Blood Pressure: 101/69 (02/13/22 2349)  Pulse: (!) 122 (02/13/22 2349)  Temperature: 98 7 °F (37 1 °C) (02/13/22 2349)  Temp Source: Oral (02/13/22 2127)  Respirations: 20 (02/13/22 2349)  Height: 5' 10" (177 8 cm) (02/13/22 2127)  Weight - Scale: 68 kg (150 lb) (02/13/22 2127)  SpO2: 98 % (02/13/22 2349)    Physical Exam  Vitals and nursing note reviewed  Constitutional:       Comments: Pleasant  HENT:      Head: Normocephalic  Mouth/Throat:      Mouth: Mucous membranes are moist    Eyes:      Extraocular Movements: Extraocular movements intact  Conjunctiva/sclera: Conjunctivae normal    Cardiovascular:      Rate and Rhythm: Regular rhythm  Tachycardia present  Pulses: Normal pulses  Heart sounds: No murmur heard  Pulmonary:      Effort: Pulmonary effort is normal       Breath sounds: Normal breath sounds  Abdominal:      General: Abdomen is flat  Palpations: Abdomen is soft  Tenderness: There is no abdominal tenderness  Genitourinary:     Comments: Milan catheter with gross hematuria  Musculoskeletal:         General: Normal range of motion  Cervical back: Normal range of motion  Right lower leg: No edema  Left lower leg: No edema  Skin:     General: Skin is warm and dry  Coloration: Skin is pale  Neurological:      General: No focal deficit present  Mental Status: He is alert and oriented to person, place, and time  Psychiatric:         Mood and Affect: Mood normal          Thought Content:  Thought content normal           Additional Data:     Lab Results:  Results from last 7 days   Lab Units 02/13/22  1704 02/12/22  0829 02/12/22  0829   WBC Thousand/uL 1 22*   < > 0 50* HEMOGLOBIN g/dL 7 7*   < > 6 2*   HEMATOCRIT % 24 3*   < > 19 9*   PLATELETS Thousands/uL 25*   < > 10*   BANDS PCT %  --   --  3   LYMPHO PCT % 36   < > 39   MONO PCT % 10   < > 3*   EOS PCT % 0   < > 0    < > = values in this interval not displayed  Results from last 7 days   Lab Units 02/13/22  1704   SODIUM mmol/L 134*   POTASSIUM mmol/L 4 6   CHLORIDE mmol/L 101   CO2 mmol/L 23   BUN mg/dL 23   CREATININE mg/dL 0 74   ANION GAP mmol/L 10   CALCIUM mg/dL 7 5*   GLUCOSE RANDOM mg/dL 90                       Imaging: No pertinent imaging reviewed  CTA chest pe study    (Results Pending)       EKG and Other Studies Reviewed on Admission:   · EKG: No EKG obtained  EKG ordered due to charted tachycardia    ** Please Note: This note has been constructed using a voice recognition system   **

## 2022-02-15 NOTE — ASSESSMENT & PLAN NOTE
· Noted on VS and auscultation  · Related to severe anemia  · On chart review - pt tachycardiac 120-130s since 1/18 at transfusion therapy, however pt states ongoing for months especially with any small amount of activity   · Did have PE in Sept 2021 and had to stop anticoagulation 2 months ago due to pancytopenia   · 09/21 CT PE: "Right middle lobe, lingular, and left upper lobe segmental pulmonary emboli are noted  RV LV ratio is approximately 0 9, this is the cut off for RV dysfunction/right heart strain"  · ECHO 09/21: G1DD of LV  RV with moderate dilation and RV pressure overload     · Obtain STAT CT PE study - negative  · Echo pending  · Monitor on  tele

## 2022-02-15 NOTE — ASSESSMENT & PLAN NOTE
· Hematuria  - able to urinate and passing some clots, however he then developed urinary retention at 0800 so presented to the ED   · Had originally improved on CBI  · Rate was decreased and recurred, rate increased and platelets noted to be decreasing again  · apprec urology  ·  thrombocytopenia contributory - transfused platelets as required, we'll transfuse again 2/15

## 2022-02-15 NOTE — ASSESSMENT & PLAN NOTE
· Urine dipstick with positive nitrite but negative leukocytes   · Will restart cipro per patient's oncologist, Dr Venessa Thornton, guidance   · Urine culture no growth

## 2022-02-15 NOTE — ASSESSMENT & PLAN NOTE
· Platelets 66H on admission with active bleeding (hematuria)  · 1U platelets ordered in ED per guidance from patient's oncologist, Dr Venessa Thornton   · 1 unit to be given again 2/15    Transfuse additional unit PRBC 2/14 with good response    · WBC 1 9  · Cipro restarted per oncologist guidance by ED (pt had just finished 10 day oral cipro for infection prophylaxis last night)   - for prophylaxis

## 2022-02-15 NOTE — ASSESSMENT & PLAN NOTE
· Metastatic to bone (see below), liver, b/l adrenals  · CT 1/22: "Improved mediastinal and hilar adenopathy  Unchanged lingular nodule  Worsening hepatic metastatic disease  New right adrenal metastatic nodule  Unchanged left adrenal metastatic disease  New upper abdominal retroperitoneal adenopathy  Slightly worse moderate to severe left hydroureteronephrosis, with increased irregular mass involving the left side of the urinary bladder and left seminal vesicle  Increased adenopathy along the left pelvic sidewall  Diffuse widespread osseous metastatic disease    · EUS lymphadenopathy biopsy 9/21: "metastatic carcinoma compatible with prostatic primary "  · Follows with Dr Tiera Hernandez outpatient - ED spoke with her on admission   · Undergoing chemotherapy - last 2/4/22

## 2022-02-15 NOTE — PROGRESS NOTES
[place]  Progress Note Lennart Mode 1960, 57 y o  male MRN: [id number]  Unit/Bed#: -01 Encounter: [id number]  Primary Care Provider: Mary Zacarias MD   Date and time admitted to hospital: 2/13/2022  3:38 PM    * Hematuria  Assessment & Plan  · Hematuria  - able to urinate and passing some clots, however he then developed urinary retention at 0800 so presented to the ED   · Had originally improved on CBI  · Rate was decreased and recurred, rate increased and platelets noted to be decreasing again  · apprec urology  ·  thrombocytopenia contributory - transfused platelets as required, we'll transfuse again 2/15    Tachycardia  Assessment & Plan  · Noted on VS and auscultation  · Related to severe anemia  · On chart review - pt tachycardiac 120-130s since 1/18 at transfusion therapy, however pt states ongoing for months especially with any small amount of activity   · Did have PE in Sept 2021 and had to stop anticoagulation 2 months ago due to pancytopenia   · 09/21 CT PE: "Right middle lobe, lingular, and left upper lobe segmental pulmonary emboli are noted  RV LV ratio is approximately 0 9, this is the cut off for RV dysfunction/right heart strain"  · ECHO 09/21: G1DD of LV  RV with moderate dilation and RV pressure overload     · Obtain STAT CT PE study - negative  · Echo pending  · Monitor on  tele    Cystitis  Assessment & Plan  · Urine dipstick with positive nitrite but negative leukocytes   · Will restart cipro per patient's oncologist, Dr Venessa Thornton, guidance   · Urine culture no growth    Pancytopenia ([address] )  Assessment & Plan  · Platelets 11F on admission with active bleeding (hematuria)  · 1U platelets ordered in ED per guidance from patient's oncologist, Dr Venessa Thornton   · 1 unit to be given again 2/15    Transfuse additional unit PRBC 2/14 with good response    · WBC 1 9  · Cipro restarted per oncologist guidance by ED (pt had just finished 10 day oral cipro for infection prophylaxis last night)   - for prophylaxis    Osseous metastasis ([facility])  Assessment & Plan  · Diffuse osseous mets  · Had severe symptomatic disease of the lumbosacral spine so was treated with palliative radiation therapy of L2-S2 with last dose on 7/20/2021 with improvement in pain   · Pain management - PDMP reviewed:  · oxycontin ER 10mg, 60 pills, last fill 12/2 (uses PRN as first line)  · Oxycodone 10mg, 120 pills, last fill 12/2 (PRN as breakthrough)    Prostate cancer ([facility] [address] )  Assessment & Plan  · Metastatic to bone (see below), liver, b/l adrenals  · CT 1/22: "Improved mediastinal and hilar adenopathy  Unchanged lingular nodule  Worsening hepatic metastatic disease  New right adrenal metastatic nodule  Unchanged left adrenal metastatic disease  New upper abdominal retroperitoneal adenopathy  Slightly worse moderate to severe left hydroureteronephrosis, with increased irregular mass involving the left side of the urinary bladder and left seminal vesicle  Increased adenopathy along the left pelvic sidewall  Diffuse widespread osseous metastatic disease  · EUS lymphadenopathy biopsy 9/21: "metastatic carcinoma compatible with prostatic primary "  · Follows with Dr Lloyd Banks outpatient - ED spoke with her on admission   · Undergoing chemotherapy - last 2/4/22    MALT lymphoma ([facility] [address] )  Assessment & Plan  · Hx of MALT lymphoma 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 s/p triple abx course for H  Pylori at that time and then found to have progressive disease with second gastric lesions with resolution on EGD from 9/21       VTE  Prophylaxis:   Pharmacologic: in place    Patient Centered Rounds: I have performed bedside rounds with nursing staff today      Discussions with Specialists or Other Care Team Provider: case management    Education and Discussions with Family / Patient: pt      Current Length of Stay: 2 day(s)    Current Patient Status: Inpatient        Code Status: Level 1 - Full Code      Subjective:   Pt seen  Hematuria had improved cbi was reduced in rate  Then recurred, rate increased again  Patient denies dypsnea    Patient is seen and examined at bedside  All other ROS are negative  Objective:     Vitals:   Temp (24hrs), Av 1 °F (36 7 °C), Min:97 7 °F (36 5 °C), Max:98 5 °F (36 9 °C)    Temp:  [97 7 °F (36 5 °C)-98 5 °F (36 9 °C)] 98 5 °F (36 9 °C)  HR:  [] 125  Resp:  [18] 18  BP: ()/(63-72) 100/69  SpO2:  [94 %-99 %] 94 %  Body mass index is 21 52 kg/m²  Input and Output Summary (last 24 hours): Intake/Output Summary (Last 24 hours) at 2/15/2022 1229  Last data filed at 2/15/2022 1000  Gross per 24 hour   Intake 345 83 ml   Output 1850 ml   Net -1504 17 ml       Physical Exam:       GEN: No acute distress, comfortable  HEEENT: No JVD, PERRLA, no scleral icterus  RESP: Lungs clear to auscultation bilaterally  CV: tachy  ABD: SOFT NON TENDER, POSITIVE BOWEL SOUNDS, NO DISTENTION  PSYCH: CALM  Gu: hematuria, CBI  NEURO: A X O X 3, NO FOCAL DEFICITS  SKIN: NO RASH  EXTREM: NO EDEMA    Additional Data:     Labs:    Results from last 7 days   Lab Units 02/15/22  0449   WBC Thousand/uL 1 90*   HEMOGLOBIN g/dL 9 1*   HEMATOCRIT % 28 7*   PLATELETS Thousands/uL 39*   BANDS PCT % 3   LYMPHO PCT % 42   MONO PCT % 13*   EOS PCT % 0     Results from last 7 days   Lab Units 02/15/22  0449   SODIUM mmol/L 136   POTASSIUM mmol/L 4 1   CHLORIDE mmol/L 102   CO2 mmol/L 22   BUN mg/dL 17   CREATININE mg/dL 0 77   ANION GAP mmol/L 12   CALCIUM mg/dL 7 3*   ALBUMIN g/dL 2 4*   TOTAL BILIRUBIN mg/dL 0 40   ALK PHOS U/L 377*   ALT U/L 12   AST U/L 50*   GLUCOSE RANDOM mg/dL 89                           * I Have Reviewed All Lab Data Listed Above  Imaging:     Results for orders placed during the hospital encounter of 09/15/21    XR chest portable    Narrative  CHEST    INDICATION:   Hypoxia      COMPARISON:  CT chest abdomen pelvis 2021    EXAM PERFORMED/VIEWS:  XR CHEST PORTABLE      FINDINGS:    Cardiomediastinal silhouette appears unremarkable  The lungs are clear  No pneumothorax or pleural effusion  Osseous structures appear within normal limits for patient age  Impression  No acute cardiopulmonary disease  Workstation performed: [id number]    No results found for this or any previous visit        *I have reviewed all imaging reports listed above      Recent Cultures (last 7 days):     Results from last 7 days   Lab Units 02/13/22  1810   URINE CULTURE  No Growth <1000 cfu/mL       Last 24 Hours Medication List:   Current Facility-Administered Medications   Medication Dose Route Frequency Provider Last Rate    acetaminophen  650 mg Oral Q6H PRN Arlester Smaller, PA-C      vitamin C  1,000 mg Oral Daily Arlester Smaller, PA-C      calcium carbonate  1,000 mg Oral Daily PRN Arlester Smaller, PA-C      ciprofloxacin  400 mg Intravenous Q12H Arlester Smaller, PA-C 400 mg (02/15/22 0815)    famotidine  20 mg Oral BID Arlester Smaller, PA-C      gabapentin  600 mg Oral TID Arlester Smaller, PA-C      glycerin-hypromellose-  1 drop Both Eyes Q4H PRN Arlester Smaller, PA-C      melatonin  6 mg Oral HS Arlester Smaller, PA-C      ondansetron  4 mg Oral Q6H PRN Arlester Smaller, PA-C      oxyCODONE  10 mg Oral Q12H PRN Arlester Smaller, PA-C      oxyCODONE  5 mg Oral Q6H PRN Arlester Smaller, PA-C      pantoprazole  40 mg Oral Early Morning Arlester Smaller, PA-C      patient supplied medication  1 each Apply externally TID Sylvia Ayala MD      polyethylene glycol  17 g Oral Daily PRN Arlester Smaller, PA-C      saccharomyces boulardii  250 mg Oral BID Arlester Smaller, PA-C      senna  1 tablet Oral HS PRN Arlester Smaller, PA-C      sodium chloride  1 g Oral BID Arlester Smaller, PA-C          Today, Patient Was Seen By: Sylvia Ayala MD    ** Please Note: Dictation voice to text software may have been used in the creation of this document   **

## 2022-02-15 NOTE — PROGRESS NOTES
Progress Note - William See 57 y o  male MRN: [id number]    Unit/Bed#: -01 Encounter: [id number]      Assessment/Plan:-    Assessment/Plan:  Hematuria with retention  - CBI started,will decrease and monitor output for clots and bleeding  Will re-eval in 3 hours  - will need [place] for outpatient voiding trial  - trend hemoglobin  - transfuse as needed     Cystitis  -urine with nitrates but negative leukocytes  - started on Cipro per oncologist  -Dmitriy Skinner cultures no growth till date     Anemia  -hemoglobin 6 2 on admission, improved to 9 1 today  - continue to trend  - transfuse as needed     Prostate cancer with metastasis  - bone, liver and adrenals  - followed by Dr Bekah Pearce  - chemotherapy, last dose 02/04/2022     Pancytopenia  -follow labs    Subjective:   Doing well this am     Objective:     Vitals: Blood pressure 102/71, pulse (!) 131, temperature 98 5 °F (36 9 °C), resp  rate 18, height 5' 10" (1 778 m), weight 68 kg (150 lb), SpO2 94 %  ,Body mass index is 21 52 kg/m²  Intake/Output Summary (Last 24 hours) at 2/15/2022 0831  Last data filed at 2/15/2022 0815  Gross per 24 hour   Intake 1170 83 ml   Output 2600 ml   Net -1429 17 ml       Physical Exam:  Awake, alert, oriented*3  NAD  NC, AT  EOMI, perrl, nose normal externally, MMM  CTAB, RRR  Abdomen soft, nontender  Urine bag with red urine in bag and sediment in cath  Dripping urine looks clear  cbi running      Invasive Devices  Report    Central Venous Catheter Line            Port A Cath 11/04/21 Right Chest 102 days          Drain            Continuous Bladder Irrigation Three-way 1 day    Urethral Catheter Three way 18 Fr  1 day                Lab, Imaging and other studies: I have personally reviewed pertinent reports      VTE Pharmacologic Prophylaxis: Heparin  VTE Mechanical Prophylaxis: sequential compression device

## 2022-02-16 NOTE — OCCUPATIONAL THERAPY NOTE
Occupational Therapy Evaluation     Patient Name: Noble Ramirez  CYTOX'E Date: 2/16/2022  Problem List  Principal Problem:    Hematuria  Active Problems:    MALT lymphoma ([facility] )    Prostate cancer ([facility] )    Osseous metastasis ([facility] )    Pancytopenia ([facility] )    Cystitis    Tachycardia    Past Medical History  Past Medical History:   Diagnosis Date    Cancer ([facility] )     Stomach    Lymphoma ([facility] ) 05/2018    Non Hodgkin's lymphoma ([facility] )     Prostate cancer ([facility] ) 2020    Shingles     Thoracic aortic aneurysm ([facility] )     PATIENT DENIES     Past Surgical History  Past Surgical History:   Procedure Laterality Date    APPENDECTOMY      COLONOSCOPY      HERNIA REPAIR      B/L hernia repair    IR PORT PLACEMENT  11/4/2021    KNEE ARTHROSCOPY      KNEE ARTHROSCOPY, MEDIAL PATELLO FEMORAL LIGAMENT REPAIR Left     LINEAR ENDOSCOPIC U/S N/A 6/13/2018    Procedure: LINEAR ENDOSCOPIC U/S;  Surgeon: Jon Mann MD;  Location: [facility]; Service: Gastroenterology    OH BIOPSY/EXCISION, LYMPH NODE(S) Left 6/24/2020    Procedure: EXCISION BIOPSY LYMPH NODE SUPRACLAVICULAR;  Surgeon: Sebastian Curry MD;  Location: [facility];  Service: General         02/16/22 1038   OT Last Visit   OT Visit Date 02/16/22   Note Type   Note type Evaluation   Restrictions/Precautions   Other Precautions   (multiple lines)   Pain Assessment   Pain Assessment Tool 0-10   Pain Score No Pain   Home Living   Type of [address] [place] [address] Two level; Able to live on main level with bedroom/bathroom   Bathroom Shower/Tub   (bathroom being converted to be accessible )   [address] Walker;Cane;Wheelchair-manual   Additional Comments Pt reports that he currently stays on 1st floor  Pt reports that home is currently undergoing renovations and will be completed in a few days  Reports that he has increasingly been relying on wheelchair for mobility     Prior Function   Level of [place] Needs assistance with IADLs Lives With Spouse   Receives Help From Family   ADL Assistance independent   IADLs Needs assistance   Subjective   Subjective Pt received in supine position  Pt agreeable to session  ADL   Eating Assistance 7  Independent   Grooming Assistance 7  Independent   UB Bathing Assistance 5  Supervision/Setup   LB Bathing Assistance 4  Minimal Assistance   UB Dressing Assistance 5  Supervision/Setup   LB Dressing Assistance 4  Minimal Assistance   Toileting Assistance  4  Minimal Assistance   Bed Mobility   Rolling L 5  Supervision   Additional items Verbal cues   Supine to Sit 4  Minimal assistance   Additional items Assist x 1;Verbal cues   Additional Comments Pt remained seated in recliner by end of session   Transfers   Sit to Stand 5  Supervision   Stand to Sit 5  Supervision   Stand pivot 4  Minimal assistance   Additional items Assist x 1;Verbal cues  (RW)   Balance   Static Sitting Good   Dynamic Sitting Good   Static Standing Fair +   Dynamic Standing Fair +   Activity Tolerance   Activity Tolerance Patient tolerated treatment well   Medical Staff Made Aware PT Libby   Nurse Made Aware VIKASH Veloz   RUE Assessment   RUE Assessment WFL   LUE Assessment   LUE Assessment WFL   Cognition   Overall Cognitive Status WFL   Arousal/Participation Alert   Attention Within functional limits   Orientation Level Oriented X4   Memory Within functional limits   Following Commands Follows all commands and directions without difficulty   Assessment   Limitation Decreased ADL status; Decreased endurance;Decreased high-level ADLs; Decreased self-care trans   Prognosis Good   Assessment Pt is a 57 y o  male seen for OT evaluation at [address] [place] [address], admitted 2/13/2022 w/ Hematuria  OT completed extensive review of pt's medical and social history   Comorbidities affecting pt's functional performance at time of assessment include: PMH of stage IV metastatic prostate cancer to bone, liver, adrenals, and mediastinal lymphadenopathy diagnosed in May 2020, MALT lymphadenopathy, and prior PE (no longer on anticoagulation due to pancytopenia)  Personal factors affecting pt at time of IE include:difficulty performing ADLS, difficulty performing IADLS  and decreased functional mobility  Prior to admission, pt was living with spouse and family in house with 1st floor set-up  Pt endorses that home renovations are due to be completed in a few days  Pt was independentI w/  ADLS and required assist with IADLS  Pt relying on RW/cane/wheelchair for mobility  Upon evaluation: Pt requires min Ax 1 for bed mobility, sup-min A x 1 for functional mobility/transfers, sup for UB ADLs and min A for LB ADLS 2* the following deficits impacting occupational performance: weakness, decreased balance and decreased tolerance  Pt to benefit from continued skilled OT tx while in the hospital to address deficits as defined above and maximize level of functional independence w ADL's and functional mobility  Occupational Performance areas to address include: bathing/shower, toilet hygiene, dressing, functional mobility and community mobility  Based on findings, pt is of high complexity  The patient's raw score on the AM-PAC Daily Activity inpatient short form is 21  , standardized score is 44 27  , greater than 39 4  Patients at this level are likely to benefit from DC to home  Please refer to the recommendation of the Occupational Therapist for safe DC planning  At this time, OT recommendations at time of discharge are home OT/home with family support  Goals   Patient Goals Pt wishes to get better   Plan   Treatment Interventions ADL retraining;Functional transfer training; Endurance training; Compensatory technique education;Equipment evaluation/education   Goal Expiration Date 02/26/22   OT Treatment Day 0   OT Frequency 2-3x/wk   Recommendation   OT Discharge Recommendation Home with home health rehabilitation   [facility] Daily Activity Inpatient   Lower Body Dressing 3 Bathing 3   Toileting 3   Upper Body Dressing 4   Grooming 4   Eating 4   Daily Activity Raw Score 21   Daily Activity Standardized Score (Calc for Raw Score >=11) 44 27       Pt will achieve the following goals within 10 days  *Pt will complete UB bathing and dressing with mod I     *Pt will complete LB bathing and dressing with mod I      * Pt will complete toileting w/ mod I w/ G hygiene/thoroughness using DME PRN    *Pt will complete bed mobility with mod I, with bed flat and no side rail to prep for purposeful tasks    *Pt will perform functional transfers with on/off all surfaces with mod I using DME as needed w/ G balance/safety  *Pt will increase standing tolerance to 5 minutes in order to complete sinkside ADL task  *Pt will improve functional mobility during ADL/IADL/leisure tasks to mod I using DME as needed w/ G balance/safety             Lena Osborne, OTR/L

## 2022-02-16 NOTE — PLAN OF CARE
Problem: OCCUPATIONAL THERAPY ADULT  Goal: Performs self-care activities at highest level of function for planned discharge setting  See evaluation for individualized goals  Description: Treatment Interventions: ADL retraining,Functional transfer training,Endurance training,Compensatory technique education,Equipment evaluation/education          See flowsheet documentation for full assessment, interventions and recommendations  Note: Limitation: Decreased ADL status,Decreased endurance,Decreased high-level ADLs,Decreased self-care trans  Prognosis: Good  Assessment: Pt is a 57 y o  male seen for OT evaluation at [facility], admitted 2/13/2022 w/ Hematuria  OT completed extensive review of pt's medical and social history  Comorbidities affecting pt's functional performance at time of assessment include: PMH of stage IV metastatic prostate cancer to bone, liver, adrenals, and mediastinal lymphadenopathy diagnosed in May 2020, MALT lymphadenopathy, and prior PE (no longer on anticoagulation due to pancytopenia)  Personal factors affecting pt at time of IE include:difficulty performing ADLS, difficulty performing IADLS  and decreased functional mobility  Prior to admission, pt was living with spouse and family in house with 1st floor set-up  Pt endorses that home renovations are due to be completed in a few days  Pt was independentI w/  ADLS and required assist with IADLS  Pt relying on RW/cane/wheelchair for mobility  Upon evaluation: Pt requires min Ax 1 for bed mobility, sup-min A x 1 for functional mobility/transfers, sup for UB ADLs and min A for LB ADLS 2* the following deficits impacting occupational performance: weakness, decreased balance and decreased tolerance  Pt to benefit from continued skilled OT tx while in the hospital to address deficits as defined above and maximize level of functional independence w ADL's and functional mobility   Occupational Performance areas to address include: bathing/shower, toilet hygiene, dressing, functional mobility and community mobility  Based on findings, pt is of high complexity  The patient's raw score on the AM-PAC Daily Activity inpatient short form is 21  , standardized score is 44 27  , greater than 39 4  Patients at this level are likely to benefit from DC to home  Please refer to the recommendation of the Occupational Therapist for safe DC planning  At this time, OT recommendations at time of discharge are home OT/home with family support        OT Discharge Recommendation: Home with home health rehabilitation

## 2022-02-16 NOTE — ASSESSMENT & PLAN NOTE
· Hematuria  - able to urinate and passing some clots, however he then developed urinary retention at 0800 so presented to the ED   · Appears improved but still present  · Rate decreased per urology  · Considering manual irrigation  ·  thrombocytopenia contributory - transfused platelets as required, we'll transfuse again 2/15  · Await CBC - may require more transfusion support

## 2022-02-16 NOTE — PLAN OF CARE
Problem: PHYSICAL THERAPY ADULT  Goal: Performs mobility at highest level of function for planned discharge setting  See evaluation for individualized goals  Description: Treatment/Interventions: Gait training,Endurance training          See flowsheet documentation for full assessment, interventions and recommendations  Outcome: Progressing  Note: Prognosis: Good  Problem List: Decreased mobility,Decreased endurance  Assessment: Pt able to mobilize with rw oob to chair with sba  Pt has variety of AD at home and is arranging first floor set-up  Hopes to d/c CBI and d/c soon if labs are OK  Appears appropriate for d/c home  Recommend home PT serivces to assist with new set-up/mobility needs  Will see fo 1 f/u visit for lunch amb distance when cbi d/c'd  Barriers to Discharge:  (medical clearance)        PT Discharge Recommendation: Home with home health rehabilitation          See flowsheet documentation for full assessment

## 2022-02-16 NOTE — PHYSICAL THERAPY NOTE
PT eval   02/16/22 1037   PT Last Visit   PT Visit Date 02/16/22   Note Type   Note type Evaluation   Pain Assessment   Pain Assessment Tool 0-10   Pain Score No Pain   Restrictions/Precautions   Weight Bearing Precautions Per Order No   Other Precautions   (3way CBI catherter with pink urine)   Home Living   Type of Home House   Home Layout Two level; Able to live on main level with bedroom/bathroom  (remodel in process for 1st floor set-up)   Home Equipment Walker;Cane;Wheelchair-manual   Prior Function   Level of [place] Needs assistance with IADLs; Independent with ADLs and functional mobility   Lives With Spouse   Receives Help From Family   ADL Assistance Independent   IADLs Needs assistance   Falls in the last 6 months 1 to 4  (slipped onto knees with assist to get up)   Vocational Retired   [employer]   Additional Pertinent History Pt adm with hematuria; PMH +prostate ca, MALT lymphoma,osseous mets,undergoes chemo, reported elevated HR with escalation with activity,   Family/Caregiver Present No   Cognition   Overall Cognitive Status WFL   Arousal/Participation Alert   Orientation Level Oriented X4   Memory Within functional limits   Following Commands Follows all commands and directions without difficulty   Subjective   Subjective Pt states hoping to get CBI d'c'd  Agrees to mobilize   RUE Assessment   RUE Assessment WFL   LUE Assessment   LUE Assessment WFL   RLE Assessment   RLE Assessment WFL   LLE Assessment   LLE Assessment WFL   Coordination   Movements are Fluid and Coordinated 1   Sensation WFL   Proprioception   RLE Proprioception Grossly intact   LLE Proprioception Grossly Intact   Bed Mobility   Rolling L 5  Supervision   Supine to Sit 4  Minimal assistance   Additional items Assist x 1;Verbal cues; Increased time required   Transfers   Sit to Stand 4  Minimal assistance   Additional items Assist x 1; Increased time required;Verbal cues  (bed elevated)   Stand to Sit 5  Supervision   Stand pivot 5  Supervision   Additional items Assist x 1; Armrests; Increased time required;Verbal cues  (with rw)   Ambulation/Elevation   Gait pattern Forward Flexion;Narrow JITENDRA;WNL   Gait Assistance 4  Minimal assist   Additional items Assist x 1;Verbal cues; Tactile cues  (assist for lines)   Assistive Device Rolling walker   Distance 5'   Balance   Static Sitting Fair +   Dynamic Sitting Fair +   Static Standing Fair +   Dynamic Standing Fair +   Ambulatory Fair +   Endurance Deficit   Endurance Deficit No   Activity Tolerance   Activity Tolerance Patient tolerated treatment well   Medical Staff Made Aware  [facility]   Nurse Made Aware VIKASH V[place]   Assessment   Prognosis Good   Problem List Decreased mobility; Decreased endurance   Assessment Pt able to mobilize with rw oob to chair with sba  Pt has variety of AD at home and is arranging first floor set-up  Hopes to d/c CBI and d/c soon if labs are OK  Appears appropriate for d/c home  Recommend home PT serivces to assist with new set-up/mobility needs  Will see fo 1 f/u visit for lunch amb distance when cbi d/c'd   Barriers to Discharge   (medical clearance)   Goals   Patient Goals go home   STG Expiration Date 02/18/22   Short Term Goal #1 1) safe ind amb with rw 25' level   Plan   Treatment/Interventions Gait training; Endurance training   PT Frequency 1-2x/wk   Recommendation   PT Discharge Recommendation Home with home health rehabilitation   AM-PAC Basic Mobility Inpatient   Turning in Bed Without Bedrails 3   Lying on Back to Sitting on Edge of Flat Bed 3   Moving Bed to Chair 3   Standing Up From Chair 3   Walk in Room 3   Climb 3-5 Stairs 3   Basic Mobility Inpatient Raw Score 18   Basic Mobility Standardized Score 41 05   Highest Level Of Mobility   -[facility] Goal 6: Walk 10 steps or more   -[facility] Highest Level of Mobility 4: Move to chair/commode   Modified Dilip Scale   Modified Dilip Scale 3   End of Consult   Patient Position at End of Consult Bedside chair; All needs within reach   [place] Cheryl, PT

## 2022-02-16 NOTE — ASSESSMENT & PLAN NOTE
· Urine dipstick with positive nitrite but negative leukocytes   · Will restart cipro per patient's oncologist, Dr Shweta Loja, guidance   · Urine culture no growth

## 2022-02-16 NOTE — PROGRESS NOTES
[place]  Progress Note [facility] 1960, 57 y o  male MRN: [id number]  Unit/Bed#: -01 Encounter: [id number]  Primary Care Provider: Tay Acevedo MD   Date and time admitted to hospital: 2/13/2022  3:38 PM    * Hematuria  Assessment & Plan  · Hematuria  - able to urinate and passing some clots, however he then developed urinary retention at 0800 so presented to the ED   · Appears improved but still present  · Rate decreased per urology  · Considering manual irrigation  ·  thrombocytopenia contributory - transfused platelets as required, we'll transfuse again 2/15  · Await CBC - may require more transfusion support    Tachycardia  Assessment & Plan  · Noted on VS and auscultation  · Related to severe anemia  · On chart review - pt tachycardiac 120-130s since 1/18 at transfusion therapy, however pt states ongoing for months especially with any small amount of activity   · Did have PE in Sept 2021 and had to stop anticoagulation 2 months ago due to pancytopenia   · 09/21 CT PE: "Right middle lobe, lingular, and left upper lobe segmental pulmonary emboli are noted  RV LV ratio is approximately 0 9, this is the cut off for RV dysfunction/right heart strain"  · ECHO 09/21: G1DD of LV  RV with moderate dilation and RV pressure overload     · Obtain STAT CT PE study - negative  · Echo - EF preserved   · Monitor on  tele    Cystitis  Assessment & Plan  · Urine dipstick with positive nitrite but negative leukocytes   · Will restart cipro per patient's oncologist, Dr Elma Salazar, guidance   · Urine culture no growth    Pancytopenia ([address] )  Assessment & Plan  · Platelets 38R on admission with active bleeding (hematuria)  · 1U platelets ordered in ED per guidance from patient's oncologist, Dr Elma Salazar   · 1 unit to be given again 2/15    Transfused additional unit PRBC 2/14 with good response    · WBC 1 9  · Cipro restarted per oncologist guidance by ED (pt had just finished 10 day oral cipro for infection prophylaxis last night)   - for prophylaxis    Osseous metastasis (HCC)  Assessment & Plan  · Diffuse osseous mets  · Had severe symptomatic disease of the lumbosacral spine so was treated with palliative radiation therapy of L2-S2 with last dose on 7/20/2021 with improvement in pain   · Pain management - PDMP reviewed:  · oxycontin ER 10mg, 60 pills, last fill 12/2 (uses PRN as first line)  · Oxycodone 10mg, 120 pills, last fill 12/2 (PRN as breakthrough)    Prostate cancer ([facility] [address] )  Assessment & Plan  · Metastatic to bone (see below), liver, b/l adrenals  · CT 1/22: "Improved mediastinal and hilar adenopathy  Unchanged lingular nodule  Worsening hepatic metastatic disease  New right adrenal metastatic nodule  Unchanged left adrenal metastatic disease  New upper abdominal retroperitoneal adenopathy  Slightly worse moderate to severe left hydroureteronephrosis, with increased irregular mass involving the left side of the urinary bladder and left seminal vesicle  Increased adenopathy along the left pelvic sidewall  Diffuse widespread osseous metastatic disease  · EUS lymphadenopathy biopsy 9/21: "metastatic carcinoma compatible with prostatic primary "  · Follows with Dr Ld Betancourt outpatient - ED spoke with her on admission   · Undergoing chemotherapy - last 2/4/22    MALT lymphoma ([facility] [address] )  Assessment & Plan  · Hx of MALT lymphoma 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 s/p triple abx course for H  Pylori at that time and then found to have progressive disease with second gastric lesions with resolution on EGD from 9/21       VTE  Prophylaxis:   Pharmacologic: in place    Patient Centered Rounds: I have performed bedside rounds with nursing staff today      Discussions with Specialists or Other Care Team Provider: case management    Education and Discussions with Family / Patient: pt      Current Length of Stay: 3 day(s)    Current Patient Status: Inpatient        Code Status: Level 1 - Full Code      Subjective:   Hematuria improved today   Lighter in color  Patient without other complaints  Pain well controlled    Patient is seen and examined at bedside  All other ROS are negative  Objective:     Vitals:   Temp (24hrs), Av 4 °F (36 9 °C), Min:98 1 °F (36 7 °C), Max:98 7 °F (37 1 °C)    Temp:  [98 1 °F (36 7 °C)-98 7 °F (37 1 °C)] 98 1 °F (36 7 °C)  HR:  [123-131] 123  Resp:  [18] 18  BP: (101-102)/(68-70) 101/68  SpO2:  [97 %] 97 %  Body mass index is 21 52 kg/m²  Input and Output Summary (last 24 hours): Intake/Output Summary (Last 24 hours) at 2022 0958  Last data filed at 2022 0200  Gross per 24 hour   Intake 257 17 ml   Output 1650 ml   Net -1392 83 ml       Physical Exam:       GEN: No acute distress, comfortable  HEEENT: No JVD, PERRLA, no scleral icterus  RESP: Lungs clear to auscultation bilaterally  CV: RRR, +s1/s2   ABD: SOFT NON TENDER, POSITIVE BOWEL SOUNDS, NO DISTENTION  PSYCH: CALM  Gu: hematuria, ferris ,CBI  - lighter red   NEURO: A X O X 3, NO FOCAL DEFICITS  SKIN: NO RASH  EXTREM: NO EDEMA    Additional Data:     Labs:    Results from last 7 days   Lab Units 02/15/22  0449   WBC Thousand/uL 1 90*   HEMOGLOBIN g/dL 9 1*   HEMATOCRIT % 28 7*   PLATELETS Thousands/uL 39*   BANDS PCT % 3   LYMPHO PCT % 42   MONO PCT % 13*   EOS PCT % 0     Results from last 7 days   Lab Units 22  0430   SODIUM mmol/L 138   POTASSIUM mmol/L 4 1   CHLORIDE mmol/L 105   CO2 mmol/L 24   BUN mg/dL 14   CREATININE mg/dL 0 70   ANION GAP mmol/L 9   CALCIUM mg/dL 6 6*   ALBUMIN g/dL 2 2*   TOTAL BILIRUBIN mg/dL 0 30   ALK PHOS U/L 384*   ALT U/L 14   AST U/L 72*   GLUCOSE RANDOM mg/dL 82                           * I Have Reviewed All Lab Data Listed Above  Imaging:     Results for orders placed during the hospital encounter of 09/15/21    XR chest portable    Narrative  CHEST    INDICATION:   Hypoxia      COMPARISON:  CT chest abdomen pelvis 2021    EXAM PERFORMED/VIEWS:  XR CHEST PORTABLE      FINDINGS:    Cardiomediastinal silhouette appears unremarkable  The lungs are clear  No pneumothorax or pleural effusion  Osseous structures appear within normal limits for patient age  Impression  No acute cardiopulmonary disease  Workstation performed: [id number]    No results found for this or any previous visit        *I have reviewed all imaging reports listed above      Recent Cultures (last 7 days):     Results from last 7 days   Lab Units 02/13/22  1810   URINE CULTURE  No Growth <1000 cfu/mL       Last 24 Hours Medication List:   Current Facility-Administered Medications   Medication Dose Route Frequency Provider Last Rate    acetaminophen  650 mg Oral Q6H PRN Sury Staff, PA-C      vitamin C  1,000 mg Oral Daily Sury Staff, PA-C      calcium carbonate  1,000 mg Oral Daily PRN Sury Staff, PA-C      ciprofloxacin  400 mg Intravenous Q12H Sury Staff, PA-C 400 mg (02/15/22 2045)    famotidine  20 mg Oral BID Sury Staff, PA-C      gabapentin  600 mg Oral TID Sury Staff, PA-C      glycerin-hypromellose-  1 drop Both Eyes Q4H PRN Sury Staff, PA-C      melatonin  6 mg Oral HS Sury Staff, PA-C      ondansetron  4 mg Oral Q6H PRN Sury Staff, PA-C      oxyCODONE  10 mg Oral Q12H PRN Sury Staff, PA-C      oxyCODONE  5 mg Oral Q6H PRN Sury Staff, PA-C      pantoprazole  40 mg Oral Early Morning Sury Staff, PA-C      patient supplied medication  1 each Apply externally TID Benoit Rodney MD      polyethylene glycol  17 g Oral Daily PRN Sury Staff, PA-C      saccharomyces boulardii  250 mg Oral BID Sury Staff, PA-C      senna  1 tablet Oral HS PRN Sury Staff, PA-C      sodium chloride  1 g Oral BID Sury Staff, PA-C          Today, Patient Was Seen By: Benoit Rodney MD    ** Please Note: Dictation voice to text software may have been used in the creation of this document   **

## 2022-02-16 NOTE — ASSESSMENT & PLAN NOTE
· Noted on VS and auscultation  · Related to severe anemia  · On chart review - pt tachycardiac 120-130s since 1/18 at transfusion therapy, however pt states ongoing for months especially with any small amount of activity   · Did have PE in Sept 2021 and had to stop anticoagulation 2 months ago due to pancytopenia   · 09/21 CT PE: "Right middle lobe, lingular, and left upper lobe segmental pulmonary emboli are noted  RV LV ratio is approximately 0 9, this is the cut off for RV dysfunction/right heart strain"  · ECHO 09/21: G1DD of LV  RV with moderate dilation and RV pressure overload     · Obtain STAT CT PE study - negative  · Echo - EF preserved   · Monitor on  tele

## 2022-02-16 NOTE — ASSESSMENT & PLAN NOTE
· Platelets 83K on admission with active bleeding (hematuria)  · 1U platelets ordered in ED per guidance from patient's oncologist, Dr Trinidad Hyde   · 1 unit to be given again 2/15    Transfused additional unit PRBC 2/14 with good response    · WBC 1 9  · Cipro restarted per oncologist guidance by ED (pt had just finished 10 day oral cipro for infection prophylaxis last night)   - for prophylaxis

## 2022-02-16 NOTE — PLAN OF CARE
Problem: Potential for Falls  Goal: Patient will remain free of falls  Description: INTERVENTIONS:  - Educate patient/family on patient safety including physical limitations  - Instruct patient to call for assistance with activity   - Consult OT/PT to assist with strengthening/mobility   - Keep Call bell within reach  - Keep bed low and locked with side rails adjusted as appropriate  - Keep care items and personal belongings within reach  - Initiate and maintain comfort rounds  - Make Fall Risk Sign visible to staff  - Offer Toileting every 3 Hours, in advance of need  - Initiate/Maintain alarm  - Obtain necessary fall risk management equipment:   - Apply yellow socks and bracelet for high fall risk patients  - Consider moving patient to room near nurses station  2/16/2022 1623 by Alvy Peabody, RN  Outcome: Progressing  2/16/2022 1623 by Alvy Peabody, RN  Outcome: Progressing     Problem: MOBILITY - ADULT  Goal: Maintain or return to baseline ADL function  Description: INTERVENTIONS:  -  Assess patient's ability to carry out ADLs; assess patient's baseline for ADL function and identify physical deficits which impact ability to perform ADLs (bathing, care of mouth/teeth, toileting, grooming, dressing, etc )  - Assess/evaluate cause of self-care deficits   - Assess range of motion  - Assess patient's mobility; develop plan if impaired  - Assess patient's need for assistive devices and provide as appropriate  - Encourage maximum independence but intervene and supervise when necessary  - Involve family in performance of ADLs  - Assess for home care needs following discharge   - Consider OT consult to assist with ADL evaluation and planning for discharge  - Provide patient education as appropriate  2/16/2022 1623 by Alvy Peabody, RN  Outcome: Progressing  2/16/2022 1623 by Alvy Peabody, RN  Outcome: Progressing  Goal: Maintains/Returns to pre admission functional level  Description: INTERVENTIONS:  - Perform BMAT or MOVE assessment daily    - Set and communicate daily mobility goal to care team and patient/family/caregiver  - Collaborate with rehabilitation services on mobility goals if consulted  - Perform Range of Motion 3 times a day  - Reposition patient every 3 hours  - Dangle patient 3 times a day  - Stand patient 3 times a day  - Ambulate patient 3 times a day  - Out of bed to chair 3 times a day   - Out of bed for meals 3 times a day  - Out of bed for toileting  - Record patient progress and toleration of activity level   2/16/2022 1623 by Андрей Long RN  Outcome: Progressing  2/16/2022 1623 by Андрей Long RN  Outcome: Progressing     Problem: Nutrition/Hydration-ADULT  Goal: Nutrient/Hydration intake appropriate for improving, restoring or maintaining nutritional needs  Description: Monitor and assess patient's nutrition/hydration status for malnutrition  Collaborate with interdisciplinary team and initiate plan and interventions as ordered  Monitor patient's weight and dietary intake as ordered or per policy  Utilize nutrition screening tool and intervene as necessary  Determine patient's food preferences and provide high-protein, high-caloric foods as appropriate       INTERVENTIONS:  - Monitor oral intake, urinary output, labs, and treatment plans  - Assess nutrition and hydration status and recommend course of action  - Evaluate amount of meals eaten  - Assist patient with eating if necessary   - Allow adequate time for meals  - Recommend/ encourage appropriate diets, oral nutritional supplements, and vitamin/mineral supplements  - Order, calculate, and assess calorie counts as needed  - Recommend, monitor, and adjust tube feedings and TPN/PPN based on assessed needs  - Assess need for intravenous fluids  - Provide specific nutrition/hydration education as appropriate  - Include patient/family/caregiver in decisions related to nutrition  2/16/2022 1623 by Tiffany Herbert Emily Middleton RN  Outcome: Progressing  2/16/2022 1623 by Cammy Sinclair RN  Outcome: Progressing

## 2022-02-16 NOTE — ASSESSMENT & PLAN NOTE
· Metastatic to bone (see below), liver, b/l adrenals  · CT 1/22: "Improved mediastinal and hilar adenopathy  Unchanged lingular nodule  Worsening hepatic metastatic disease  New right adrenal metastatic nodule  Unchanged left adrenal metastatic disease  New upper abdominal retroperitoneal adenopathy  Slightly worse moderate to severe left hydroureteronephrosis, with increased irregular mass involving the left side of the urinary bladder and left seminal vesicle  Increased adenopathy along the left pelvic sidewall  Diffuse widespread osseous metastatic disease    · EUS lymphadenopathy biopsy 9/21: "metastatic carcinoma compatible with prostatic primary "  · Follows with Dr Prince Murray outpatient - ED spoke with her on admission   · Undergoing chemotherapy - last 2/4/22

## 2022-02-16 NOTE — PROGRESS NOTES
Progress Note - Cheo Ospina 57 y o  male MRN: [id number]    Unit/Bed#: -01 Encounter: [id number]      Assessment/Plan:-    Assessment/Plan:  Hematuria with retention  - CBI decreased and mproved this am  Urine light pink in bag  No clots  - awaiting cbc  - will need [place] for outpatient voiding trial  - trend hemoglobin  - transfuse as needed     Cystitis  -urine with nitrates but negative leukocytes  - started on Cipro per oncologist  -Lorenzo Jose cultures no growth till date     Anemia  -hemoglobin 6 2 on admission, pending this am  - continue to trend  - transfuse as needed     Prostate cancer with metastasis  - bone, liver and adrenals  - followed by   [facility]  - chemotherapy, last dose 02/04/2022     Pancytopenia  -follow labs    Subjective:   Doing well this am     Objective:     Vitals: Blood pressure 101/68, pulse (!) 123, temperature 98 1 °F (36 7 °C), resp  rate 18, height 5' 10" (1 778 m), weight 68 kg (150 lb), SpO2 97 %  ,Body mass index is 21 52 kg/m²  Intake/Output Summary (Last 24 hours) at 2/16/2022 0816  Last data filed at 2/16/2022 0200  Gross per 24 hour   Intake 257 17 ml   Output 1650 ml   Net -1392 83 ml       Physical Exam:  Awake, alert, oriented*3  NAD  NC, AT  EOMI, perrl, nose normal externally, MMM  CTAB, RRR  Abdomen soft, nontender  Dripping urine looks pink    Invasive Devices  Report    Central Venous Catheter Line            Port A Cath 11/04/21 Right Chest 103 days          Drain            Continuous Bladder Irrigation Three-way 2 days    Urethral Catheter Three way 18 Fr  2 days                Lab, Imaging and other studies: I have personally reviewed pertinent reports      VTE Pharmacologic Prophylaxis: Heparin  VTE Mechanical Prophylaxis: sequential compression device

## 2022-02-17 NOTE — ASSESSMENT & PLAN NOTE
· Metastatic to bone (see below), liver, b/l adrenals  · CT 1/22: "Improved mediastinal and hilar adenopathy  Unchanged lingular nodule  Worsening hepatic metastatic disease  New right adrenal metastatic nodule  Unchanged left adrenal metastatic disease  New upper abdominal retroperitoneal adenopathy  Slightly worse moderate to severe left hydroureteronephrosis, with increased irregular mass involving the left side of the urinary bladder and left seminal vesicle  Increased adenopathy along the left pelvic sidewall  Diffuse widespread osseous metastatic disease    · EUS lymphadenopathy biopsy 9/21: "metastatic carcinoma compatible with prostatic primary "  · Follows with Dr Rg Rubin outpatient - ED spoke with her on admission   · Undergoing chemotherapy - last 2/4/22

## 2022-02-17 NOTE — ASSESSMENT & PLAN NOTE
· Noted on VS and auscultation  · Related to severe anemia, malignancy, longstanding dx  · On chart review - pt tachycardiac 120-130s since 1/18 at transfusion therapy, however pt states ongoing for months especially with any small amount of activity   · Did have PE in Sept 2021 and had to stop anticoagulation 2 months ago due to pancytopenia   · 09/21 CT PE: "Right middle lobe, lingular, and left upper lobe segmental pulmonary emboli are noted  RV LV ratio is approximately 0 9, this is the cut off for RV dysfunction/right heart strain"  · ECHO 09/21: G1DD of LV  RV with moderate dilation and RV pressure overload     · Obtain STAT CT PE study - negative  · Echo - EF preserved   · Monitor on  tele

## 2022-02-17 NOTE — PROGRESS NOTES
Progress Note - Urology  Excell Ryan 57 y o  male MRN: [id number]  Unit/Bed#: -01 Encounter: [id number]    Assessment/Plan:  Hematuria with retention  - CBI discontinued  - Urine is clear  - continue ferris for outpatient follow up  - trend Hgb  - can discharge home if urine stay clear to punch color, hand irrigation at home q4hrs     Cystitis  -urine with nitrates but negative leukocytes  - started on Cipro per oncologist  -Gertrude Epley cultures no growth till date     Anemia  -hemoglobin 6 2 on admission, pending this am  - continue to trend  - transfuse as needed     Prostate cancer with metastasis  - bone, liver and adrenals  - followed by Dr Noa Acevedo  - chemotherapy, last dose 02/04/2022      Subjective/Objective   Chief Complaint: unable to urinate    Subjective: doing well , eager to go home    Objective:     Blood pressure 100/69, pulse (!) 131, temperature 98 2 °F (36 8 °C), resp  rate 18, height 5' 10" (1 778 m), weight 68 kg (150 lb), SpO2 97 %  ,Body mass index is 21 52 kg/m²        Intake/Output Summary (Last 24 hours) at 2/17/2022 0853  Last data filed at 2/17/2022 0139  Gross per 24 hour   Intake 222 ml   Output 5950 ml   Net -5728 ml       Invasive Devices  Report    Central Venous Catheter Line            Port A Cath 11/04/21 Right Chest 105 days          Drain            Continuous Bladder Irrigation Three-way 3 days    Urethral Catheter Three way 18 Fr  3 days                Physical Exam: /69   Pulse (!) 131   Temp 98 2 °F (36 8 °C)   Resp 18   Ht 5' 10" (1 778 m)   Wt 68 kg (150 lb)   SpO2 97%   BMI 21 52 kg/m²   General appearance: alert and oriented, in no acute distress  Lungs: clear to auscultation bilaterally  Heart: regular rate and rhythm, S1, S2 normal, no murmur, click, rub or gallop  Abdomen: soft, non-tender; bowel sounds normal; no masses,  no organomegaly  Extremities: extremities normal, warm and well-perfused; no cyanosis, clubbing, or edema  Ferris with clear urine    Lab, Imaging and other studies:  CBC:   Lab Results   Component Value Date    WBC 2 71 (L) 02/17/2022    HGB 8 4 (L) 02/17/2022    HCT 27 6 (L) 02/17/2022    MCV 92 02/17/2022    PLT 33 (LL) 02/17/2022    MCH 27 9 02/17/2022    MCHC 30 4 (L) 02/17/2022    RDW 15 4 (H) 02/17/2022    MPV 9 7 02/17/2022   , CMP:   Lab Results   Component Value Date    SODIUM 137 02/17/2022    K 3 6 02/17/2022     02/17/2022    CO2 23 02/17/2022    BUN 17 02/17/2022    CREATININE 0 63 02/17/2022    CALCIUM 6 0 (L) 02/17/2022    AST 65 (H) 02/17/2022    ALT 9 (L) 02/17/2022    ALKPHOS 379 (H) 02/17/2022    EGFR 105 02/17/2022     VTE Pharmacologic Prophylaxis: Sequential compression device (Venodyne)   VTE Mechanical Prophylaxis: sequential compression device

## 2022-02-17 NOTE — ASSESSMENT & PLAN NOTE
· Platelets 29H on admission with active bleeding (hematuria)  · 1U platelets ordered in ED per guidance from patient's oncologist, Dr Isadora Rios   · 1 unit to be given again 2/15    Transfused additional unit PRBC 2/14 with good response    · WBC 1 9  · Cipro restarted per oncologist guidance by ED (pt had just finished 10 day oral cipro for infection prophylaxis last night)   - for prophylaxis

## 2022-02-17 NOTE — ASSESSMENT & PLAN NOTE
· Hematuria  - able to urinate and passing some clots, however he then developed urinary retention at 0800 so presented to the ED   · Completed CBI- thrombocytopenia contributory - transfused platelets as required, we'll transfuse again 2/15  · Resolved - plt 33k on discharge  Home with ferris outpt uro f/u

## 2022-02-17 NOTE — ASSESSMENT & PLAN NOTE
· Urine dipstick with positive nitrite but negative leukocytes   · Will restart cipro per patient's oncologist, Dr Emiliana Foster, guidance   · Urine culture no growth - cap at dc

## 2022-02-17 NOTE — QUICK NOTE
Patient's urine clear, pale yellow  No visible blood  Will stop CBI and continue manual irrigation  Patient comfortable with plan       Carlyn García PA-C

## 2022-02-17 NOTE — DISCHARGE SUMMARY
[place]  Discharge- Jim Manning 1960, 57 y o  male MRN: [id number]  Unit/Bed#: -01 Encounter: [id number]  Primary Care Provider: Jason Nj MD   Date and time admitted to hospital: 2/13/2022  3:38 PM    * Hematuria  Assessment & Plan  · Hematuria  - able to urinate and passing some clots, however he then developed urinary retention at 0800 so presented to the ED   · Completed CBI- thrombocytopenia contributory - transfused platelets as required, we'll transfuse again 2/15  · Resolved - plt 33k on discharge  Home with ferris outpt uro f/u     Tachycardia  Assessment & Plan  · Noted on VS and auscultation  · Related to severe anemia, malignancy, longstanding dx  · On chart review - pt tachycardiac 120-130s since 1/18 at transfusion therapy, however pt states ongoing for months especially with any small amount of activity   · Did have PE in Sept 2021 and had to stop anticoagulation 2 months ago due to pancytopenia   · 09/21 CT PE: "Right middle lobe, lingular, and left upper lobe segmental pulmonary emboli are noted  RV LV ratio is approximately 0 9, this is the cut off for RV dysfunction/right heart strain"  · ECHO 09/21: G1DD of LV  RV with moderate dilation and RV pressure overload     · Obtain STAT CT PE study - negative  · Echo - EF preserved   · Monitor on  tele    Cystitis  Assessment & Plan  · Urine dipstick with positive nitrite but negative leukocytes   · Will restart cipro per patient's oncologist, Dr Erica Ziegler, guidance   · Urine culture no growth - cap at dc     Pancytopenia [facility])  Assessment & Plan  · Platelets 32N on admission with active bleeding (hematuria)  · 1U platelets ordered in ED per guidance from patient's oncologist, Dr Erica Ziegler   · 1 unit to be given again 2/15    Transfused additional unit PRBC 2/14 with good response    · WBC 1 9  · Cipro restarted per oncologist guidance by ED (pt had just finished 10 day oral cipro for infection prophylaxis last night)   - for prophylaxis    Osseous metastasis (HCC)  Assessment & Plan  · Diffuse osseous mets  · Had severe symptomatic disease of the lumbosacral spine so was treated with palliative radiation therapy of L2-S2 with last dose on 7/20/2021 with improvement in pain   · Pain management - PDMP reviewed:  · oxycontin ER 10mg, 60 pills, last fill 12/2 (uses PRN as first line)  · Oxycodone 10mg, 120 pills, last fill 12/2 (PRN as breakthrough)    Prostate cancer ([facility] [address] )  Assessment & Plan  · Metastatic to bone (see below), liver, b/l adrenals  · CT 1/22: "Improved mediastinal and hilar adenopathy  Unchanged lingular nodule  Worsening hepatic metastatic disease  New right adrenal metastatic nodule  Unchanged left adrenal metastatic disease  New upper abdominal retroperitoneal adenopathy  Slightly worse moderate to severe left hydroureteronephrosis, with increased irregular mass involving the left side of the urinary bladder and left seminal vesicle  Increased adenopathy along the left pelvic sidewall  Diffuse widespread osseous metastatic disease  · EUS lymphadenopathy biopsy 9/21: "metastatic carcinoma compatible with prostatic primary "  · Follows with Dr Veria Saint outpatient - ED spoke with her on admission   · Undergoing chemotherapy - last 2/4/22    MALT lymphoma ([facility] [address] )  Assessment & Plan  · Hx of MALT lymphoma 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 s/p triple abx course for H  Pylori at that time and then found to have progressive disease with second gastric lesions with resolution on EGD from 9/21      severe protein calorie malnutrition     Pancytopenia likely associated with chemotherapy     Hospital Course:     Thuy Luna is a 57 y o  male patient who originally presented to the hospital on   Admission Orders (From admission, onward)     Ordered        02/13/22 [address]  Once                     due to hematuria    This hematuria was likely due to significant thrombocytopenia in the setting of his active malignancy and chemotherapy  He received transfusion support for both platelets and packed red blood cells  He had acceptable and stable anemia and thrombocytopenia at the time of discharge  His hematuria did resolve with CBI and was likely exacerbated by thrombocytopenia  He will follow-up with urology and oncology at the time discharge  Please see above list of diagnoses and related plan for additional information  Physical Exam:    GEN: No acute distress, comfortable  HEEENT: No JVD, PERRLA, no scleral icterus  RESP: Lungs clear to auscultation bilaterally  CV: tachy  ABD: SOFT NON TENDER, POSITIVE BOWEL SOUNDS, NO DISTENTION  PSYCH: CALM  Gu: ferris, clear yellow urine  NEURO: A X O X 3, NO FOCAL DEFICITS  SKIN: NO RASH  EXTREM: NO EDEMA      Condition at Discharge:  good      Discharge instructions/Information to patient and family:   See after visit summary for information provided to patient and family  Provisions for Follow-Up Care:  See after visit summary for information related to follow-up care and any pertinent home health orders  Disposition:     Home with VNA Services (Reminder: Complete face to face encounter)       Discharge Statement:  I spent 37 minutes discharging the patient  This time was spent on the day of discharge  I had direct contact with the patient on the day of discharge  Greater than 50% of the total time was spent examining patient, answering all patient questions, arranging and discussing plan of care with patient as well as directly providing post-discharge instructions  Additional time then spent on discharge activities  Discharge Medications:  See after visit summary for reconciled discharge medications provided to patient and family        ** Please Note: This note has been constructed using a voice recognition system **

## 2022-02-18 NOTE — UTILIZATION REVIEW
Notification of Discharge   This is a Notification of Discharge from our facility [address]  Please be advised that this patient has been discharge from our facility  Below you will find the admission and discharge date and time including the patients disposition  UTILIZATION REVIEW CONTACT:  Arnold Shukla  Utilization   Network Utilization Review Department  Phone: [phone] carefully listen to the prompts  All voicemails are confidential   Email: [email or web address]     PHYSICIAN ADVISORY SERVICES:  FOR [id number] REVIEW - MEDICAL NECESSITY DENIAL  Phone: [phone]  Fax: [phone]  Email: [email or web address]     PRESENTATION DATE: 2/13/2022  3:38 PM  OBERVATION ADMISSION DATE:   INPATIENT ADMISSION DATE: 2/13/22  7:22 PM   DISCHARGE DATE: 2/17/2022  1:28 PM  DISPOSITION: Home with [facility] with Home Health Care      IMPORTANT INFORMATION:  Send all requests for admission clinical reviews, approved or denied determinations and any other requests to dedicated fax number below belonging to the campus where the patient is receiving treatment   List of dedicated fax numbers:  [address] DENIALS (Administrative/Medical Necessity) [phone]   [address] (Maternity/NICU/Pediatrics) [phone]   [facility] [phone]   130 [facility] [phone]   [address] [place] [address] [phone]   [address] [phone]   [facility]  [phone]   2205 [facility], [facility]  2401 [facility] 1000 [facility] [phone]

## 2022-02-21 NOTE — TELEPHONE ENCOUNTER
Patient is a 59-year-old male with history of prostate cancer currently undergoing chemo therapy  Presented to [address] [place] [address] due to gross hematuria most likely from thrombocytopenia from undergoing chemotherapy  He was discharged with urethral Milan catheter  Please assure that he has outpatient follow-up for Milan catheter removal within a week  He follows with a urologist in [place]    May be easier to come to our office for Milan catheter removal/void trial

## 2022-02-21 NOTE — TELEPHONE ENCOUNTER
Patient is tentatively scheduled for void trial in [place] as [facility] has no openings on Wednesday - only day nurse is in [facility]   He is Scheduled for 8:30 and 2:30 on Thursday at the [facility]

## 2022-02-22 NOTE — TELEPHONE ENCOUNTER
Nedra Catherine and asked if he still had his catheter and he states he follows up with Dr Steven White  and had catheter removed yesterday

## 2022-03-01 NOTE — PROGRESS NOTES
Platelets ordered for patient today and no pre meds ordered  Dr Jas Myles office contacted to verify no pre med order  Per Dr Benji Diggs no pre meds required

## 2022-03-03 PROBLEM — N17.9 ACUTE KIDNEY INJURY (HCC): Status: ACTIVE | Noted: 2022-01-01

## 2022-03-03 NOTE — ASSESSMENT & PLAN NOTE
 Metastatic to bone (see below), liver, b/l adrenals   CT 1/22: "Improved mediastinal and hilar adenopathy  Unchanged lingular nodule  Worsening hepatic metastatic disease  New right adrenal metastatic nodule  Unchanged left adrenal metastatic disease  New upper abdominal retroperitoneal adenopathy  Slightly worse moderate to severe left hydroureteronephrosis, with increased irregular mass involving the left side of the urinary bladder and left seminal vesicle  Increased adenopathy along the left pelvic sidewall  Diffuse widespread osseous metastatic disease     EUS lymphadenopathy biopsy 9/21: "metastatic carcinoma compatible with prostatic primary "   Follows with Dr Veria Saint outpatient    Undergoing chemotherapy

## 2022-03-03 NOTE — ASSESSMENT & PLAN NOTE
Associated with chemotherapy and active malignancy  Plt 4 units  Transfuse platelets  Recheck cbc tonight  Bedrest  Mechanical only DVT PPX   Anemia also likely to worsen with IVF given  Transfuse 1 unit PRBC

## 2022-03-03 NOTE — ASSESSMENT & PLAN NOTE
 Hx of MALT lymphoma 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 s/p triple abx course for H  Pylori at that time and then found to have progressive disease with second gastric lesions with resolution on EGD from 9/21

## 2022-03-03 NOTE — H&P
[place]  H&P- Jimbo Cashing 1960, 57 y o  male MRN: [id number]  Unit/Bed#: ED 08 Encounter: [id number]  Primary Care Provider: Osiris Sanchez MD   Date and time admitted to hospital: 3/3/2022  4:13 PM    * Pancytopenia [facility])  Assessment & Plan  Associated with chemotherapy and active malignancy  Plt 4 units  Transfuse platelets  Recheck cbc tonight  Bedrest  Mechanical only DVT PPX   Anemia also likely to worsen with IVF given  Transfuse 1 unit PRBC       Tachycardia  Assessment & Plan   Noted on VS and auscultation   Related to severe anemia, malignancy, longstanding dx   On chart review - pt tachycardiac 120-130s since 1/18 at transfusion therapy, however pt states ongoing for months especially with any small amount of activity    Echo was unremarkable last admission    Prostate cancer [facility])  Assessment & Plan   Metastatic to bone (see below), liver, b/l adrenals   CT 1/22: "Improved mediastinal and hilar adenopathy  Unchanged lingular nodule  Worsening hepatic metastatic disease  New right adrenal metastatic nodule  Unchanged left adrenal metastatic disease  New upper abdominal retroperitoneal adenopathy  Slightly worse moderate to severe left hydroureteronephrosis, with increased irregular mass involving the left side of the urinary bladder and left seminal vesicle  Increased adenopathy along the left pelvic sidewall  Diffuse widespread osseous metastatic disease     EUS lymphadenopathy biopsy 9/21: "metastatic carcinoma compatible with prostatic primary "   Follows with Dr Sindi Ortiz outpatient    Undergoing chemotherapy     MALT lymphoma [facility])  Assessment & Plan   Hx of MALT lymphoma 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 s/p triple abx course for H  Pylori at that time and then found to have progressive disease with second gastric lesions with resolution on EGD from 9/21        Chief Complaint   Patient presents with    Abnormal Lab     pt reports abnormal platelet count from past blood work; patient reports fatigue and weakness that started weeks ago         HPI:  Hollis Li is a 57 y o  male who presents with abnormal blood work  He was referred here by his oncologist  Platelet count was 5164 on outpatient blood work  He had recent transfusions as well  Patient denies fevers chills  He denies abdominal pain or decreased oral intake at this time  He denies any destiny bleeding  He is known to me from the past hospitalization of which he had refractory hematuria  He is currently undergoing chemotherapy for metastatic prostate cancer  He denies any chest pain at this time related to his long-standing tachycardia  Historical Information   Past Medical History:   Diagnosis Date    Cancer ([facility] [address] )     Stomach    Lymphoma ([facility] [address] ) 05/2018    Non Hodgkin's lymphoma ([facility] [address] [address] )     Prostate cancer ([facility] [address] ) 2020    Shingles     Thoracic aortic aneurysm ([facility] [address] )     PATIENT DENIES     Past Surgical History:   Procedure Laterality Date    APPENDECTOMY      COLONOSCOPY      HERNIA REPAIR      B/L hernia repair    IR PORT PLACEMENT  11/4/2021    KNEE ARTHROSCOPY      KNEE ARTHROSCOPY, MEDIAL PATELLO FEMORAL LIGAMENT REPAIR Left     LINEAR ENDOSCOPIC U/S N/A 6/13/2018    Procedure: LINEAR ENDOSCOPIC U/S;  Surgeon: Cris Childs MD;  Location: [facility];   Service: Gastroenterology    DE BIOPSY/EXCISION, LYMPH NODE(S) Left 6/24/2020    Procedure: EXCISION BIOPSY LYMPH NODE SUPRACLAVICULAR;  Surgeon: Olinda Bailey MD;  Location: [facility];  Service: General     Social History   Social History     Substance and Sexual Activity   Alcohol Use Not Currently     Social History     Substance and Sexual Activity   Drug Use No     Social History     Tobacco Use   Smoking Status Never Smoker   Smokeless Tobacco Never Used     Family History   Problem Relation Age of Onset    Melanoma Mother     Prostate cancer Father     Lung cancer Father     Breast cancer Sister     Pancreatic cancer Paternal Grandfather     Hodgkin's lymphoma Sister        Meds/Allergies   Allergies   Allergen Reactions    Granisetron Nausea Only     Weakness/nausea    Tetracycline Other (See Comments)     Increase LFT's    Valtrex [Valacyclovir] Nausea Only       Meds:    Current Facility-Administered Medications:     sodium chloride 0 9 % bolus 1,000 mL, 1,000 mL, Intravenous, Once, Mynor Norwood DO, Last Rate: 1,000 mL/hr at 03/03/22 1743, 1,000 mL at 03/03/22 1743    Current Outpatient Medications:     Ascorbic Acid (vitamin C) 1000 MG tablet, Take 1,000 mg by mouth daily, Disp: , Rfl:     Bacillus Coagulans-Inulin (Probiotic) 1-250 BILLION-MG CAPS, Take by mouth 50 billion, Disp: , Rfl:     cholecalciferol (VITAMIN D3) 1,000 units tablet, Take 500 Units by mouth daily 50,000 1x week, Disp: , Rfl:     dexamethasone (DECADRON) 1 mg tablet, Take 1 mg by mouth 2 (two) times a day with meals, Disp: , Rfl:     famotidine (PEPCID) 20 mg tablet, Take 20 mg by mouth 2 (two) times a day, Disp: , Rfl:     gabapentin (NEURONTIN) 300 mg capsule, Take 600 mg by mouth 3 (three) times a day  , Disp: , Rfl:     magnesium 30 MG tablet, Take 30 mg by mouth in the morning, Disp: , Rfl:     melatonin 1 mg, Take 6 mg by mouth daily at bedtime, Disp: , Rfl:     Nutritional Supplements (PRO-RAJINDER PLUS PO), Take by mouth, Disp: , Rfl:     Omega-3 Fatty Acids (Fish Oil) 1200 MG CAPS, Take by mouth, Disp: , Rfl:     ondansetron (ZOFRAN) 4 mg tablet, Take 4 mg by mouth every 8 (eight) hours as needed for nausea or vomiting, Disp: , Rfl:     oxyCODONE (ROXICODONE) 5 immediate release tablet, Take 10 mg by mouth every 4 (four) hours as needed for moderate pain or severe pain, Disp: , Rfl:     OxyCONTIN 10 MG 12 hr tablet, Take 10 mg by mouth every 12 (twelve) hours as needed  , Disp: , Rfl:     pantoprazole (PROTONIX) 40 mg tablet, Take 40 mg by mouth as needed  , Disp: , Rfl:     RESTASIS 0 05 % ophthalmic emulsion, Administer 1 drop to both eyes every 12 (twelve) hours, Disp: , Rfl:     silver sulfadiazine (SILVADENE,SSD) 1 % cream, Apply topically 2 (two) times a day  , Disp: , Rfl:     sodium chloride 1 g tablet, 1 g 2 (two) times a day  , Disp: , Rfl:     zinc gluconate 50 mg tablet, Take 50 mg by mouth daily, Disp: , Rfl:     zinc oxide 20 % ointment, Apply topically as needed, Disp: , Rfl:     (Not in a hospital admission)        Review of Systems:    A complete and comprehensive 14 point organ system review was performed and all other systems are negative other than stated above in the HPI    Current Vitals:   Blood Pressure: 106/63 (03/03/22 1618)  Pulse: (!) 125 (03/03/22 1616)  Temperature: 99 5 °F (37 5 °C) (03/03/22 1619)  Temp Source: Temporal (03/03/22 1619)  Respirations: 20 (03/03/22 1616)  SpO2: 99 % (03/03/22 1616)  SPO2 RA Rest      ED from 3/3/2022 in  [facility] Emergency Department   SpO2 99 %   SpO2 Activity At Rest   O2 Device None (Room air)   O2 Flow Rate --        No intake or output data in the 24 hours ending 03/03/22 1816  There is no height or weight on file to calculate BMI       Physical Exam:     General: thin appearing male, nad  HEENT: atraumatic, PERRLA, moist mucosa   Neck: Trachea midline, no carotid bruit, no masses  Respiratory: normal chest wall expansion, CTA B, no r/r/w, no rubs  Cardiovascular: RRR, chemoport  Abdomen: Soft, non-tender, non-distended, normal bowel sounds in all quadrants, no hepatosplenomegaly, no tympany  Rectal: deferred  Musculoskeletal: normal ROM in upper and lower extremities  Integumentary: warm, dry, and pink, with no rash, purpura, or petechia  Heme/Lymph: no lymphadenopathy, no bruises  Neurological: Cranial Nerves II-XII grossly intact; no focal deficits in sensation or strength, A  x O x 3  Psychiatric: cooperative with normal mood, affect, and cognition    Lab Results:   CBC:   Lab Results   Component Value Date    WBC 2 51 (L) 03/03/2022    HGB 7 2 (L) 03/03/2022    HCT 24 3 (L) 03/03/2022    MCV 95 03/03/2022    PLT 4 (LL) 03/03/2022    MCH 28 2 03/03/2022    MCHC 29 6 (L) 03/03/2022    RDW 15 8 (H) 03/03/2022    MPV 9 6 03/03/2022    NRBC 0 09/19/2021     CMP:  Lab Results   Component Value Date     03/03/2022    CO2 24 03/03/2022    BUN 28 (H) 03/03/2022    CREATININE 1 15 03/03/2022    CALCIUM 7 3 (L) 03/03/2022    AST 69 (H) 03/03/2022    ALT 22 03/03/2022    ALKPHOS 601 (H) 03/03/2022    EGFR 67 03/03/2022     Lab Results   Component Value Date    TROPONINI <0 02 09/17/2021     Coagulation:   Lab Results   Component Value Date    INR 1 21 (H) 03/03/2022    Urinalysis:  Lab Results   Component Value Date    COLORU Yellow 03/03/2022    CLARITYU Clear 03/03/2022    SPECGRAV 1 010 03/03/2022    PHUR 6 0 03/03/2022    PHUR 7 2 07/07/2020    LEUKOCYTESUR Trace (A) 03/03/2022    NITRITE Negative 03/03/2022    GLUCOSEU Negative 03/03/2022    KETONESU Negative 03/03/2022    BILIRUBINUR Negative 03/03/2022    BLOODU Trace-Intact (A) 03/03/2022      Amylase: No results found for: AMYLASE  Lipase:   Lab Results   Component Value Date    LIPASE 264 04/11/2020        Imaging: CTA chest pe study    Result Date: 2/14/2022  Narrative: CTA - CHEST WITH IV CONTRAST - PULMONARY ANGIOGRAM INDICATION:   ongoing tachycardia, PE in 09/2021 but had to stop anticoagulation 2 months ago due pancytopenia  COMPARISON: CT chest, abdomen, and pelvis dated 1/22/2022 TECHNIQUE: CTA examination of the chest was performed using angiographic technique according to a protocol specifically tailored to evaluate for pulmonary embolism  Axial, sagittal, and coronal 2D reformatted images were created from the source data and  submitted for interpretation  In addition, coronal 3D MIP postprocessing was performed on the acquisition scanner  Radiation dose length product (DLP) for this visit:  350 mGy-cm     This examination, like all CT scans performed in the [address] [place] [address]  [facility], was performed utilizing techniques to minimize radiation dose exposure, including the use of iterative reconstruction and automated exposure control  IV Contrast:  65 mL of iohexol (OMNIPAQUE)  FINDINGS: PULMONARY ARTERIAL TREE:  Some images are suboptimal secondary to respiratory motion which decreases sensitivity for evaluation of peripheral pulmonary emboli, subject to this, no pulmonary embolism is seen  LUNGS:  Linear scarring/atelectasis dependently and in the bilateral lower lung fields  Lungs otherwise appear grossly clear  PLEURA:  Unremarkable  HEART/GREAT VESSELS:  Prominence of the right heart chambers suggesting increased right heart pressures  Mild coronary atherosclerosis  Mild aortic atherosclerosis  No aortic aneurysm  MEDIASTINUM AND LUISA:  Shotty hilar and mediastinal lymph nodes, nonspecific  No dominant mediastinal mass is seen CHEST WALL AND LOWER NECK:   Extensive sclerotic osseous metastatic disease redemonstrated  Right sided Mediport terminates near the cavoatrial junction  VISUALIZED STRUCTURES IN THE UPPER ABDOMEN:  Extensive hepatic metastatic disease redemonstrated these appear to have intervally increased in size, largest lesion in the right hepatic dome measures approximately 5 2 cm in size  OSSEOUS STRUCTURES:  No acute fracture or destructive osseous lesion  Impression: Some images are suboptimal secondary to respiratory motion which decreases sensitivity for evaluation of peripheral pulmonary emboli, subject to this, no pulmonary embolism is seen  Extensive hepatic and osseous metastatic disease as described  Prominent hilar and mediastinal lymph nodes may represent lymphatic metastatic disease as well  Coronary atherosclerosis, and other findings as above   Workstation performed: [id number]     Echo follow up/limited w/ contrast if indicated    Result Date: 2/15/2022  Narrative: Arcenio Pert  Left Ventricle: Left ventricular cavity size is normal  Wall thickness is normal  The left ventricular ejection fraction is 65%  Systolic function is normal  Wall motion is normal  Diastolic function is normal    Right Ventricle: Right ventricular cavity size is moderately dilated  Systolic function is normal    Left Atrium: The atrium is upper normal in size    Right Atrium: The atrium is mildly dilated    Mitral Valve: There is mild regurgitation    Tricuspid Valve: There is mild regurgitation    Aorta: The aortic root is upper normal in size  EKG, Pathology, and Other Studies: I have personally reviewed the results  VTE   Prophylaxis: In place    Code Status: Prior    Anticipated Length of Stay:  Patient will be admitted on an Inpatient basis with an anticipated length of stay of  greater 2 midnights  Counseling / Coordination of Care  Total floor / unit time spent today 51 minutes  Greater than 50% of total time was spent with the patient and / or family counseling and / or coordination of care       Wife updated    "This note has been constructed using a voice recognition system"      Kingsley Cox MD  3/3/2022, 6:16 PM

## 2022-03-03 NOTE — ED PROVIDER NOTES
History  Chief Complaint   Patient presents with    Abnormal Lab     pt reports abnormal platelet count from past blood work; patient reports fatigue and weakness that started weeks ago      This pleasant 50-year-old male with history of metastatic prostate cancer, non-Hodgkin's lymphoma, pancytopenia and remote pulmonary embolism was sent here by his oncologist to found him to be pancytopenic again today  Platelet count is 1225, Hb 7 6, WBC 2 31  He states he was transfused with platelets yesterday  Patient denies any signs of bleeding  Wife states that does not look more pale than usual   He does not feel that he is having increased chest pain, shortness of breath or headache  He states he has chronic tachycardia and that is documented in the prior note  Pulse here is 125 beats per minute  He does not feel short of breath  Pulse ox is 99% on room air  He does not look to be in any distress  He is not taking any blood thinners currently  He has not had recent syncope  He does have generalized weakness  This is ongoing  There has been no recent trauma, hematuria or bloody stool  Recently started DUNIA IGNACIO  Prior to Admission Medications   Prescriptions Last Dose Informant Patient Reported? Taking?    Ascorbic Acid (vitamin C) 1000 MG tablet   Yes No   Sig: Take 1,000 mg by mouth daily   Bacillus Coagulans-Inulin (Probiotic) 1-250 BILLION-MG CAPS   Yes No   Sig: Take by mouth 50 billion   Nutritional Supplements (PRO-RAJINDER PLUS PO)  Self Yes No   Sig: Take by mouth   Omega-3 Fatty Acids (Fish Oil) 1200 MG CAPS   Yes No   Sig: Take by mouth   OxyCONTIN 10 MG 12 hr tablet   Yes No   Sig: Take 10 mg by mouth every 12 (twelve) hours as needed     RESTASIS 0 05 % ophthalmic emulsion   Yes No   Sig: Administer 1 drop to both eyes every 12 (twelve) hours   cholecalciferol (VITAMIN D3) 1,000 units tablet  Self Yes No   Sig: Take 500 Units by mouth daily 50,000 1x week   dexamethasone (DECADRON) 1 mg tablet Yes No   Sig: Take 1 mg by mouth 2 (two) times a day with meals   famotidine (PEPCID) 20 mg tablet   Yes No   Sig: Take 20 mg by mouth 2 (two) times a day   gabapentin (NEURONTIN) 300 mg capsule   Yes No   Sig: Take 600 mg by mouth 3 (three) times a day     magnesium 30 MG tablet   Yes No   Sig: Take 30 mg by mouth in the morning   melatonin 1 mg   Yes No   Sig: Take 6 mg by mouth daily at bedtime   ondansetron (ZOFRAN) 4 mg tablet   Yes No   Sig: Take 4 mg by mouth every 8 (eight) hours as needed for nausea or vomiting   oxyCODONE (ROXICODONE) 5 immediate release tablet   Yes No   Sig: Take 10 mg by mouth every 4 (four) hours as needed for moderate pain or severe pain   pantoprazole (PROTONIX) 40 mg tablet  Self Yes No   Sig: Take 40 mg by mouth as needed     silver sulfadiazine (SILVADENE,SSD) 1 % cream   Yes No   Sig: Apply topically 2 (two) times a day     sodium chloride 1 g tablet   Yes No   Si g 2 (two) times a day     zinc gluconate 50 mg tablet   Yes No   Sig: Take 50 mg by mouth daily   zinc oxide 20 % ointment   Yes No   Sig: Apply topically as needed      Facility-Administered Medications: None       Past Medical History:   Diagnosis Date    Cancer ([facility] )     Stomach    Lymphoma ([facility] [address] ) 2018    Non Hodgkin's lymphoma ([facility] [address] )     Prostate cancer ([facility] [address] )     Shingles     Thoracic aortic aneurysm ([facility] [address] )     PATIENT DENIES       Past Surgical History:   Procedure Laterality Date    APPENDECTOMY      COLONOSCOPY      HERNIA REPAIR      B/L hernia repair    IR PORT PLACEMENT  2021    KNEE ARTHROSCOPY      KNEE ARTHROSCOPY, MEDIAL PATELLO FEMORAL LIGAMENT REPAIR Left     LINEAR ENDOSCOPIC U/S N/A 2018    Procedure: LINEAR ENDOSCOPIC U/S;  Surgeon: Mango Lewis MD;  Location: [facility];   Service: Gastroenterology    IL BIOPSY/EXCISION, LYMPH NODE(S) Left 2020    Procedure: EXCISION BIOPSY LYMPH NODE SUPRACLAVICULAR;  Surgeon: Elli Yeager MD;  Location: [facility]; Service: General       Family History   Problem Relation Age of Onset   Vaishnavi Johns Melanoma Mother     Prostate cancer Father     Lung cancer Father     Breast cancer Sister     Pancreatic cancer Paternal Grandfather     Hodgkin's lymphoma Sister      I have reviewed and agree with the history as documented  E-Cigarette/Vaping    E-Cigarette Use Never User      E-Cigarette/Vaping Substances    Nicotine No     Flavoring No      Social History     Tobacco Use    Smoking status: Never Smoker    Smokeless tobacco: Never Used   Vaping Use    Vaping Use: Never used   Substance Use Topics    Alcohol use: Not Currently    Drug use: No       Review of Systems   Constitutional: Positive for fatigue  Negative for diaphoresis, fever and unexpected weight change  Respiratory: Positive for cough (Mild, nonproductive, feels like it is from irritation of his throat  )  Negative for chest tightness and shortness of breath  Cardiovascular: Negative for chest pain, palpitations and leg swelling  Gastrointestinal: Negative for abdominal pain, blood in stool, diarrhea, nausea and vomiting  Genitourinary: Positive for difficulty urinating and frequency  Negative for decreased urine volume, dysuria, flank pain and hematuria  Patient wears [place] cath in bed overnight so he does not have to get up to go to the bathroom so frequently  During the day he feels he is able to empty his bladder fully  Musculoskeletal: Positive for back pain and myalgias  Skin: Negative for rash and wound  Neurological: Positive for weakness  Negative for dizziness, seizures, syncope, light-headedness and headaches  Hematological: Does not bruise/bleed easily  All other systems reviewed and are negative  Physical Exam  Physical Exam  Vitals and nursing note reviewed  Constitutional:       General: He is not in acute distress  Appearance: He is well-developed  He is ill-appearing  He is not diaphoretic     HENT:      Head: Normocephalic and atraumatic  Right Ear: External ear normal       Left Ear: External ear normal       Nose: Nose normal       Mouth/Throat:      Mouth: Mucous membranes are dry  Pharynx: No posterior oropharyngeal erythema  Eyes:      General: No scleral icterus  Conjunctiva/sclera: Conjunctivae normal       Pupils: Pupils are equal, round, and reactive to light  Neck:      Vascular: No JVD  Cardiovascular:      Rate and Rhythm: Regular rhythm  Tachycardia present  Pulses: Normal pulses  Heart sounds: Normal heart sounds  No murmur heard  Pulmonary:      Effort: Pulmonary effort is normal       Breath sounds: Normal breath sounds  No rales  Abdominal:      General: Bowel sounds are normal       Palpations: Abdomen is soft  There is no mass  Tenderness: There is no abdominal tenderness  There is no guarding or rebound  Musculoskeletal:         General: No tenderness  Normal range of motion  Cervical back: Normal range of motion and neck supple  No tenderness  Right lower leg: No edema  Left lower leg: No edema  Lymphadenopathy:      Cervical: No cervical adenopathy  Skin:     General: Skin is warm and dry  Capillary Refill: Capillary refill takes less than 2 seconds  Coloration: Skin is pale  Findings: No bruising or rash  Neurological:      General: No focal deficit present  Mental Status: He is alert and oriented to person, place, and time  Mental status is at baseline  Cranial Nerves: No cranial nerve deficit  Coordination: Coordination normal       Deep Tendon Reflexes: Reflexes are normal and symmetric     Psychiatric:         Mood and Affect: Mood normal          Behavior: Behavior normal          Vital Signs  ED Triage Vitals   Temperature Pulse Respirations Blood Pressure SpO2   03/03/22 1619 03/03/22 1616 03/03/22 1616 03/03/22 1618 03/03/22 1616   99 5 °F (37 5 °C) (!) 125 20 106/63 99 %      Temp Source Heart Rate Source Patient Position - Orthostatic VS BP Location FiO2 (%)   03/03/22 1619 03/03/22 1616 -- -- --   Temporal Monitor         Pain Score       --                  Vitals:    03/03/22 1616 03/03/22 1618   BP:  106/63   Pulse: (!) 125          Visual Acuity      ED Medications  Medications   sodium chloride 0 9 % bolus 1,000 mL (1,000 mL Intravenous New Bag 3/3/22 1743)       Diagnostic Studies  Results Reviewed     Procedure Component Value Units Date/Time    Protime-INR [[id number]]  (Abnormal) Collected: 03/03/22 1700    Lab Status: Final result Specimen: Blood from Arm, Right Updated: 03/03/22 1812     Protime 15 2 seconds      INR 1 21    APTT [[id number]]  (Normal) Collected: 03/03/22 1700    Lab Status: Final result Specimen: Blood from Arm, Right Updated: 03/03/22 1812     PTT 33 seconds     CBC and differential [[id number]]  (Abnormal) Collected: 03/03/22 1700    Lab Status: Edited Result - FINAL Specimen: Blood from Central Venous Line Updated: 03/03/22 1756     WBC 2 51 Thousand/uL      RBC 2 55 Million/uL      Hemoglobin 7 2 g/dL      Hematocrit 24 3 %      MCV 95 fL      MCH 28 2 pg      MCHC 29 6 g/dL      RDW 15 8 %      MPV 9 6 fL      Platelets 4 Thousands/uL     Manual Differential(PHLEBS Do Not Order) [[id number]]  (Abnormal) Collected: 03/03/22 1700    Lab Status: Final result Specimen: Blood from Central Venous Line Updated: 03/03/22 1752     Segmented % 68 %      Bands % 2 %      Lymphocytes % 22 %      Monocytes % 2 %      Eosinophils, % 0 %      Basophils % 0 %      Atypical Lymphocytes % 6 %      Absolute Neutrophils 1 76 Thousand/uL      Lymphocytes Absolute 0 55 Thousand/uL      Monocytes Absolute 0 05 Thousand/uL      Eosinophils Absolute 0 00 Thousand/uL      Basophils Absolute 0 00 Thousand/uL      Total Counted --     Dohle Bodies Present     RBC Morphology Present     Anisocytosis Present     Hypochromia Present     Macrocytes Present     Microcytes Present     Platelet Estimate Decreased    Comprehensive metabolic panel [[id number]]  (Abnormal) Collected: 03/03/22 1700    Lab Status: Final result Specimen: Blood from Central Venous Line Updated: 03/03/22 1729     Sodium 138 mmol/L      Potassium 4 7 mmol/L      Chloride 103 mmol/L      CO2 24 mmol/L      ANION GAP 11 mmol/L      BUN 28 mg/dL      Creatinine 1 15 mg/dL      Glucose 94 mg/dL      Calcium 7 3 mg/dL      Corrected Calcium 8 7 mg/dL      AST 69 U/L      ALT 22 U/L      Alkaline Phosphatase 601 U/L      Total Protein 5 9 g/dL      Albumin 2 3 g/dL      Total Bilirubin 0 40 mg/dL      eGFR 67 ml/min/1 73sq m     Narrative:      National Kidney Disease Foundation guidelines for Chronic Kidney Disease (CKD):     Stage 1 with normal or high GFR (GFR > 90 mL/min/1 73 square meters)    Stage 2 Mild CKD (GFR = 60-89 mL/min/1 73 square meters)    Stage 3A Moderate CKD (GFR = 45-59 mL/min/1 73 square meters)    Stage 3B Moderate CKD (GFR = 30-44 mL/min/1 73 square meters)    Stage 4 Severe CKD (GFR = 15-29 mL/min/1 73 square meters)    Stage 5 End Stage CKD (GFR <15 mL/min/1 73 square meters)  Note: GFR calculation is accurate only with a steady state creatinine    Urine Microscopic [[id number]]  (Abnormal) Collected: 03/03/22 1656    Lab Status: Final result Specimen: Urine, Clean Catch Updated: 03/03/22 1726     RBC, UA 1-2 /hpf      WBC, UA 4-10 /hpf      Epithelial Cells Occasional /hpf      Bacteria, UA Occasional /hpf     UA (URINE) with reflex to Scope [[id number]]  (Abnormal) Collected: 03/03/22 1656    Lab Status: Final result Specimen: Urine, Clean Catch Updated: 03/03/22 1712     Color, UA Yellow     Clarity, UA Clear     Specific Gravity, UA 1 010     pH, UA 6 0     Leukocytes, UA Trace     Nitrite, UA Negative     Protein, UA Negative mg/dl      Glucose, UA Negative mg/dl      Ketones, UA Negative mg/dl      Urobilinogen, UA 0 2 E U /dl      Bilirubin, UA Negative     Blood, UA Trace-Intact                 No orders to display              Procedures  ECG 12 Lead Documentation Only    Date/Time: 3/3/2022 5:05 PM  Performed by: Aileen Frey DO  Authorized by: Aileen Frey DO     ECG reviewed by me, the ED Provider: yes    Patient location:  ED  Previous ECG:     Previous ECG:  Compared to current    Comparison ECG info:  Sinus rhythm has replaced ectopic atrial rhythm    Similarity:  Changes noted    Comparison to cardiac monitor: Yes    Rhythm:     Rhythm: sinus tachycardia    Ectopy:     Ectopy: none    QRS:     QRS axis:  Left    QRS intervals: Wide  Conduction:     Conduction: abnormal      Abnormal conduction: complete RBBB               ED Course  ED Course as of 03/03/22 1821   Thu Mar 03, 2022   1733 Texted SLIM for admission                                             MDM  Number of Diagnoses or Management Options  Dehydration: new and does not require workup  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 ([facility] [address] )  Pancytopenia ([address] ): established and worsening  Diagnosis management comments: 60-year-old male undergoing chemotherapy for metastatic prostate cancer was noted to have pancytopenia with platelet count of 4640  Will recheck labs  Patient does appear dry and is tachycardic  Has remote history of PE and has been withholding his blood thinner as per oncology instructions  Patient has no dyspnea, hemoptysis or signs of DVT  Lungs are clear  Will hydrate patient and check labs  Will likely need admission for transfusion  No evidence of bleeding               Amount and/or Complexity of Data Reviewed  Clinical lab tests: ordered and reviewed  Review and summarize past medical records: yes  Discuss the patient with other providers: yes  Independent visualization of images, tracings, or specimens: yes        Disposition  Final diagnoses:   Pancytopenia ([address] )   Dehydration   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 [facility])     Time reflects when diagnosis was documented in both MDM as applicable and the Disposition within this note     Time User Action Codes Description Comment    3/3/2022  5:44 PM Pepper Roddy Add [P67 047] Pancytopenia ([address] )     3/3/2022  5:45 PM Pepper Roddy Add [E86 0] Dehydration     3/3/2022  5:45 PM Pepper Roddy Add [U86,  C79 51] 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 [facility])       ED Disposition     ED Disposition Condition Date/Time Comment    Admit Stable Thu Mar 3, 2022  5:44 PM Case was discussed with Dr Jaqueline Rodriguez and the patient's admission status was agreed to be Admission Status: observation status to the service of Dr Jaqueline Rodriguez   Follow-up Information    None         Patient's Medications   Discharge Prescriptions    No medications on file       No discharge procedures on file      PDMP Review     None          ED Provider  Electronically Signed by           Shruthi Severino,   03/03/22 [address] [place] [address], DO  03/03/22 3729

## 2022-03-03 NOTE — ASSESSMENT & PLAN NOTE
 Noted on VS and auscultation   Related to severe anemia, malignancy, longstanding dx   On chart review - pt tachycardiac 120-130s since 1/18 at transfusion therapy, however pt states ongoing for months especially with any small amount of activity    Echo was unremarkable last admission

## 2022-03-04 NOTE — ASSESSMENT & PLAN NOTE
Related to dehydration, anemia  Given IVF, blood products   Slight improvement - follow with additional hydration

## 2022-03-04 NOTE — PROGRESS NOTES
[place]  Progress Note [place] Cassette 1960, 57 y o  male MRN: [id number]  Unit/Bed#: -Andressa Encounter: [id number]  Primary Care Provider: Sheryle Mosses, MD   Date and time admitted to hospital: 3/3/2022  4:13 PM    * Pancytopenia [facility])  Assessment & Plan  Associated with chemotherapy and active malignancy  Plt 4  On admission  Improved with transfusion  Monitor CBC     Mechanical only DVT PPX   Anemia improved s/p PRBC transfusions       Acute kidney injury [facility])  Assessment & Plan  Related to dehydration, anemia  Given IVF, blood products   Slight improvement - follow with additional hydration    Tachycardia  Assessment & Plan   Noted on VS and auscultation   Related to severe anemia, malignancy, longstanding dx   On chart review - pt tachycardiac 120-130s since 1/18 at transfusion therapy, however pt states ongoing for months especially with any small amount of activity    Echo was unremarkable last admission    Prostate cancer [facility])  Assessment & Plan   Metastatic to bone (see below), liver, b/l adrenals   CT 1/22: "Improved mediastinal and hilar adenopathy  Unchanged lingular nodule  Worsening hepatic metastatic disease  New right adrenal metastatic nodule  Unchanged left adrenal metastatic disease  New upper abdominal retroperitoneal adenopathy  Slightly worse moderate to severe left hydroureteronephrosis, with increased irregular mass involving the left side of the urinary bladder and left seminal vesicle  Increased adenopathy along the left pelvic sidewall  Diffuse widespread osseous metastatic disease     EUS lymphadenopathy biopsy 9/21: "metastatic carcinoma compatible with prostatic primary "   Follows with Dr Adalgisa Villanueva outpatient    Undergoing chemotherapy     MALT lymphoma [facility])  Assessment & Plan   Hx of MALT lymphoma 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 s/p triple abx course for H  Pylori at that time and then found to have progressive disease with second gastric lesions with resolution on EGD from          VTE  Prophylaxis:   Pharmacologic: in place    Patient Centered Rounds: I have performed bedside rounds with nursing staff today  Discussions with Specialists or Other Care Team Provider: case management    Education and Discussions with Family / Patient: pt      Current Length of Stay: 1 day(s)    Current Patient Status: Inpatient        Code Status: Level 1 - Full Code      Subjective:   Pt seen  Feels better after transfusions  Denies destiny bleed  No sob    Patient is seen and examined at bedside  All other ROS are negative  Objective:     Vitals:   Temp (24hrs), Av 6 °F (37 °C), Min:97 5 °F (36 4 °C), Max:99 5 °F (37 5 °C)    Temp:  [97 5 °F (36 4 °C)-99 5 °F (37 5 °C)] 98 2 °F (36 8 °C)  HR:  [] 137  Resp:  [16-20] 18  BP: (103-127)/(63-89) 127/89  SpO2:  [96 %-99 %] 97 %  There is no height or weight on file to calculate BMI  Input and Output Summary (last 24 hours): Intake/Output Summary (Last 24 hours) at 3/4/2022 0956  Last data filed at 3/4/2022 0901  Gross per 24 hour   Intake 2090 ml   Output 2500 ml   Net -410 ml       Physical Exam:       GEN: No acute distress, comfortable, underweight  HEEENT: No JVD, PERRLA, no scleral icterus  RESP: Lungs clear to auscultation bilaterally  CV: tachy, +s1/s2 , chemoport  ABD: SOFT NON TENDER, POSITIVE BOWEL SOUNDS, NO DISTENTION  PSYCH: CALM  NEURO: A X O X 3, NO FOCAL DEFICITS  SKIN: NO RASH  EXTREM: NO EDEMA    Additional Data:     Labs:    Results from last 7 days   Lab Units 22  0607 22  2345 22  2345   WBC Thousand/uL 2 03*   < > 1 87*   HEMOGLOBIN g/dL 7 8*   < > 6 7*   HEMATOCRIT % 25 2*   < > 22 7*   PLATELETS Thousands/uL 29*   < > 12*   BANDS PCT %  --   --  8   LYMPHO PCT %  --   --  22   MONO PCT %  --   --  7   EOS PCT %  --   --  0    < > = values in this interval not displayed       Results from last 7 days   Lab Units 22  0607   SODIUM mmol/L 142   POTASSIUM mmol/L 4 4   CHLORIDE mmol/L 107   CO2 mmol/L 24   BUN mg/dL 24   CREATININE mg/dL 1 01   ANION GAP mmol/L 11   CALCIUM mg/dL 6 6*   ALBUMIN g/dL 2 2*   TOTAL BILIRUBIN mg/dL 0 90   ALK PHOS U/L 494*   ALT U/L 19   AST U/L 90*   GLUCOSE RANDOM mg/dL 81     Results from last 7 days   Lab Units 03/03/22  1700   INR  1 21*                       * I Have Reviewed All Lab Data Listed Above  Imaging:     Results for orders placed during the hospital encounter of 09/15/21    XR chest portable    Narrative  CHEST    INDICATION:   Hypoxia  COMPARISON:  CT chest abdomen pelvis 9/14/2021    EXAM PERFORMED/VIEWS:  XR CHEST PORTABLE      FINDINGS:    Cardiomediastinal silhouette appears unremarkable  The lungs are clear  No pneumothorax or pleural effusion  Osseous structures appear within normal limits for patient age  Impression  No acute cardiopulmonary disease  Workstation performed: [id number]    No results found for this or any previous visit        *I have reviewed all imaging reports listed above      Recent Cultures (last 7 days):           Last 24 Hours Medication List:   Current Facility-Administered Medications   Medication Dose Route Frequency Provider Last Rate    acetaminophen  650 mg Oral Q6H PRN Everton Berkowitz, MD      aluminum-magnesium hydroxide-simethicone  30 mL Oral Q6H PRN Leila Davison MD      Apalutamide  240 mg Oral Daily Everton Shock, MD      vitamin C  1,000 mg Oral Daily Everton Berkowitz MD      docusate sodium  100 mg Oral BID Everton Berkowitz MD      famotidine  20 mg Oral BID Everton Berkowitz MD      gabapentin  600 mg Oral TID Everton Berkowitz MD      melatonin  6 mg Oral HS Everton Berkowitz MD      metoprolol tartrate  12 5 mg Oral Q12H [address] Rahul Rojas PA-C      ondansetron  4 mg Oral Q4H PRN Everton Berkowitz MD      pantoprazole  40 mg Oral Early Morning Everton Berkowitz MD  polyethylene glycol  17 g Oral Daily Neri Alicea MD      predniSONE  5 mg Oral Daily Mario Paige MD      senna  1 tablet Oral Daily Mario Paige MD      sodium chloride  125 mL/hr Intravenous Continuous Mario Paige MD      sodium chloride  1 g Oral BID Mario Paige MD          Today, Patient Was Seen By: Mario Paige MD    ** Please Note: Dictation voice to text software may have been used in the creation of this document   **

## 2022-03-04 NOTE — PLAN OF CARE
Problem: Nutrition/Hydration-ADULT  Goal: Nutrient/Hydration intake appropriate for improving, restoring or maintaining nutritional needs  Description: Monitor and assess patient's nutrition/hydration status for malnutrition  Collaborate with interdisciplinary team and initiate plan and interventions as ordered  Monitor patient's weight and dietary intake as ordered or per policy  Utilize nutrition screening tool and intervene as necessary  Determine patient's food preferences and provide high-protein, high-caloric foods as appropriate       INTERVENTIONS:  - Monitor oral intake, urinary output, labs, and treatment plans  - Assess nutrition and hydration status and recommend course of action  - Evaluate amount of meals eaten  - Assist patient with eating if necessary   - Allow adequate time for meals  - Recommend/ encourage appropriate diets, oral nutritional supplements, and vitamin/mineral supplements  - Order, calculate, and assess calorie counts as needed  - Recommend, monitor, and adjust tube feedings and TPN/PPN based on assessed needs  - Assess need for intravenous fluids  - Provide specific nutrition/hydration education as appropriate  - Include patient/family/caregiver in decisions related to nutrition  Outcome: Progressing     Problem: PAIN - ADULT  Goal: Verbalizes/displays adequate comfort level or baseline comfort level  Description: Interventions:  - Encourage patient to monitor pain and request assistance  - Assess pain using appropriate pain scale  - Administer analgesics based on type and severity of pain and evaluate response  - Implement non-pharmacological measures as appropriate and evaluate response  - Consider cultural and social influences on pain and pain management  - Notify physician/advanced practitioner if interventions unsuccessful or patient reports new pain  Outcome: Progressing     Problem: INFECTION - ADULT  Goal: Absence or prevention of progression during hospitalization  Description: INTERVENTIONS:  - Assess and monitor for signs and symptoms of infection  - Monitor lab/diagnostic results  - Monitor all insertion sites, i e  indwelling lines, tubes, and drains  - Monitor endotracheal if appropriate and nasal secretions for changes in amount and color  - [place] appropriate cooling/warming therapies per order  - Administer medications as ordered  - Instruct and encourage patient and family to use good hand hygiene technique  - Identify and instruct in appropriate isolation precautions for identified infection/condition  Outcome: Progressing  Goal: Absence of fever/infection during neutropenic period  Description: INTERVENTIONS:  - Monitor WBC    Outcome: Progressing     Problem: SAFETY ADULT  Goal: Patient will remain free of falls  Description: INTERVENTIONS:  - Educate patient/family on patient safety including physical limitations  - Instruct patient to call for assistance with activity   - Consult OT/PT to assist with strengthening/mobility   - Keep Call bell within reach  - Keep bed low and locked with side rails adjusted as appropriate  - Keep care items and personal belongings within reach  - Initiate and maintain comfort rounds  - Make Fall Risk Sign visible to staff  - Offer Toileting every 2 Hours, in advance of need  - Initiate/Maintain bedalarm  - Obtain necessary fall risk management equipment: wheelchair, call bell within reach  - Apply yellow socks and bracelet for high fall risk patients  - Consider moving patient to room near nurses station  Outcome: Progressing  Goal: Maintain or return to baseline ADL function  Description: INTERVENTIONS:  -  Assess patient's ability to carry out ADLs; assess patient's baseline for ADL function and identify physical deficits which impact ability to perform ADLs (bathing, care of mouth/teeth, toileting, grooming, dressing, etc )  - Assess/evaluate cause of self-care deficits   - Assess range of motion  - Assess patient's mobility; develop plan if impaired  - Assess patient's need for assistive devices and provide as appropriate  - Encourage maximum independence but intervene and supervise when necessary  - Involve family in performance of ADLs  - Assess for home care needs following discharge   - Consider OT consult to assist with ADL evaluation and planning for discharge  - Provide patient education as appropriate  Outcome: Progressing  Goal: Maintains/Returns to pre admission functional level  Description: INTERVENTIONS:  - Perform BMAT or MOVE assessment daily    - Set and communicate daily mobility goal to care team and patient/family/caregiver  - Collaborate with rehabilitation services on mobility goals if consulted  - Perform Range of Motion 3 times a day  - Reposition patient every 2 hours  - Dangle patient 2 times a day  - Record patient progress and toleration of activity level   Outcome: Progressing     Problem: DISCHARGE PLANNING  Goal: Discharge to home or other facility with appropriate resources  Description: INTERVENTIONS:  - Identify barriers to discharge w/patient and caregiver  - Arrange for needed discharge resources and transportation as appropriate  - Identify discharge learning needs (meds, wound care, etc )  - Arrange for interpretive services to assist at discharge as needed  - Refer to Case Management Department for coordinating discharge planning if the patient needs post-hospital services based on physician/advanced practitioner order or complex needs related to functional status, cognitive ability, or social support system  Outcome: Progressing     Problem: Knowledge Deficit  Goal: Patient/family/caregiver demonstrates understanding of disease process, treatment plan, medications, and discharge instructions  Description: Complete learning assessment and assess knowledge base    Interventions:  - Provide teaching at level of understanding  - Provide teaching via preferred learning methods  Outcome: Progressing     Problem: CARDIOVASCULAR - ADULT  Goal: Maintains optimal cardiac output and hemodynamic stability  Description: INTERVENTIONS:  - Monitor I/O, vital signs and rhythm  - Monitor for S/S and trends of decreased cardiac output  - Administer and titrate ordered vasoactive medications to optimize hemodynamic stability  - Assess quality of pulses, skin color and temperature  - Assess for signs of decreased coronary artery perfusion  - Instruct patient to report change in severity of symptoms  Outcome: Progressing  Goal: Absence of cardiac dysrhythmias or at baseline rhythm  Description: INTERVENTIONS:  - Continuous cardiac monitoring, vital signs, obtain 12 lead EKG if ordered  - Administer antiarrhythmic and heart rate control medications as ordered  - Monitor electrolytes and administer replacement therapy as ordered  Outcome: Progressing     Problem: HEMATOLOGIC - ADULT  Goal: Maintains hematologic stability  Description: INTERVENTIONS  - Assess for signs and symptoms of bleeding or hemorrhage  - Monitor labs  - Administer supportive blood products/factors as ordered and appropriate  Outcome: Progressing

## 2022-03-04 NOTE — CONSULTS
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  He also has history of MALT lymphoma  Patient has been treated with multiple lines of therapy    He is admitted with pancytopenia which is associated with his active malignancy and secondary to chemotherapy  Would recommend to transfuse as needed as needed with leukoreduced and irradiated blood products  ANC greater than 1000  No need for Granix as patient is hemodynamically stable, afebrile and not significantly neutropenic    Of note, Patient follows with Dr Nancy Merchant  Please consult Dr Lilia Menon or reach out to her practice directly (Office # [phone]) for any specific treatment related questions or concerns related to patient's prognosis, questions regarding hospice or current hospitalization  [address] [place] [address] Dr Oncology can not comment on prognosis or treatment recommendations as we are not involved in the management of this patient and may not concur with some of the management being provided by his primary oncologist  His treating oncologist should be asked for their opinion regarding management issues including the appropriateness of hospice    Jani MERRILL   [facility]  Hematology Oncology

## 2022-03-04 NOTE — ED NOTES
Noted patient's HR to be 150, sustaining  Patient denies any symptoms at this time, EKG repeated  Dr Chong Daniels made aware via Adam Abdullahi, VIKASH  03/03/22 1907

## 2022-03-04 NOTE — ED NOTES
Patient's HR now 103, afebrile  Dr Jaqueline Rodriguez made aware via [employer]  EKG repeated       Germania Pineda RN  03/03/22 1912

## 2022-03-04 NOTE — MALNUTRITION/BMI
This medical record reflects one or more clinical indicators suggestive of malnutrition and/or morbid obesity  Malnutrition Findings:      Adult Degree of Malnutrition: Other severe protein calorie malnutrition (Pt presents with severe protein calorie malnutrition as evidenced by <75% po intake > 1 month, clavicle protrusion, temporal depressions and sunken orbitals )  Malnutrition Characteristics: Fat loss,Muscle loss,Inadequate energy     Treat with Regular diet and supplements prn from home (Refusing our supplements currently)    BMI Findings: There is no height or weight on file to calculate BMI  See Nutrition note dated 3/4/22 for additional details  Completed nutrition assessment is viewable in the nutrition documentation

## 2022-03-04 NOTE — UTILIZATION REVIEW
Initial Clinical Review    Admission: Date/Time/Statement:   Admission Orders (From admission, onward)     Ordered        03/03/22 1810  INPATIENT ADMISSION  Once                      Orders Placed This Encounter   Procedures    INPATIENT ADMISSION     Standing Status:   Standing     Number of Occurrences:   1     Order Specific Question:   Level of Care     Answer:   Med Surg [16]     Order Specific Question:   Estimated length of stay     Answer:   More than 2 Midnights     Order Specific Question:   Certification     Answer:   I certify that inpatient services are medically necessary for this patient for a duration of greater than two midnights  See H&P and MD Progress Notes for additional information about the patient's course of treatment  ED Arrival Information     Expected Arrival Acuity    - 3/3/2022 16:10 Urgent         Means of arrival Escorted by Service Admission type    Wheelchair Family Member Hospitalist Urgent         Arrival complaint    Abnormal Blood Work        Chief Complaint   Patient presents with    Abnormal Lab     pt reports abnormal platelet count from past blood work; patient reports fatigue and weakness that started weeks ago        Initial Presentation: this is a 57year old male from home to ED, per oncology,  admitted inpatient due to pancytopenia/RANDI due to dehydration and anemia  Presented due to outpatient lab work showing platelet count of 2145 despite transfusion day prior, hgb 7 6  Has ongoing weakness  On exam appears ill and dry  Tachycardic  Mucous membranes dry  Wbc 2 51   H&H 7 2/24  3  Platelets 4  Absolute neutrophils 1 76  Bun 28, creatinine 1 15 with baseline 0 63 - 0 74  Plan is transfuse platelets, mechanical only DVT ppx  Transfuse PRBC  Consult oncology  Start IVF and trend BMP and CBC  Date: 3/4/22   Day 2: feels better post transfusions  On exam tachycardia  Chemoport  Wbc 2 03   H&H 8 7/25 2  Calcium 6 6  Albumin 2 2    Plan is continued IVF for hydration  3/4/22 per oncology- patient has pancytopenia with active malignancy secondary to chemo  Has pmh of 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  history of MALT lymphoma    Plan is transfuse as needed as needed with leukoreduced and irradiated blood products    ED Triage Vitals   Temperature Pulse Respirations Blood Pressure SpO2   03/03/22 1619 03/03/22 1616 03/03/22 1616 03/03/22 1618 03/03/22 1616   99 5 °F (37 5 °C) (!) 125 20 106/63 99 %      Temp Source Heart Rate Source Patient Position - Orthostatic VS BP Location FiO2 (%)   03/03/22 1619 03/03/22 1616 03/03/22 1800 03/03/22 1800 --   Temporal Monitor Lying Left arm       Pain Score       03/03/22 2019       4          Wt Readings from Last 1 Encounters:   02/15/22 68 kg (150 lb)     Additional Vital Signs:   03/04/22 07:53:38 98 2 °F (36 8 °C) 137 Abnormal  22 127/89 102 97 % None (Room air) Lying   03/04/22 04:27:24 98 °F (36 7 °C) 86 -- 115/76 89 97 % -- --   03/04/22 02:21:45 98 5 °F (36 9 °C) 88 18 109/72 84 97 % -- --   03/04/22 01:27:02 98 8 °F (37 1 °C) 89 18 112/71 85 96 % -- --   03/04/22 00:57:27 98 4 °F (36 9 °C) 95 -- 109/69 82 96 % -- --   03/03/22 22:16:56 98 9 °F (37 2 °C) 97 -- 110/67 81 96 % -- --   03/03/22 20:17:03 98 9 °F (37 2 °C) 109 Abnormal  -- 115/80 92 96 % -- --   03/03/22 1852 99 2 °F (37 3 °C) 109 Abnormal  18 109/69 -- -- -- --   03/03/22 1800 -- 112 Abnormal  18 103/64 77 98 % None (Room         Pertinent Labs/Diagnostic Test Results:   3/3/22 ecg - Sinus tachycardia  Right bundle branch block  Abnormal ECG  When compared with ECG of 13-FEB-2022 22:26,  Sinus rhythm has replaced Ectopic atrial rhythm  Left anterior fascicular block is no longer Present    3/3/22 ecg Atrial flutter with 2 to 1 block  Left axis deviation  Right bundle branch block  Nonspecific ST abnormality  Abnormal ECG  When compared with ECG of 03-MAR-2022 17:00,  Atrial flutter has replaced Sinus rhythm  QRS axis Shifted left  ST now depressed in Inferior leads  ST more depressed Anterior leads  T wave inversion now evident in Inferior leads    3/3/22 ecg Sinus tachycardia  Left axis deviation  Right bundle branch block  Abnormal ECG  When compared with ECG of 03-MAR-2022 19:04,  Sinus rhythm has replaced Atrial flutter  ST no longer depressed in Inferior leads  ST less depressed in Anterior leads  T wave inversion no longer evident in Inferior leads  T wave inversion no longer evident in Lateral leads    Results from last 7 days   Lab Units 03/04/22  0607 03/03/22  2345 03/03/22  1700   WBC Thousand/uL 2 03* 1 87* 2 51*   HEMOGLOBIN g/dL 7 8* 6 7* 7 2*   HEMATOCRIT % 25 2* 22 7* 24 3*   PLATELETS Thousands/uL 29* 12* 4*   BANDS PCT %  --  8 2     Results from last 7 days   Lab Units 03/04/22  0607 03/03/22  1700   SODIUM mmol/L 142 138   POTASSIUM mmol/L 4 4 4 7   CHLORIDE mmol/L 107 103   CO2 mmol/L 24 24   ANION GAP mmol/L 11 11   BUN mg/dL 24 28*   CREATININE mg/dL 1 01 1 15   EGFR ml/min/1 73sq m 79 67   CALCIUM mg/dL 6 6* 7 3*   MAGNESIUM mg/dL 1 8  --    PHOSPHORUS mg/dL 3 6  --      Results from last 7 days   Lab Units 03/04/22  0607 03/03/22  1700   AST U/L 90* 69*   ALT U/L 19 22   ALK PHOS U/L 494* 601*   TOTAL PROTEIN g/dL 5 4* 5 9*   ALBUMIN g/dL 2 2* 2 3*   TOTAL BILIRUBIN mg/dL 0 90 0 40     Results from last 7 days   Lab Units 03/04/22  0607 03/03/22  1700   GLUCOSE RANDOM mg/dL 81 94     Results from last 7 days   Lab Units 03/03/22  1700   PROTIME seconds 15 2*   INR  1 21*   PTT seconds 33     Results from last 7 days   Lab Units 03/03/22  1656   CLARITY UA  Clear   COLOR UA  Yellow   SPEC GRAV UA  1 010   PH UA  6 0   GLUCOSE UA mg/dl Negative   KETONES UA mg/dl Negative   BLOOD UA  Trace-Intact*   PROTEIN UA mg/dl Negative   NITRITE UA  Negative   BILIRUBIN UA  Negative   UROBILINOGEN UA E U /dl 0 2   LEUKOCYTES UA  Trace*   WBC UA /hpf 4-10*   RBC UA /hpf 1-2 BACTERIA UA /hpf Occasional   EPITHELIAL CELLS WET PREP /hpf Occasional       ED Treatment:   Medication Administration from 03/03/2022 1609 to 03/03/2022 2011       Date/Time Order Dose Route Action Comments     03/03/2022 1743 sodium chloride 0 9 % bolus 1,000 mL 1,000 mL Intravenous New Bag      03/03/2022 1856 famotidine (PEPCID) tablet 20 mg 20 mg Oral Given      03/03/2022 1856 acetaminophen (TYLENOL) tablet 650 mg 650 mg Oral Given         Past Medical History:   Diagnosis Date    Cancer ([facility] )     Stomach    Lymphoma [facility] ) 05/2018    Non Hodgkin's lymphoma ([facility] )     Prostate cancer ([facility] ) 2020    Shingles     Thoracic aortic aneurysm ([facility])     PATIENT DENIES     Present on Admission:   MALT lymphoma ([facility] )   Prostate cancer ([facility] )   Pancytopenia ([facility] )   Tachycardia      Admitting Diagnosis: Dehydration [E86 0]  Abnormal laboratory test [R89 9]  Pancytopenia ([place] [address] ) [D61 818]  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 [facility]) [C61, C79 51]  Age/Sex: 57 y o  male  Admission Orders:  3/3/22 1810 inpatient   Scheduled Medications:  Apalutamide, 240 mg, Oral, Daily  vitamin C, 1,000 mg, Oral, Daily  docusate sodium, 100 mg, Oral, BID  famotidine, 20 mg, Oral, BID  gabapentin, 600 mg, Oral, TID  melatonin, 6 mg, Oral, HS  metoprolol tartrate, 12 5 mg, Oral, Q12H KATHARINA  pantoprazole, 40 mg, Oral, Early Morning  polyethylene glycol, 17 g, Oral, Daily  predniSONE, 5 mg, Oral, Daily  senna, 1 tablet, Oral, Daily  sodium chloride, 1 g, Oral, BID      Continuous IV Infusions:  sodium chloride, 125 mL/hr, Intravenous, Continuous      PRN Meds:  acetaminophen, 650 mg, Oral, Q6H PRN - used x 1 3/3, x 1 3/4   aluminum-magnesium hydroxide-simethicone, 30 mL, Oral, Q6H PRN  ondansetron, 4 mg, Oral, Q4H PRN    3/3/22- transfuse leukoreduced platelet pheresis  2/6/92 transfuse leukoreduced RBC  3/4/22 transfuse leukoreduced RBC, CMV negative, irradiated, leukoreduced  3/4/22 transfuse leukoreduced platelet pheresis  IP CONSULT TO ONCOLOGY    Network Utilization Review Department  ATTENTION: Please call with any questions or concerns to [phone] and carefully listen to the prompts so that you are directed to the right person  All voicemails are confidential   Velia Webster all requests for admission clinical reviews, approved or denied determinations and any other requests to dedicated fax number below belonging to the campus where the patient is receiving treatment   List of dedicated fax numbers for the Facilities:  [address] DENIALS (Administrative/Medical Necessity) [phone]   [address] (Maternity/NICU/Pediatrics) [phone]   [address] [place] [address]  [address] Medical [place] [address] [id number] [place] [address] [address] [place] [address] [phone]

## 2022-03-04 NOTE — ASSESSMENT & PLAN NOTE
 Metastatic to bone (see below), liver, b/l adrenals   CT 1/22: "Improved mediastinal and hilar adenopathy  Unchanged lingular nodule  Worsening hepatic metastatic disease  New right adrenal metastatic nodule  Unchanged left adrenal metastatic disease  New upper abdominal retroperitoneal adenopathy  Slightly worse moderate to severe left hydroureteronephrosis, with increased irregular mass involving the left side of the urinary bladder and left seminal vesicle  Increased adenopathy along the left pelvic sidewall  Diffuse widespread osseous metastatic disease     EUS lymphadenopathy biopsy 9/21: "metastatic carcinoma compatible with prostatic primary "   Follows with Dr Garcia July outpatient    Undergoing chemotherapy

## 2022-03-04 NOTE — ED NOTES
Report given to MS3 RN  Plan of care discussed and all questions answered at this time       Nic Kwon RN  03/03/22 8225

## 2022-03-04 NOTE — ASSESSMENT & PLAN NOTE
Associated with chemotherapy and active malignancy  Plt 4  On admission  Improved with transfusion  Monitor CBC     Mechanical only DVT PPX   Anemia improved s/p PRBC transfusions

## 2022-03-05 PROBLEM — E43 SEVERE PROTEIN-CALORIE MALNUTRITION (HCC): Status: ACTIVE | Noted: 2022-01-01

## 2022-03-05 NOTE — ASSESSMENT & PLAN NOTE
Associated with chemotherapy and active malignancy  Plt 4  On admission  Improved with transfusion to 29k, since decreased to 19k  Transfuse 1 additional unit platelet  Monitor CBC     Mechanical only DVT PPX   Anemia improved s/p PRBC transfusions

## 2022-03-05 NOTE — ASSESSMENT & PLAN NOTE
Malnutrition Findings:      Adult Degree of Malnutrition: Other severe protein calorie malnutrition (Pt presents with severe protein calorie malnutrition as evidenced by <75% po intake > 1 month, clavicle protrusion, temporal depressions and sunken orbitals )    BMI Findings: There is no height or weight on file to calculate BMI  show

## 2022-03-05 NOTE — ASSESSMENT & PLAN NOTE
 Metastatic to bone (see below), liver, b/l adrenals   CT 1/22: "Improved mediastinal and hilar adenopathy  Unchanged lingular nodule  Worsening hepatic metastatic disease  New right adrenal metastatic nodule  Unchanged left adrenal metastatic disease  New upper abdominal retroperitoneal adenopathy  Slightly worse moderate to severe left hydroureteronephrosis, with increased irregular mass involving the left side of the urinary bladder and left seminal vesicle  Increased adenopathy along the left pelvic sidewall  Diffuse widespread osseous metastatic disease     EUS lymphadenopathy biopsy 9/21: "metastatic carcinoma compatible with prostatic primary "   Follows with Dr Paulo Kowalski outpatient    Undergoing chemotherapy

## 2022-03-05 NOTE — PROGRESS NOTES
[place]  Progress Note Zach Lambert 1960, 57 y o  male MRN: [id number]  Unit/Bed#: -Andressa Encounter: [id number]  Primary Care Provider: Mikel Bennett MD   Date and time admitted to hospital: 3/3/2022  4:13 PM    * Pancytopenia [facility])  Assessment & Plan  Associated with chemotherapy and active malignancy  Plt 4  On admission  Improved with transfusion to 29k, since decreased to 19k  Transfuse 1 additional unit platelet  Monitor CBC     Mechanical only DVT PPX   Anemia improved s/p PRBC transfusions       Severe protein-calorie malnutrition ([address] )  Assessment & Plan  Malnutrition Findings:      Adult Degree of Malnutrition: Other severe protein calorie malnutrition (Pt presents with severe protein calorie malnutrition as evidenced by <75% po intake > 1 month, clavicle protrusion, temporal depressions and sunken orbitals )    BMI Findings: There is no height or weight on file to calculate BMI  Acute kidney injury [facility])  Assessment & Plan  Related to dehydration, anemia  Given IVF, blood products   Slight improvement - follow with additional hydration    Tachycardia  Assessment & Plan   Noted on VS and auscultation   Related to severe anemia, malignancy, longstanding dx   On chart review - pt tachycardiac 120-130s since 1/18 at transfusion therapy, however pt states ongoing for months especially with any small amount of activity    Echo was unremarkable last admission    Prostate cancer [facility])  Assessment & Plan   Metastatic to bone (see below), liver, b/l adrenals   CT 1/22: "Improved mediastinal and hilar adenopathy  Unchanged lingular nodule  Worsening hepatic metastatic disease  New right adrenal metastatic nodule  Unchanged left adrenal metastatic disease  New upper abdominal retroperitoneal adenopathy   Slightly worse moderate to severe left hydroureteronephrosis, with increased irregular mass involving the left side of the urinary bladder and left seminal vesicle  Increased adenopathy along the left pelvic sidewall  Diffuse widespread osseous metastatic disease   EUS lymphadenopathy biopsy : "metastatic carcinoma compatible with prostatic primary "   Follows with Dr López Setting outpatient    Undergoing chemotherapy     MALT lymphoma [facility])  Assessment & Plan   Hx of MALT lymphoma diagnosed in 2018 s/p triple abx course for H  Pylori at that time and then found to have progressive disease with second gastric lesions with resolution on EGD from          VTE  Prophylaxis:   Pharmacologic: in place    Patient Centered Rounds: I have performed bedside rounds with nursing staff today  Discussions with Specialists or Other Care Team Provider: case management    Education and Discussions with Family / Patient: pt    Current Length of Stay: 2 day(s)    Current Patient Status: Inpatient        Code Status: Level 1 - Full Code      Subjective:   Pt denies destiny bleeding  No sob  No dyspnea  No abd pain or fevers    Patient is seen and examined at bedside  All other ROS are negative  Objective:     Vitals:   Temp (24hrs), Av 7 °F (36 5 °C), Min:97 3 °F (36 3 °C), Max:98 1 °F (36 7 °C)    Temp:  [97 3 °F (36 3 °C)-98 1 °F (36 7 °C)] 98 1 °F (36 7 °C)  HR:  [] 151  Resp:  [15-18] 15  BP: (111-117)/(76-85) 111/85  SpO2:  [97 %-98 %] 97 %  There is no height or weight on file to calculate BMI  Input and Output Summary (last 24 hours):        Intake/Output Summary (Last 24 hours) at 3/5/2022 1019  Last data filed at 3/5/2022 0558  Gross per 24 hour   Intake 1480 ml   Output 7450 ml   Net -5970 ml       Physical Exam:       GEN: No acute distress, comfortable , underweight gentlemen  HEEENT: No JVD, PERRLA, no scleral icterus  RESP: Lungs clear to auscultation bilaterally  CV:  tachy  ABD: SOFT NON TENDER, POSITIVE BOWEL SOUNDS, NO DISTENTION  PSYCH: CALM  NEURO: A X O X 3, NO FOCAL DEFICITS  SKIN: NO RASH  EXTREM: NO EDEMA    Additional Data:     Labs:    Results from last 7 days   Lab Units 03/05/22  0553   WBC Thousand/uL 2 46*   HEMOGLOBIN g/dL 9 1*   HEMATOCRIT % 29 0*   PLATELETS Thousands/uL 19*   BANDS PCT % 13*   LYMPHO PCT % 24   MONO PCT % 5   EOS PCT % 0     Results from last 7 days   Lab Units 03/05/22  0553   SODIUM mmol/L 142   POTASSIUM mmol/L 4 2   CHLORIDE mmol/L 107   CO2 mmol/L 24   BUN mg/dL 20   CREATININE mg/dL 0 96   ANION GAP mmol/L 11   CALCIUM mg/dL 6 8*   ALBUMIN g/dL 2 3*   TOTAL BILIRUBIN mg/dL 0 60   ALK PHOS U/L 558*   ALT U/L 19   AST U/L 100*   GLUCOSE RANDOM mg/dL 74     Results from last 7 days   Lab Units 03/03/22  1700   INR  1 21*                       * I Have Reviewed All Lab Data Listed Above  Imaging:     Results for orders placed during the hospital encounter of 09/15/21    XR chest portable    Narrative  CHEST    INDICATION:   Hypoxia  COMPARISON:  CT chest abdomen pelvis 9/14/2021    EXAM PERFORMED/VIEWS:  XR CHEST PORTABLE      FINDINGS:    Cardiomediastinal silhouette appears unremarkable  The lungs are clear  No pneumothorax or pleural effusion  Osseous structures appear within normal limits for patient age  Impression  No acute cardiopulmonary disease  Workstation performed: [id number]    No results found for this or any previous visit        *I have reviewed all imaging reports listed above      Recent Cultures (last 7 days):           Last 24 Hours Medication List:   Current Facility-Administered Medications   Medication Dose Route Frequency Provider Last Rate    acetaminophen  650 mg Oral Q6H PRN Lina Mcleod MD      aluminum-magnesium hydroxide-simethicone  30 mL Oral Q6H PRN Cassidy George MD      Apalutamide  240 mg Oral Daily Lina Mcleod MD      vitamin C  1,000 mg Oral Daily Lina Mcleod MD      docusate sodium  100 mg Oral BID Lina Mcleod MD      famotidine  20 mg Oral BID Lina Mcleod MD      gabapentin  600 mg Oral TID Everton Berkowitz MD      loperamide  2 mg Oral Q4H PRN Everton Berkowitz MD      melatonin  6 mg Oral HS Everton Berkowitz MD      metoprolol tartrate  12 5 mg Oral Q12H [facility] Rahul Rojas PA-C      ondansetron  4 mg Oral Q4H PRN Everton Berkowitz MD      pantoprazole  40 mg Oral Early Morning Everton Berkowitz MD      polyethylene glycol  17 g Oral Daily Everton Berkowitz MD      predniSONE  5 mg Oral Daily Everton Berkowitz, MD      predniSONE  7 5 mg Oral HS Waleska Weaver PA-C      senna  1 tablet Oral Daily Everton Berkowitz MD      sodium chloride  1 g Oral BID Everton Berkowitz MD          Today, Patient Was Seen By: Everton Berkowitz MD    ** Please Note: Dictation voice to text software may have been used in the creation of this document   **

## 2022-03-05 NOTE — PLAN OF CARE
Problem: Nutrition/Hydration-ADULT  Goal: Nutrient/Hydration intake appropriate for improving, restoring or maintaining nutritional needs  Description: Monitor and assess patient's nutrition/hydration status for malnutrition  Collaborate with interdisciplinary team and initiate plan and interventions as ordered  Monitor patient's weight and dietary intake as ordered or per policy  Utilize nutrition screening tool and intervene as necessary  Determine patient's food preferences and provide high-protein, high-caloric foods as appropriate       INTERVENTIONS:  - Monitor oral intake, urinary output, labs, and treatment plans  - Assess nutrition and hydration status and recommend course of action  - Evaluate amount of meals eaten  - Assist patient with eating if necessary   - Allow adequate time for meals  - Recommend/ encourage appropriate diets, oral nutritional supplements, and vitamin/mineral supplements  - Order, calculate, and assess calorie counts as needed  - Recommend, monitor, and adjust tube feedings and TPN/PPN based on assessed needs  - Assess need for intravenous fluids  - Provide specific nutrition/hydration education as appropriate  - Include patient/family/caregiver in decisions related to nutrition  Outcome: Progressing     Problem: PAIN - ADULT  Goal: Verbalizes/displays adequate comfort level or baseline comfort level  Description: Interventions:  - Encourage patient to monitor pain and request assistance  - Assess pain using appropriate pain scale  - Administer analgesics based on type and severity of pain and evaluate response  - Implement non-pharmacological measures as appropriate and evaluate response  - Consider cultural and social influences on pain and pain management  - Notify physician/advanced practitioner if interventions unsuccessful or patient reports new pain  Outcome: Progressing     Problem: INFECTION - ADULT  Goal: Absence or prevention of progression during hospitalization  Description: INTERVENTIONS:  - Assess and monitor for signs and symptoms of infection  - Monitor lab/diagnostic results  - Monitor all insertion sites, i e  indwelling lines, tubes, and drains  - Monitor endotracheal if appropriate and nasal secretions for changes in amount and color  - [place] appropriate cooling/warming therapies per order  - Administer medications as ordered  - Instruct and encourage patient and family to use good hand hygiene technique  - Identify and instruct in appropriate isolation precautions for identified infection/condition  Outcome: Progressing  Goal: Absence of fever/infection during neutropenic period  Description: INTERVENTIONS:  - Monitor WBC    Outcome: Progressing       Problem: DISCHARGE PLANNING  Goal: Discharge to home or other facility with appropriate resources  Description: INTERVENTIONS:  - Identify barriers to discharge w/patient and caregiver  - Arrange for needed discharge resources and transportation as appropriate  - Identify discharge learning needs (meds, wound care, etc )  - Arrange for interpretive services to assist at discharge as needed  - Refer to Case Management Department for coordinating discharge planning if the patient needs post-hospital services based on physician/advanced practitioner order or complex needs related to functional status, cognitive ability, or social support system  Outcome: Progressing     Problem: Knowledge Deficit  Goal: Patient/family/caregiver demonstrates understanding of disease process, treatment plan, medications, and discharge instructions  Description: Complete learning assessment and assess knowledge base    Interventions:  - Provide teaching at level of understanding  - Provide teaching via preferred learning methods  Outcome: Progressing     Problem: CARDIOVASCULAR - ADULT  Goal: Maintains optimal cardiac output and hemodynamic stability  Description: INTERVENTIONS:  - Monitor I/O, vital signs and rhythm  - Monitor for S/S and trends of decreased cardiac output  - Administer and titrate ordered vasoactive medications to optimize hemodynamic stability  - Assess quality of pulses, skin color and temperature  - Assess for signs of decreased coronary artery perfusion  - Instruct patient to report change in severity of symptoms  Outcome: Progressing  Goal: Absence of cardiac dysrhythmias or at baseline rhythm  Description: INTERVENTIONS:  - Continuous cardiac monitoring, vital signs, obtain 12 lead EKG if ordered  - Administer antiarrhythmic and heart rate control medications as ordered  - Monitor electrolytes and administer replacement therapy as ordered  Outcome: Progressing     Problem: HEMATOLOGIC - ADULT  Goal: Maintains hematologic stability  Description: INTERVENTIONS  - Assess for signs and symptoms of bleeding or hemorrhage  - Monitor labs  - Administer supportive blood products/factors as ordered and appropriate  Outcome: Progressing

## 2022-03-06 NOTE — ASSESSMENT & PLAN NOTE
Malnutrition Findings:      Adult Degree of Malnutrition: Other severe protein calorie malnutrition (Pt presents with severe protein calorie malnutrition as evidenced by <75% po intake > 1 month, clavicle protrusion, temporal depressions and sunken orbitals )    BMI Findings: There is no height or weight on file to calculate BMI

## 2022-03-06 NOTE — PLAN OF CARE
Problem: Nutrition/Hydration-ADULT  Goal: Nutrient/Hydration intake appropriate for improving, restoring or maintaining nutritional needs  Description: Monitor and assess patient's nutrition/hydration status for malnutrition  Collaborate with interdisciplinary team and initiate plan and interventions as ordered  Monitor patient's weight and dietary intake as ordered or per policy  Utilize nutrition screening tool and intervene as necessary  Determine patient's food preferences and provide high-protein, high-caloric foods as appropriate       INTERVENTIONS:  - Monitor oral intake, urinary output, labs, and treatment plans  - Assess nutrition and hydration status and recommend course of action  - Evaluate amount of meals eaten  - Assist patient with eating if necessary   - Allow adequate time for meals  - Recommend/ encourage appropriate diets, oral nutritional supplements, and vitamin/mineral supplements  - Order, calculate, and assess calorie counts as needed  - Recommend, monitor, and adjust tube feedings and TPN/PPN based on assessed needs  - Assess need for intravenous fluids  - Provide specific nutrition/hydration education as appropriate  - Include patient/family/caregiver in decisions related to nutrition  Outcome: Progressing     Problem: PAIN - ADULT  Goal: Verbalizes/displays adequate comfort level or baseline comfort level  Description: Interventions:  - Encourage patient to monitor pain and request assistance  - Assess pain using appropriate pain scale  - Administer analgesics based on type and severity of pain and evaluate response  - Implement non-pharmacological measures as appropriate and evaluate response  - Consider cultural and social influences on pain and pain management  - Notify physician/advanced practitioner if interventions unsuccessful or patient reports new pain  Outcome: Progressing     Problem: INFECTION - ADULT  Goal: Absence or prevention of progression during hospitalization  Description: INTERVENTIONS:  - Assess and monitor for signs and symptoms of infection  - Monitor lab/diagnostic results  - Monitor all insertion sites, i e  indwelling lines, tubes, and drains  - Monitor endotracheal if appropriate and nasal secretions for changes in amount and color  - [place] appropriate cooling/warming therapies per order  - Administer medications as ordered  - Instruct and encourage patient and family to use good hand hygiene technique  - Identify and instruct in appropriate isolation precautions for identified infection/condition  Outcome: Progressing  Goal: Absence of fever/infection during neutropenic period  Description: INTERVENTIONS:  - Monitor WBC    Outcome: Progressing     Problem: SAFETY ADULT  Goal: Patient will remain free of falls  Description: INTERVENTIONS:  - Educate patient/family on patient safety including physical limitations  - Instruct patient to call for assistance with activity   - Consult OT/PT to assist with strengthening/mobility   - Keep Call bell within reach  - Keep bed low and locked with side rails adjusted as appropriate  - Keep care items and personal belongings within reach  - Initiate and maintain comfort rounds  - Make Fall Risk Sign visible to staff  - Offer Toileting every 2 Hours, in advance of need  - Initiate/Maintain bed alarm  - Obtain necessary fall risk management equipment:   - Apply yellow socks and bracelet for high fall risk patients  - Consider moving patient to room near nurses station  Outcome: Progressing  Goal: Maintain or return to baseline ADL function  Description: INTERVENTIONS:  -  Assess patient's ability to carry out ADLs; assess patient's baseline for ADL function and identify physical deficits which impact ability to perform ADLs (bathing, care of mouth/teeth, toileting, grooming, dressing, etc )  - Assess/evaluate cause of self-care deficits   - Assess range of motion  - Assess patient's mobility; develop plan if impaired  - Assess patient's need for assistive devices and provide as appropriate  - Encourage maximum independence but intervene and supervise when necessary  - Involve family in performance of ADLs  - Assess for home care needs following discharge   - Consider OT consult to assist with ADL evaluation and planning for discharge  - Provide patient education as appropriate  Outcome: Progressing  Goal: Maintains/Returns to pre admission functional level  Description: INTERVENTIONS:  - Perform BMAT or MOVE assessment daily    - Set and communicate daily mobility goal to care team and patient/family/caregiver  - Collaborate with rehabilitation services on mobility goals if consulted  - Perform Range of Motion 3 times a day  - Reposition patient every 2 hours    - Dangle patient 3 times a day  - Stand patient 3 times a day  - Ambulate patient 3 times a day  - Out of bed to chair 3 times a day   - Out of bed for meals 3 times a day  - Out of bed for toileting  - Record patient progress and toleration of activity level   Outcome: Progressing     Problem: DISCHARGE PLANNING  Goal: Discharge to home or other facility with appropriate resources  Description: INTERVENTIONS:  - Identify barriers to discharge w/patient and caregiver  - Arrange for needed discharge resources and transportation as appropriate  - Identify discharge learning needs (meds, wound care, etc )  - Arrange for interpretive services to assist at discharge as needed  - Refer to Case Management Department for coordinating discharge planning if the patient needs post-hospital services based on physician/advanced practitioner order or complex needs related to functional status, cognitive ability, or social support system  Outcome: Progressing     Problem: Knowledge Deficit  Goal: Patient/family/caregiver demonstrates understanding of disease process, treatment plan, medications, and discharge instructions  Description: Complete learning assessment and assess knowledge base    Interventions:  - Provide teaching at level of understanding  - Provide teaching via preferred learning methods  Outcome: Progressing     Problem: CARDIOVASCULAR - ADULT  Goal: Maintains optimal cardiac output and hemodynamic stability  Description: INTERVENTIONS:  - Monitor I/O, vital signs and rhythm  - Monitor for S/S and trends of decreased cardiac output  - Administer and titrate ordered vasoactive medications to optimize hemodynamic stability  - Assess quality of pulses, skin color and temperature  - Assess for signs of decreased coronary artery perfusion  - Instruct patient to report change in severity of symptoms  Outcome: Progressing  Goal: Absence of cardiac dysrhythmias or at baseline rhythm  Description: INTERVENTIONS:  - Continuous cardiac monitoring, vital signs, obtain 12 lead EKG if ordered  - Administer antiarrhythmic and heart rate control medications as ordered  - Monitor electrolytes and administer replacement therapy as ordered  Outcome: Progressing     Problem: HEMATOLOGIC - ADULT  Goal: Maintains hematologic stability  Description: INTERVENTIONS  - Assess for signs and symptoms of bleeding or hemorrhage  - Monitor labs  - Administer supportive blood products/factors as ordered and appropriate  Outcome: Progressing     Problem: MOBILITY - ADULT  Goal: Maintain or return to baseline ADL function  Description: INTERVENTIONS:  -  Assess patient's ability to carry out ADLs; assess patient's baseline for ADL function and identify physical deficits which impact ability to perform ADLs (bathing, care of mouth/teeth, toileting, grooming, dressing, etc )  - Assess/evaluate cause of self-care deficits   - Assess range of motion  - Assess patient's mobility; develop plan if impaired  - Assess patient's need for assistive devices and provide as appropriate  - Encourage maximum independence but intervene and supervise when necessary  - Involve family in performance of ADLs  - Assess for home care needs following discharge   - Consider OT consult to assist with ADL evaluation and planning for discharge  - Provide patient education as appropriate  Outcome: Progressing  Goal: Maintains/Returns to pre admission functional level  Description: INTERVENTIONS:  - Perform BMAT or MOVE assessment daily    - Set and communicate daily mobility goal to care team and patient/family/caregiver  - Collaborate with rehabilitation services on mobility goals if consulted  - Perform Range of Motion 3 times a day  - Reposition patient every 2 hours    - Dangle patient 3 times a day  - Stand patient 3 times a day  - Ambulate patient 3 times a day  - Out of bed to chair 3 times a day   - Out of bed for meals 3 times a day  - Out of bed for toileting  - Record patient progress and toleration of activity level   Outcome: Progressing     Problem: Prexisting or High Potential for Compromised Skin Integrity  Goal: Skin integrity is maintained or improved  Description: INTERVENTIONS:  - Identify patients at risk for skin breakdown  - Assess and monitor skin integrity  - Assess and monitor nutrition and hydration status  - Monitor labs   - Assess for incontinence   - Turn and reposition patient  - Assist with mobility/ambulation  - Relieve pressure over bony prominences  - Avoid friction and shearing  - Provide appropriate hygiene as needed including keeping skin clean and dry  - Evaluate need for skin moisturizer/barrier cream  - Collaborate with interdisciplinary team   - Patient/family teaching  - Consider wound care consult   Outcome: Progressing     Problem: Potential for Falls  Goal: Patient will remain free of falls  Description: INTERVENTIONS:  - Educate patient/family on patient safety including physical limitations  - Instruct patient to call for assistance with activity   - Consult OT/PT to assist with strengthening/mobility   - Keep Call bell within reach  - Keep bed low and locked with side rails adjusted as appropriate  - Keep care items and personal belongings within reach  - Initiate and maintain comfort rounds  - Make Fall Risk Sign visible to staff  - Offer Toileting every 2 Hours, in advance of need  - Initiate/Maintain bed alarm  - Obtain necessary fall risk management equipment:   - Apply yellow socks and bracelet for high fall risk patients  - Consider moving patient to room near nurses station  Outcome: Progressing

## 2022-03-06 NOTE — ASSESSMENT & PLAN NOTE
 Metastatic to bone (see below), liver, b/l adrenals   CT 1/22: "Improved mediastinal and hilar adenopathy  Unchanged lingular nodule  Worsening hepatic metastatic disease  New right adrenal metastatic nodule  Unchanged left adrenal metastatic disease  New upper abdominal retroperitoneal adenopathy  Slightly worse moderate to severe left hydroureteronephrosis, with increased irregular mass involving the left side of the urinary bladder and left seminal vesicle  Increased adenopathy along the left pelvic sidewall  Diffuse widespread osseous metastatic disease     EUS lymphadenopathy biopsy 9/21: "metastatic carcinoma compatible with prostatic primary "   Follows with Dr Stephy Scott outpatient    Undergoing chemotherapy

## 2022-03-06 NOTE — DISCHARGE SUMMARY
[place]  Discharge- Lewis Crum 1960, 57 y o  male MRN: [id number]  Unit/Bed#: MS [facility]ress[facility] Encounter: [id number]  Primary Care Provider: Fe Sih MD   Date and time admitted to hospital: 3/3/2022  4:13 PM    * Pancytopenia [facility])  Assessment & Plan  Associated with chemotherapy and active malignancy  Plt 4  On admission  Improved with transfusion to 29k, -> 19k  Transfuse 1 additional unit platelet - responded to 39k  Monitor CBC     Mechanical only DVT PPX   Anemia improved s/p PRBC transfusions       Severe protein-calorie malnutrition ([address] )  Assessment & Plan  Malnutrition Findings:      Adult Degree of Malnutrition: Other severe protein calorie malnutrition (Pt presents with severe protein calorie malnutrition as evidenced by <75% po intake > 1 month, clavicle protrusion, temporal depressions and sunken orbitals )    BMI Findings: There is no height or weight on file to calculate BMI  Acute kidney injury [facility])  Assessment & Plan  Related to dehydration, anemia  Given IVF, blood products   Slight improvement - follow with additional hydration    Tachycardia  Assessment & Plan   Noted on VS and auscultation   Related to severe anemia, malignancy, longstanding dx   On chart review - pt tachycardiac 120-130s since 1/18 at transfusion therapy, however pt states ongoing for months especially with any small amount of activity    Echo was unremarkable last admission    Prostate cancer [facility])  Assessment & Plan   Metastatic to bone (see below), liver, b/l adrenals   CT 1/22: "Improved mediastinal and hilar adenopathy  Unchanged lingular nodule  Worsening hepatic metastatic disease  New right adrenal metastatic nodule  Unchanged left adrenal metastatic disease  New upper abdominal retroperitoneal adenopathy   Slightly worse moderate to severe left hydroureteronephrosis, with increased irregular mass involving the left side of the urinary bladder and left seminal vesicle  Increased adenopathy along the left pelvic sidewall  Diffuse widespread osseous metastatic disease   EUS lymphadenopathy biopsy : "metastatic carcinoma compatible with prostatic primary "   Follows with Dr Patricia Rai outpatient    Undergoing chemotherapy     MALT lymphoma [facility])  Assessment & Plan   Hx of MALT lymphoma diagnosed in 2018 s/p triple abx course for H  Pylori at that time and then found to have progressive disease with second gastric lesions with resolution on EGD from          VTE  Prophylaxis:   Pharmacologic: in place    Patient Centered Rounds: I have performed bedside rounds with nursing staff today  Discussions with Specialists or Other Care Team Provider: case management    Education and Discussions with Family / Patient:  pt    Current Length of Stay: 3 day(s)    Current Patient Status: Inpatient        Code Status: Level 1 - Full Code      Subjective:   Pt seen  Comfortable  Denies bleeding   Denies dypsnea, denies chest pain  No complaints    Patient is seen and examined at bedside  All other ROS are negative  Objective:     Vitals:   Temp (24hrs), Av 1 °F (36 7 °C), Min:97 7 °F (36 5 °C), Max:98 3 °F (36 8 °C)    Temp:  [97 7 °F (36 5 °C)-98 3 °F (36 8 °C)] 97 7 °F (36 5 °C)  HR:  [] 90  Resp:  [15-18] 16  BP: (109-111)/(77-78) 109/77  SpO2:  [96 %-98 %] 98 %  There is no height or weight on file to calculate BMI  Input and Output Summary (last 24 hours):        Intake/Output Summary (Last 24 hours) at 3/6/2022 0954  Last data filed at 3/6/2022 0541  Gross per 24 hour   Intake 1113 ml   Output 4600 ml   Net -3487 ml       Physical Exam:       GEN: No acute distress, comfortable, underweight  HEEENT: No JVD, PERRLA, no scleral icterus  RESP: Lungs clear to auscultation bilaterally  CV : tachycardic, chemoport  ABD: SOFT NON TENDER, POSITIVE BOWEL SOUNDS, NO DISTENTION  PSYCH: CALM  NEURO: A X O X 3, NO FOCAL DEFICITS  SKIN: NO RASH  EXTREM: NO EDEMA    Additional Data:     Labs:    Results from last 7 days   Lab Units 03/06/22  0546 03/05/22  0553 03/05/22  0553   WBC Thousand/uL 2 56*   < > 2 46*   HEMOGLOBIN g/dL 9 0*   < > 9 1*   HEMATOCRIT % 29 4*   < > 29 0*   PLATELETS Thousands/uL 37*   < > 19*   BANDS PCT %  --   --  13*   LYMPHO PCT %  --   --  24   MONO PCT %  --   --  5   EOS PCT %  --   --  0    < > = values in this interval not displayed  Results from last 7 days   Lab Units 03/06/22  0546   SODIUM mmol/L 140   POTASSIUM mmol/L 4 6   CHLORIDE mmol/L 105   CO2 mmol/L 23   BUN mg/dL 19   CREATININE mg/dL 0 85   ANION GAP mmol/L 12   CALCIUM mg/dL 7 0*   ALBUMIN g/dL 2 3*   TOTAL BILIRUBIN mg/dL 0 60   ALK PHOS U/L 1,165*   ALT U/L 78   AST U/L 237*   GLUCOSE RANDOM mg/dL 84     Results from last 7 days   Lab Units 03/03/22  1700   INR  1 21*                       * I Have Reviewed All Lab Data Listed Above  Imaging:     Results for orders placed during the hospital encounter of 09/15/21    XR chest portable    Narrative  CHEST    INDICATION:   Hypoxia  COMPARISON:  CT chest abdomen pelvis 9/14/2021    EXAM PERFORMED/VIEWS:  XR CHEST PORTABLE      FINDINGS:    Cardiomediastinal silhouette appears unremarkable  The lungs are clear  No pneumothorax or pleural effusion  Osseous structures appear within normal limits for patient age  Impression  No acute cardiopulmonary disease  Workstation performed: [id number]    No results found for this or any previous visit        *I have reviewed all imaging reports listed above      Recent Cultures (last 7 days):           Last 24 Hours Medication List:   Current Facility-Administered Medications   Medication Dose Route Frequency Provider Last Rate    acetaminophen  650 mg Oral Q6H PRN Alesia Harrison MD      aluminum-magnesium hydroxide-simethicone  30 mL Oral Q6H PRN Neri Bee MD      Apalutamide  240 mg Oral Daily Alesia Harrison, MD      vitamin C  1,000 mg Oral Daily Jany Samayoa MD      docusate sodium  100 mg Oral BID Jany Samayoa MD      famotidine  20 mg Oral BID Jany Samayoa MD      gabapentin  600 mg Oral TID Jany Samayoa MD      loperamide  2 mg Oral Q4H PRN Jany Samayoa MD      melatonin  6 mg Oral HS Jany Samayoa MD      metoprolol tartrate  12 5 mg Oral Q12H [address] Earnestine Thurston PA-C      ondansetron  4 mg Oral Q4H PRN Jany Samayoa MD      pantoprazole  40 mg Oral Early Morning Jany Samayoa MD      polyethylene glycol  17 g Oral Daily Jany Samayoa MD      predniSONE  5 mg Oral Daily Jany Samayoa MD      predniSONE  7 5 mg Oral HS Armand Collet, PA-C      senna  1 tablet Oral Daily Jany Samayoa MD      sodium chloride  1 g Oral BID Jany Samayoa MD          Today, Patient Was Seen By: Jany Samayoa MD    ** Please Note: Dictation voice to text software may have been used in the creation of this document   **

## 2022-03-06 NOTE — ASSESSMENT & PLAN NOTE
Associated with chemotherapy and active malignancy  Plt 4  On admission  Improved with transfusion to 29k, -> 19k  Transfuse 1 additional unit platelet - responded to 39k  Monitor CBC     Mechanical only DVT PPX   Anemia improved s/p PRBC transfusions

## 2022-03-10 NOTE — UTILIZATION REVIEW
Notification of Discharge   This is a Notification of Discharge from our facility [address] [place] [address]  Please be advised that this patient has been discharge from our facility  Below you will find the admission and discharge date and time including the patients disposition  UTILIZATION REVIEW CONTACT:  King Das  Utilization   Network Utilization Review Department  Phone: [phone] x carefully listen to the prompts  All voicemails are confidential   Email: [email or web address]     PHYSICIAN ADVISORY SERVICES:  FOR GTNO-VI-PWLM REVIEW - MEDICAL NECESSITY DENIAL  Phone: [phone]  Fax: [phone]  Email: [email or web address]     PRESENTATION DATE: 3/3/2022  4:13 PM  OBERVATION ADMISSION DATE:   INPATIENT ADMISSION DATE: 3/3/22  6:10 PM   DISCHARGE DATE: 3/6/2022 12:37 PM  DISPOSITION: Home with [place] with [address] INFORMATION:  Send all requests for admission clinical reviews, approved or denied determinations and any other requests to dedicated fax number below belonging to the campus where the patient is receiving treatment   List of dedicated fax numbers:  [address] DENIALS (Administrative/Medical Necessity) [phone]   1000  [facility] (Maternity/NICU/Pediatrics) [phone]   Karie Richardson [phone]   [address] [phone]   [address] [place] [address] [phone]   2000 [facility] [address] [place] [address],[address] [phone]   [facility]  [phone]   [address] [place] [address], [facility]  [address] [facility] 1000 [facility] [phone]

## 2022-03-11 PROBLEM — N13.30 HYDRONEPHROSIS OF RIGHT KIDNEY: Status: ACTIVE | Noted: 2022-01-01

## 2022-03-11 PROBLEM — A41.9 SEVERE SEPSIS (HCC): Status: ACTIVE | Noted: 2022-01-01

## 2022-03-11 PROBLEM — G93.41 ACUTE METABOLIC ENCEPHALOPATHY: Status: ACTIVE | Noted: 2022-01-01

## 2022-03-11 PROBLEM — R65.20 SEVERE SEPSIS (HCC): Status: ACTIVE | Noted: 2022-01-01

## 2022-03-11 PROBLEM — E43 SEVERE PROTEIN-CALORIE MALNUTRITION (HCC): Status: RESOLVED | Noted: 2022-01-01 | Resolved: 2022-01-01

## 2022-03-11 NOTE — ED PROVIDER NOTES
History  Chief Complaint   Patient presents with    Fever - 9 weeks to 74 years     Patient presents to the ER with report of having a 102 4 fever this morning, being disoriented, lethargic and having "jerky movements "     50-year-old male presents for evaluation fever beginning this morning  The wife reports the patient had a fever of 102 4 at home he had witnessed jerking movements  Patient denies any pain  He took Tylenol Motrin prior to arrival   Patient complains of fatigue  He has a history of elevated heart rates but states that he does not feel palpitations or chest pain with this  Patient complains of intermittent chronic pain in his low back and thighs which is unchanged from previous  Denies changes in his daily routine  Prior to Admission Medications   Prescriptions Last Dose Informant Patient Reported? Taking?    Ascorbic Acid (vitamin C) 1000 MG tablet   Yes No   Sig: Take 1,000 mg by mouth daily   Bacillus Coagulans-Inulin (Probiotic) 1-250 BILLION-MG CAPS   Yes No   Sig: Take by mouth 50 billion   Erleada 60 MG TABS   Yes No   Nutritional Supplements (PRO-RAJINDER PLUS PO)  Self Yes No   Sig: Take by mouth   Omega-3 Fatty Acids (Fish Oil) 1200 MG CAPS   Yes No   Sig: Take by mouth   Patient not taking: Reported on 3/3/2022    RESTASIS 0 05 % ophthalmic emulsion   Yes No   Sig: Administer 1 drop to both eyes every 12 (twelve) hours   cholecalciferol (VITAMIN D3) 1,000 units tablet  Self Yes No   Sig: Take 500 Units by mouth daily 50,000 1x week   dexamethasone (DECADRON) 4 mg tablet  Spouse/Significant Other Yes Yes   Sig: Take 4 mg by mouth 2 (two) times a day with meals   famotidine (PEPCID) 20 mg tablet   Yes No   Sig: Take 20 mg by mouth 2 (two) times a day   gabapentin (NEURONTIN) 300 mg capsule   Yes No   Sig: Take 600 mg by mouth 3 (three) times a day     ketoconazole (NIZORAL) 200 mg tablet   Yes No   Sig: Take 200 mg by mouth daily   loperamide (IMODIUM) 2 mg capsule  Self Yes No Sig: Take 2 mg by mouth 4 (four) times a day as needed for diarrhea   magnesium 30 MG tablet   Yes No   Sig: Take 30 mg by mouth in the morning   melatonin 1 mg   Yes No   Sig: Take 6 mg by mouth daily at bedtime   metoprolol tartrate (LOPRESSOR) 25 mg tablet   Yes No   Sig: Take 12 5 mg by mouth every 12 (twelve) hours   ondansetron (ZOFRAN) 4 mg tablet   Yes No   Sig: Take 4 mg by mouth every 8 (eight) hours as needed for nausea or vomiting   oxyCODONE (ROXICODONE) 10 MG TABS   Yes No   Sig: Take 10 mg by mouth   pantoprazole (PROTONIX) 40 mg tablet  Self Yes No   Sig: Take 40 mg by mouth as needed     predniSONE 5 mg tablet Not Taking at Unknown time  Yes No   Si (two) times a day with meals 5mg in the morning 7 5mg at night    Patient not taking: Reported on 3/11/2022    sodium chloride 1 g tablet   Yes No   Si g 2 (two) times a day     zinc gluconate 50 mg tablet   Yes No   Sig: Take 50 mg by mouth daily      Facility-Administered Medications: None       Past Medical History:   Diagnosis Date    Cancer ([facility] )     Stomach    Lymphoma ([facility] ) 2018    Non Hodgkin's lymphoma ([facility] )     Prostate cancer ([facility] )     Shingles     Thoracic aortic aneurysm ([facility] )     PATIENT DENIES       Past Surgical History:   Procedure Laterality Date    APPENDECTOMY      COLONOSCOPY      HERNIA REPAIR      B/L hernia repair    IR PORT PLACEMENT  2021    KNEE ARTHROSCOPY      KNEE ARTHROSCOPY, MEDIAL PATELLO FEMORAL LIGAMENT REPAIR Left     LINEAR ENDOSCOPIC U/S N/A 2018    Procedure: LINEAR ENDOSCOPIC U/S;  Surgeon: Shayla Holcomb MD;  Location: [facility];   Service: Gastroenterology    PA BIOPSY/EXCISION, LYMPH NODE(S) Left 2020    Procedure: EXCISION BIOPSY LYMPH NODE SUPRACLAVICULAR;  Surgeon: Yuri Álvarez MD;  Location: [facility];  Service: General       Family History   Problem Relation Age of Onset    Melanoma Mother     Prostate cancer Father     Lung cancer Father     Breast cancer Sister     Pancreatic cancer Paternal Grandfather     Hodgkin's lymphoma Sister      I have reviewed and agree with the history as documented  E-Cigarette/Vaping    E-Cigarette Use Never User      E-Cigarette/Vaping Substances    Nicotine No     Flavoring No      Social History     Tobacco Use    Smoking status: Never Smoker    Smokeless tobacco: Never Used   Vaping Use    Vaping Use: Never used   Substance Use Topics    Alcohol use: Not Currently    Drug use: No       Review of Systems   Constitutional: Positive for chills, fatigue and fever  Neurological: Positive for tremors  All other systems reviewed and are negative  Physical Exam  Physical Exam  Vitals and nursing note reviewed  Constitutional:       General: He is not in acute distress  Appearance: He is well-developed  HENT:      Head: Normocephalic and atraumatic  Right Ear: External ear normal       Left Ear: External ear normal       Mouth/Throat:      Pharynx: No oropharyngeal exudate  Eyes:      General: No scleral icterus  Pupils: Pupils are equal, round, and reactive to light  Cardiovascular:      Rate and Rhythm: Regular rhythm  Tachycardia present  Heart sounds: Normal heart sounds  Pulmonary:      Effort: Pulmonary effort is normal  No respiratory distress  Breath sounds: Normal breath sounds  Abdominal:      General: Bowel sounds are normal       Palpations: Abdomen is soft  Tenderness: There is no abdominal tenderness  There is no guarding or rebound  Musculoskeletal:         General: Normal range of motion  Cervical back: Normal range of motion and neck supple  Skin:     General: Skin is warm and dry  Findings: No rash  Neurological:      Mental Status: He is alert and oriented to person, place, and time           Vital Signs  ED Triage Vitals [03/11/22 1039]   Temperature Pulse Respirations Blood Pressure SpO2   97 6 °F (36 4 °C) (!) 136 (!) 28 (!) 89/55 93 % Temp Source Heart Rate Source Patient Position - Orthostatic VS BP Location FiO2 (%)   Oral Monitor Lying Left arm --      Pain Score       7           Vitals:    03/11/22 1245 03/11/22 1400 03/11/22 1430 03/11/22 1531   BP: 95/55 98/61 105/60 112/72   Pulse: (!) 124 (!) 120 (!) 125    Patient Position - Orthostatic VS:             Visual Acuity      ED Medications  Medications   LORazepam (ATIVAN) injection 1 mg (has no administration in time range)   sodium chloride 0 9 % bolus 1,000 mL (0 mL Intravenous Stopped 3/11/22 1231)   vancomycin (VANCOCIN) IVPB (premix in dextrose) 1,000 mg 200 mL (1,000 mg Intravenous New Bag 3/11/22 1353)   cefepime (MAXIPIME) IVPB (premix in dextrose) 2,000 mg 50 mL (0 mg Intravenous Stopped 3/11/22 1259)   sodium chloride 0 9 % bolus 1,000 mL (0 mL Intravenous Stopped 3/11/22 1331)   HYDROmorphone (DILAUDID) injection 1 mg (1 mg Intravenous Given 3/11/22 1352)   iohexol (OMNIPAQUE) 350 MG/ML injection (SINGLE-DOSE) 100 mL (100 mL Intravenous Given 3/11/22 1440)       Diagnostic Studies  Results Reviewed     Procedure Component Value Units Date/Time    Lactic acid 2 Hours [[id number]]  (Normal) Collected: 03/11/22 1357    Lab Status: Final result Specimen: Blood from Central Venous Line Updated: 03/11/22 1436     LACTIC ACID 1 6 mmol/L     Narrative:      Result may be elevated if tourniquet was used during collection      HS Troponin I 2hr [[id number]]  (Normal) Collected: 03/11/22 1357    Lab Status: Final result Specimen: Blood from Central Venous Line Updated: 03/11/22 1433     hs TnI 2hr 42 ng/L      Delta 2hr hsTnI 4 ng/L     Urine Microscopic [[id number]] Collected: 03/11/22 1358    Lab Status: Final result Specimen: Urine, Clean Catch Updated: 03/11/22 1419     RBC, UA 1-2 /hpf      WBC, UA 2-4 /hpf      Epithelial Cells Occasional /hpf      Bacteria, UA Occasional /hpf      COARSE GRANULAR CASTS 4-5 /lpf     UA w Reflex to Microscopic w Reflex to Culture [[id number]] (Abnormal) Collected: 03/11/22 1358    Lab Status: Final result Specimen: Urine, Clean Catch Updated: 03/11/22 1408     Color, UA Yellow     Clarity, UA Clear     Specific Gravity, UA 1 015     pH, UA 5 5     Leukocytes, UA Negative     Nitrite, UA Negative     Protein, UA Trace mg/dl      Glucose, UA Negative mg/dl      Ketones, UA Negative mg/dl      Urobilinogen, UA 1 0 E U /dl      Bilirubin, UA Negative     Blood, UA Moderate    HS Troponin I 4hr [[id number]]     Lab Status: No result Specimen: Blood     Procalcitonin with AM Reflex [[id number]]  (Abnormal) Collected: 03/11/22 1111    Lab Status: Final result Specimen: Blood from Central Venous Line Updated: 03/11/22 1157     Procalcitonin 1 72 ng/ml     Procalcitonin Reflex [[id number]]     Lab Status: No result Specimen: Blood     Lactic acid [[id number]]  (Abnormal) Collected: 03/11/22 1111    Lab Status: Final result Specimen: Blood from Central Venous Line Updated: 03/11/22 1156     LACTIC ACID 2 6 mmol/L     Narrative:      Result may be elevated if tourniquet was used during collection      HS Troponin 0hr (reflex protocol) [[id number]]  (Normal) Collected: 03/11/22 1115    Lab Status: Final result Specimen: Blood from Central Venous Line Updated: 03/11/22 1154     hs TnI 0hr 38 ng/L     Comprehensive metabolic panel [[id number]]  (Abnormal) Collected: 03/11/22 1111    Lab Status: Final result Specimen: Blood from Central Venous Line Updated: 03/11/22 1147     Sodium 133 mmol/L      Potassium 5 0 mmol/L      Chloride 99 mmol/L      CO2 20 mmol/L      ANION GAP 14 mmol/L      BUN 28 mg/dL      Creatinine 1 26 mg/dL      Glucose 100 mg/dL      Calcium 7 3 mg/dL      Corrected Calcium 8 7 mg/dL       U/L      ALT 38 U/L      Alkaline Phosphatase 957 U/L      Total Protein 5 8 g/dL      Albumin 2 2 g/dL      Total Bilirubin 1 40 mg/dL      eGFR 60 ml/min/1 73sq m     Narrative:      [place] guidelines for Chronic Kidney Disease (CKD):   Stage 1 with normal or high GFR (GFR > 90 mL/min/1 73 square meters)    Stage 2 Mild CKD (GFR = 60-89 mL/min/1 73 square meters)    Stage 3A Moderate CKD (GFR = 45-59 mL/min/1 73 square meters)    Stage 3B Moderate CKD (GFR = 30-44 mL/min/1 73 square meters)    Stage 4 Severe CKD (GFR = 15-29 mL/min/1 73 square meters)    Stage 5 End Stage CKD (GFR <15 mL/min/1 73 square meters)  Note: GFR calculation is accurate only with a steady state creatinine    APTT [[id number]]  (Normal) Collected: 03/11/22 1111    Lab Status: Final result Specimen: Blood from Central Venous Line Updated: 03/11/22 1141     PTT 36 seconds     Protime-INR [[id number]]  (Abnormal) Collected: 03/11/22 1111    Lab Status: Final result Specimen: Blood from Central Venous Line Updated: 03/11/22 1141     Protime 16 5 seconds      INR 1 35    Manual Differential(PHLEBS Do Not Order) [[id number]]  (Abnormal) Collected: 03/11/22 1111    Lab Status: Final result Specimen: Blood from Central Venous Line Updated: 03/11/22 1138     Segmented % 78 %      Lymphocytes % 10 %      Monocytes % 12 %      Eosinophils, % 0 %      Basophils % 0 %      Absolute Neutrophils 7 51 Thousand/uL      Lymphocytes Absolute 0 96 Thousand/uL      Monocytes Absolute 1 16 Thousand/uL      Eosinophils Absolute 0 00 Thousand/uL      Basophils Absolute 0 00 Thousand/uL      Total Counted --     RBC Morphology Present     Anisocytosis Present     Platelet Estimate Decreased    CBC and differential [[id number]]  (Abnormal) Collected: 03/11/22 1111    Lab Status: Final result Specimen: Blood from Central Venous Line Updated: 03/11/22 1138     WBC 9 63 Thousand/uL      RBC 3 04 Million/uL      Hemoglobin 8 6 g/dL      Hematocrit 27 1 %      MCV 89 fL      MCH 28 3 pg      MCHC 31 7 g/dL      RDW 15 5 %      MPV 12 1 fL      Platelets 26 Thousands/uL     Blood culture #1 [[id number]] Collected: 03/11/22 1111    Lab Status:  In process Specimen: Blood from Central Venous Line Updated: 03/11/22 1120    Blood culture #2 [[id number]] Collected: 03/11/22 1111    Lab Status: In process Specimen: Blood from Central Venous Line Updated: 03/11/22 1120                 CT abdomen pelvis with contrast   Final Result by Nieves Mays MD (03/11 2559)      1  New bibasilar dependent atelectatic changes with trace pleural fluid on the left  2   New moderate right hydronephrosis and hydroureter and could be the source of sepsis  Stable appearance of moderate left hydronephrosis and hydroureter  3  Worsening hepatic metastasis  4  Worsening Was Increasing left bladder wall mass  5 Worsening adrenal metastasis  Worsening anterior pelvic mesenteric nodule  Worsening aortocaval and retrocaval lymph nodes  6  Worsening left external iliac lymph node  The study was marked in [facility] for immediate notification  Workstation performed: [id number]         XR chest 1 view portable   Final Result by Debbi Mccartney MD (03/11 3010)      No findings of pneumonia or pulmonary edema  Findings related to metastatic disease as described above  Workstation performed: [id number]         CT head wo contrast    (Results Pending)              Procedures  ECG 12 Lead Documentation Only    Date/Time: 3/11/2022 11:26 AM  Performed by: Dari Lechuga DO  Authorized by: Dari Lechuga DO     Indications / Diagnosis:  Fever  ECG reviewed by me, the ED Provider: yes    Patient location:  ED  Previous ECG:     Previous ECG:  Compared to current    Comparison ECG info:  Ventricular rate has increased significantly when compared to previous    There are no acute ischemic changes when compared to EKG from March 3rd    Similarity:  Changes noted  Interpretation:     Interpretation: normal    Rate:     ECG rate:  169    ECG rate assessment: tachycardic    Rhythm:     Rhythm: sinus tachycardia    Ectopy:     Ectopy: none    QRS:     QRS axis:  Normal    QRS intervals: Normal  Conduction:     Conduction: abnormal      Abnormal conduction: complete RBBB    ST segments:     ST segments:  Non-specific    Depression:  V4, V5 and V6  T waves:     T waves: normal    ECG 12 Lead Documentation Only    Date/Time: 3/11/2022 11:50 AM  Performed by: Nleida Riggs DO  Authorized by: Nelida Riggs DO     Indications / Diagnosis:  Tachycardia  ECG reviewed by me, the ED Provider: yes    Patient location:  ED  Previous ECG:     Previous ECG:  Compared to current    Comparison ECG info:  Ventricular rate decreased by approximately 40 beats per minute  Similarity:  Changes noted  Interpretation:     Interpretation: normal    Rate:     ECG rate:  126    ECG rate assessment: tachycardic    Rhythm:     Rhythm: sinus tachycardia    Ectopy:     Ectopy: none    QRS:     QRS axis:  Normal    QRS intervals:  Normal  Conduction:     Conduction: abnormal      Abnormal conduction: complete RBBB    ST segments:     ST segments:  Normal  T waves:     T waves: normal    CriticalCare Time  Performed by: Nelida Riggs DO  Authorized by:  Nelida Riggs DO     Critical care provider statement:     Critical care time (minutes):  35    Critical care time was exclusive of:  Separately billable procedures and treating other patients and teaching time    Critical care was necessary to treat or prevent imminent or life-threatening deterioration of the following conditions:  Sepsis    Critical care was time spent personally by me on the following activities:  Blood draw for specimens, obtaining history from patient or surrogate, development of treatment plan with patient or surrogate, discussions with consultants, evaluation of patient's response to treatment, examination of patient, ordering and performing treatments and interventions, ordering and review of laboratory studies, ordering and review of radiographic studies, re-evaluation of patient's condition, review of old charts and interpretation of cardiac output measurements    I assumed direction of critical care for this patient from another provider in my specialty: no               ED Course  ED Course as of 03/11/22 1620   Fri Mar 11, 2022   1149 Heart rate improved upon re-evaluation after some fluids  1217 LACTIC ACID(!!): 2 6  Sepsis alert called  ABX ordered   Dariusz CCAP paged for admit   1226 Discussed case with Tess Ramsey from [phone]  She advised 2 L of fluids and re-evaluation  If Patient's blood pressure improves the patient could be admitted to medicine service versus critical care   1324 BP now 100/56  SLIM paged for admit  Pain medication ordered for patient                            Initial Sepsis Screening     Row Name 03/11/22 1352 03/11/22 1214             Is the patient's history suggestive of a new or worsening infection? -- --       Suspected source of infection suspect infection, source unknown  -ML suspect infection, source unknown  -ML       Are two or more of the following signs & symptoms of infection both present and new to the patient? Yes (Proceed)  -ML Yes (Proceed)  -ML       Indicate SIRS criteria Tachycardia > 90 bpm;Tachypnea > 20 resp per min  -ML Tachycardia > 90 bpm;Tachypnea > 20 resp per min  -ML       If the answer is yes to both questions, suspicion of sepsis is present -- --       If severe sepsis is present AND tissue hypoperfusion perists in the hour after fluid resuscitation or lactate > 4, the patient meets criteria for SEPTIC SHOCK -- --       Are any of the following organ dysfunction criteria present within 6 hours of suspected infection and SIRS criteria that are NOT considered to be chronic conditions? -- Yes  -ML       Organ dysfunction Lactate > 2 0 mmol/L;SBP < 90 mmHg  -ML SBP < 90 mmHg; Lactate > 2 0 mmol/L  -ML       Date of presentation of severe sepsis -- --       Time of presentation of severe sepsis -- --       Tissue hypoperfusion persists in the hour after crystalloid fluid administration, evidenced, by either: -- --       Was hypotension present within one hour of the conclusion of crystalloid fluid administration? No  -ML --       Date of presentation of septic shock -- --       Time of presentation of septic shock -- --             User Key  (r) = Recorded By, (t) = Taken By, (c) = Cosigned By    [address] Name Provider Type    ML Josse Dasilva, DO Physician                              MDM  Number of Diagnoses or Management Options  Anemia  Hypotension  Sepsis [facility]): new and requires workup  Sinus tachycardia  Diagnosis management comments: Differential diagnosis:  Urinary tract infection, pneumonia, sepsis, anemia, renal failure other  Plan for sepsis workup coverage with broad-spectrum antibiotics with plan for admission      Hypotension addressed with IV fluids and improved blood pressure after re-evaluation           Amount and/or Complexity of Data Reviewed  Clinical lab tests: reviewed  Tests in the radiology section of CPT®: reviewed  Tests in the medicine section of CPT®: reviewed  Decide to obtain previous medical records or to obtain history from someone other than the patient: yes  Review and summarize past medical records: yes  Discuss the patient with other providers: yes        Disposition  Final diagnoses:   Sepsis ([address] )   Hypotension   Sinus tachycardia   Anemia     Time reflects when diagnosis was documented in both MDM as applicable and the Disposition within this note     Time User Action Codes Description Comment    3/11/2022 12:19 PM [place] Add [A41 9] Sepsis ([address] )     3/11/2022 12:19 PM Geannie Smaller [I95 9] Hypotension     3/11/2022 12:21 PM [place] Add [R00 0] Sinus tachycardia     3/11/2022 12:21 PM Dominic [place] Add [D64 9] Anemia       ED Disposition     ED Disposition Condition Date/Time Comment    Admit Stable Fri Mar 11, 2022  2:21 PM Case was discussed with Dr Vahe Lassiter and the patient's admission status was agreed to be Admission Status: inpatient status to the service of Dr Shefali Albardao   Follow-up Information    None         Current Discharge Medication List      CONTINUE these medications which have NOT CHANGED    Details   dexamethasone (DECADRON) 4 mg tablet Take 4 mg by mouth 2 (two) times a day with meals      Ascorbic Acid (vitamin C) 1000 MG tablet Take 1,000 mg by mouth daily      Bacillus Coagulans-Inulin (Probiotic) 1-250 BILLION-MG CAPS Take by mouth 50 billion      cholecalciferol (VITAMIN D3) 1,000 units tablet Take 500 Units by mouth daily 50,000 1x week      Erleada 60 MG TABS       famotidine (PEPCID) 20 mg tablet Take 20 mg by mouth 2 (two) times a day      gabapentin (NEURONTIN) 300 mg capsule Take 600 mg by mouth 3 (three) times a day        ketoconazole (NIZORAL) 200 mg tablet Take 200 mg by mouth daily      loperamide (IMODIUM) 2 mg capsule Take 2 mg by mouth 4 (four) times a day as needed for diarrhea      magnesium 30 MG tablet Take 30 mg by mouth in the morning      melatonin 1 mg Take 6 mg by mouth daily at bedtime      metoprolol tartrate (LOPRESSOR) 25 mg tablet Take 12 5 mg by mouth every 12 (twelve) hours      Nutritional Supplements (PRO-RAJINDER PLUS PO) Take by mouth      Omega-3 Fatty Acids (Fish Oil) 1200 MG CAPS Take by mouth      ondansetron (ZOFRAN) 4 mg tablet Take 4 mg by mouth every 8 (eight) hours as needed for nausea or vomiting      oxyCODONE (ROXICODONE) 10 MG TABS Take 10 mg by mouth      pantoprazole (PROTONIX) 40 mg tablet Take 40 mg by mouth as needed        predniSONE 5 mg tablet 2 (two) times a day with meals 5mg in the morning 7 5mg at night       RESTASIS 0 05 % ophthalmic emulsion Administer 1 drop to both eyes every 12 (twelve) hours      sodium chloride 1 g tablet 1 g 2 (two) times a day        zinc gluconate 50 mg tablet Take 50 mg by mouth daily             No discharge procedures on file      PDMP Review     None          ED Provider  Electronically Signed by           Andres January, DO  03/11/22 1621

## 2022-03-11 NOTE — H&P
[place]  H&P- Charline Battles 1960, 57 y o  male MRN: [id number]  Unit/Bed#: MS [facility] Encounter: [id number]  Primary Care Provider: Virginia Solis MD   Date and time admitted to hospital: 3/11/2022 10:33 AM    * Severe sepsis [facility])  Assessment & Plan  Patient presented to the ED with a fevers at home  Recently admitted on 3/3-3/6 for RANDI and pancytopenia    Patient meets criteria due to fevers, tachycardia, tachypnea, hypotension, leukopenia, lactic acid 2 6, procalcitonin 1 72 source unknown    CT abd pelvis:1   New bibasilar dependent atelectatic changes with trace pleural fluid on the left  2  New moderate right hydronephrosis and hydroureter and could be the source of sepsis  Stable appearance of moderate left hydronephrosis and hydroureter  3  Worsening hepatic metastasis  4  Worsening Was Increasing left bladder wall mass  5 Worsening adrenal metastasis   Worsening anterior pelvic mesenteric nodule   Worsening aortocaval and retrocaval lymph nodes  6  Worsening left external iliac lymph node  Blood cultures pending  Given vanc cefepime in the ED-> continue on admission  Lactic acid q 2 hours till cleared  Continue to trend troponin  IVF continue  Trend procal    Acute metabolic encephalopathy  Assessment & Plan  Most likely secondary to sepsis    CT head pending  Eeg    See management of sepsis for further information    Severe protein-calorie malnutrition ([address] )  Assessment & Plan  Malnutrition Findings:     Adult Degree of Malnutrition: Other severe protein calorie malnutrition (Pt presents with severe protein calorie malnutrition as evidenced by <75% po intake > 1 month, clavicle protrusion, temporal depressions and sunken orbitals )        BMI Findings: Body mass index is 21 52 kg/m²         Tachycardia  Assessment & Plan  Has a longstanding history of tachycardia  Echo on 2/15/22 showed an EF of 65% with normal systolic and diastolic function    EKG on admission: Sinus tachycardia When compared with ECG of 11-MAR-2022 No significant change was found    Telemetry    Pancytopenia (HCC)  Assessment & Plan  Associated with chemo an active malignancy  WBC within normal limits 9 63  Hemoglobin 8 6  Platelets 26    S/p transfusion on recent admission for plts and PRBC's    Continue to monitor  Transfuse PRBCs with hb < 7  Transfuse Plts < 20  Holding DVT prophylaxis    Pulmonary embolus (HCC)  Assessment & Plan  Hx of PE  Holding AC d/t thrombocytopenia    Prostate cancer ([facility] [address] )  Assessment & Plan  Mets to the bone, liver and bilateral adrenal    CT 1/22 showed improved mediastinal and hilar adenopathy  Unchanged lingular noted  Worsening hepatic metastatic disease  New right adrenal metastatic nodule  Unchanged left adrenal metastatic disease  New upper abdominal retroperitoneal adenopathy  Slight worse moderate to severe left hydroureteronephrosis, with increased irregular mass involving the left side of the urinary bladder and left seminal vesicle  Increased adenopathy along the left pelvic sidewall  Diffuse widespread osseous metastatic disease  EUS Lymphadenopathy biopsy done on 9/21 showed metastatic carcinoma compatible with prostatic primary    Follows with Dr Elliot Gomez    Undergoing chemo    On prednisone 5 mg po qd-> Solumedrol 40 mg IV q8h for stress dose    MALT lymphoma ([facility] [address] )  Assessment & Plan  Diagnosed in 5/2018 s/p triple antibiotic course for H pylori at that time and then found to have progressive disease is 2nd gastric lesions with resolution on EGD 9/21    VTE Pharmacologic Prophylaxis: VTE Score: 4 Moderate Risk (Score 3-4) - Pharmacological DVT Prophylaxis Contraindicated  Sequential Compression Devices Ordered  Code Status: Prior full code    Anticipated Length of Stay: Patient will be admitted on an inpatient basis with an anticipated length of stay of greater than 2 midnights secondary to sepsis      Total Time for Visit, including Counseling / Coordination of Care: 45 minutes Greater than 50% of this total time spent on direct patient counseling and coordination of care  Chief Complaint: fevers    History of Present Illness:  Cheo Ospina is a 57 y o  male with a PMH of prostate cancer, MALT lymphoma, pancytopenia, PE, malnutrition and tachycardia who presents with fevers that started earlier today  He was recently admitted on 3/3/22-3/6/22 for pancytopenia and RANDI  Per wife she noticed jerky movements earlier today when he became febrile as well as disoriented  He denies having any chest pain, shortness a breath, abdominal pain, nausea, vomiting, diarrhea, constipation and urinary symptoms  Review of Systems:  Review of Systems   All other systems reviewed and are negative  Past Medical and Surgical History:   Past Medical History:   Diagnosis Date    Cancer [facility])     Stomach    Lymphoma ([facility] [address] ) 05/2018    Non Hodgkin's lymphoma ([facility] [address] )     Prostate cancer ([facility] [address] ) 2020    Shingles     Thoracic aortic aneurysm ([facility] [address] )     PATIENT DENIES       Past Surgical History:   Procedure Laterality Date    APPENDECTOMY      COLONOSCOPY      HERNIA REPAIR      B/L hernia repair    IR PORT PLACEMENT  11/4/2021    KNEE ARTHROSCOPY      KNEE ARTHROSCOPY, MEDIAL PATELLO FEMORAL LIGAMENT REPAIR Left     LINEAR ENDOSCOPIC U/S N/A 6/13/2018    Procedure: LINEAR ENDOSCOPIC U/S;  Surgeon: Dario Farooq MD;  Location: [facility]; Service: Gastroenterology    MO BIOPSY/EXCISION, LYMPH NODE(S) Left 6/24/2020    Procedure: EXCISION BIOPSY LYMPH NODE SUPRACLAVICULAR;  Surgeon: Toyin Lowe MD;  Location: [facility];  Service: General       Meds/Allergies:  Prior to Admission medications    Medication Sig Start Date End Date Taking?  Authorizing Provider   Ascorbic Acid (vitamin C) 1000 MG tablet Take 1,000 mg by mouth daily    Historical Provider, MD   Bacillus Coagulans-Inulin (Probiotic) 1-250 BILLION-MG CAPS Take by mouth 50 billion    Historical Provider, MD   cholecalciferol (VITAMIN D3) 1,000 units tablet Take 500 Units by mouth daily 50,000 1x week    Historical Provider, MD   Erleada 60 MG TABS  2/23/22   Historical Provider, MD   famotidine (PEPCID) 20 mg tablet Take 20 mg by mouth 2 (two) times a day    Historical Provider, MD   gabapentin (NEURONTIN) 300 mg capsule Take 600 mg by mouth 3 (three) times a day      Historical Provider, MD   ketoconazole (NIZORAL) 200 mg tablet Take 200 mg by mouth daily 2/24/22   Historical Provider, MD   loperamide (IMODIUM) 2 mg capsule Take 2 mg by mouth 4 (four) times a day as needed for diarrhea    Historical Provider, MD   magnesium 30 MG tablet Take 30 mg by mouth in the morning    Historical Provider, MD   melatonin 1 mg Take 6 mg by mouth daily at bedtime    Historical Provider, MD   metoprolol tartrate (LOPRESSOR) 25 mg tablet Take 12 5 mg by mouth every 12 (twelve) hours    Historical Provider, MD   Nutritional Supplements (PRO-RAJINDER PLUS PO) Take by mouth    Historical Provider, MD   Omega-3 Fatty Acids (Fish Oil) 1200 MG CAPS Take by mouth  Patient not taking: Reported on 3/3/2022     Historical Provider, MD   ondansetron (ZOFRAN) 4 mg tablet Take 4 mg by mouth every 8 (eight) hours as needed for nausea or vomiting    Historical Provider, MD   oxyCODONE (ROXICODONE) 10 MG TABS Take 10 mg by mouth 12/6/21   Historical Provider, MD   pantoprazole (PROTONIX) 40 mg tablet Take 40 mg by mouth as needed   3/7/20   Historical Provider, MD   predniSONE 5 mg tablet 2 (two) times a day with meals 5mg in the morning 7 5mg at night  2/24/22   Historical Provider, MD   RESTASIS 0 05 % ophthalmic emulsion Administer 1 drop to both eyes every 12 (twelve) hours 6/9/20   Historical Provider, MD   sodium chloride 1 g tablet 1 g 2 (two) times a day   1/19/22   Historical Provider, MD   zinc gluconate 50 mg tablet Take 50 mg by mouth daily    Historical Provider, MD     I have reviewed home medications using recent Epic encounter  Allergies: Allergies   Allergen Reactions    Granisetron Nausea Only     Weakness/nausea    Tetracycline Other (See Comments)     Increase LFT's    Valtrex [Valacyclovir] Nausea Only       Social History:  Marital Status: /Civil Union   Patient Pre-hospital Living Situation: Home, With spouse  Patient Pre-hospital Level of Mobility: walks  Patient Pre-hospital Diet Restrictions: none  Substance Use History:   Social History     Substance and Sexual Activity   Alcohol Use Not Currently     Social History     Tobacco Use   Smoking Status Never Smoker   Smokeless Tobacco Never Used     Social History     Substance and Sexual Activity   Drug Use No       Family History:  Family History   Problem Relation Age of Onset    Melanoma Mother     Prostate cancer Father     Lung cancer Father     Breast cancer Sister     Pancreatic cancer Paternal Grandfather     Hodgkin's lymphoma Sister        Physical Exam:     Vitals:   Blood Pressure: 112/72 (03/11/22 1531)  Pulse: (!) 125 (03/11/22 1430)  Temperature: (!) 97 1 °F (36 2 °C) (03/11/22 1531)  Temp Source: Oral (03/11/22 1039)  Respirations: 18 (03/11/22 1531)  Height: 5' 10" (177 8 cm) (03/11/22 1039)  Weight - Scale: 68 kg (150 lb) (03/11/22 1039)  SpO2: 95 % (03/11/22 1430)    Physical Exam  Vitals and nursing note reviewed  Constitutional:       Comments: Thin frail cachetic    HENT:      Head: Normocephalic and atraumatic  Cardiovascular:      Rate and Rhythm: Normal rate and regular rhythm  Pulses: Normal pulses  Heart sounds: Normal heart sounds  Pulmonary:      Effort: Pulmonary effort is normal       Breath sounds: Normal breath sounds  Abdominal:      General: Abdomen is flat  Bowel sounds are normal       Palpations: Abdomen is soft  Musculoskeletal:      Right lower leg: No edema  Left lower leg: No edema  Skin:     General: Skin is warm     Neurological:      General: No focal deficit present  Mental Status: He is alert and oriented to person, place, and time  Comments: Tired responding to name          Additional Data:     Lab Results:  Results from last 7 days   Lab Units 03/11/22  1111 03/06/22  0546 03/05/22  0553   WBC Thousand/uL 9 63   < > 2 46*   HEMOGLOBIN g/dL 8 6*   < > 9 1*   HEMATOCRIT % 27 1*   < > 29 0*   PLATELETS Thousands/uL 26*   < > 19*   BANDS PCT %  --   --  13*   LYMPHO PCT % 10*  --  24   MONO PCT % 12  --  5   EOS PCT % 0  --  0    < > = values in this interval not displayed  Results from last 7 days   Lab Units 03/11/22  1111   SODIUM mmol/L 133*   POTASSIUM mmol/L 5 0   CHLORIDE mmol/L 99*   CO2 mmol/L 20*   BUN mg/dL 28*   CREATININE mg/dL 1 26   ANION GAP mmol/L 14*   CALCIUM mg/dL 7 3*   ALBUMIN g/dL 2 2*   TOTAL BILIRUBIN mg/dL 1 40*   ALK PHOS U/L 957*   ALT U/L 38   AST U/L 431*   GLUCOSE RANDOM mg/dL 100     Results from last 7 days   Lab Units 03/11/22  1111   INR  1 35*             Results from last 7 days   Lab Units 03/11/22  1357 03/11/22  1111   LACTIC ACID mmol/L 1 6 2 6*   PROCALCITONIN ng/ml  --  1 72*       Imaging: Reviewed radiology reports from this admission including: chest xray, abdominal/pelvic CT and CT head  CT abdomen pelvis with contrast   Final Result by Mynor Shafer MD (03/11 1497)      1  New bibasilar dependent atelectatic changes with trace pleural fluid on the left  2   New moderate right hydronephrosis and hydroureter and could be the source of sepsis  Stable appearance of moderate left hydronephrosis and hydroureter  3  Worsening hepatic metastasis  4  Worsening Was Increasing left bladder wall mass  5 Worsening adrenal metastasis  Worsening anterior pelvic mesenteric nodule  Worsening aortocaval and retrocaval lymph nodes  6  Worsening left external iliac lymph node  The study was marked in [facility] for immediate notification         Workstation performed: [id number] XR chest 1 view portable   Final Result by Nena Jaimes MD (03/11 7295)      No findings of pneumonia or pulmonary edema  Findings related to metastatic disease as described above  Workstation performed: [id number]         CT head wo contrast    (Results Pending)       EKG and Other Studies Reviewed on Admission:   EKG: Sinus tachycardia  Left axis deviation  Right bundle branch block  Abnormal ECG  When compared with ECG of 11-MAR-2022 11:16, (unconfirmed) No significant change was found    ** Please Note: This note has been constructed using a voice recognition system   **

## 2022-03-11 NOTE — ASSESSMENT & PLAN NOTE
Most likely secondary to sepsis    CT head pending  Eeg    See management of sepsis for further information

## 2022-03-11 NOTE — ASSESSMENT & PLAN NOTE
Associated with chemo an active malignancy  WBC within normal limits 9 63  Hemoglobin 8 6  Platelets 26    S/p transfusion on recent admission for plts and PRBC's    Continue to monitor  Transfuse PRBCs with hb < 7  Transfuse Plts < 20  Holding DVT prophylaxis

## 2022-03-11 NOTE — PROGRESS NOTES
Vancomycin Assessment    Colette Villafuerte is a 57 y o  male who is currently receiving vancomycin for pneumonia  Relevant clinical data and objective history reviewed:  Creatinine   Date Value Ref Range Status   03/11/2022 1 26 0 60 - 1 30 mg/dL Final     Comment:     Standardized to IDMS reference method   03/06/2022 0 85 0 60 - 1 30 mg/dL Final     Comment:     Standardized to IDMS reference method   03/05/2022 0 96 0 60 - 1 30 mg/dL Final     Comment:     Standardized to IDMS reference method     /72   Pulse (!) 125   Temp (!) 97 1 °F (36 2 °C)   Resp 18   Ht 5' 10" (1 778 m)   Wt 68 kg (150 lb)   SpO2 95%   BMI 21 52 kg/m²   No intake/output data recorded  Lab Results   Component Value Date/Time    BUN 28 (H) 03/11/2022 11:11 AM    WBC 9 63 03/11/2022 11:11 AM    HGB 8 6 (L) 03/11/2022 11:11 AM    HCT 27 1 (L) 03/11/2022 11:11 AM    MCV 89 03/11/2022 11:11 AM    PLT 26 (LL) 03/11/2022 11:11 AM     Temp Readings from Last 3 Encounters:   03/11/22 (!) 97 1 °F (36 2 °C)   03/06/22 97 7 °F (36 5 °C)   03/01/22 97 9 °F (36 6 °C) (Tympanic)     Vancomycin Days of Therapy: 1    Assessment/Plan  The patient is currently on vancomycin utilizing scheduled dosing based on actual body weight  Baseline risks associated with therapy include: advanced age  The patient will receive a one time dose of 1000 mg of vancomycin and will be continued on 750 mg Q 12 H  Pharmacy will also follow closely for s/sx of nephrotoxicity, infusion reactions, and appropriateness of therapy  BMP and CBC will be ordered per protocol  Plan for trough as patient approaches steady state, prior to the 4th  dose at approximately 2330 on 03/13/22  Due to infection severity, will target a trough of 15-20  Pharmacy will continue to follow the patients culture results and clinical progress daily      Eugenie Jiménez, Pharmacist

## 2022-03-11 NOTE — ASSESSMENT & PLAN NOTE
Has a longstanding history of tachycardia  Echo on 2/15/22 showed an EF of 65% with normal systolic and diastolic function    EKG on admission: Sinus tachycardia When compared with ECG of 11-MAR-2022 No significant change was found    Telemetry

## 2022-03-11 NOTE — ASSESSMENT & PLAN NOTE
Mets to the bone, liver and bilateral adrenal    CT 1/22 showed improved mediastinal and hilar adenopathy  Unchanged lingular noted  Worsening hepatic metastatic disease  New right adrenal metastatic nodule  Unchanged left adrenal metastatic disease  New upper abdominal retroperitoneal adenopathy  Slight worse moderate to severe left hydroureteronephrosis, with increased irregular mass involving the left side of the urinary bladder and left seminal vesicle  Increased adenopathy along the left pelvic sidewall  Diffuse widespread osseous metastatic disease        EUS Lymphadenopathy biopsy done on 9/21 showed metastatic carcinoma compatible with prostatic primary    Follows with Dr Saima Medina    Undergoing chemo    On prednisone 5 mg po qd-> Solumedrol 40 mg IV q8h for stress dose

## 2022-03-11 NOTE — ASSESSMENT & PLAN NOTE
Malnutrition Findings:     Adult Degree of Malnutrition: Other severe protein calorie malnutrition (Pt presents with severe protein calorie malnutrition as evidenced by <75% po intake > 1 month, clavicle protrusion, temporal depressions and sunken orbitals )        BMI Findings: Body mass index is 21 52 kg/m²

## 2022-03-11 NOTE — ASSESSMENT & PLAN NOTE
Patient presented to the ED with a fevers at home  Recently admitted on 3/3-3/6 for RANDI and pancytopenia    Patient meets criteria due to fevers, tachycardia, tachypnea, hypotension, leukopenia, lactic acid 2 6, procalcitonin 1 72 source unknown    CT abd pelvis:1   New bibasilar dependent atelectatic changes with trace pleural fluid on the left  2  New moderate right hydronephrosis and hydroureter and could be the source of sepsis  Stable appearance of moderate left hydronephrosis and hydroureter  3  Worsening hepatic metastasis  4  Worsening Was Increasing left bladder wall mass  5 Worsening adrenal metastasis   Worsening anterior pelvic mesenteric nodule   Worsening aortocaval and retrocaval lymph nodes  6  Worsening left external iliac lymph node       Blood cultures pending  Given vanc cefepime in the ED-> continue on admission  Lactic acid q 2 hours till cleared  Continue to trend troponin  IVF continue  Trend procal

## 2022-03-11 NOTE — ASSESSMENT & PLAN NOTE
Diagnosed in 5/2018 s/p triple antibiotic course for H pylori at that time and then found to have progressive disease is 2nd gastric lesions with resolution on EGD 9/21

## 2022-03-11 NOTE — QUICK NOTE
Progress Note - Triage Asssessment   Thuy Luna 57 y o  male MRN: [id number]    Time Called ( Time): 4640  Date Called: 03/11/22  Room#: ER 1  Person requesting evaluation: Dr Deb Marcelino     Situation:    59-year-old male with a past medical history of prostate cancer currently undergoing chemotherapy, hypercholesterolemia presents to the ER with a chief complaint of fever at home  On presentation to the ER, his blood pressure is reportedly 80s over 50s, with a heart rate as high as 160  He was bolused 2050mL of crystalloid fluid in keeping with his 30cc/kg bolus for s/o sepsis  Patient appears intravascularly dry, sleepy, though wakes easily to voice and is oriented x3, in NAD, appropriately conversational  He denies n/v/d, abd pain, CP, SOB, HA, dizziness  He is covered in the ER with vancomycin cefepime, UA pending  Following IVF boluses, his blood pressure improves to systolics 729L with maps in the 70s, and heart rate of 120  At this time, he appears appropriate for medical-surgical admission under the hospitalist service  Interventions:   Suggestions:  · Cont IVF resuscitation   · Agree with BSA coverage with Vanc, cefepime   · Consider further imaging if fevers persist and unable to determine source, though possible in the setting of neutropenic fever?   · Suggest tele for tachycardia   · Now HD stable   · Follow BC, procal pending        Triage Assessment:     Patient can be admitted to med-surg level of care    Recommendations discussed with Dr Deb Marcelino

## 2022-03-12 PROBLEM — G93.41 ACUTE METABOLIC ENCEPHALOPATHY: Status: RESOLVED | Noted: 2022-01-01 | Resolved: 2022-01-01

## 2022-03-12 NOTE — DISCHARGE INSTRUCTIONS
Nephrostomy Tube Care     WHAT YOU NEED TO KNOW:   A nephrostomy tube is a catheter (thin plastic tube) that is inserted through your skin and into your kidney  The nephrostomy tube drains urine from your kidney into a collecting bag outside your body  You may need a nephrostomy tube when something is blocking the normal flow of urine  A nephrostomy tube may be used for a short or a long period of time  The nephrostomy tube comes out of your back, so you will need someone to help care for your nephrostomy tube  DISCHARGE INSTRUCTIONS:      How to clean the skin around the nephrostomy tube and change the bandage:  Since the nephrostomy tube comes out of your back, you will not be able to care for it by yourself  Ask someone to follow the general directions below to check and care for your nephrostomy tube  Gather the items you will need  Disposable (single use) under-pad, and a clean washcloth  ¨ Plain soap, warm water, and new medical gloves  ¨ Sterile gauze bandages  ¨ Clear adhesive dressing or medical tape  ¨ Skin barrier  ¨ Protective skin film  ¨ Trash bag  · Remove the old bandage, and check the tube entry site  ¨ Have the patient lie on his side with the nephrostomy tube entry site facing up  Place the under-pad where it will catch drainage as you are working with the nephrostomy tube  ¨ Wash your hands with soap and water  Put on new medical gloves  ¨ Gently remove the old bandage, without pulling on the tube  Do this by holding the skin beside the tube with one hand  With the other hand, gently remove sticky tape and the skin barrier by pulling in the same direction as hair growth  Do not touch the side of the bandage that is placed over or around the tube  Throw the bandage and skin barrier away in a trash bag  ¨ Look for signs of infection, such as skin redness and swelling  Report any skin changes to healthcare providers  ¨ Clean the tube entry site      ¨ Hold the tube in place to keep it from being pulled out while you are cleaning around it  ¨ You will need to clean the area twice  For the first cleaning, wet a new gauze bandage with soap and water  Begin at the entry site of the tube  Wipe the skin in circles, moving away from the entry site  Remove blood and any other material with the gauze  Do this as often as needed  Use a new gauze bandage each time you clean the area, moving away from the entry site  ¨ For the second cleaning, wet a new gauze bandage with water  Begin at the entry site of the tube  Wipe the skin in circles, moving away from the entry site  Use a new gauze bandage each time you clean the area, moving away from the entry site  ¨ Gently pat the skin with a clean washcloth to dry it  · Apply the skin barrier and bandages  ¨ Roll up a bandage to make it thick, and place it under  the place where the tube enters the skin  Place it to support the tube, and stop it from kinking or bending  Tape the bandage in place, and apply more bandages if directed by a healthcare provider  ¨ Bring the tubing forward to the front and tape it to the skin  Do not stretch the tube tight, because this may pull the nephrostomy tube out  How often to change the bandage  Change the bandage around the tube, every other day  If your bandages  get dirty or wet, change them right away, and as often as needed  If your nephrostomy tube is to be used for a long period of time, the tube needs to be changed every 2 to 3 months  Healthcare providers will tell you when you need to make an appointment to have your tube changed  How to care for the urine drainage bag:   · Ask if you need to measure and write down how much urine is in the bag before you empty it  Drain urine out of the drainage bag when it is ½ to ? full  Open the spout at the bottom of the bag to empty the urine into the toilet  · You may need to detach the drainage bag from the nephrostomy tube to change it    If so, attach a new drainage bag tightly to the nephrostomy tube  ·   How to prevent problems with your nephrostomy tube:   · Change bandages, directed  This helps to prevent infection  Throw away or clean your drainage bag as directed by your healthcare provider  · Wipe the connecting ends of the drainage bag with alcohol before you reconnect the bag to the tube  This helps prevent infection  Keep the tube taped to your skin and connected to a drainage bag placed below the level of your kidneys  This helps prevent urine from backing up into your kidneys  You may wear a small drainage bag strapped to your leg to let you move around more easily  · Check the catheter to be sure it is in place after you change your clothes or do other activities  Do not wear tight clothing over the tube  Place the tubing over your thigh rather than under it when you are sitting down  Be sure that nothing is pulling on the nephrostomy tube when you move around  · Change positions if you see little or no urine in your drainage bag  Check to see if the urine tube is twisted or bent  Be sure that you are not sitting or lying on the tube  If there are no kinks and there is little or no urine in the drainage bag, tell your healthcare provider  · Flush out the tube as directed  Some tubes get flushed one time a day with 10 mls of NSS You will be given a prescription for the flushes  To flush the nephrostomy tube, clean both connections with alcohol swap  Twist off the drainage bag tube and twist the saline syringe into the nephrostomy tube and flush briskly  Remove the syringe and twist the drainage bag tube back into the nephrostomy tube  · Keep the site covered while you shower  Tape a piece of clear adhesive plastic over the dressing to keep it dry while you shower  Do not take tub baths      Contact Interventional Radiology at [phone] [facility] PATIENTS: Contact Interventional Radiology at [phone]) Darline Torres PATIENTS: Contact Interventional Radiology at [phone]) if:  · The skin around the nephrostomy tube is red, swollen, itches, or has a rash  · You have a fever greater than 101 or chills  · You have lower back or hip pain  · There are changes in how your urine looks or smells  · You have little or no urine draining from the nephrostomy tube  · You have nausea and are vomiting  · The black deepthi on your tube has moved, or the tube is longer than when it was put in    · You have questions or concerns about your condition or care  · The nephrostomy tube comes out completely  · There is blood, pus, or a bad smell coming from the place where the tube enters your skin  · Urine is leaking around the tube  The following pharmacies carry the flush syringes  [facility]                     [facility]       [address]  Phone [phone]            Phone [phone]  [address] & [facility]                      [address]                                 [phone]  Phone [phone]            Phone [phone]    [facility]                                                                         [facility] [phone]  [address]   Phone [phone]

## 2022-03-12 NOTE — PROGRESS NOTES
Interventional Radiology Preprocedure Note    History/Indication for procedure:   Graciela Rodriguez is a 57 y o  male with a PMH of metastatic prostate cancer who who presents with concern for urosepsis  He presents for bilateral PCN placement      Relevant past medical history:    Past Medical History:   Diagnosis Date    Cancer ([facility] )     Stomach    Lymphoma ([facility] ) 05/2018    [facility]     Prostate cancer ([facility] ) 2020    Shingles     Thoracic aortic aneurysm (HCC)     PATIENT DENIES     Patient Active Problem List   Diagnosis    MALT lymphoma ([facility] )    [facility] )    Abnormal nuclear stress test    Pure hypercholesterolemia    Osseous metastasis (HCC)    Pulmonary embolus (HCC)    SIRS (systemic inflammatory response syndrome) (HCC)    Anemia    Pancytopenia (HCC)    Hematuria    Cystitis    Tachycardia    Acute kidney injury (HCC)    Severe protein-calorie malnutrition (HCC)    Severe sepsis (HCC)    Acute metabolic encephalopathy    Hydronephrosis of right kidney       BP 97/62   Pulse (!) 126   Temp 99 °F (37 2 °C) (Oral)     Medications:    Inpatient Medications:     Scheduled Medications:  Current Facility-Administered Medications   Medication Dose Route Frequency Provider Last Rate    acetaminophen  650 mg Oral Q6H PRN Omar Burns MD      [START ON 3/12/2022] cefepime  1,000 mg Intravenous Q12H Omar Burns MD      [START ON 3/12/2022] famotidine  20 mg Oral Daily Before Breakfast Omar Burns MD      fentanyl citrate (PF)  50 mcg Intravenous Q2H PRN Omar Burns MD      [START ON 3/12/2022] gabapentin  600 mg Oral TID Omar Burns MD      [START ON 3/12/2022] ketoconazole  200 mg Oral Daily Omar Burns MD      loperamide  2 mg Oral 4x Daily PRN Omar Burns MD      LORazepam  1 mg Intravenous Q4H PRN Omar Burns MD      melatonin  6 mg Oral HS Omar Burns MD      methylPREDNISolone sodium succinate  40 mg Intravenous [facility] Rashmi Irving MD      [START ON 3/12/2022] metoprolol tartrate  12 5 mg Oral Q12H [facility] Rashmi Irving MD      ondansetron  4 mg Intravenous Q6H PRN Rashmi Irving MD      oxyCODONE  20 mg Oral Q12H [facility] Rashmi Irving MD      oxyCODONE  10 mg Oral Q4H PRN Rashmi Irving MD      [START ON 3/12/2022] pantoprazole  40 mg Oral Daily Before Breakfast Rashmi Irving MD      polyethylene glycol  17 g Oral Daily PRN Rashmi Irving MD      sodium chloride  100 mL/hr Intravenous Continuous Rashmi Irving MD      [START ON 3/12/2022] sodium chloride  1 g Oral BID With Meals Rashmi Irving MD      [START ON 3/12/2022] vancomycin  15 mg/kg Intravenous Q12H Rashmi Irving MD         Infusions:  sodium chloride, 100 mL/hr        PRN:    acetaminophen    fentanyl citrate (PF)    loperamide    LORazepam    ondansetron    oxyCODONE    polyethylene glycol    Outpatient Medications:  Current Facility-Administered Medications on File Prior to Encounter   Medication Dose Route Frequency Provider Last Rate Last Admin    [COMPLETED] acetaminophen (TYLENOL) tablet 325 mg  325 mg Oral Once Madhu Montgomery PA-C   325 mg at 03/11/22 1952    [COMPLETED] cefepime (MAXIPIME) IVPB (premix in dextrose) 2,000 mg 50 mL  2,000 mg Intravenous Once Villalba Avers, DO   Stopped at 03/11/22 1259    [COMPLETED] HYDROmorphone (DILAUDID) injection 1 mg  1 mg Intravenous Once Villalba Avers, DO   1 mg at 03/11/22 1352    [COMPLETED] iohexol (OMNIPAQUE) 350 MG/ML injection (SINGLE-DOSE) 100 mL  100 mL Intravenous Once in imaging Aimee Pérez MD   100 mL at 03/11/22 1440    [COMPLETED] sodium chloride 0 9 % bolus 1,000 mL  1,000 mL Intravenous Once Villalba Avers, DO   Stopped at 03/11/22 1231    [COMPLETED] sodium chloride 0 9 % bolus 1,000 mL  1,000 mL Intravenous Once Villalba Avers, DO   Stopped at 03/11/22 1331    [COMPLETED] vancomycin (VANCOCIN) IVPB (premix in dextrose) 1,000 mg 200 mL  15 mg/kg Intravenous Once Velasquez Patino DO Danielle   Stopped at 03/11/22 1453    [DISCONTINUED] acetaminophen (TYLENOL) tablet 650 mg  650 mg Oral Q6H PRN Na Hankins MD   650 mg at 03/11/22 1952    [DISCONTINUED] cefepime (MAXIPIME) IVPB (premix in dextrose) 1,000 mg 50 mL  1,000 mg Intravenous Q12H Na Hankins MD        [DISCONTINUED] famotidine (PEPCID) tablet 20 mg  20 mg Oral Daily Before Breakfast Na Hankins MD        [DISCONTINUED] gabapentin (NEURONTIN) capsule 600 mg  600 mg Oral TID Na Hankins MD   600 mg at 03/11/22 2019    [DISCONTINUED] ketoconazole (NIZORAL) tablet 200 mg  200 mg Oral Daily Na Hankins MD        [DISCONTINUED] loperamide (IMODIUM) capsule 2 mg  2 mg Oral 4x Daily PRN Na Hankins MD        [DISCONTINUED] LORazepam (ATIVAN) injection 1 mg  1 mg Intravenous Q4H PRN Na Hankins MD        [DISCONTINUED] melatonin tablet 6 mg  6 mg Oral HS Na Hankins MD        [DISCONTINUED] methylPREDNISolone sodium succinate (Solu-MEDROL) injection 40 mg  40 mg Intravenous Q8H Jem Montgomery MD        [DISCONTINUED] metoprolol tartrate (LOPRESSOR) partial tablet 12 5 mg  12 5 mg Oral Q12H [facility] Na Hankins MD        [DISCONTINUED] ondansetron (ZOFRAN) injection 4 mg  4 mg Intravenous Q6H PRN Na Hankins MD        [DISCONTINUED] oxyCODONE (ROXICODONE) IR tablet 10 mg  10 mg Oral Q4H PRN Na Hankins MD   10 mg at 03/11/22 2020    [DISCONTINUED] pantoprazole (PROTONIX) EC tablet 40 mg  40 mg Oral Daily Before Breakfast Na Hankins MD        [DISCONTINUED] polyethylene glycol (MIRALAX) packet 17 g  17 g Oral Daily PRN Na Hankins MD        [DISCONTINUED] sodium chloride 0 9 % infusion  100 mL/hr Intravenous Continuous Na Hankins  mL/hr at 03/11/22 2008 100 mL/hr at 03/11/22 2008    [DISCONTINUED] sodium chloride tablet 1 g  1 g Oral BID With Meals Na Hankins MD   1 g at 03/11/22 2020    [DISCONTINUED] vancomycin (VANCOCIN) IVPB (premix in dextrose) 750 mg 150 mL  12 5 mg/kg Intravenous Q12H Maverick Pastor MD         Current Outpatient Medications on File Prior to Encounter   Medication Sig Dispense Refill    Ascorbic Acid (vitamin C) 1000 MG tablet Take 1,000 mg by mouth daily      Bacillus Coagulans-Inulin (Probiotic) 1-250 BILLION-MG CAPS Take by mouth 50 billion      cholecalciferol (VITAMIN D3) 1,000 units tablet Take 500 Units by mouth daily 50,000 1x week      dexamethasone (DECADRON) 4 mg tablet Take 4 mg by mouth 2 (two) times a day with meals      Erleada 60 MG TABS       famotidine (PEPCID) 20 mg tablet Take 20 mg by mouth 2 (two) times a day      gabapentin (NEURONTIN) 300 mg capsule Take 600 mg by mouth 3 (three) times a day        ketoconazole (NIZORAL) 200 mg tablet Take 200 mg by mouth daily      loperamide (IMODIUM) 2 mg capsule Take 2 mg by mouth 4 (four) times a day as needed for diarrhea      magnesium 30 MG tablet Take 30 mg by mouth in the morning      melatonin 1 mg Take 6 mg by mouth daily at bedtime      metoprolol tartrate (LOPRESSOR) 25 mg tablet Take 12 5 mg by mouth every 12 (twelve) hours      Nutritional Supplements (PRO-RAJINDER PLUS PO) Take by mouth      Omega-3 Fatty Acids (Fish Oil) 1200 MG CAPS Take by mouth (Patient not taking: Reported on 3/3/2022 )      ondansetron (ZOFRAN) 4 mg tablet Take 4 mg by mouth every 8 (eight) hours as needed for nausea or vomiting      oxyCODONE (ROXICODONE) 10 MG TABS Take 10 mg by mouth      pantoprazole (PROTONIX) 40 mg tablet Take 40 mg by mouth as needed        predniSONE 5 mg tablet 2 (two) times a day with meals 5mg in the morning 7 5mg at night  (Patient not taking: Reported on 3/11/2022 )      RESTASIS 0 05 % ophthalmic emulsion Administer 1 drop to both eyes every 12 (twelve) hours      sodium chloride 1 g tablet 1 g 2 (two) times a day        zinc gluconate 50 mg tablet Take 50 mg by mouth daily         Allergies   Allergen Reactions    Granisetron Nausea Only Weakness/nausea    Tetracycline Other (See Comments)     Increase LFT's    Valtrex [Valacyclovir] Nausea Only       Anticoagulants: none    ASA classification: ASA 4 - Patient with severe systemic disease that is a constant threat to life    Airway Assessment: II (hard and soft palate, upper portion of tonsils anduvula visible)    Relevant family history: None    Relevant review of systems: None    Prior sedation/anesthesia: yes    Can the patient lie flat? Yes     NPO Status: yes    Labs:   CBC with diff:   Lab Results   Component Value Date    WBC 9 63 03/11/2022    HGB 8 6 (L) 03/11/2022    HCT 27 1 (L) 03/11/2022    MCV 89 03/11/2022    PLT 26 (LL) 03/11/2022    MCH 28 3 03/11/2022    MCHC 31 7 03/11/2022    RDW 15 5 (H) 03/11/2022    MPV 12 1 03/11/2022    NRBC 1 03/06/2022     BMP/CMP:  Lab Results   Component Value Date    K 4 9 03/11/2022     03/11/2022    CO2 18 (L) 03/11/2022    BUN 26 (H) 03/11/2022    CREATININE 1 09 03/11/2022    CALCIUM 6 5 (L) 03/11/2022     (H) 03/11/2022    ALT 38 03/11/2022    ALKPHOS 957 (H) 03/11/2022    EGFR 72 03/11/2022   ,     Coags:   Lab Results   Component Value Date    PTT 36 03/11/2022    INR 1 35 (H) 03/11/2022   ,   Results from last 7 days   Lab Units 03/11/22  1111   PTT seconds 36   INR  1 35*        Relevant imaging studies:   Reviewed  Directed physical examination:  I agree with the physical exam performed on 3/11/22 and there are no additional changes  Assessment/Plan:   B/L PCN placement  Sedation/Anesthesia plan:  GA will be used as needed for procedure  Consent with alternatives to the procedure, risks and benefits have been explained and discussed with the patient/patient's family: yes, increased risk of bleeding due to low platelets explicitly discussed  Platelets will be running during the procedure

## 2022-03-12 NOTE — ASSESSMENT & PLAN NOTE
"Diagnosed in 05/2018    Status post triple antibiotic course for H pylori at that time and 2nd EGD in 9/21 showed resolution of the gastric lesion"    Erleada 460 mg tabs taken Monday through Friday  Continue home Apalutamide 240 mg daily  Oncology consulted per patient and patient's wife request  Palliative Care consulted; appreciate assistance and recommendations

## 2022-03-12 NOTE — ASSESSMENT & PLAN NOTE
Malnutrition Findings:           BMI Findings: Body mass index is 22 62 kg/m²       Nutrition consulted; continue with supplemental nutrition

## 2022-03-12 NOTE — PROGRESS NOTES
Interventional Radiology Brief Note:    I was contacted by Miguel Pineda regarding this patient's care  Briefly, he is a gentleman with metastatic prostate cancer, on chemotherapy  He presented recently with worsening fevers to [facility], and met criteria for septic shock (fever, hypotensive, tachycardic, elevated lactate)  He has new R hydronephrosis  IR consulted for R PCN  It seems reasonable to assume that this may be the cause of his septic shock  PCN placement is not a service that we routinely provide overnight at [address] [place] [address]  At this point, I am recommending level 1 transfer to [facility] ideally (or [facility] or [facility] if needed given bed limitations) for emergent R PCN placement later this evening  I am also recommending urology consultation for confirmation of this plan  Please update me regarding this patient's travel plans  Please TigerText me when he arrives to one of the above hospitals, and place a consult when he arrives to one of the above hospitals    Thank you for allowing Interventional Radiology to participate in the care of Kaleigh Blas  Please don't hesitate to call, text, email, or TigerText with any questions  Maya Macdonald MD  Interventional Radiologist  [facility]  Personal Cell: [phone]  [email or web address]

## 2022-03-12 NOTE — CONSULTS
Consultation - General Surgery   Colette Villafuerte 57 y o  male MRN: [id number]  Unit/Bed#: -01 Encounter: [id number]               Assessment/Plan     Severe Sepsis  -Pt recently admitted for RANDI and panctyopenia  -evidenced by tachycardia, fevers, tachypnea and hypotension and LA 2 6, procal 1 72  CT shows R sided hydro as possible source  UA reviewed  Send Cx  Blood Cx pending  Cefepime on board  Trend labs  IVF  Discussed with urologist, Dr Franco Cash and IR, pt needs source control  We recommend b/l pcn placements  Pt cannot get this over at [facility] overnight  D/W slim, Urology and IR, pt needs to be transferred to [facility] for intervention and further care  Pt and wife explained and they are in agreement      History of Present Illness    Admit Date:  3/11/2022  Hospital Day:  0 days  Primary Service:  Hospitalist  Attending Provider:  Maverick Pastor MD  Physician Requesting Consult: Maverick Pastor MD    HPI: Colette Villafuerte is a 57y o  year old male with pmhx significant for prostate cancer with bone, liver and adrenal mets, MALT lymphoma, pancytopenia, PE, malnutrition and tachycardia who presents with fevers that started earlier  Pt on admitted on 3/3-3/6 for pancytopenia and RANDI  Was admitted in Feb and was seen by urology for hematuria with retention and cystitis and anemia  Pt was on cbi and dc'ed with [facility]  There is right sided hydronephrosis and hydroureter which could present the source of the sepsis as per the CT  L hydro seems chronic  He has worsening heptic mets and increase in L bladder mass, worsening adrenal mets  +lymph nodes enlarged  Upon admission, he did have a ferris placed  Prior to, he did not have any urinary pain, hematuria or symptoms   He did have pain and it felt worse, the wife reports he usually gets worse right before his chemo      Inpatient consult to Urology  Consult performed by: Marianela Tian PA-C  Consult ordered by: Giovanna Grimm PA-C          Review of Systems CONSTITUTIONAL: +fevers, chills, fatigue  HEENT: No facial trauma, earache or tinnitus  Denies hearing loss or visual disturbances  CARDIOVASCULAR: +tachycardic  RESPIRATORY: Denies any cough, hemoptysis, shortness of breath or dyspnea on exertion  GASTROINTESTINAL: No constipation, no diarrhea, no abdominal hernias, no nausea or vomiting  GENITOURINARY: No problems with urination  Denies any hematuria or dysuria  NEUROLOGIC:  +tremors  MUSCULOSKELETAL: Denies any muscle or joint pain  SKIN: Denies skin rashes or itching  ENDOCRINE: Denies excessive thirst  Denies intolerance to heat or cold  PSYCHOSOCIAL:  Positive for depression and anxiety Denies any recent memory loss  Historical Information   Past Medical History:   Diagnosis Date    [facility]     Stomach    Lymphoma ([facility] ) 05/2018    Non Hodgkin's lymphoma ([facility] )     Prostate cancer ([facility] ) 2020    Shingles     Thoracic aortic aneurysm ([facility] )     PATIENT DENIES     Past Surgical History:   Procedure Laterality Date    APPENDECTOMY      COLONOSCOPY      HERNIA REPAIR      B/L hernia repair    IR PORT PLACEMENT  11/4/2021    KNEE ARTHROSCOPY      KNEE ARTHROSCOPY, MEDIAL PATELLO FEMORAL LIGAMENT REPAIR Left     LINEAR ENDOSCOPIC U/S N/A 6/13/2018    Procedure: LINEAR ENDOSCOPIC U/S;  Surgeon: Gavi Hernandez MD;  [facility];   Service: Gastroenterology    [place] BIOPSY/EXCISION, LYMPH NODE(S) Left 6/24/2020    Procedure: EXCISION BIOPSY LYMPH NODE SUPRACLAVICULAR;  Surgeon: Tabitha Meehan MD;  [facility];  Service: General     Social History   Social History     Substance and Sexual Activity   Alcohol Use Not Currently     Social History     Substance and Sexual Activity   Drug Use No     E-Cigarette/Vaping    E-Cigarette Use Never User      E-Cigarette/Vaping Substances    Nicotine No     Flavoring No      Social History     Tobacco Use   Smoking Status Never Smoker   Smokeless Tobacco Never Used     Family History:   Family History   Problem Relation Age of Onset   Stormy Nolasco Melanoma Mother     Prostate cancer Father     Lung cancer Father     Breast cancer Sister     Pancreatic cancer Paternal Grandfather     Hodgkin's lymphoma Sister        Meds/Allergies   current meds:   Current Facility-Administered Medications   Medication Dose Route Frequency    acetaminophen (TYLENOL) tablet 650 mg  650 mg Oral Q6H PRN    [START ON 3/12/2022] cefepime (MAXIPIME) IVPB (premix in dextrose) 1,000 mg 50 mL  1,000 mg Intravenous Q12H    [START ON 3/12/2022] famotidine (PEPCID) tablet 20 mg  20 mg Oral Daily Before Breakfast    gabapentin (NEURONTIN) capsule 600 mg  600 mg Oral TID    [START ON 3/12/2022] ketoconazole (NIZORAL) tablet 200 mg  200 mg Oral Daily    loperamide (IMODIUM) capsule 2 mg  2 mg Oral 4x Daily PRN    LORazepam (ATIVAN) injection 1 mg  1 mg Intravenous Q4H PRN    melatonin tablet 6 mg  6 mg Oral HS    methylPREDNISolone sodium succinate (Solu-MEDROL) injection 40 mg  40 mg Intravenous Q8H [address]    metoprolol tartrate (LOPRESSOR) partial tablet 12 5 mg  12 5 mg Oral Q12H [address]    ondansetron (ZOFRAN) injection 4 mg  4 mg Intravenous Q6H PRN    oxyCODONE (ROXICODONE) IR tablet 10 mg  10 mg Oral Q4H PRN    [START ON 3/12/2022] pantoprazole (PROTONIX) EC tablet 40 mg  40 mg Oral Daily Before Breakfast    polyethylene glycol (MIRALAX) packet 17 g  17 g Oral Daily PRN    sodium chloride 0 9 % infusion  100 mL/hr Intravenous Continuous    sodium chloride tablet 1 g  1 g Oral BID With Meals    [START ON 3/12/2022] vancomycin (VANCOCIN) IVPB (premix in dextrose) 750 mg 150 mL  12 5 mg/kg Intravenous Q12H          Allergies   Allergen Reactions    Granisetron Nausea Only     Weakness/nausea    Tetracycline Other (See Comments)     Increase LFT's    Valtrex [Valacyclovir] Nausea Only       Objective   Temp:  [97 1 °F (36 2 °C)-102 9 °F (39 4 °C)] 102 9 °F (39 4 °C)  HR:  [120-167] 146  Resp:  [13-28] 18  BP: ()/(55-72) 112/72    Intake/Output Summary (Last 24 hours) at 3/11/2022 1921  Last data filed at 3/11/2022 1331  Gross per 24 hour   Intake 2050 ml   Output --   Net 2050 ml       Physical Exam   Constituional: cachetic male laying in bed, NAD  HEENT: AT, NC, PERRL, EOMI,  CV: Tachycardic, regular rhythm, no murmurs  Pulm: CTAB, effort normal, no crackles or wheezes  Abdomen: Soft, nondistended,  No guarding  Neg alves's  + BS  MSK: No joint swelling, Normal ROM in all joints  Extremities: No edema, socks in place  Skin:  Pale skin  No rashes or discoloration  Neurologic: face symmetric, speech nl , jain, no gross deficits noted  Pycsh: depressed      Lab Results:   CBC:   Lab Results   Component Value Date    WBC 9 63 03/11/2022    HGB 8 6 (L) 03/11/2022    HCT 27 1 (L) 03/11/2022    MCV 89 03/11/2022    PLT 26 (LL) 03/11/2022    MCH 28 3 03/11/2022    MCHC 31 7 03/11/2022    RDW 15 5 (H) 03/11/2022    MPV 12 1 03/11/2022   , CMP:   Lab Results   Component Value Date    SODIUM 133 (L) 03/11/2022    K 5 0 03/11/2022    CL 99 (L) 03/11/2022    CO2 20 (L) 03/11/2022    BUN 28 (H) 03/11/2022    CREATININE 1 26 03/11/2022    CALCIUM 7 3 (L) 03/11/2022     (H) 03/11/2022    ALT 38 03/11/2022    ALKPHOS 957 (H) 03/11/2022    EGFR 60 03/11/2022     Imaging Studies: I have personally reviewed pertinent reports  EKG, Pathology, and Other Studies: I have personally reviewed pertinent reports  Counseling / Coordination of Care  Total floor / unit time spent today 15 minutes  Greater than 50% of total time was spent with the patient and / or family counseling and / or coordination of care   A description of the counseling / coordination of care: 30mins

## 2022-03-12 NOTE — ASSESSMENT & PLAN NOTE
Secondary to metastatic prostate cancer    Continue methylprednisolone 40 mg daily  Status post 1 unit packed red blood cells 3/12 for hemoglobin of 6 4  Goal hemoglobin greater than 7, transfuse if less than 7  see prostate cancer for further management

## 2022-03-12 NOTE — PROGRESS NOTES
Discussed case with SELAM Barrett and recommended insertion of ferris as bladder looked distended and repeat CT in 48 hrs for re-evaluation  If persistent IR consult for CAMRYN Nolasco MD  3/11/2022

## 2022-03-12 NOTE — ASSESSMENT & PLAN NOTE
"Patient noted to have new hydronephrosis and hydroureter on the right side  Also noted to have urinary retention  Status post Milan catheter placement    Urology evaluated the patient due to persistent fever and likely source of infection recommended PCN placement  · As per urology progress note left-sided is more and there is a possibility that it has spread along the trigone over to the right side"    Urology consulted, appreciate recommendations  Patient remains hemodynamically stable s/p bilateral PCN placed by IR on 03/11  See plan below Severe sepsis

## 2022-03-12 NOTE — ASSESSMENT & PLAN NOTE
Metastatic prostate cancer currently on apalutamide 60 mg 4 tabs daily Monday through Friday  Follows Dr Fredrick Barroso outpatient  Appropriate for hospice at this point however family is interested in continuing full aggressive care  Palliative following and recommend patient is made at minimum DNR DNI

## 2022-03-12 NOTE — ASSESSMENT & PLAN NOTE
Secondary to urosepsis in setting of metastatic prostate cancer    S/p PCN 3/11 managed by IR, appreciate recommendations  Urology consulted, appreciate recommendations  Continue to trend BUN and creatinine, currently at baseline of 1  Continue ceftriaxone IV, will transition to p o   Antibiotics  Ostomy care daily  See sepsis for further management

## 2022-03-12 NOTE — ASSESSMENT & PLAN NOTE
Malnutrition Findings:           BMI Findings: There is no height or weight on file to calculate BMI     Continue with the supplemental nutrition  CONTINUE WITH THE NUTRITIONAL SUPPORT

## 2022-03-12 NOTE — PROGRESS NOTES
Vancomycin IV Pharmacy-to-Dose Consultation    Renaldo Cotto is a 57 y o  male who is currently receiving Vancomycin IV with management by the Pharmacy Consult service  Assessment/Plan:  The patient was reviewed  Renal function is stable and no signs or symptoms of nephrotoxicity and/or infusion reactions were documented in the chart  3/11 bld cx 2/2: gram positive cocci in clusters     Based on todays assessment, continue current vancomycin (day # 2) dosing of 1000 mg q12h, with a plan for trough to be drawn at 1430 on 3/13 due to daylight savings  Will adjust subsequent dosing tomorrow  We will continue to follow the patients culture results and clinical progress daily      Cora Chase, PharmD  Emergency Medicine Clinical Pharmacist    or Adam

## 2022-03-12 NOTE — ASSESSMENT & PLAN NOTE
Most likely secondary to sepsis    CT head pending - will need to be performed at [facility]  Eeg    See management of sepsis for further information

## 2022-03-12 NOTE — ASSESSMENT & PLAN NOTE
· Patient noted to have new hydronephrosis and hydroureter on the right side  Also noted to have urinary retention  Status post Milan catheter placement    Urology evaluated the patient due to persistent fever and likely source of infection recommended PCN placement  · As per urology progress note left-sided is more and there is a possibility that it has spread along the trigone over to the right side

## 2022-03-12 NOTE — BRIEF OP NOTE (RAD/CATH)
INTERVENTIONAL RADIOLOGY PROCEDURE NOTE    Date: 3/12/2022    Procedure: IR NEPHROSTOMY TUBE PLACEMENT    Preoperative diagnosis:   1  MALT lymphoma ([facility] )    2  Prostate cancer ([facility] )    3  Osseous metastasis ([facility] )    4  Sepsis ([facility] )         Postoperative diagnosis: Same  Surgeon: Yumiko Koch MD     Assistant: None  No qualified resident was available  Blood loss: 5 ml    Specimens: sent to lab     Findings: b/l 10 Fr PCN placement  Urine on the right was mildly turbid, not obviously purulent  Urine on left was clear  Patient is likely a candidate for attempts at internalization to PCNUs in the future (likely as an outpatient)    Complications: None immediate      Anesthesia: general

## 2022-03-12 NOTE — CONSULTS
Consultation - Palliative and Supportive Care   Jimbo Kearney 57 y o  male [id number]    Patient Active Problem List   Diagnosis    [facility]    Prostate cancer ([facility]    Abnormal nuclear stress test    Pure hypercholesterolemia    Osseous metastasis (HCC)    Pulmonary embolus (HCC)    SIRS (systemic inflammatory response syndrome) (HCC)    Anemia    Pancytopenia (HCC)    Hematuria    Cystitis    Tachycardia    Acute kidney injury (HCC)    Severe protein-calorie malnutrition (HCC)    Severe sepsis (HCC)    Acute metabolic encephalopathy    Hydronephrosis of right kidney     Active issues specifically addressed today include: Malt lymphoma  Prostate cancer  Pulmonary embolus  Acute metabolic encephalopathy  Hydronephrosis of right kidney  Severe sepsis  Severe protein calorie malnutrition  Palliative care encounter  Goals of care conversation    Plan:  1  Symptom management - per primary team     2  Goals - continue full cares with no limits   - had lengthy goals of care conversation with the patient  Patient reports wife would be medical decision maker if he is unable to do so  He is interested in have 5 wishes document feel during this hospital stay  He also reported he does not wish to be sedated or unconscious for a prolonged period of time  He states that he would like to have chest compressions and being intubated if there is a reasonable possibility for him to survive  At this time he wants to continue with treatment focused cares, but he is agreeable to have palliative care following  Code Status: Full - Level 1   Decisional apparatus:  Patient is competent on my exam today  If competence is lost, patient's substitute decision maker would default to spouse by [place]     Advance Directive / Living Will / POLST:  Not on file     I have reviewed the patient's controlled substance dispensing history in the Prescription Drug Monitoring Program in compliance with the [facility] regulations before prescribing any controlled substances  We appreciate the invitation to be involved in this patient's care  We will continue to follow up  Please do not hesitate to reach our on call provider through our clinic answering service at  should you have acute symptom control concerns  Adelita Martin MD  Palliative and Supportive Care  Clinic/Answering Service: [phone]  You can find me on [employer]! IDENTIFICATION:  Inpatient consult to Palliative Care  Consult performed by: Adelita Martin MD  Consult ordered by: Omkar Kelly DO        Physician Requesting Consult: Alexus Estrada, *  Reason for Consult / Principal Problem: Metastatic prostate cancer, hx of MALT lymphoma, GOC conversations  Hx and PE limited by: Acute pain    HISTORY OF PRESENT ILLNESS:       Lewis Crum is a 57 y o  male with past medical history of metastatic prostate cancer, mild lymphoma, severe protein calorie malnutrition, who presents with worsening weakness and altered mental state noted by his wife  Patient [facility] presented to [facility]  He was noted to have fever and met criteria for sepsis at the time  Patient noted to be tachycardic and hypotensive  CT scan showed presents of right-sided hydronephrosis, and was noted to be potentially the source of infection  Patient transferred to [facility] for IR procedure  Patient was given IV fluids prior to transfer  Patient is currently status post IR nephrostomy tube placement  Patient was transferred to medical ICU for observation after intervention  Infectious Disease consulted and following  Patient follows with Heme-Onc as a patient and is currently under chemotherapy treatments  Palliative care consulted for goals of care conversation  On arrival to MICU, patient was noted to be uncomfortable    He reports he has severe pain on left leg, very mildly improved with current pain regimen  He denies any other complaints  He reports he has been talking to wife regarding advanced directives, but has never been able to complete 1  He is interested in having 5 wishes field at this hospital stay  He voices wife should be the 1 to make decisions in case he is unable to do so  He states that at this time he wants to continue with current treatments, including chest compressions and intubation if he needs it  He also said limitations regarding aggressive interventions including, if he is unable to wake up on needs to be sedated he would no longer want to be kept on a breathing machine  Review of Systems   Constitutional: Positive for malaise/fatigue  Respiratory: Negative for shortness of breath  Musculoskeletal: Positive for myalgias (LLE pain)  Gastrointestinal: Negative for abdominal pain, constipation and diarrhea  Genitourinary: Negative for flank pain  Psychiatric/Behavioral: The patient is not nervous/anxious  Past Medical History:   Diagnosis Date    Cancer [facility])     Stomach    Lymphoma ([facility] ) 05/2018    Non Hodgkin's lymphoma ([facility] )     Prostate cancer ([facility] ) 2020    Shingles     Thoracic aortic aneurysm ([facility] )     PATIENT DENIES     Past Surgical History:   Procedure Laterality Date    APPENDECTOMY      COLONOSCOPY      HERNIA REPAIR      B/L hernia repair    IR PORT PLACEMENT  11/4/2021    KNEE ARTHROSCOPY      KNEE ARTHROSCOPY, MEDIAL PATELLO FEMORAL LIGAMENT REPAIR Left     LINEAR ENDOSCOPIC U/S N/A 6/13/2018    Procedure: LINEAR ENDOSCOPIC U/S;  Surgeon: Amber Ng MD;  [facility];   Service: Gastroenterology    [place] BIOPSY/EXCISION, LYMPH NODE(S) Left 6/24/2020    Procedure: EXCISION BIOPSY LYMPH NODE SUPRACLAVICULAR;  Surgeon: Victor Manuel Avery MD;  [facility];  Service: General     Social History     Socioeconomic History    Marital status: /Civil Union     Spouse name: Not on file    Number of children: Not on file    Years of education: Not on file    Highest education level: Not on file   Occupational History    Not on file   Tobacco Use    Smoking status: Never Smoker    Smokeless tobacco: Never Used   Vaping Use    Vaping Use: Never used   Substance and Sexual Activity    Alcohol use: Not Currently    Drug use: No    Sexual activity: Yes   Other Topics Concern    Not on file   Social History Narrative    Not on file     Social Determinants of Health     Financial Resource Strain: Not on file   Food Insecurity: No Food Insecurity    Worried About Running Out of Food in the Last Year: Never true    Aiyana of Food in the Last Year: Never true   Transportation Needs: No Transportation Needs    Lack of Transportation (Medical): No    Lack of Transportation (Non-Medical):  No   Physical Activity: Not on file   Stress: Not on file   Social Connections: Not on file   Intimate Partner Violence: Not on file   Housing Stability: Low Risk     Unable to Pay for Housing in the Last Year: No    Number of Places Lived in the Last Year: 1    Unstable Housing in the Last Year: No     Family History   Problem Relation Age of Onset    Melanoma Mother     Prostate cancer Father     Lung cancer Father     Breast cancer Sister     Pancreatic cancer Paternal Grandfather     Hodgkin's lymphoma Sister        MEDICATIONS / ALLERGIES:    current meds:   Current Facility-Administered Medications   Medication Dose Route Frequency    acetaminophen (TYLENOL) tablet 650 mg  650 mg Oral Q6H PRN    cefepime (MAXIPIME) 2,000 mg in dextrose 5 % 50 mL IVPB  2,000 mg Intravenous Q12H    famotidine (PEPCID) tablet 20 mg  20 mg Oral Daily Before Breakfast    fentanyl citrate (PF) 100 MCG/2ML 50 mcg  50 mcg Intravenous Q2H PRN    gabapentin (NEURONTIN) capsule 600 mg  600 mg Oral TID    ketoconazole (NIZORAL) tablet 200 mg  200 mg Oral Daily    loperamide (IMODIUM) capsule 2 mg  2 mg Oral 4x Daily PRN    LORazepam (ATIVAN) injection 1 mg  1 mg Intravenous Q4H PRN    melatonin tablet 6 mg  6 mg Oral HS    methylPREDNISolone sodium succinate (Solu-MEDROL) injection 40 mg  40 mg Intravenous Q8H [address]    metoprolol tartrate (LOPRESSOR) partial tablet 12 5 mg  12 5 mg Oral Q12H [address]    ondansetron (ZOFRAN) injection 4 mg  4 mg Intravenous Q6H PRN    oxyCODONE (OxyCONTIN) 12 hr tablet 20 mg  20 mg Oral Q12H [address]    oxyCODONE (ROXICODONE) immediate release tablet 10 mg  10 mg Oral Q4H PRN    pantoprazole (PROTONIX) EC tablet 40 mg  40 mg Oral Daily Before Breakfast    polyethylene glycol (MIRALAX) packet 17 g  17 g Oral Daily PRN    sodium chloride tablet 1 g  1 g Oral BID With Meals    vancomycin (VANCOCIN) 1000 mg in sodium chloride 0 9% 250 mL IVPB  15 mg/kg Intravenous Q12H       Allergies   Allergen Reactions    Granisetron Nausea Only     Weakness/nausea    Tetracycline Other (See Comments)     Increase LFT's    Valtrex [Valacyclovir] Nausea Only       OBJECTIVE:    Physical Exam  Physical Exam  Constitutional:       General: He is in acute distress  Appearance: He is ill-appearing  Comments: Bilateral temporal wasting    Eyes:      General:         Right eye: No discharge  Left eye: No discharge  Conjunctiva/sclera: Conjunctivae normal    Cardiovascular:      Rate and Rhythm: Normal rate  Pulmonary:      Effort: Pulmonary effort is normal  No respiratory distress  Abdominal:      General: Abdomen is flat  There is no distension  Palpations: Abdomen is soft  Genitourinary:     Comments: Milan in place  Musculoskeletal:      Right lower leg: No edema  Left lower leg: No edema  Skin:     General: Skin is warm and dry  Capillary Refill: Capillary refill takes less than 2 seconds  Coloration: Skin is jaundiced  Neurological:      Mental Status: He is alert and oriented to person, place, and time  Psychiatric:         Behavior: Behavior is cooperative           Lab Results: CBC:   Lab Results   Component Value Date    WBC 10 37 (H) 03/12/2022    HGB 6 4 (LL) 03/12/2022    HCT 19 6 (L) 03/12/2022    MCV 90 03/12/2022    PLT 69 (L) 03/12/2022    MCH 29 2 03/12/2022    MCHC 32 7 03/12/2022    RDW 16 5 (H) 03/12/2022    MPV 11 7 03/12/2022    NRBC 1 03/12/2022   , CMP:   Lab Results   Component Value Date    SODIUM 137 03/12/2022    K 4 4 03/12/2022     (H) 03/12/2022    CO2 17 (L) 03/12/2022    BUN 24 03/12/2022    CREATININE 0 99 03/12/2022    CALCIUM 7 2 (L) 03/12/2022     (H) 03/12/2022    ALT 23 03/12/2022    ALKPHOS 671 (H) 03/12/2022    EGFR 81 03/12/2022         Counseling / Coordination of Care    Total floor / unit time spent today 40 minutes  Greater than 50% of total time was spent with the patient and / or family counseling and / or coordination of care  A description of the counseling / coordination of care: psychosocial support, goals of care conversations

## 2022-03-12 NOTE — ASSESSMENT & PLAN NOTE
· Patient with longstanding history of tachycardia  Workup in the past was rather unremarkable    Patient baseline heart rate is 100s to 110,s  · Monitor on telemetry  · Patient noted to be tachycardic in the 1 4 days in [facility] likely secondary to fever and sepsis

## 2022-03-12 NOTE — ASSESSMENT & PLAN NOTE
· Patient with history of PE diagnosed in 09/2021  · Holding anticoagulation due to thrombocytopenia  · It appears that the patient has been off of anticoagulation during the last 2 admissions due to thrombocytopenia

## 2022-03-12 NOTE — SEDATION DOCUMENTATION
Bilateral PCN tubes placed by Dr William William without complications  Anesthesia present throughout case, pt to MICU post procedure

## 2022-03-12 NOTE — H&P
[address] [place] [address]  H&P- Kaleigh Bull 1960, 57 y o  male MRN: [id number]  Unit/Bed#: [facility] 431-01 Encounter: [id number]  Primary Care Provider: Joni Gracia MD   Date and time admitted to hospital: 3/11/2022 10:32 PM    * Severe sepsis [facility])  Assessment & Plan  · Patient met the criteria for severe sepsis at the time of present  · Likely urinary source given abnormality seen on the CT scan  Continue with broad-spectrum antibiotics  Patient had blood culture done dated repeat blood culture now  · Urology evaluation appreciated  Transferred to [address] [place] [address] for PCN placement by Interventional Radiology  · Continue with broad-spectrum antibiotics  · Patient met the criteria for sepsis at the time of admission  Noted to be hypertensive at the time of presentation to [facility]   · Hypotension resolved with IV hydration  · Infectious Disease consult    Hydronephrosis of right kidney  Assessment & Plan  · Patient noted to have new hydronephrosis and hydroureter on the right side  Also noted to have urinary retention  Status post [place] catheter placement  Urology evaluated the patient due to persistent fever and likely source of infection recommended PCN placement  · As per urology progress note left-sided is more and there is a possibility that it has spread along the trigone over to the right side    Acute metabolic encephalopathy  Assessment & Plan  · Toxic metabolic encephalopathy secondary to sepsis  · Monitor mentation    Severe protein-calorie malnutrition ([address] )  Assessment & Plan  Malnutrition Findings:           BMI Findings: There is no height or weight on file to calculate BMI  Continue with the supplemental nutrition  CONTINUE WITH THE NUTRITIONAL SUPPORT    Tachycardia  Assessment & Plan  · Patient with longstanding history of tachycardia  Workup in the past was rather unremarkable    Patient baseline heart rate is 100s to 110,s  · Monitor on telemetry  · Patient noted to be tachycardic in the 1 4 days in [facility] likely secondary to fever and sepsis    Pancytopenia ([facility] [address] )  Assessment & Plan  · Patient with anemia and thrombocytopenia  · Platelet 26  ·     Pulmonary embolus (HCC)  Assessment & Plan  · Patient with history of PE diagnosed in 09/2021  · Holding anticoagulation due to thrombocytopenia  · It appears that the patient has been off of anticoagulation during the last 2 admissions due to thrombocytopenia    Prostate cancer [facility])  Assessment & Plan  · Patient with 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  · CT scan done today reviewed showed that the metastatic process is actually worsening  · Patient follows up with Dr Anna Miranda   · Currently undergoing chemotherapy  · Patient is on chronic steroids  Stress dose of steroids for now    MALT lymphoma ([facility] [address] )  Assessment & Plan  · Diagnosed in 05/2018  Status post triple antibiotic course for H pylori at that time and 2nd EGD in 9/21 showed resolution of the gastric lesion    VTE Prophylaxis: CUR contraindicated due to thrombocytopenia  / sequential compression device   Code Status:  Full code  POLST: POLST form is not discussed and not completed at this time  Discussion with family:  Wife updated in the room    Anticipated Length of Stay:  Patient will be admitted on an Inpatient basis with an anticipated length of stay of   >2 midnights  Justification for Hospital Stay:  Sepsis    Total Time for Visit, including Counseling / Coordination of Care: 70 minutes  Greater than 50% of this total time spent on direct patient counseling and coordination of care  Chief Complaint:       History of Present Illness:    Jenniffer Goodman is a 57 y o  male who presents as a transfer from [facility] for [employer] procedure  P initially presented to [facility] secondary to fever    Patient met the criteria for sepsis at the time of admission and was tachycardic and hypotensive  Patient with baseline underlying tachycardia and previous workup has been negative  Patient was also noted to be hypotensive and received IV fluid resuscitation with improvement in the blood pressure and patient was admitted to the hospital   Patient had a CT scan done which showed presence of right-sided hydronephrosis which is new and felt to be the source of infection and was started on broad-spectrum antibiotics  Patient continued to have persistent fever and urology evaluation done recommended PCN tube placement for source control  Case was discussed with Interventional Radiology who recommended transfer to [address] [place] [address] for IR procedure  When the patient came to the floor patient was noted to be tachycardic and hypotensive  Discussed with Interventional Radiology and plan for platelet transfusion before the procedure  Patient heart rate spontaneously improved to 120 baseline  Wife reported that patient heart rate has been going up and down for the past couple months and it is not unusual for him to have heart rate going up to 160s intermittently  Patient denies any chest pain shortness of breath or any dizziness or lightheadedness  Patient reported that he gets home code own and OxyContin at home  Patient with worsening abdominal pain and also left lower extremity pain for which he had taking 20 mg of OxyContin b i d  And 10 mg of oxycodone p r n  Case was discussed with Interventional Radiology who is coming to to the procedure now  Patient will get platelet transfusion  Discussed with critical care-Dr Wendy Aponte  evaluated the patient at bedside and will accept the patient to critical care service  Review of Systems:    Review of Systems   Constitutional: Positive for activity change, chills, fatigue and fever  HENT: Negative  Respiratory: Negative  Gastrointestinal: Positive for abdominal pain  Endocrine: Negative      Genitourinary: Negative  Musculoskeletal: Negative  Leg pain   Skin: Negative  Allergic/Immunologic: Negative  Neurological: Positive for tremors  Psychiatric/Behavioral: Negative  Past Medical and Surgical History:     Past Medical History:   Diagnosis Date    Cancer [facility])     Stomach    Lymphoma ([facility] [address] ) 05/2018    Non Hodgkin's lymphoma ([facility] [address] )     Prostate cancer ([facility] [address] ) 2020    Shingles     Thoracic aortic aneurysm ([facility] [address] )     PATIENT DENIES       Past Surgical History:   Procedure Laterality Date    APPENDECTOMY      COLONOSCOPY      HERNIA REPAIR      B/L hernia repair    IR PORT PLACEMENT  11/4/2021    KNEE ARTHROSCOPY      KNEE ARTHROSCOPY, MEDIAL PATELLO FEMORAL LIGAMENT REPAIR Left     LINEAR ENDOSCOPIC U/S N/A 6/13/2018    Procedure: LINEAR ENDOSCOPIC U/S;  Surgeon: Maranda Montes MD;  Location: [facility]; Service: Gastroenterology    ME BIOPSY/EXCISION, LYMPH NODE(S) Left 6/24/2020    Procedure: EXCISION BIOPSY LYMPH NODE SUPRACLAVICULAR;  Surgeon: Power Blanco MD;  Location: [facility];  Service: General       Meds/Allergies:    Prior to Admission medications    Medication Sig Start Date End Date Taking?  Authorizing Provider   Ascorbic Acid (vitamin C) 1000 MG tablet Take 1,000 mg by mouth daily    Historical Provider, MD   Bacillus Coagulans-Inulin (Probiotic) 1-250 BILLION-MG CAPS Take by mouth 50 billion    Historical Provider, MD   cholecalciferol (VITAMIN D3) 1,000 units tablet Take 500 Units by mouth daily 50,000 1x week    Historical Provider, MD   dexamethasone (DECADRON) 4 mg tablet Take 4 mg by mouth 2 (two) times a day with meals    Historical Provider, MD   Erleada 60 MG TABS  2/23/22   Historical Provider, MD   famotidine (PEPCID) 20 mg tablet Take 20 mg by mouth 2 (two) times a day    Historical Provider, MD   gabapentin (NEURONTIN) 300 mg capsule Take 600 mg by mouth 3 (three) times a day      Historical Provider, MD   ketoconazole (NIZORAL) 200 mg tablet Take 200 mg by mouth daily 2/24/22   Historical Provider, MD   loperamide (IMODIUM) 2 mg capsule Take 2 mg by mouth 4 (four) times a day as needed for diarrhea    Historical Provider, MD   magnesium 30 MG tablet Take 30 mg by mouth in the morning    Historical Provider, MD   melatonin 1 mg Take 6 mg by mouth daily at bedtime    Historical Provider, MD   metoprolol tartrate (LOPRESSOR) 25 mg tablet Take 12 5 mg by mouth every 12 (twelve) hours    Historical Provider, MD   Nutritional Supplements (PRO-RAJINDER PLUS PO) Take by mouth    Historical Provider, MD   Omega-3 Fatty Acids (Fish Oil) 1200 MG CAPS Take by mouth  Patient not taking: Reported on 3/3/2022     Historical Provider, MD   ondansetron (ZOFRAN) 4 mg tablet Take 4 mg by mouth every 8 (eight) hours as needed for nausea or vomiting    Historical Provider, MD   oxyCODONE (ROXICODONE) 10 MG TABS Take 10 mg by mouth 12/6/21   Historical Provider, MD   pantoprazole (PROTONIX) 40 mg tablet Take 40 mg by mouth as needed   3/7/20   Historical Provider, MD   predniSONE 5 mg tablet 2 (two) times a day with meals 5mg in the morning 7 5mg at night   Patient not taking: Reported on 3/11/2022  2/24/22   Historical Provider, MD   RESTASIS 0 05 % ophthalmic emulsion Administer 1 drop to both eyes every 12 (twelve) hours 6/9/20   Historical Provider, MD   sodium chloride 1 g tablet 1 g 2 (two) times a day   1/19/22   Historical Provider, MD   zinc gluconate 50 mg tablet Take 50 mg by mouth daily    Historical Provider, MD     I have reviewed home medications with a medical source (PCP, Pharmacy, other)  Allergies:    Allergies   Allergen Reactions    Granisetron Nausea Only     Weakness/nausea    Tetracycline Other (See Comments)     Increase LFT's    Valtrex [Valacyclovir] Nausea Only       Social History:     Marital Status: /Civil Union   Occupation:   Patient Pre-hospital Living Situation:  Lives at home with family  Patient Pre-hospital Level of Mobility:   Patient Pre-hospital Diet Restrictions:   Substance Use History:   Social History     Substance and Sexual Activity   Alcohol Use Not Currently     Social History     Tobacco Use   Smoking Status Never Smoker   Smokeless Tobacco Never Used     Social History     Substance and Sexual Activity   Drug Use No       Family History:    Family History   Problem Relation Age of Onset    Melanoma Mother     Prostate cancer Father     Lung cancer Father     Breast cancer Sister     Pancreatic cancer Paternal Grandfather     Hodgkin's lymphoma Sister        Physical Exam:     Vitals:   Blood Pressure: 97/62 (03/11/22 2249)  Pulse: (!) 126 (03/11/22 2249)  Temperature: 99 °F (37 2 °C) (03/11/22 2238)  Temp Source: Oral (03/11/22 2238)    Physical Exam  Constitutional:       Comments: Chronically ill-appearing and cachectic male   HENT:      Nose: Nose normal    Eyes:      General: No scleral icterus  Cardiovascular:      Rate and Rhythm: Regular rhythm  Tachycardia present  Pulmonary:      Comments: Decreased breath sounds bilateral  Abdominal:      General: Abdomen is flat  There is no distension  Tenderness: There is no abdominal tenderness  Musculoskeletal:      Right lower leg: No edema  Left lower leg: No edema  Skin:     General: Skin is warm  Neurological:      General: No focal deficit present  Mental Status: He is alert  Additional Data:     Lab Results: I have personally reviewed pertinent reports  Results from last 7 days   Lab Units 03/11/22  1111 03/06/22  0546 03/05/22  0553   WBC Thousand/uL 9 63   < > 2 46*   HEMOGLOBIN g/dL 8 6*   < > 9 1*   HEMATOCRIT % 27 1*   < > 29 0*   PLATELETS Thousands/uL 26*   < > 19*   BANDS PCT %  --   --  13*   LYMPHO PCT % 10*  --  24   MONO PCT % 12  --  5   EOS PCT % 0  --  0    < > = values in this interval not displayed       Results from last 7 days   Lab Units 03/11/22  1952 03/11/22  1111 03/11/22  1111   SODIUM mmol/L 135*   < > 133* POTASSIUM mmol/L 4 9   < > 5 0   CHLORIDE mmol/L 104   < > 99*   CO2 mmol/L 18*   < > 20*   BUN mg/dL 26*   < > 28*   CREATININE mg/dL 1 09   < > 1 26   ANION GAP mmol/L 13   < > 14*   CALCIUM mg/dL 6 5*   < > 7 3*   ALBUMIN g/dL  --   --  2 2*   TOTAL BILIRUBIN mg/dL  --   --  1 40*   ALK PHOS U/L  --   --  957*   ALT U/L  --   --  38   AST U/L  --   --  431*   GLUCOSE RANDOM mg/dL 81   < > 100    < > = values in this interval not displayed  Results from last 7 days   Lab Units 03/11/22  1111   INR  1 35*             Results from last 7 days   Lab Units 03/11/22  1952 03/11/22  1357 03/11/22  1111   LACTIC ACID mmol/L 1 7 1 6 2 6*   PROCALCITONIN ng/ml  --   --  1 72*       Imaging: I have personally reviewed pertinent reports  IR nephrostomy tube placement    (Results Pending)       EKG, Pathology, and Other Studies Reviewed on Admission:   · EKG:    [facility] Reviewed: Yes     ** Please Note: This note has been constructed using a voice recognition system   **

## 2022-03-12 NOTE — ASSESSMENT & PLAN NOTE
Patient presented to [facility]'s upper buttocks since he experienced fever on 3/11  He received to septic workup and IV fluids following protocol of 30 cc/kilogram per hour  CT scan demonstrated right hydronephrosis and he was started on broad-spectrum antibiotics for urosepsis  He was transferred to [facility] for IR intervention, particularly percutaneous nephrostomy tube placement on 03/12      Patient currently on  ceftriaxone, vancomycin, ketoconazole, needing broad coverage in setting of immunosuppression  Will await final blood cultures  Status post bilateral percutaneous nephrostomy 3/11  ID on board, appreciate recommendations  Urine cultures pending, will follow-up

## 2022-03-12 NOTE — PROGRESS NOTES
Progress Note - Critical Care   Doris Course 57 y o  male MRN: [id number]  [facility]: [id number]    Impression:  Principal Problem:    Severe sepsis ([facility] )  Active Problems:    Tachycardia    Pancytopenia ([facility] )    [facility]    Prostate cancer ([facility] )    Pulmonary embolus ([facility] )    Severe protein-calorie malnutrition ([facility] )    Acute metabolic encephalopathy    Hydronephrosis of right kidney      Plan:    Neuro:   · Encephalopathy  · Improved post IR intervention  · Likely in setting of sepsis  · Pain controlled with: Tylenol PRN, home oxy 20mg q12h  · Palliative consulted, appreciate recs  · Regulate sleep/wake cycle  · melatonin  · Delirium precautions  · CAM-ICU daily  · Trend neuro exam    CV:   · MAP goal > 65  · Rhythm: Sinus Tachycardia  · Follow rhythm on telemetry  · Patient has history of tachycardia for the past few months  · Continue to trend  · Continue home metoprolol    Lung:   · No active issues  · SpO2 goal >90%  · Hx of pulm embolisms  · Diagnosed 9/21  · Holding due to thrombocytopenia and recent surgical intervention, continue when able    GI:   · MALT lymphoma  · Diagnosed 5/18, status post triple antibiotic course and EGD showed resolution of gastric lesion  · Bowel regimen: PRN  · Zofran PRN for nausea    FEN:   · Severe protein calorie malnutrition  · Continue with nutrition support, dietician to adjust  · Nutrition/diet plan: Regular diet  · Replete electrolytes with goals: K >4 0, Mag >2 0, and Phos >3 0  :   · Hydronephrosis  · Urinary retention with bilateral hydronephrosis and hydroureter  · S/p bilateral pcn placement 3/12  · Sites c/d/i, blood tinged urine seen in both bags as well as in foleycatheter  · May be candidate for internalization of pcn's in the future  · Milan in place  · Continue antibiotics  · Urology and IR on board  · Indwelling Milan present: yes   · Urinary catheter still needed for Strict I and O in a critically ill patient    · Trend UOP and BUN/creat  · Strict I and O  · Urine cultures pending  ID:   · Source of infection: Urosepsis  · Abx ordered: vancomycin and cefepime  · Day # 1 of course  · Pharmacy consult for vancomycin dosing  · Trend temps and WBC count  · Maintain normothermia  · Blood and urine cultures pending    Heme:   · Prostate Cancer  · Metastatic to bones liver and adrenals, currently on chemotherapy  · Sees Dr Ld Betancourt  · Palliative consulted  · On prednisone 5mg po; now receiving solumedrol 40mg IV q8h for stress dose  · Pancytopenia  · Anemia and thrombocytopenia  · Related to chemotherapy treatment and active cancer  · Trend hgb and plts  · Transfuse as needed for goal hgb >7    Endo:   · Glycemic control plan: Maintain normoglycemia    MSK/Skin:  · Frequent turning and pressure off-loading  · Local wound care as needed      VTE Prophylaxis:  · Pharmacologic Prophylaxis: None in setting of thrombocytopenia and recent procedure with hematuria  · Mechanical Prophylaxis: sequential compression device    Disposition: Continue ICU care    Treatment Team/Consultants: IR, ID, urology, palliative    Date of admission: 3/11/2022    Reason for Admission:  Urosepsis    HPI/24hr events: 78-year-old male history of prostate cancer, multi lymphoma, who initially presented at [facility] due to fever  Patient receive septic workup as well as IV fluids  CT scan shown there showed right-sided hydronephrosis and patient was started on antibiotics for urosepsis  Also noted to be pancytopenic, associated with his chemotherapy  Urology recommended PCN tube placement  Transferred to [facility] for IR intervention  [facility], patient was tachycardic and hypotensive  Patient and wife report patient has a history of tachycardia over the past few months  Complains of some chronic pain including left leg pain but denies any headache, chest pain, dyspnea, abdominal pain, dizziness or lightheadedness      ROS: 14 point ROS is negative and/or as stated in the HPI  Physical Exam:    Physical Exam  Vitals and nursing note reviewed  Constitutional:       Appearance: He is well-developed  He is not diaphoretic  Comments: Cachectic appearing   HENT:      Head: Normocephalic and atraumatic  Right Ear: External ear normal       Left Ear: External ear normal    Eyes:      General:         Right eye: No discharge  Left eye: No discharge  Conjunctiva/sclera: Conjunctivae normal    Neck:      Vascular: No JVD  Trachea: No tracheal deviation  Cardiovascular:      Rate and Rhythm: Normal rate and regular rhythm  Heart sounds: Normal heart sounds  Pulmonary:      Effort: Pulmonary effort is normal       Breath sounds: Normal breath sounds  No wheezing  Abdominal:      General: There is no distension  Tenderness: There is no abdominal tenderness  Comments: Bilateral PCN tubes in place with blood tinged urine  No surrounding erythema or tenderness   Genitourinary:     Comments: Milan in place  Musculoskeletal:         General: Normal range of motion  Cervical back: Normal range of motion  Skin:     General: Skin is warm and dry  Neurological:      Mental Status: He is alert and oriented to person, place, and time  Psychiatric:         Mood and Affect: Mood normal          Speech: Speech normal          Behavior: Behavior normal          Vitals:   Vitals:    22 2238 22 2249 22 0100 22 0127   BP: 92/63 97/62  104/65   BP Location: Left arm   Left arm   Pulse: (!) 166 (!) 126  (!) 124   Resp:    16   Temp: 99 °F (37 2 °C)   98 4 °F (36 9 °C)   TempSrc: Oral   Oral   SpO2:    97%   Weight:   71 5 kg (157 lb 10 1 oz)        Arterial Line:          Temperature: Temp (24hrs), Av 4 °F (38 °C), Min:97 1 °F (36 2 °C), Max:103 6 °F (39 8 °C)  Current: Temperature: 98 4 °F (36 9 °C)    Weights:    Body mass index is 22 62 kg/m²       Hemodynamic Monitoring:  N/A Respiratory:  SpO2: SpO2: 97 %       Intake and Outputs:    Intake/Output Summary (Last 24 hours) at 3/12/2022 0203  Last data filed at 3/11/2022 2354  Gross per 24 hour   Intake 200 ml   Output --   Net 200 ml     I/O last 24 hours: In: 200 [Blood:200]  Out: -       Nutrition:        Diet Orders   (From admission, onward)             Start     Ordered    03/12/22 0139  Diet Regular; Regular House  Diet effective now        References:    Nutrtion Support Algorithm Enteral vs  Parenteral   Question Answer Comment   Diet Type Regular    Regular Regular House    RD to adjust diet per protocol? Yes        03/12/22 0139                Labs:   Results from last 7 days   Lab Units 03/11/22  1111 03/06/22  0546 03/05/22  0553   WBC Thousand/uL 9 63 2 56* 2 46*   HEMOGLOBIN g/dL 8 6* 9 0* 9 1*   HEMATOCRIT % 27 1* 29 4* 29 0*   PLATELETS Thousands/uL 26* 37* 19*   MONO PCT % 12  --  5     Results from last 7 days   Lab Units 03/11/22 1952 03/11/22  1111 03/06/22  0546 03/05/22  0553 03/05/22  0553   SODIUM mmol/L 135* 133* 140   < > 142   POTASSIUM mmol/L 4 9 5 0 4 6   < > 4 2   CHLORIDE mmol/L 104 99* 105   < > 107   CO2 mmol/L 18* 20* 23   < > 24   BUN mg/dL 26* 28* 19   < > 20   CREATININE mg/dL 1 09 1 26 0 85   < > 0 96   CALCIUM mg/dL 6 5* 7 3* 7 0*   < > 6 8*   ALK PHOS U/L  --  957* 1,165*  --  558*   ALT U/L  --  38 78  --  19   AST U/L  --  431* 237*  --  100*    < > = values in this interval not displayed  Results from last 7 days   Lab Units 03/11/22  1111   INR  1 35*   PTT seconds 36     Results from last 7 days   Lab Units 03/11/22 1952 03/11/22  1357 03/11/22  1111   LACTIC ACID mmol/L 1 7 1 6 2 6*             Results from last 7 days   Lab Units 03/11/22  1111   PROCALCITONIN ng/ml 1 72*     No results found for: Laquita Funez       Results from last 7 days   Lab Units 03/11/22  1111   TSH 3RD [facility] uIU/mL 0 829       Imaging:    CXR:  I have personally reviewed pertinent reports      Cherlyn Sicard I have personally reviewed pertinent reports  Micro:   Blood Culture:   Lab Results   Component Value Date    BLOODCX Received in Microbiology Lab  Culture in Progress  03/11/2022    BLOODCX No Growth After 5 Days  09/15/2021    BLOODCX No Growth After 5 Days  09/15/2021    BLOODCX No Growth After 5 Days  07/07/2020    BLOODCX No Growth After 5 Days  07/07/2020     Urine Culture:   Lab Results   Component Value Date    URINECX No Growth <1000 cfu/mL 02/13/2022    URINECX 20,000-29,000 cfu/ml Enterobacter aerogenes (A) 07/07/2020     Sputum Culture: No components found for: SPUTUMCX  Wound Culure: No results found for: Cody Jameson  Results from last 7 days   Lab Units 03/11/22  1111   BLOOD CULTURE  Received in Microbiology Lab  Culture in Progress  GRAM STAIN RESULT  Gram positive cocci in clusters*       Allergies:    Allergies   Allergen Reactions    Granisetron Nausea Only     Weakness/nausea    Tetracycline Other (See Comments)     Increase LFT's    Valtrex [Valacyclovir] Nausea Only       Medications:   Scheduled Meds:  Current Facility-Administered Medications   Medication Dose Route Frequency Provider Last Rate    acetaminophen  650 mg Oral Q6H PRN Chan A Paster, DO      cefepime  1,000 mg Intravenous Q12H Chan A Paster, DO      famotidine  20 mg Oral Daily Before Breakfast Chan A Paster, DO      fentanyl citrate (PF)  50 mcg Intravenous Q2H PRN Chan A Paster, DO      gabapentin  600 mg Oral TID Chan A Paster, DO      ketoconazole  200 mg Oral Daily Chan A Paster, DO      loperamide  2 mg Oral 4x Daily PRN Chan A Paster, DO      LORazepam  1 mg Intravenous Q4H PRN Chan A Paster, DO      melatonin  6 mg Oral HS Chan A Paster, DO      methylPREDNISolone sodium succinate  40 mg Intravenous Q8H [address] Chan A Paster, DO      metoprolol tartrate  12 5 mg Oral Q12H [address] Chan A Paster, DO      ondansetron  4 mg Intravenous Q6H PRN Chan A Paster, DO      oxyCODONE  20 mg Oral Q12H [address] Shira Rob A Paster, DO      oxyCODONE  10 mg Oral Q4H PRN Chan A Paster, DO      pantoprazole  40 mg Oral Daily Before Breakfast Chan A Paster, DO      polyethylene glycol  17 g Oral Daily PRN Chan A Paster, DO      sodium chloride  1 g Oral BID With Meals Chan A Paster, DO      vancomycin  15 mg/kg Intravenous Q12H Chan A Paster, DO       Continuous Infusions:   PRN Meds:  acetaminophen, 650 mg, Q6H PRN  fentanyl citrate (PF), 50 mcg, Q2H PRN  loperamide, 2 mg, 4x Daily PRN  LORazepam, 1 mg, Q4H PRN  ondansetron, 4 mg, Q6H PRN  oxyCODONE, 10 mg, Q4H PRN  polyethylene glycol, 17 g, Daily PRN        Invasive lines and devices: Invasive Devices  Report    Central Venous Catheter Line            Port A Cath 03/11/22 Right Chest <1 day          Drain            Nephrostomy Left 1 10 Fr  <1 day    Nephrostomy Right 10 Fr  <1 day    Urethral Catheter Coude 16 Fr  <1 day                Code Status: Level 1 - Full Code    Counseling / Coordination of Care  Total Critical Care time spent 35 minutes excluding procedures, teaching and family updates            SIGNATURE: Cam Fleming DO  DATE: March 12, 2022  TIME: 2:03 AM

## 2022-03-12 NOTE — ASSESSMENT & PLAN NOTE
Improving  Patient with anemia and thrombocytopenia  Secondary to metastatic prostate cancer, MALT, and chemotherapy  Patient remains mildly tachycardic following 1 Unit pRBCs on 03/12  AM Hemoglobin measured at 7 9 g/dL  Transfuse Hemoglobin <7 g/dL

## 2022-03-12 NOTE — CONSULTS
Consultation - Infectious Disease   Colette Villafuerte 57 y o  male MRN: [id number]  Unit/Bed#: [facility] 08 Encounter: [id number]      IMPRESSION & RECOMMENDATIONS:   Impression/Recommendations:  1  Severe sepsis, POA at [facility]  Leukopenia, tachycardia, fevers, elevated lactic acid  Secondary to #2 vs #3     -antibiotic plan as below  -follow temperatures and hemodynamics  -recheck CBC in a m   -supportive care    2  Gram-positive bacteremia  Blood cultures from 03/11 show GPCs in clusters  Both sets appear to have been drawn from the port at the same time  Consider true bacteremia versus contaminated specimens  No other intravascular prosthetic devices     -continue IV vancomycin  Dosing recommendations per pharmacy  -follow up initial blood cultures  -follow-up repeat blood cultures    3  Presumptive UTI  In the setting of bilateral hydronephrosis secondary to #4  Hydronephrosis on the right side is new on this admission  Status post emergent bilateral PCN placement by IR 3/12     -continue IV cefepime for now  -follow-up urine culture and blood cultures and tailor antibiotics accordingly  -urology/IR follow-up    4  Metastatic prostate cancer with diffuse lymphatic and bony involvement, bilateral hydronephrosis  On active chemotherapy via port  Follows with Dr Michelle Palacios  CT shows progressive metastatic disease     -palliative care evaluation pending  -oncology follow-up    5  Prior MALT lymphoma  Antibiotics:  Vancomycin/cefepime 2    I discussed above plan with critical care service  I personally reviewed prior hospitalization records in detail  Thank you for this consultation  We will follow along with you        HISTORY OF PRESENT ILLNESS:  Reason for Consult:  Gram-positive bacteremia    HPI: Colette Villafuerte is a 57 y o  male with metastatic prostate cancer with diffuse lymphatic and bony involvement and known left-sided hydronephrosis on active chemotherapy who follows with Dr Michelle Palacios, prior MALT lymphoma presents as a transfer from [address] [place] [address] after initially presenting secondary to fever  Found to be tachycardic and hypotensive responsive to IV fluids  Patient developed early fever up to 103 2  Abdominal CT showed extensive metastatic disease, in addition to new moderate right-sided hydronephrosis and hydroureter  It also showed stable left-sided hydronephrosis  Patient was started on empiric vancomycin and cefepime  He was transferred to [facility] for bilateral PCNs placement by IR, which was performed overnight  Urine was mildly turbid, nonpurulent  Admission blood cultures now show GPCs in clusters  Patient is feeling better today  Denies fevers, chills, abdominal, pelvic or flank pain  Denies any issues with the port  REVIEW OF SYSTEMS:  A complete system-based review of systems is otherwise negative  PAST MEDICAL HISTORY:  Past Medical History:   Diagnosis Date    Cancer [facility])     Stomach    Lymphoma ([facility] [facility] ) 05/2018    Non Hodgkin's lymphoma ([facility] )     Prostate cancer ([facility] ) 2020    Shingles     Thoracic aortic aneurysm ([facility] )     PATIENT DENIES     Past Surgical History:   Procedure Laterality Date    APPENDECTOMY      COLONOSCOPY      HERNIA REPAIR      B/L hernia repair    IR PORT PLACEMENT  11/4/2021    KNEE ARTHROSCOPY      KNEE ARTHROSCOPY, MEDIAL PATELLO FEMORAL LIGAMENT REPAIR Left     LINEAR ENDOSCOPIC U/S N/A 6/13/2018    Procedure: LINEAR ENDOSCOPIC U/S;  Surgeon: Andrea Pan MD;  Location: [facility];   Service: Gastroenterology    FL BIOPSY/EXCISION, LYMPH NODE(S) Left 6/24/2020    Procedure: EXCISION BIOPSY LYMPH NODE SUPRACLAVICULAR;  Surgeon: Phil Gutierrez MD;  Location: [facility];  Service: General       FAMILY HISTORY:  Non-contributory    SOCIAL HISTORY:  Social History     Substance and Sexual Activity   Alcohol Use Not Currently     Social History     Substance and Sexual Activity   Drug Use No     Social History     Tobacco Use Smoking Status Never Smoker   Smokeless Tobacco Never Used       ALLERGIES:  Allergies   Allergen Reactions    Granisetron Nausea Only     Weakness/nausea    Tetracycline Other (See Comments)     Increase LFT's    Valtrex [Valacyclovir] Nausea Only       MEDICATIONS:  All current active medications have been reviewed  PHYSICAL EXAM:  Vitals:  Temp:  [97 1 °F (36 2 °C)-103 6 °F (39 8 °C)] 98 °F (36 7 °C)  HR:  [118-167] 120  Resp:  [12-28] 15  BP: ()/(54-74) 90/56  SpO2:  [93 %-98 %] 98 %  Temp (24hrs), Av 2 °F (37 9 °C), Min:97 1 °F (36 2 °C), Max:103 6 °F (39 8 °C)  Current: Temperature: 98 °F (36 7 °C)     Physical Exam:  General:  Very sedated, nontoxic  Eyes:  Conjunctive clear with no hemorrhages or effusions  Oropharynx:  No ulcers, no lesions  Neck:  Supple, no lymphadenopathy  Lungs:  Nonlabored respirations  Chest wall port in place with no overlying erythema, tenderness or drainage  Abdomen:  Soft, non-tender, non-distended  Bilateral PCNs with mildly cloudy yellow urine  Extremities:  No peripheral cyanosis, clubbing, or edema  Skin:  No visible rashes  Neurological:  Moves all four extremities spontaneously    LABS, IMAGING, & OTHER STUDIES:  Lab Results:  I have personally reviewed pertinent labs  Results from last 7 days   Lab Units 22  0613 22  1952 22  1111 22  0546 22  0546   POTASSIUM mmol/L 4 4 4 9 5 0   < > 4 6   CHLORIDE mmol/L 110* 104 99*   < > 105   CO2 mmol/L 17* 18* 20*   < > 23   BUN mg/dL 24 26* 28*   < > 19   CREATININE mg/dL 0 99 1 09 1 26   < > 0 85   EGFR ml/min/1 73sq m 81 72 60   < > 93   CALCIUM mg/dL 7 2* 6 5* 7 3*   < > 7 0*   AST U/L 276*  --  431*  --  237*   ALT U/L 23  --  38  --  78   ALK PHOS U/L 671*  --  957*  --  1,165*    < > = values in this interval not displayed       Results from last 7 days   Lab Units 22  0613 22  1111 22  0546   WBC Thousand/uL 10 37* 9 63 2 56*   HEMOGLOBIN g/dL 6 4* 8 6* 9 0* PLATELETS Thousands/uL 69* 26* 37*     Results from last 7 days   Lab Units 03/11/22  1111   GRAM STAIN RESULT  Gram positive cocci in clusters*  Gram positive cocci in clusters*       Imaging Studies:   I have personally reviewed pertinent imaging study reports and images in PACS  Chest x-ray shows no pneumonia or pulmonary edema  CT abdomen/pelvis shows new bibasilar dependent atelectatic changes  New moderate right hydronephrosis and hydroureter  Stable appearance of moderate left hydronephrosis and hydroureter  Worsening hepatic metastasis  Increasing left bladder wall mass  Worsening adrenal metastases  EKG, Pathology, and Other Studies:   I have personally reviewed pertinent reports

## 2022-03-12 NOTE — ASSESSMENT & PLAN NOTE
Patient with prostate cancer with metastatic multiple processes  Patient currently established with Dr Nolasco Code A/P identified "worsening hepatic metastasis, worsening mets of adrenal glands, worsening left bladder wall mass, worsening anterior pelvic mesenteric nodule, worsening aortocaval and retrocaval lymph nodes, and worsening left external iliac lymph node"  Overall worsening disease progression on imaging and indicative of poor prognosis  Oncology consulted; patient and patient's wife request  Continue home Apalutamide 60 mg 4 tabs daily  Resumed home Decadron 4 mg BID

## 2022-03-12 NOTE — SEPSIS NOTE
Sepsis Note   Jimbo Kearney 57 y o  male MRN: [id number]  Unit/Bed#: [facility] 08 Encounter: [id number]       qSOFA     Row Name 03/12/22 1000 03/12/22 0817 03/12/22 0800 03/12/22 0700 03/12/22 0600    Altered mental status GCS < 15 -- -- 0 -- --    Respiratory Rate > / =22 1 -- -- 0 0    Systolic BP < / =558 1 1 -- 1 1    Q Sofa Score 2 1 1 1 1    Row Name 03/12/22 0500 03/12/22 0437 03/12/22 0418 03/12/22 0400 03/12/22 0300    Altered mental status GCS < 15 -- -- 0 -- --    Respiratory Rate > / =22 0 0 0 0 0    Systolic BP < / =402 1 1 1 -- 1    Q Sofa Score 1 1 1 0 1    Row Name 03/12/22 0200 03/12/22 0127 03/11/22 2249 03/11/22 2238       Altered mental status GCS < 15 -- 0 -- --     Respiratory Rate > / =22 0 0 -- --     Systolic BP < / =145 0 0 1 1     Q Sofa Score 0 0 -- --                Initial Sepsis Screening     Row Name 03/12/22 1018                Is the patient's history suggestive of a new or worsening infection? No  -CS        Suspected source of infection --        Are two or more of the following signs & symptoms of infection both present and new to the patient? No  -CS        Indicate SIRS criteria --        If the answer is yes to both questions, suspicion of sepsis is present --        If severe sepsis is present AND tissue hypoperfusion perists in the hour after fluid resuscitation or lactate > 4, the patient meets criteria for SEPTIC SHOCK --        Are any of the following organ dysfunction criteria present within 6 hours of suspected infection and SIRS criteria that are NOT considered to be chronic conditions? Yes  -CS        Organ dysfunction --        Date of presentation of severe sepsis --        Time of presentation of severe sepsis --        Tissue hypoperfusion persists in the hour after crystalloid fluid administration, evidenced, by either: --        Was hypotension present within one hour of the conclusion of crystalloid fluid administration?  No  -CS        Date of presentation of septic shock --        Time of presentation of septic shock --              User Key  (r) = Recorded By, (t) = Taken By, (c) = Cosigned By    Clearance Janine Name Provider Type    LOW [place] DO Fellow                Already being treated for sepsis  Currently on antibiotics already

## 2022-03-12 NOTE — ASSESSMENT & PLAN NOTE
· Diagnosed in 05/2018    Status post triple antibiotic course for H pylori at that time and 2nd EGD in 9/21 showed resolution of the gastric lesion

## 2022-03-12 NOTE — PROGRESS NOTES
Pastoral Care Progress Note    3/12/2022  Patient: Fercho Conley : 1960  Admission Date & Time: 3/11/2022 2232  MRN: [id number] [facility]: [id number]      This  fulfilled a request for a consult by Spiritual Care  Pt declined Spiritual Care services at this time  Pt and wife were informed by this  that [facility] is available for spiritual and emotional support   Please contact Spiritual Care as needed                  Chaplaincy Interventions Utilized:   Empowerment: Provided chaplaincy education        Chaplaincy Outcomes Achieved:  Declined  support

## 2022-03-12 NOTE — PROGRESS NOTES
Vancomycin Assessment    Chris Phillips is a 57 y o  male who is currently receiving vancomycin 750mg Q12H for Pneumonia     Relevant clinical data and objective history reviewed:  Creatinine   Date Value Ref Range Status   03/11/2022 1 09 0 60 - 1 30 mg/dL Final     Comment:     Standardized to IDMS reference method   03/11/2022 1 26 0 60 - 1 30 mg/dL Final     Comment:     Standardized to IDMS reference method   03/06/2022 0 85 0 60 - 1 30 mg/dL Final     Comment:     Standardized to IDMS reference method     BP 97/62   Pulse (!) 126   Temp 99 °F (37 2 °C) (Oral)   No intake/output data recorded  Lab Results   Component Value Date/Time    BUN 26 (H) 03/11/2022 07:52 PM    WBC 9 63 03/11/2022 11:11 AM    HGB 8 6 (L) 03/11/2022 11:11 AM    HCT 27 1 (L) 03/11/2022 11:11 AM    MCV 89 03/11/2022 11:11 AM    PLT 26 (LL) 03/11/2022 11:11 AM     Temp Readings from Last 3 Encounters:   03/11/22 99 °F (37 2 °C) (Oral)   03/11/22 99 1 °F (37 3 °C) (Axillary)   03/06/22 97 7 °F (36 5 °C)     Vancomycin Days of Therapy: 1    Assessment/Plan  The patient is currently on vancomycin utilizing scheduled dosing based on actual body weight  Baseline risks associated with therapy include: advanced age and dehydration  The patient is currently receiving 750mg Q12H and after clinical evaluation will be changed to 1000mg Q12H  Pharmacy will also follow closely for s/sx of nephrotoxicity, infusion reactions, and appropriateness of therapy  BMP and CBC will be ordered per protocol  Plan for trough as patient approaches steady state, prior to the 5th  dose at approximately 1330 on 3/13/22  Due to infection severity, will target a trough of 15-20 (appropriate for most indications)   Pharmacy will continue to follow the patients culture results and clinical progress daily      Guevara Crow, Pharmacist

## 2022-03-12 NOTE — EMTALA/ACUTE CARE TRANSFER
[facility] MED SURG UNIT  [address] [address]  Dept: [phone]      ACUTE CARE TRANSFER CONSENT    NAME Jenniffer FLORES 1960                              MRN [id number]    I have been informed of my rights regarding examination, treatment, and transfer   by Dr Margaree Galeazzi, MD    Benefits:  IR intervention for severe sepsis secondary to "New moderate right hydronephrosis and hydroureter and could be the source of sepsis "    Risks:  decompensation during transfer       Transfer Request:  I acknowledge that my medical condition has been evaluated and explained to me by the treating physician or other qualified medical person and/or my attending physician who has recommended and offered to me further medical examination and treatment  I understand the Hospital's obligation with respect to the treatment and stabilization of my medical condition  I nevertheless request to be transferred  I release the Hospital, the doctor, and any other persons caring for me from all responsibility or liability for any injury or ill effects that may result from my transfer and agree to accept all responsibility for the consequences of my choice to transfer, rather than receive stabilizing treatment at the Hospital  I understand that because the transfer is my request, my insurance may not provide reimbursement for the services  The Hospital will assist and direct me and my family in how to make arrangements for transfer, but the hospital is not liable for any fees charged by the transport service  In spite of this understanding, I refuse to consent to further medical examination and treatment which has been offered to me, and request transfer to    I authorize the performance of emergency medical procedures and treatments upon me in both transit and upon arrival at the receiving facility    Additionally, I authorize the release of any and all medical records to the receiving facility and request they be transported with me, if possible  I authorize the performance of emergency medical procedures and treatments upon me in both transit and upon arrival at the receiving facility  Additionally, I authorize the release of any and all medical records to the receiving facility and request they be transported with me, if possible  I understand that the safest mode of transportation during a medical emergency is an ambulance and that the Hospital advocates the use of this mode of transport  Risks of traveling to the receiving facility by car, including absence of medical control, life sustaining equipment, such as oxygen, and medical personnel has been explained to me and I fully understand them  (DMITRI CORRECT BOX BELOW)  [  ]  I consent to the stated transfer and to be transported by ambulance/helicopter  [  ]  I consent to the stated transfer, but refuse transportation by ambulance and accept full responsibility for my transportation by car  I understand the risks of non-ambulance transfers and I exonerate the Hospital and its staff from any deterioration in my condition that results from this refusal     X___________________________________________    DATE  22  TIME________  Signature of patient or legally responsible individual signing on patient behalf           RELATIONSHIP TO PATIENT_________________________          Provider Certification    NAME Thuy Luna                                        [facility] 1960                              MRN [id number]    A medical screening exam was performed on the above named patient    Based on the examination:    Condition Necessitating Transfer severe sepsis secondary to hydronephrosis and hydroureter     Patient Condition:  guarded     Reason for Transfer:  need for IR evaluation and urology evaluation for possible percutaneous nephrostomy     Transfer Requirements: Facility     · Space available and qualified personnel available for treatment as acknowledged by  [facility] transfer center   · Agreed to accept transfer and to provide appropriate medical treatment as acknowledged by [facility] provider on-call at [facility]           · Appropriate medical records of the examination and treatment of the patient are provided at the time of transfer   [address] [facility] _______  · Transfer will be performed by qualified personnel from    and appropriate transfer equipment as required, including the use of necessary and appropriate life support measures  Provider Certification: I have examined the patient and explained the following risks and benefits of being transferred/refusing transfer to the patient/family:         Based on these reasonable risks and benefits to the patient and/or the unborn child(angy), and based upon the information available at the time of the patients examination, I certify that the medical benefits reasonably to be expected from the provision of appropriate medical treatments at another medical facility outweigh the increasing risks, if any, to the individuals medical condition, and in the case of labor to the unborn child, from effecting the transfer      X____________________________________________ DATE 03/11/22        TIME_______      ORIGINAL - SEND TO MEDICAL RECORDS   COPY - SEND WITH PATIENT DURING TRANSFER

## 2022-03-12 NOTE — ASSESSMENT & PLAN NOTE
Patient presented to the ED with a fevers at home  Recently admitted on 3/3-3/6 for RANDI and pancytopenia    Patient meets criteria due to fevers, tachycardia, tachypnea, hypotension, leukopenia, lactic acid 2 6, procalcitonin 1 72 source unknown    CT abd pelvis:1   New bibasilar dependent atelectatic changes with trace pleural fluid on the left  2  New moderate right hydronephrosis and hydroureter and could be the source of sepsis  Stable appearance of moderate left hydronephrosis and hydroureter  3  Worsening hepatic metastasis  4  Worsening Was Increasing left bladder wall mass  5 Worsening adrenal metastasis   Worsening anterior pelvic mesenteric nodule   Worsening aortocaval and retrocaval lymph nodes  6  Worsening left external iliac lymph node  Blood cultures pending  Given vanc cefepime in the ED-> continue on admission  Lactic acid q 2 hours till cleared  Continue to trend troponin  IVF continue  Trend procal    Urology initially contacted on admission who recommended ferris catheter x48 hours, however as pt with continued fever and tachycardia in house urology PA consulted, Ann Marie Sapp  She spoke with urology, Dr Neema Villanueva, who recommended transfer for percutaneous nephrostomy tube  IR, Dr Amelia Bloom, was contacted who recommended level 1 transfer to [facility] this evening  Pt and wife agreeable to transfer

## 2022-03-12 NOTE — ASSESSMENT & PLAN NOTE
· Patient met the criteria for severe sepsis at the time of present  · Likely urinary source given abnormality seen on the CT scan  Continue with broad-spectrum antibiotics  Patient had blood culture done dated repeat blood culture now  · Urology evaluation appreciated  Transferred to [facility] for PCN placement by Interventional Radiology  · Continue with broad-spectrum antibiotics  · Patient met the criteria for sepsis at the time of admission    Noted to be hypertensive at the time of presentation to [facility]   · Hypotension resolved with IV hydration  · Infectious Disease consult

## 2022-03-12 NOTE — ASSESSMENT & PLAN NOTE
Present on admission  Sepsis criteria: febrile episodes at home, pancytopenia, tachycardia, hypotension with lactic acidosis and septic shock  Patient admitted to MICU for hypotension requiring pressor support (weaned off 03/12)  Etiologies: GPCC bacteremia and acute cystitis with severe hydronephrosis  Blood cultures collected on 03/11 POSITIVE for [facility]  Blood cultures collected on 03/12 showed NGTD  No growth on Urine cultures  ID consulted; continue IV Vancomycin and Cefepime

## 2022-03-12 NOTE — ASSESSMENT & PLAN NOTE
Secondary to metastatic prostate cancer    Malnutrition Findings:     diffuse sarcopenia       BMI Findings: Body mass index is 22 62 kg/m²         Nutrition on board, appreciate recommendations  Nutrition supplements with meals

## 2022-03-12 NOTE — ASSESSMENT & PLAN NOTE
· Patient with 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  · CT scan done today reviewed showed that the metastatic process is actually worsening  · Patient follows up with Dr Chirag Callejas   · Currently undergoing chemotherapy  · Patient is on chronic steroids    Stress dose of steroids for now

## 2022-03-12 NOTE — ASSESSMENT & PLAN NOTE
May be secondary to chemotherapy treatment, intravascular depletion, or reflex    "Baseline tachycardia" 150s outpatient  Continue telemetry  Cardiology on board, appreciate recommendations

## 2022-03-12 NOTE — ASSESSMENT & PLAN NOTE
Mets to the bone, liver and bilateral adrenal    CT 1/22 showed improved mediastinal and hilar adenopathy  Unchanged lingular noted  Worsening hepatic metastatic disease  New right adrenal metastatic nodule  Unchanged left adrenal metastatic disease  New upper abdominal retroperitoneal adenopathy  Slight worse moderate to severe left hydroureteronephrosis, with increased irregular mass involving the left side of the urinary bladder and left seminal vesicle  Increased adenopathy along the left pelvic sidewall  Diffuse widespread osseous metastatic disease        EUS Lymphadenopathy biopsy done on 9/21 showed metastatic carcinoma compatible with prostatic primary    Follows with Dr Hailey Sanchez    Undergoing chemo    On prednisone 5 mg po qd-> Solumedrol 40 mg IV q8h for stress dose

## 2022-03-12 NOTE — PROGRESS NOTES
UROLOGY PROGRESS NOTE   Patient Identifiers: Pati Vinson (MRN [id number])  Date of Service: 3/12/2022        Assessment:   Pt with b/l hydronephrosis (new on the right) s/p PCN with likely urosepsis now improving    Plan:   - continue ferris and PCNs  - Remove ferris when pt is clinically stable and check PVRs to enure he is emptying well  - PCNs may be able to covnerted to PCNUs at later date  - Will need oupt urology follow up based on progress and goal of care          Subjective:     24 HR EVENTS:   Patient was clinically compensating so decision made for transfer and bilateral PCN placement  After this he appeared to clinically improve    Today at the bedside he reports essentially no discomfort or complaints but also said he had no flank pain on either side yesterday or leading up to hospitalization      Objective:     VITALS:    Vitals:    03/12/22 1500   BP: 102/61   Pulse: 104   Resp: (!) 24   Temp: 98 4 °F (36 9 °C)   SpO2: 97%       INS & OUTS:  [unfilled]    LABS:  Lab Results   Component Value Date    HGB 6 4 (LL) 03/12/2022    HCT 19 6 (L) 03/12/2022    WBC 10 37 (H) 03/12/2022    PLT 69 (L) 03/12/2022   ]    Lab Results   Component Value Date    K 4 4 03/12/2022     (H) 03/12/2022    CO2 17 (L) 03/12/2022    BUN 24 03/12/2022    CREATININE 0 99 03/12/2022    CALCIUM 7 2 (L) 03/12/2022   ]    INPATIENT MEDS:    Current Facility-Administered Medications:     acetaminophen (TYLENOL) tablet 650 mg, 650 mg, Oral, Q6H PRN, Jose Riojas DO, 650 mg at 03/12/22 1125    Apalutamide TABS 240 mg/day, 240 mg/day, Oral, Daily, Jose Riojas DO, 240 mg/day at 03/12/22 1342    cefepime (MAXIPIME) 2,000 mg in dextrose 5 % 50 mL IVPB, 2,000 mg, Intravenous, Q12H, Jose iRojas DO, Last Rate: 100 mL/hr at 03/12/22 1504, 2,000 mg at 03/12/22 1504    famotidine (PEPCID) tablet 20 mg, 20 mg, Oral, Daily Before Breakfast, Jose Riojas DO, 20 mg at 03/12/22 0606    fentanyl citrate (PF) 100 MCG/2ML 50 mcg, 50 mcg, Intravenous, Q2H PRN, Lincoln Adjutant, DO, 50 mcg at 03/12/22 1500    gabapentin (NEURONTIN) capsule 600 mg, 600 mg, Oral, TID, Lincoln Adjutant, DO, 600 mg at 03/12/22 0545    ketoconazole (NIZORAL) tablet 200 mg, 200 mg, Oral, Daily, Lincoln Adjutant, DO, 200 mg at 03/12/22 1125    loperamide (IMODIUM) capsule 2 mg, 2 mg, Oral, 4x Daily PRN, Lincoln Adjutant, DO    LORazepam (ATIVAN) injection 1 mg, 1 mg, Intravenous, Q4H PRN, Lincoln Adjutant, DO    melatonin tablet 6 mg, 6 mg, Oral, HS, Lincoln Adjutant, DO    methylPREDNISolone sodium succinate (Solu-MEDROL) injection 40 mg, 40 mg, Intravenous, Q8H [address], Kimberly Fierro, DO, 40 mg at 03/12/22 1459    metoprolol tartrate (LOPRESSOR) partial tablet 12 5 mg, 12 5 mg, Oral, Q12H [address], Kimberly Fierro, DO, 12 5 mg at 03/12/22 0817    ondansetron (ZOFRAN) injection 4 mg, 4 mg, Intravenous, Q6H PRN, Lincoln Adjutant, DO    oxyCODONE (OxyCONTIN) 12 hr tablet 20 mg, 20 mg, Oral, Q12H [address], Kimberly Fierro, DO, 20 mg at 03/12/22 1126    oxyCODONE (ROXICODONE) immediate release tablet 10 mg, 10 mg, Oral, Q4H PRN, Lincoln Adjutant, DO, 10 mg at 03/12/22 1354    pantoprazole (PROTONIX) EC tablet 40 mg, 40 mg, Oral, Daily Before Breakfast, Lincoln Adjutant, DO, 40 mg at 03/12/22 0606    polyethylene glycol (MIRALAX) packet 17 g, 17 g, Oral, Daily, Lincoln Adjutant, DO    senna (SENOKOT) tablet 8 6 mg, 1 tablet, Oral, HS, Lincoln Adjutant, DO    sodium chloride tablet 1 g, 1 g, Oral, BID With Meals, Lincoln Adjutant, DO, 1 g at 03/12/22 0817    vancomycin (VANCOCIN) 1000 mg in sodium chloride 0 9% 250 mL IVPB, 15 mg/kg, Intravenous, Q12H, Lincoln Adjutant, DO, 1,000 mg at 03/12/22 1500      Physical Exam:   /61   Pulse 104   Temp 98 4 °F (36 9 °C)   Resp (!) 24   Wt 71 5 kg (157 lb 10 1 oz)   SpO2 97%   BMI 22 62 kg/m²   GEN: resting comfortably  RESP: breathing comfortably with no accessory muscle use  CV: no significant peripheral edema  ABD: soft, non-tender, non-distended  Left PCN with cherry hematuria  Right PCN with pink colored urine  CEBALLOS: clear urine

## 2022-03-12 NOTE — DISCHARGE SUMMARY
[place]  Discharge- Colette Villafuerte 1960, 57 y o  male MRN: [id number]  Unit/Bed#: -Andressa Encounter: [id number]  Primary Care Provider: Padmini Renee MD   Date and time admitted to hospital: 3/11/2022 10:33 AM    Tachycardia  Assessment & Plan  Has a longstanding history of tachycardia  Echo on 2/15/22 showed an EF of 65% with normal systolic and diastolic function    EKG on admission: Sinus tachycardia When compared with ECG of 11-MAR-2022 No significant change was found    Telemetry    Pancytopenia (HCC)  Assessment & Plan  Associated with chemo an active malignancy  WBC within normal limits 9 63  Hemoglobin 8 6  Platelets 26    S/p transfusion on recent admission for plts and PRBC's    Continue to monitor  Transfuse PRBCs with hb < 7  Transfuse Plts < 20  Holding DVT prophylaxis    Prostate cancer ([facility] [address] )  Assessment & Plan  Mets to the bone, liver and bilateral adrenal    CT 1/22 showed improved mediastinal and hilar adenopathy  Unchanged lingular noted  Worsening hepatic metastatic disease  New right adrenal metastatic nodule  Unchanged left adrenal metastatic disease  New upper abdominal retroperitoneal adenopathy  Slight worse moderate to severe left hydroureteronephrosis, with increased irregular mass involving the left side of the urinary bladder and left seminal vesicle  Increased adenopathy along the left pelvic sidewall  Diffuse widespread osseous metastatic disease        EUS Lymphadenopathy biopsy done on 9/21 showed metastatic carcinoma compatible with prostatic primary    Follows with Dr George Bhandari    Undergoing chemo    On prednisone 5 mg po qd-> Solumedrol 40 mg IV q8h for stress dose    MALT lymphoma ([facility] [address] )  Assessment & Plan  Diagnosed in 5/2018 s/p triple antibiotic course for H pylori at that time and then found to have progressive disease is 2nd gastric lesions with resolution on EGD 8/04    Acute metabolic encephalopathy  Assessment & Plan  Most likely secondary to sepsis    CT head pending - will need to be performed at [facility]  Eeg    See management of sepsis for further information    Severe protein-calorie malnutrition ([address] )  Assessment & Plan  Malnutrition Findings:     Adult Degree of Malnutrition: Other severe protein calorie malnutrition (Pt presents with severe protein calorie malnutrition as evidenced by <75% po intake > 1 month, clavicle protrusion, temporal depressions and sunken orbitals )        BMI Findings: Body mass index is 21 52 kg/m²  Pulmonary embolus (HCC)  Assessment & Plan  Hx of PE  Holding AC d/t thrombocytopenia    * Severe sepsis [facility])  Assessment & Plan  Patient presented to the ED with a fevers at home  Recently admitted on 3/3-3/6 for RANDI and pancytopenia    Patient meets criteria due to fevers, tachycardia, tachypnea, hypotension, leukopenia, lactic acid 2 6, procalcitonin 1 72 source unknown    CT abd pelvis:1   New bibasilar dependent atelectatic changes with trace pleural fluid on the left  2  New moderate right hydronephrosis and hydroureter and could be the source of sepsis  Stable appearance of moderate left hydronephrosis and hydroureter  3  Worsening hepatic metastasis  4  Worsening Was Increasing left bladder wall mass  5 Worsening adrenal metastasis   Worsening anterior pelvic mesenteric nodule   Worsening aortocaval and retrocaval lymph nodes  6  Worsening left external iliac lymph node  Blood cultures pending  Given vanc cefepime in the ED-> continue on admission  Lactic acid q 2 hours till cleared  Continue to trend troponin  IVF continue  Trend procal    Urology initially contacted on admission who recommended ferris catheter x48 hours, however as pt with continued fever and tachycardia in house urology PA consulted, Ryan Valenzuela  She spoke with urology, Dr Rogena Spatz, who recommended transfer for percutaneous nephrostomy tube   IR, Dr Maqruise Ellis, was contacted who recommended level 1 transfer to [facility] this evening  Pt and wife agreeable to transfer  Medical Problems             Resolved Problems  Date Reviewed: 3/11/2022    None              Discharging Physician / Practitioner: Allan Castro PA-C  PCP: Emily Alcala MD  Admission Date:   Admission Orders (From admission, onward)     Ordered        03/11/22 1421  Inpatient Admission  Once                      Discharge Date: 03/11/22    Consultations During Hospital Stay:  · UROLOGY     Procedures Performed:   CT C/A/P: "New moderate right hydronephrosis and hydroureter and could be the source of sepsis  Stable appearance of moderate left hydronephrosis and hydroureter "    Significant Findings / Test Results:   · See above     Incidental Findings:   · None      Test Results Pending at Discharge (will require follow up): · Will need CT head performed      Outpatient Tests Requested:  · None     Complications:  Requiring transfer to [facility] as level 1 transfer for percutaneous nephrostomy this evening with IR     Reason for Admission: severe sepsis likely secondary to right hydronephrosis and hydroureter     Hospital Course:   Angus Hopper is a 57 y o  male with hx of metastatic prostate cancer, MALT lymphoma, prior PE, malnutrition, and known tachycardia patient who originally presented to the hospital on 3/11/2022 due to fever that onset earlier today  Pt found to have severe sepsis with suspected source of right hydronephrosis and hydroureter found on CT scan  Urology initially recommended [place] x48 hours with recheck [address], however pt remained tachycardia and febrile, so urology PA in house was contacted who evaluated the patient and after speaking with attending, Dr Mikaela Mendez, decision was made to transfer to [facility] for IR intervention with percutaneous nephrostomy tube  Dr Panda Wall, IR recommended level 1 transfer  Discussed with Dr Talia Evans at [facility] who accepted pt in transfer to [facility]       The patient, initially admitted to the hospital as inpatient, was discharged earlier than expected given the following: need to transfer to [facility] for percutaneous nephrostomy tube   Please see above list of diagnoses and related plan for additional information  Condition at Discharge: guarded    Discharge Day Visit / Exam:   * Please refer to separate progress note for these details *    Discussion with Family: Updated  (wife) at bedside  Discharge instructions/Information to patient and family:   See after visit summary for information provided to patient and family  Provisions for Follow-Up Care:  See after visit summary for information related to follow-up care and any pertinent home health orders  Disposition:   [address] Transfer to [facility]    Planned Readmission: at [facility] as a transfer      Discharge Statement:  I spent 25 minutes discharging the patient  This time was spent on the day of discharge  I had direct contact with the patient on the day of discharge  Greater than 50% of the total time was spent examining patient, answering all patient questions, arranging and discussing plan of care with patient as well as directly providing post-discharge instructions  Additional time then spent on discharge activities  Discharge Medications:  See after visit summary for reconciled discharge medications provided to patient and/or family        **Please Note: This note may have been constructed using a voice recognition system**

## 2022-03-12 NOTE — ANESTHESIA POSTPROCEDURE EVALUATION
Post-Op Assessment Note    CV Status:  Stable  Pain Score: 0    Pain management: adequate     Mental Status:  Awake   Hydration Status:  Stable   PONV Controlled:  Controlled   Airway Patency:  Patent      Post Op Vitals Reviewed: Yes      Staff: Anesthesiologist, CRNA         No complications documented      BP      Temp      Pulse    Resp      SpO2

## 2022-03-12 NOTE — PROGRESS NOTES
Assumed patient's care from Dr Hobbs Failing this morning  58 y/o male hx of metastatic prostate ca to bone/lymphatics, malt lymphoma, PE, pancytopenia, tachycardia, protein calorie malnutrion presented to [facility]B and found to have R hydronephrosis and sepsis  Transferred to [facility] and underwent per nephrostomy tube  Admitted ot MICU on pressors that have now been weaned off  Key labs/studies (reviewed)   CT ab/pelvis   1   New bibasilar dependent atelectatic changes with trace pleural fluid on the left  2   New moderate right hydronephrosis and hydroureter and could be the source of sepsis  Stable appearance of moderate left hydronephrosis and hydroureter  3  Worsening hepatic metastasis  4  Worsening Was Increasing left bladder wall mass  5 Worsening adrenal metastasis   Worsening anterior pelvic mesenteric nodule   Worsening aortocaval and retrocaval lymph nodes  6  Worsening left external iliac lymph node  Micro: blood 3/12: gram positive cocci in clusters       Exam VS reviewed   Gen:cachetic, temporal wasting, chronically ill appearing  Head: no masses, lesions  Eyes: anicteric, no conjunctival irritation  Oropharynx: ETT in place, no obvious oropharyngeal breakdown  Lungs: course bs, no wheezes or ronchi   Cardiac: rrr w/o mrg  Ab: normal bowel sounds, soft, nontender, b/l nephrostomy tubes in place   Ext: no c/c/e  Skin: warm, dry, no rashes, intact  Neuro: no focal neurological deficits    Problems  Septic shock- gram positive bacteremia, suspect urinary source with hydronephrosis   Pancytopenia secondary to chemotherapy  Tachyarrhythmia    metabolic acidosis    acute kidney injury    prostate cancer with metastatic disease-   Transaminitis, elevated alk phos   severe protein calorie malnutrition  Chronic pain syndrome  History of MALT lymphoma  Hx of PE (9/2021)   Enecephalopathy- toxi/metabolic in setting of sepsis--resolved      PLAN  Encephalopathy resolved  Continue CAM-ICU  Delirium precautions  Pressors have been weaned off  Cont hemodynamic monitoring-- has tacchyarrythmia (chronic)--but will engage with cardiology  Cont cefepime and vanocmycin; f/u on cultures; monitor nephrostomy tube output-- f/u on IR recs  Obtain urology consultation  Appreciate ID recs  Cont gabapentin  Cont ketaconazole  Stop solumedrol 40 mg tid -- transition back to home prednisone  Cont metoprolol  Cont prn oxycontin 12 mg q12 hours   Appreciate palliative care involvement  Regular diet   Monitor thrombocytopenia  Repeat basic metabolic panel for apparent hyperchloremic non anion gap metabolic acidosis  T- none   L- chemo port R chest   D- [place]   P- scds        Disposition/Misc:  If hemodynamically stable (off pressors) can likely downgrade this afternoon       Jyoti Betancourt MD

## 2022-03-12 NOTE — ASSESSMENT & PLAN NOTE
Currently takes erleada 460 mg tabs Monday through Friday  Follows Dr Chris Melgar outpatient  Appropriate for hospice at this point  Palliative following, appreciate recommendations

## 2022-03-12 NOTE — ANESTHESIA PREPROCEDURE EVALUATION
Procedure:  IR NEPHROSTOMY TUBE PLACEMENT    Relevant Problems   CARDIO   (+) Pulmonary embolus (HCC)   (+) Pure hypercholesterolemia      /RENAL   (+) Acute kidney injury ([facility] [place] [address] )   (+) Hydronephrosis of right kidney   (+) Prostate cancer (HCC)      HEMATOLOGY   (+) Anemia   (+) MALT lymphoma (HCC)   (+) Pancytopenia (HCC)      Other   (+) Severe sepsis ([facility] [place] [address] )      57year old with PMHx of metastatic prostate CA, MALT lymphoma, ascending aortic aneurysm, hx of PE, pancytopenia, with Plt 26 on presentation  Transferred from [address] for IR nephrostomy tube placement due to sepsis 2/2 presumed urologic origin given imaging results showing bilateral hydronephrosis  Patient has hx of chronic pain and unable to lay prone for light sedation  Decision made to proceed with GA  Educated patient on increased risk of bleeding with procedure, possibility of prolonged intubation and ICU admission after procedure  Patient and wife in agreement with plan  Echo 2/15/22     Left Ventricle: Left ventricular cavity size is normal  Wall thickness is normal  The left ventricular ejection fraction is 65%  Systolic function is normal  Wall motion is normal  Diastolic function is normal     Right Ventricle: Right ventricular cavity size is moderately dilated  Systolic function is normal     Left Atrium: The atrium is upper normal in size    Right Atrium: The atrium is mildly dilated    Mitral Valve: There is mild regurgitation    Tricuspid Valve: There is mild regurgitation    Aorta: The aortic root is upper normal in size  Physical Exam    Airway    Mallampati score: II  TM Distance: >3 FB  Neck ROM: full     Dental   No notable dental hx     Cardiovascular      Pulmonary      Other Findings        Anesthesia Plan  ASA Score- 4 Emergent    Anesthesia Type- general with ASA Monitors  Additional Monitors:   Airway Plan: ETT  Plan Factors-Exercise tolerance (METS): <4 METS  Chart reviewed  EKG reviewed  Existing labs reviewed  Patient summary reviewed  Patient is not a current smoker  Induction- intravenous and rapid sequence induction  Postoperative Plan- Plan for postoperative opioid use  Planned trial extubation    Informed Consent- Anesthetic plan and risks discussed with patient and spouse  I personally reviewed this patient with the CRNA  Discussed and agreed on the Anesthesia Plan with the RAFA Bello

## 2022-03-12 NOTE — QUICK NOTE
QUICK NOTE - Deterioration Index  Josie Scott 57 y o  male MRN: [id number]  Unit/Bed#: -01 Encounter: [id number]    Date Paged: 22  Time Paged:   Room #: 26  Arrival Time:   Deterioration index score at time of page: 57 1  %  Spoke with rn from primary team  Need to escalate level of care:  Patient currently being transferred to [facility] for IR perc nephrostomy may need upgrade to step-down level 1 after procedure pending given sepsis     PROBLEMS resulting in high DI score: Factors Contributing to Score    Contribution Factor Value   20% Pulse 139   18% Age 57   46% Systolic 85   70% Neurological exam Lethargic   7% Cardiac rhythm Sinus tachycardia   6% Potassium 4 9 mmol/L   4% Respiratory rate 18     Contribution Factor Value   4% Hematocrit 27 1 %   3% WBC count 9 63 Thousand/uL   2% Sodium 135 mmol/L   2% BUN 26 mg/dL   1% Platelet count 26 Thousands/uL   <1% Temperature 99 1 °F (37 3 °C)   <1% Pulse oximetry                    PLAN:     Seen 1 hr prior due to temp 102 and lethargy  Spoke with Cathi DOSS to review plans with urology for source control and per nephrostomy  LA <2  Plan tx [facility] for IR per nephrostomy  bp improved 100's    Please contact critical care via [employer] with any questions or concerns       Vitals:   Vitals:    22 1945 22   BP:   106/68 105/60   BP Location:       Pulse:  (!) 148  (!) 139   Resp:   18 18   Temp: (!) 103 2 °F (39 6 °C) (!) 102 8 °F (39 3 °C)  99 1 °F (37 3 °C)   TempSrc: Oral Axillary  Axillary   SpO2:       Weight:       Height:           Respiratory:  SpO2: SpO2: 95 %, SpO2 Activity: SpO2 Activity: At Rest, SpO2 Device: O2 Device: None (Room air)       Temperature: Temp (24hrs), Av 9 °F (38 3 °C), Min:97 1 °F (36 2 °C), Max:103 6 °F (39 8 °C)  Current: Temperature: 99 1 °F (37 3 °C)    Labs:   Results from last 7 days   Lab Units 22  1111 22  0546 03/05/22  0553   WBC Thousand/uL 9 63 2 56* 2 46*   HEMOGLOBIN g/dL 8 6* 9 0* 9 1*   HEMATOCRIT % 27 1* 29 4* 29 0*   PLATELETS Thousands/uL 26* 37* 19*   MONO PCT % 12  --  5     Results from last 7 days   Lab Units 03/11/22 1952 03/11/22  1111 03/06/22  0546 03/05/22  0553 03/05/22  0553   SODIUM mmol/L 135* 133* 140   < > 142   POTASSIUM mmol/L 4 9 5 0 4 6   < > 4 2   CHLORIDE mmol/L 104 99* 105   < > 107   CO2 mmol/L 18* 20* 23   < > 24   BUN mg/dL 26* 28* 19   < > 20   CREATININE mg/dL 1 09 1 26 0 85   < > 0 96   CALCIUM mg/dL 6 5* 7 3* 7 0*   < > 6 8*   ALK PHOS U/L  --  957* 1,165*  --  558*   ALT U/L  --  38 78  --  19   AST U/L  --  431* 237*  --  100*    < > = values in this interval not displayed           Results from last 7 days   Lab Units 03/11/22 1952 03/11/22  1357 03/11/22  1111   LACTIC ACID mmol/L 1 7 1 6 2 6*         Results from last 7 days   Lab Units 03/11/22  1111   PROCALCITONIN ng/ml 1 72*       Code Status: Level 1 - Full Code

## 2022-03-13 PROBLEM — N30.01 ACUTE CYSTITIS WITH HEMATURIA: Status: ACTIVE | Noted: 2022-01-01

## 2022-03-13 PROBLEM — R74.01 TRANSAMINITIS: Status: ACTIVE | Noted: 2022-01-01

## 2022-03-13 PROBLEM — R78.81 BACTEREMIA: Status: ACTIVE | Noted: 2022-01-01

## 2022-03-13 PROBLEM — I47.1 SVT (SUPRAVENTRICULAR TACHYCARDIA) (HCC): Status: ACTIVE | Noted: 2022-01-01

## 2022-03-13 PROBLEM — I47.10 SVT (SUPRAVENTRICULAR TACHYCARDIA) (HCC): Status: ACTIVE | Noted: 2022-01-01

## 2022-03-13 NOTE — ASSESSMENT & PLAN NOTE
· Patient with longstanding history of tachycardia  Workup in the past was rather unremarkable    Patient baseline heart rate is 100s to 110,s  · Monitor on telemetry  · Patient noted to be tachycardic in the 1 4 days in [facility] [place] likely secondary to fever and sepsis

## 2022-03-13 NOTE — ASSESSMENT & PLAN NOTE
Resolved  Present on admission  Likely secondary to severe sepsis and acute on chronic anemia  Cardiology consulted; appreciate assistance  Per AM discussions with Cardiology, continue PO Lopressor TID dosing

## 2022-03-13 NOTE — UTILIZATION REVIEW
Initial Clinical Review    Admission: Date/Time/Statement:   Admission Orders (From admission, onward)     Ordered        03/11/22 1421  Inpatient Admission  Once                      Orders Placed This Encounter   Procedures    Inpatient Admission     Standing Status:   Standing     Number of Occurrences:   1     Order Specific Question:   Level of Care     Answer:   Med Surg [16]     Order Specific Question:   Estimated length of stay     Answer:   More than 2 Midnights     Order Specific Question:   Certification     Answer:   I certify that inpatient services are medically necessary for this patient for a duration of greater than two midnights  See H&P and MD Progress Notes for additional information about the patient's course of treatment  ED Arrival Information     Expected Arrival Acuity    - 3/11/2022 10:25 Emergent         Means of arrival Escorted by Service Admission type    Wheelchair Spouse Hospitalist Emergency         Arrival complaint    fever, disoriented        Chief Complaint   Patient presents with    Fever - 9 weeks to 74 years     Patient presents to the ER with report of having a 102 4 fever this morning, being disoriented, lethargic and having "jerky movements "       Initial Presentation:  this is a 57year old male from home to ED admitted inpatient due to sepsis/acute metabolic encephalopathy  PMH of prostate cancer, MALT lymphoma, pancytopenia, PE, malnutrition  Presented due to fever starting morning of arrival with confusion, jerky movements  Fatigued  Has chronic pain in lower back and thighs  On exam tachycardic  Hypotensive  Procalcitonin 1 72  Lactic acid 2 6  Bun 28, creatinine 1 26    platelets 26  H&H 8 6/27 1  Ct abdomen showed right hydronephrosis  Worsening hepatic metastasis, left bladder wall mass, adrenal metastasis  Blood cultures done and gram stain showed gram + cocci in clusters    In the ED given 2 liters of IVF, started on antibiotics and given IV analgesia  Plan is continue antibiotics, IVF  Consult urology  Check ct head  Telemetry  [place]     3/11/22 per Urology -Has continued fever and tachycardia, now  recommend bilateral PCN  Suspect renal decompression, as left side obstructed and may have spread along the trigone over to right      3/11/22 2125 discharged to [facility] as higher level of care due to need for percutaneous nephrostomy tube which can not be done at [facility]  ED Triage Vitals [03/11/22 1039]   Temperature Pulse Respirations Blood Pressure SpO2   97 6 °F (36 4 °C) (!) 136 (!) 28 (!) 89/55 93 %      Temp Source Heart Rate Source Patient Position - Orthostatic VS BP Location FiO2 (%)   Oral Monitor Lying Left arm --      Pain Score       7          Wt Readings from Last 1 Encounters:   03/12/22 71 5 kg (157 lb 10 1 oz)     Additional Vital Signs:   03/11/22 20:54:43 99 1 °F (37 3 °C) 139 Abnormal  18 105/60 75 -- -- --   03/11/22 20:11:53 -- -- 18 106/68 81 -- -- --   03/11/22 2011 102 8 °F (39 3 °C) Abnormal  148 Abnormal  -- -- -- -- -- --   03/11/22 1945 103 2 °F (39 6 °C) Abnormal  -- -- -- -- -- -- --   03/11/22 19:38:15 103 6 °F (39 8 °C) Abnormal  -- -- 104/68 80 -- -- --   03/11/22 1852 102 9 °F (39 4 °C) Abnormal  146 Abnormal  -- -- -- -- -- --   03/11/22 15:31:13 97 1 °F (36 2 °C) Abnormal  -- 18 112/72 85 -- -- --   03/11/22 1500 -- 158 Abnormal  14 116/59 83 98 % None (Room air) Lying   03/11/22 1400 -- 120 Abnormal  13 98/61 74 93 % -- --   03/11/22 1300 -- 164 Abnormal  17 102/54 73 94 % -- --   03/11/22 1200 -- 124 Abnormal  19 92/56 70 95 % None (Room air)        Pertinent Labs/Diagnostic Test Results:   CT abdomen pelvis with contrast   Final Result by Dory Favre, MD (03/11 9950)      1  New bibasilar dependent atelectatic changes with trace pleural fluid on the left  2   New moderate right hydronephrosis and hydroureter and could be the source of sepsis     Stable appearance of moderate left hydronephrosis and hydroureter  3  Worsening hepatic metastasis  4  Worsening Was Increasing left bladder wall mass  5 Worsening adrenal metastasis  Worsening anterior pelvic mesenteric nodule  Worsening aortocaval and retrocaval lymph nodes  6  Worsening left external iliac lymph node  The study was marked in [facility] for immediate notification  Workstation performed: [id number]         XR chest 1 view portable   Final Result by Authur Sever, MD (03/11 1245)      No findings of pneumonia or pulmonary edema  Findings related to metastatic disease as described above  Workstation performed: [id number]           3/11/22 ecg - Supraventricular tachycardia  Right bundle branch block  Abnormal ECG  When compared with ECG of 03-MAR-2022 19:11,  Vent   rate has increased BY  65 BPM    3/11/22 ecg - Sinus tachycardia  Left axis deviation  Right bundle branch block  Abnormal ECG  When compared with ECG of 11-MAR-2022 11:16, (unconfirmed) No significant change was found    3/11/22 ecg Supraventricular tachycardia  Right superior axis deviation  Right bundle branch block  Abnormal ECG     Results from last 7 days   Lab Units 03/13/22  0616 03/12/22 0613 03/11/22  1111   WBC Thousand/uL 11 32* 10 37* 9 63   HEMOGLOBIN g/dL 7 9* 6 4* 8 6*   HEMATOCRIT % 24 0* 19 6* 27 1*   PLATELETS Thousands/uL 58* 69* 26*     Results from last 7 days   Lab Units 03/13/22  0616 03/12/22 0613 03/11/22 1952 03/11/22  1111   SODIUM mmol/L 137 137 135* 133*   POTASSIUM mmol/L 4 7 4 4 4 9 5 0   CHLORIDE mmol/L 108 110* 104 99*   CO2 mmol/L 18* 17* 18* 20*   ANION GAP mmol/L 11 10 13 14*   BUN mg/dL 24 24 26* 28*   CREATININE mg/dL 0 90 0 99 1 09 1 26   EGFR ml/min/1 73sq m 91 81 72 60   CALCIUM mg/dL 7 1* 7 2* 6 5* 7 3*   MAGNESIUM mg/dL  --  2 2  --   --      Results from last 7 days   Lab Units 03/12/22  0613 03/11/22  1111   AST U/L 276* 431*   ALT U/L 23 38   ALK PHOS U/L 671* 957* TOTAL PROTEIN g/dL 5 2* 5 8*   ALBUMIN g/dL 1 9* 2 2*   TOTAL BILIRUBIN mg/dL 1 20* 1 40*     Results from last 7 days   Lab Units 03/13/22  0616 03/12/22  0613 03/11/22 1952 03/11/22  1111   GLUCOSE RANDOM mg/dL 100 106 81 100     Results from last 7 days   Lab Units 03/11/22  1834 03/11/22  1357 03/11/22  1115   HS TNI 0HR ng/L  --   --  38   HS TNI 2HR ng/L  --  42  --    HSTNI D2 ng/L  --  4  --    HS TNI 4HR ng/L 48  --   --    HSTNI D4 ng/L 10  --   --      Results from last 7 days   Lab Units 03/11/22  1111   PROTIME seconds 16 5*   INR  1 35*   PTT seconds 36     Results from last 7 days   Lab Units 03/11/22  1111   TSH 3RD GENERATON uIU/mL 0 829     Results from last 7 days   Lab Units 03/12/22  0613 03/11/22  1111   PROCALCITONIN ng/ml 5 38* 1 72*     Results from last 7 days   Lab Units 03/11/22  1952 03/11/22  1357 03/11/22  1111   LACTIC ACID mmol/L 1 7 1 6 2 6*     Results from last 7 days   Lab Units 03/11/22  1358   CLARITY UA  Clear   COLOR UA  Yellow   SPEC GRAV UA  1 015   PH UA  5 5   GLUCOSE UA mg/dl Negative   KETONES UA mg/dl Negative   BLOOD UA  Moderate*   PROTEIN UA mg/dl Trace*   NITRITE UA  Negative   BILIRUBIN UA  Negative   UROBILINOGEN UA E U /dl 1 0   LEUKOCYTES UA  Negative   WBC UA /hpf 2-4   RBC UA /hpf 1-2   BACTERIA UA /hpf Occasional   EPITHELIAL CELLS WET PREP /hpf Occasional     Results from last 7 days   Lab Units 03/12/22  0207 03/12/22  0205 03/12/22  0049 03/12/22  0018 03/11/22  1111   BLOOD CULTURE   --  No Growth at 24 hrs    No Growth at 24 hrs   --   --  Staphylococcus epidermidis*  Staphylococcus epidermidis*   GRAM STAIN RESULT   --   --   --   --  Gram positive cocci in clusters*  Gram positive cocci in clusters*   URINE CULTURE  No Growth <1000 cfu/mL  --  No Growth <1000 cfu/mL No Growth <1000 cfu/mL  --        ED Treatment:   Medication Administration from 03/11/2022 1025 to 03/11/2022 1527       Date/Time Order Dose Route Action Comments     03/11/2022 1111 sodium chloride 0 9 % bolus 1,000 mL 1,000 mL Intravenous New Bag      03/11/2022 1353 vancomycin (VANCOCIN) IVPB (premix in dextrose) 1,000 mg 200 mL 1,000 mg Intravenous New Bag      03/11/2022 1229 cefepime (MAXIPIME) IVPB (premix in dextrose) 2,000 mg 50 mL 2,000 mg Intravenous New Bag      03/11/2022 1231 sodium chloride 0 9 % bolus 1,000 mL 1,000 mL Intravenous New Bag      03/11/2022 1352 HYDROmorphone (DILAUDID) injection 1 mg 1 mg Intravenous Given         Past Medical History:   Diagnosis Date    Cancer ([place] [address] )     Stomach    Lymphoma ([place] [address] ) 05/2018    Non [facility]'s [facility] )     Prostate cancer ([facility] ) 2020    Shingles     Thoracic aortic aneurysm (HCC)     PATIENT DENIES     Present on Admission:   Severe protein-calorie malnutrition (HCC)   MALT lymphoma ([facility] )   Prostate cancer ([place] [address] )   Pulmonary embolus (HCC)   Tachycardia   Pancytopenia (HCC)      Admitting Diagnosis: Sinus tachycardia [R00 0]  Anemia [D64 9]  Hypotension [I95 9]  Fever [R50 9]  Sepsis ([place] [address] ) [A41 9]  Age/Sex: 57 y o  male  Admission Orders: 3/11/22 1421 inpatient   Scheduled Medications:  Current Facility-Administered Medications   Medication Dose Route Frequency    acetaminophen (TYLENOL) tablet 650 mg  650 mg Oral Q6H PRN - used x 1 3/11    [START ON 3/12/2022] cefepime (MAXIPIME) IVPB (premix in dextrose) 1,000 mg 50 mL  1,000 mg Intravenous Q12H    [START ON 3/12/2022] famotidine (PEPCID) tablet 20 mg  20 mg Oral Daily Before Breakfast    gabapentin (NEURONTIN) capsule 600 mg  600 mg Oral TID    [START ON 3/12/2022] ketoconazole (NIZORAL) tablet 200 mg  200 mg Oral Daily    loperamide (IMODIUM) capsule 2 mg  2 mg Oral 4x Daily PRN    LORazepam (ATIVAN) injection 1 mg  1 mg Intravenous Q4H PRN    melatonin tablet 6 mg  6 mg Oral HS    methylPREDNISolone sodium succinate (Solu-MEDROL) injection 40 mg  40 mg Intravenous Q8H [facility]    metoprolol tartrate (LOPRESSOR) partial tablet 12 5 mg  12 5 mg Oral Q12H [facility]  ondansetron (ZOFRAN) injection 4 mg  4 mg Intravenous Q6H PRN    oxyCODONE (ROXICODONE) IR tablet 10 mg  10 mg Oral Q4H PRN - used x 1     [START ON 3/12/2022] pantoprazole (PROTONIX) EC tablet 40 mg  40 mg Oral Daily Before Breakfast    polyethylene glycol (MIRALAX) packet 17 g  17 g Oral Daily PRN    sodium chloride 0 9 % infusion  100 mL/hr Intravenous Continuous    sodium chloride tablet 1 g  1 g Oral BID With Meals    [START ON 3/12/2022] vancomycin (VANCOCIN) IVPB (premix in dextrose) 750 mg 150 mL  12 5 mg/kg Intravenous Q12H           Telemetry   Urinary catheter    Kinjal Murphy RN  Network Utilization Review Department  ATTENTION: Please call with any questions or concerns to [phone] and carefully listen to the prompts so that you are directed to the right person  All voicemails are confidential   Sandra Doing all requests for admission clinical reviews, approved or denied determinations and any other requests to dedicated fax number below belonging to the campus where the patient is receiving treatment   List of dedicated fax numbers for the Facilities:  [address] DENIALS (Administrative/Medical Necessity) [phone]   [address] (Maternity/NICU/Pediatrics) [phone] [address]  [address] [facility] [place] [address] [address] [place] [address] [address] [facility] [facility] [phone]

## 2022-03-13 NOTE — UTILIZATION REVIEW
Initial Clinical Review    Admission: Date/Time/Statement:   Admission Orders (From admission, onward)     Ordered        03/12/22 0006  Inpatient Admission  Once                      Orders Placed This Encounter   Procedures    Inpatient Admission     Standing Status:   Standing     Number of Occurrences:   1     Order Specific Question:   Level of Care     Answer:   Level 2 Stepdown / HOT [14]     Order Specific Question:   Bed Type     Answer:   Bariatric [1]     Order Specific Question:   Estimated length of stay     Answer:   More than 2 Midnights     Order Specific Question:   Certification     Answer:   I certify that inpatient services are medically necessary for this patient for a duration of greater than two midnights  See H&P and MD Progress Notes for additional information about the patient's course of treatment  Initial Presentation:  56 y/o male with PMHx of MALT lymphoma  PE, prostate cancer [facility] from [facility] where he initially presented d/t fever  Met sepsis criteria on presentation with tachycardia and hypotension  CT a/p done showing R-sided hydronephrosis which was felt to be source of infection  Tx'd to [facility] for IR consult for possible PCN placement  Given IVF's and IV abx in ED  ADMIT INPATIENT to M/S UNIT with SEVERE SEPSIS, hydronephrosis of R kidney -- continue IV abx  Blood cxs pending  ID consult  Tele monitoring  Continue home po steroids  Palliative care consult  IR note 3/12 -- Findings: b/l 10 Fr PCN placement    Urine on the right was mildly turbid, not obviously purulent  Urine on left was clear    Patient is likely a candidate for attempts at internalization to PCNUs in the future (likely as an outpatient)    Date: 3/12   Day 2: Encephalopathy resolved  Pressors have been weaned off  Cont hemodynamic monitoring-- has tacchyarrythmia (chronic)--but cardiology consulted    Cont cefepime and vanocmycin; f/u on cultures; monitor nephrostomy tube output-- f/u on IR recs  Obtain urology consultation  Appreciate ID following  Cont gabapentin  Cont ketaconazole  Stop solumedrol 40 mg tid -- transition back to home prednisone  Cont metoprolol  Cont prn oxycontin 12 mg q12 hours  Appreciate palliative care involvement  Regular diet  Monitor thrombocytopenia  Repeat BMP  ID consult 3/12 -- Severe sepsis 2/2 to gram positive bacteremia and UTI -- continue IV vancomycin and cefepime for now  F/u on UCX and blood cxs    Wt Readings from Last 1 Encounters:   03/12/22 71 5 kg (157 lb 10 1 oz)       Vital Signs:   Date/Time Temp Pulse Resp BP MAP (mmHg) SpO2 O2 Device   03/12/22 22:25:29 97 9 °F (36 6 °C) 103 16 102/68 79 97 % --   03/12/22 21:43:11 98 °F (36 7 °C) 100 -- 103/68 80 96 % None (Room air)   03/12/22 1700 -- 94 24 Abnormal  99/67 -- 95 % --   03/12/22 1600 -- 94 24 Abnormal  -- -- 96 % --   03/12/22 1500 98 4 °F (36 9 °C) 104 24 Abnormal  102/61 70 97 % --   03/12/22 1300 -- 96 12 85/55 Abnormal  60 97 % --   03/12/22 1230 -- 98 15 82/56 Abnormal  63 96 % --   03/12/22 1217 98 4 °F (36 9 °C) 97 12 85/53 Abnormal  -- -- --   03/12/22 1200 -- 100 22 87/56 Abnormal  61 98 % --   03/12/22 0900 -- 148 Abnormal  27 Abnormal  99/68 78 99 % --   03/12/22 0817 97 8 °F (36 6 °C) 164 Abnormal  -- 92/75 -- -- --   03/12/22 0600 -- 164 Abnormal  17 83/65 Abnormal  74 97 % --   03/12/22 0437 -- 118 Abnormal  12 91/63 68 97 % --   03/12/22 0418 98 °F (36 7 °C) 118 Abnormal  15 88/60 Abnormal  67 97 % None (Room air)   03/12/22 0127 98 4 °F (36 9 °C) 124 Abnormal  16 104/65 80 97 % None (Room air)   03/11/22 2238 99 °F (37 2 °C) 166 Abnormal  -- 92/63 73 -- --       Pertinent Labs/Diagnostic Test Results:   IR nephrostomy tube placement   Final Result by Maya Macdonald MD (03/12 1321)   Bilateral nephrostomy catheter placement  PLAN:   The catheters were left to gravity drainage   The patient is a candidate for internalization to nephroureteral catheters  Workstation performed: [id number]           CT A/p 3/11 -- 1   New bibasilar dependent atelectatic changes with trace pleural fluid on the left  2   New moderate right hydronephrosis and hydroureter and could be the source of sepsis  Stable appearance of moderate left hydronephrosis and hydroureter  3  Worsening hepatic metastasis  4  Worsening Was Increasing left bladder wall mass  5 Worsening adrenal metastasis   Worsening anterior pelvic mesenteric nodule   Worsening aortocaval and retrocaval lymph nodes  6  Worsening left external iliac lymph node  CXR 3/11 -- No findings of pneumonia or pulmonary edema  Findings related to metastatic disease as described above            Results from last 7 days   Lab Units 03/13/22  0616 03/12/22  0613 03/11/22  1111   WBC Thousand/uL 11 32* 10 37* 9 63   HEMOGLOBIN g/dL 7 9* 6 4* 8 6*   HEMATOCRIT % 24 0* 19 6* 27 1*   PLATELETS Thousands/uL 58* 69* 26*     Results from last 7 days   Lab Units 03/13/22  0616 03/12/22  0613 03/11/22 1952 03/11/22  1111   SODIUM mmol/L 137 137 135* 133*   POTASSIUM mmol/L 4 7 4 4 4 9 5 0   CHLORIDE mmol/L 108 110* 104 99*   CO2 mmol/L 18* 17* 18* 20*   ANION GAP mmol/L 11 10 13 14*   BUN mg/dL 24 24 26* 28*   CREATININE mg/dL 0 90 0 99 1 09 1 26   EGFR ml/min/1 73sq m 91 81 72 60   CALCIUM mg/dL 7 1* 7 2* 6 5* 7 3*   MAGNESIUM mg/dL  --  2 2  --   --      Results from last 7 days   Lab Units 03/12/22  0613 03/11/22  1111   AST U/L 276* 431*   ALT U/L 23 38   ALK PHOS U/L 671* 957*   TOTAL PROTEIN g/dL 5 2* 5 8*   ALBUMIN g/dL 1 9* 2 2*   TOTAL BILIRUBIN mg/dL 1 20* 1 40*     Results from last 7 days   Lab Units 03/13/22  0616 03/12/22  0613 03/11/22 1952 03/11/22  1111   GLUCOSE RANDOM mg/dL 100 106 81 100     Results from last 7 days   Lab Units 03/11/22  1834 03/11/22  1357 03/11/22  1115   HS TNI 0HR ng/L  --   --  38   HS TNI 2HR ng/L  --  42  --    HSTNI D2 ng/L  --  4  --    HS TNI 4HR ng/L 48  -- --    HSTNI D4 ng/L 10  --   --          Results from last 7 days   Lab Units 03/11/22  1111   PROTIME seconds 16 5*   INR  1 35*   PTT seconds 36     Results from last 7 days   Lab Units 03/11/22  1111   TSH 3RD GENERATON uIU/mL 0 829     Results from last 7 days   Lab Units 03/12/22  0613 03/11/22  1111   PROCALCITONIN ng/ml 5 38* 1 72*     Results from last 7 days   Lab Units 03/11/22  1952 03/11/22  1357 03/11/22  1111   LACTIC ACID mmol/L 1 7 1 6 2 6*     Results from last 7 days   Lab Units 03/11/22  1358   CLARITY UA  Clear   COLOR UA  Yellow   SPEC GRAV UA  1 015   PH UA  5 5   GLUCOSE UA mg/dl Negative   KETONES UA mg/dl Negative   BLOOD UA  Moderate*   PROTEIN UA mg/dl Trace*   NITRITE UA  Negative   BILIRUBIN UA  Negative   UROBILINOGEN UA E U /dl 1 0   LEUKOCYTES UA  Negative   WBC UA /hpf 2-4   RBC UA /hpf 1-2   BACTERIA UA /hpf Occasional   EPITHELIAL CELLS WET PREP /hpf Occasional     Results from last 7 days   Lab Units 03/12/22  0207 03/12/22  0205 03/12/22  0049 03/12/22  0018 03/11/22  1111   BLOOD CULTURE   --  No Growth at 24 hrs    No Growth at 24 hrs   --   --  Staphylococcus epidermidis*  Staphylococcus epidermidis*   GRAM STAIN RESULT   --   --   --   --  Gram positive cocci in clusters*  Gram positive cocci in clusters*   URINE CULTURE  No Growth <1000 cfu/mL  --  No Growth <1000 cfu/mL No Growth <1000 cfu/mL  --        Past Medical History:   Diagnosis Date    Cancer ([facility] )     Stomach    [facility] ) 05/2018    Non Hodgkin's lymphoma ([facility] )     [facility] ) 2020    Shingles     Thoracic aortic aneurysm ([facility] )     PATIENT DENIES     Present on Admission:   Prostate cancer ([facility] )   Pulmonary embolus (HCC)   Severe sepsis (HCC)   MALT lymphoma (HCC)   Tachycardia   (Resolved) Acute metabolic encephalopathy   Pancytopenia (HCC)   Severe protein-calorie malnutrition (HCC)      Admitting Diagnosis: Severe sepsis ([facility] ) [A41 9, R65 20]  Age/Sex: 57 y o  male  Admission Orders:  Scheduled Medications:  Apalutamide, 240 mg/day, Oral, Daily  cefepime, 2,000 mg, Intravenous, Q12H  [START ON 3/14/2022] dexamethasone, 4 mg, Oral, Q12H [facility]  famotidine, 20 mg, Oral, BID  gabapentin, 600 mg, Oral, TID  melatonin, 12 mg, Oral, HS  metoprolol tartrate, 25 mg, Oral, Q8H  oxyCODONE, 20 mg, Oral, Q12H KATHARINA  oxyCODONE, 10 mg, Oral, Q4H  sodium chloride, 1 g, Oral, BID With Meals  vancomycin, 750 mg, Intravenous, Q12H    PRN Meds:  acetaminophen, 650 mg, Oral, Q6H PRN  fentaNYL, 50 mcg, Intravenous, Q5 Min PRN  fentanyl citrate (PF), 50 mcg, Intravenous, Q2H PRN  loperamide, 2 mg, Oral, 4x Daily PRN  LORazepam, 1 mg, Intravenous, Q4H PRN  metoclopramide, 10 mg, Intravenous, Once PRN  ondansetron, 4 mg, Intravenous, Q6H PRN  polyethylene glycol, 17 g, Oral, Daily PRN        IP CONSULT TO CASE MANAGEMENT  IP CONSULT TO PALLIATIVE CARE  IP CONSULT TO PASTORAL CARE  IP CONSULT TO UROLOGY  IP CONSULT TO PHARMACY  IP CONSULT TO INFECTIOUS DISEASES  IP CONSULT TO CARDIOLOGY  IP CONSULT TO ONCOLOGY    Dmitri Langston RN  Network Utilization Review Department  ATTENTION: Please call with any questions or concerns to [phone] and carefully listen to the prompts so that you are directed to the right person  All voicemails are confidential   Kelle Bee all requests for admission clinical reviews, approved or denied determinations and any other requests to dedicated fax number below belonging to the campus where the patient is receiving treatment   List of dedicated fax numbers for the Facilities:  [address] [place] [address] DENIALS (Administrative/Medical Necessity) [phone]   [address] [place] [address] (Maternity/NICU/Pediatrics) 261 [facility] [address] 150 [facility] [id number] [place] [address] [phone] [place] [address] [phone]

## 2022-03-13 NOTE — PROGRESS NOTES
Vancomycin IV Pharmacy-to-Dose Consultation    Angus Hopper is a 57 y o  male who is currently receiving Vancomycin IV with management by the Pharmacy Consult service  Assessment/Plan:  The patient was reviewed  Renal function is stable and no signs or symptoms of nephrotoxicity and/or infusion reactions were documented in the chart  Level this afternoon resulted at 24 2  This level was drawn 11 hours after the last dose and is 1 hour prior to a true trough  Extrapolated trough is still supratherapeutic at 22 4    Based on todays assessment, decrease current vancomycin (day # 3) dosing to 750 mg q12h, with a plan for next trough to be drawn at 0300 on 3/15  We will continue to follow the patients culture results and clinical progress daily      Jeovanny Merino PharmD  Emergency Medicine Clinical Pharmacist    or Adam

## 2022-03-13 NOTE — PROGRESS NOTES
Progress Note - Infectious Disease   Karina Morin 57 y o  male MRN: [id number]  Unit/Bed#: [facility] 933-01 Encounter: [id number]      IMPRESSION & RECOMMENDATIONS:   Impression/Recommendations:  1  Severe sepsis, POA at [facility]  Leukopenia, tachycardia, fevers, elevated lactic acid  Secondary to #2 vs #3      -antibiotic plan as below  -follow temperatures and hemodynamics  -recheck CBC in a m   -supportive care     2  Gram-positive bacteremia  Blood cultures from  show GPCs in clusters  Both sets appear to have been drawn from the port at the same time  Consider true bacteremia versus contaminated specimens  No other intravascular prosthetic devices  Repeat blood cultures are preliminarily negative      -continue IV vancomycin  Dosing recommendations per pharmacy  -follow up final blood cultures from   -follow-up repeat blood cultures to ensure clearance of bacteremia     3  Presumptive UTI  In the setting of bilateral hydronephrosis secondary to #4  Hydronephrosis on the right side is new on this admission  Status post emergent bilateral PCN placement by IR 3/12      -continue IV cefepime for now  -follow-up urine culture  -urology/IR follow-up     4  Metastatic prostate cancer with diffuse lymphatic and bony involvement, bilateral hydronephrosis  On active chemotherapy via port  Follows with Dr Trinidad Hyde  CT shows progressive metastatic disease      -palliative care evaluation pending  -oncology follow-up     5  Prior MALT lymphoma      Antibiotics:  Vancomycin/cefepime 3      Subjective:  Patient is doing better and was transferred out of ICU  No fevers  No hypoxia  Stable blood pressures      Objective:  Vitals:  Temp:  [97 4 °F (36 3 °C)-98 4 °F (36 9 °C)] 97 4 °F (36 3 °C)  HR:  [] 111  Resp:  [12-27] 16  BP: ()/(52-76) 107/76  SpO2:  [94 %-99 %] 97 %  Temp (24hrs), Av 1 °F (36 7 °C), Min:97 4 °F (36 3 °C), Max:98 4 °F (36 9 °C)  Current: Temperature: (!) 97 4 °F (36 3 °C)    Physical Exam:   General:  No acute distress, very thin, nontoxic  HEENT:  Atraumatic normocephalic  Neck:  Trachea midline  Psychiatric:  No acute psychosis  Pulmonary:  Normal respiratory excursion without accessory muscle use  Chest wall port in place with no overlying erythema, tenderness or drainage  Abdomen:  Soft, nontender  Bilateral PCNs of yellow urine  Extremities:  No edema  Skin:  No rashes  Neuro: Moves all extremities spontaneously    Lab Results:  I have personally reviewed pertinent labs  Results from last 7 days   Lab Units 03/13/22  0616 03/12/22  0613 03/11/22  1952 03/11/22  1111 03/11/22  1111   POTASSIUM mmol/L 4 7 4 4 4 9   < > 5 0   CHLORIDE mmol/L 108 110* 104   < > 99*   CO2 mmol/L 18* 17* 18*   < > 20*   BUN mg/dL 24 24 26*   < > 28*   CREATININE mg/dL 0 90 0 99 1 09   < > 1 26   EGFR ml/min/1 73sq m 91 81 72   < > 60   CALCIUM mg/dL 7 1* 7 2* 6 5*   < > 7 3*   AST U/L  --  276*  --   --  431*   ALT U/L  --  23  --   --  38   ALK PHOS U/L  --  671*  --   --  957*    < > = values in this interval not displayed  Results from last 7 days   Lab Units 03/13/22  0616 03/12/22  0613 03/11/22  1111   WBC Thousand/uL 11 32* 10 37* 9 63   HEMOGLOBIN g/dL 7 9* 6 4* 8 6*   PLATELETS Thousands/uL 58* 69* 26*     Results from last 7 days   Lab Units 03/12/22  0205 03/11/22  1111   BLOOD CULTURE  No Growth at 24 hrs  No Growth at 24 hrs   --    GRAM STAIN RESULT   --  Gram positive cocci in clusters*  Gram positive cocci in clusters*       Imaging Studies:   I have personally reviewed pertinent imaging study reports and images in PACS  EKG, Pathology, and Other Studies:   I have personally reviewed pertinent reports

## 2022-03-13 NOTE — PROGRESS NOTES
[address] [facility]  Progress Note Shakir Galeano 1960, 57 y o  male MRN: [id number]  Unit/Bed#: [facility] 933-01 Encounter: [id number]  Primary Care Provider: Ming Bates MD   Date and time admitted to hospital: 3/11/2022 10:32 PM    * Severe sepsis [facility])  Assessment & Plan  Present on admission  Sepsis criteria: febrile episodes at home, pancytopenia, tachycardia, hypotension with lactic acidosis and septic shock  Patient admitted to MICU for hypotension requiring pressor support (weaned off 03/12)  Etiologies: GPCC bacteremia and acute cystitis with severe hydronephrosis  Blood cultures collected on 03/11 POSITIVE for [facility]  Blood cultures collected on 03/12 showed NGTD  No growth on Urine cultures  ID consulted; continue IV Vancomycin and Cefepime    Bacteremia  Assessment & Plan  Present on admission  See plan below Severe sepsis    Transaminitis  Assessment & Plan  Improving  Present on admission  Likely secondary to septic shock  Continue to monitor intermittently  Will closely monitor with PRN Tylenol for fever only    SVT (supraventricular tachycardia) (HCC)  Assessment & Plan  Resolved  Present on admission  Likely secondary to severe sepsis and acute on chronic anemia  Cardiology consulted; appreciate assistance  Per AM discussions with Cardiology, continue PO Lopressor TID dosing    Hydronephrosis of right kidney  Assessment & Plan  "Patient noted to have new hydronephrosis and hydroureter on the right side  Also noted to have urinary retention  Status post [place] catheter placement    Urology evaluated the patient due to persistent fever and likely source of infection recommended PCN placement  · As per urology progress note left-sided is more and there is a possibility that it has spread along the trigone over to the right side"    Urology consulted, appreciate recommendations  Patient remains hemodynamically stable s/p bilateral PCN placed by IR on 03/11  See plan below Severe sepsis    Severe protein-calorie malnutrition ([facility] [address] )  Assessment & Plan  Malnutrition Findings:           BMI Findings: Body mass index is 22 62 kg/m²  Nutrition consulted; continue with supplemental nutrition    Tachycardia  Assessment & Plan  · Patient with longstanding history of tachycardia  Workup in the past was rather unremarkable    Patient baseline heart rate is 100s to 110,s  · Monitor on telemetry  · Patient noted to be tachycardic in the 1 4 days in [facility] likely secondary to fever and sepsis    Acute cystitis with hematuria  Assessment & Plan  Present on admission  Noted on UA performed on 03/11  No growth on Urine culture  Continue plan below Severe sepsis    Pancytopenia [facility])  Assessment & Plan  Improving  Patient with anemia and thrombocytopenia  Secondary to metastatic prostate cancer, MALT, and chemotherapy  Patient remains mildly tachycardic following 1 Unit pRBCs on 03/12  AM Hemoglobin measured at 7 9 g/dL  Transfuse Hemoglobin <7 g/dL    Pulmonary embolus [facility])  Assessment & Plan  Patient with history of PE diagnosed in 09/2021  Holding anticoagulation due to thrombocytopenia    Osseous metastasis ([facility] )  Assessment & Plan  Patient and his wife report scheduled use of IR Oxycodone  Continue Oxycodone ER 12 mg q12h  Started scheduled Roxicodone 10 mg q4h  Patient and wife declined bowel regimen at this time  Palliative Care involved; patient will need pain regimen optimization per pending recs (I e  fentanyl patch)    Prostate cancer [facility])  Assessment & Plan  Patient with prostate cancer with metastatic multiple processes  Patient currently established with Dr Teresita Vergara  CT A/P identified "worsening hepatic metastasis, worsening mets of adrenal glands, worsening left bladder wall mass, worsening anterior pelvic mesenteric nodule, worsening aortocaval and retrocaval lymph nodes, and worsening left external iliac lymph node"  Overall worsening disease progression on imaging and indicative of poor prognosis  Oncology consulted; patient and patient's wife request  Continue home Apalutamide 60 mg 4 tabs daily  Resumed home Decadron 4 mg BID    MALT lymphoma ([address] )  Assessment & Plan  "Diagnosed in 05/2018  Status post triple antibiotic course for H pylori at that time and 2nd EGD in 9/21 showed resolution of the gastric lesion"    [address] 460 mg tabs taken Monday through Friday  Continue home Apalutamide 240 mg daily  Oncology consulted per patient and patient's wife request  Palliative Care consulted; appreciate assistance and recommendations        VTE Pharmacologic Prophylaxis: VTE Score: 9 Moderate Risk (Score 3-4) - Pharmacological DVT Prophylaxis Contraindicated  Sequential Compression Devices Ordered  Patient Centered Rounds: I performed bedside rounds with nursing staff today  Discussions with Specialists or Other Care Team Provider: Cardiology, Urology, Nurse and      Education and Discussions with Family / Patient: Updated  (wife) at bedside  Time Spent for Care: 45 minutes  More than 50% of total time spent on counseling and coordination of care as described above  Current Length of Stay: 2 day(s)  Current Patient Status: Inpatient   Certification Statement: The patient will continue to require additional inpatient hospital stay due to sepsis management  Discharge Plan: Anticipate discharge in >72 hrs to discharge location to be determined pending rehab evaluations  Code Status: Level 1 - Full Code      Subjective:   Patient states that he feels pain all over his body and on his non-chemo days he takes Roxicodone as scheduled every 4 hours per his wife  When he get chemo, he does not take the Roxicodone as much  He declines bowel regimen at this time  He and his wife deny Ketoconazole and Prednisone  Patient is currently on Decadron  They request Oncology consult since he wants to continue chemotherapy   They questioned the dose and frequency of Lopressor since he take it differently at home until Cardiology explained the patient's elevated heart rate during assessment  Objective:     Vitals:   Temp (24hrs), Av 9 °F (36 6 °C), Min:97 4 °F (36 3 °C), Max:98 3 °F (36 8 °C)    Temp:  [97 4 °F (36 3 °C)-98 3 °F (36 8 °C)] 97 4 °F (36 3 °C)  HR:  [] 101  Resp:  [16] 16  BP: (102-107)/(68-76) 106/76  SpO2:  [94 %-97 %] 96 %  Body mass index is 22 62 kg/m²  Input and Output Summary (last 24 hours): Intake/Output Summary (Last 24 hours) at 3/13/2022 1725  Last data filed at 3/13/2022 1534  Gross per 24 hour   Intake 820 ml   Output 1850 ml   Net -1030 ml       Physical Exam:   Physical Exam  Vitals and nursing note reviewed  Constitutional:       General: He is not in acute distress  Appearance: He is not diaphoretic  Comments: Cachetic and emaciated   HENT:      Head: Normocephalic and atraumatic  Right Ear: External ear normal       Left Ear: External ear normal       Nose: Nose normal       Mouth/Throat:      Mouth: Mucous membranes are dry  Eyes:      Extraocular Movements: Extraocular movements intact  Conjunctiva/sclera: Conjunctivae normal    Cardiovascular:      Rate and Rhythm: Regular rhythm  Tachycardia present  Heart sounds: No friction rub  No gallop  Pulmonary:      Effort: Pulmonary effort is normal  No respiratory distress  Breath sounds: Normal breath sounds  Abdominal:      General: Bowel sounds are normal  There is no distension  Palpations: Abdomen is soft  Tenderness: There is abdominal tenderness  Musculoskeletal:      Cervical back: Normal range of motion  Right lower leg: No edema  Left lower leg: No edema  Skin:     General: Skin is dry  Coloration: Skin is not jaundiced  Findings: No bruising or rash  Neurological:      General: No focal deficit present        Mental Status: He is alert and oriented to person, place, and time  Psychiatric:         Mood and Affect: Mood normal          Thought Content: Thought content normal           Additional Data:   Labs:  Results from last 7 days   Lab Units 03/13/22  0616 03/12/22  0613 03/11/22  1111   WBC Thousand/uL 11 32*   < > 9 63   HEMOGLOBIN g/dL 7 9*   < > 8 6*   HEMATOCRIT % 24 0*   < > 27 1*   PLATELETS Thousands/uL 58*   < > 26*   LYMPHO PCT %  --   --  10*   MONO PCT %  --   --  12   EOS PCT %  --   --  0    < > = values in this interval not displayed  Results from last 7 days   Lab Units 03/13/22  0616 03/12/22  0613 03/12/22  0613   SODIUM mmol/L 137   < > 137   POTASSIUM mmol/L 4 7   < > 4 4   CHLORIDE mmol/L 108   < > 110*   CO2 mmol/L 18*   < > 17*   BUN mg/dL 24   < > 24   CREATININE mg/dL 0 90   < > 0 99   ANION GAP mmol/L 11   < > 10   CALCIUM mg/dL 7 1*   < > 7 2*   ALBUMIN g/dL  --   --  1 9*   TOTAL BILIRUBIN mg/dL  --   --  1 20*   ALK PHOS U/L  --   --  671*   ALT U/L  --   --  23   AST U/L  --   --  276*   GLUCOSE RANDOM mg/dL 100   < > 106    < > = values in this interval not displayed       Results from last 7 days   Lab Units 03/11/22  1111   INR  1 35*             Results from last 7 days   Lab Units 03/12/22  0613 03/11/22  1952 03/11/22  1357 03/11/22  1111   LACTIC ACID mmol/L  --  1 7 1 6 2 6*   PROCALCITONIN ng/ml 5 38*  --   --  1 72*       Lines/Drains:  Invasive Devices  Report    Central Venous Catheter Line            Port A Cath 03/11/22 Right Chest 2 days          Peripheral Intravenous Line            Peripheral IV 03/12/22 Right Antecubital 1 day          Drain            Nephrostomy Left 1 10 Fr  1 day    Nephrostomy Right 10 Fr  1 day    Urethral Catheter Coude 16 Fr  1 day              Urinary Catheter:  Goal for removal: N/A - Chronic [place]         Central Line:  Goal for removal: N/A - Chronic PICC             Imaging: Reviewed radiology reports from this admission including: abdominal/pelvic CT    Recent Cultures (last 7 days):   Results from last 7 days   Lab Units 03/12/22  0207 03/12/22  0205 03/12/22  0049 03/12/22  0018 03/11/22  1111   BLOOD CULTURE   --  No Growth at 24 hrs    No Growth at 24 hrs   --   --  Staphylococcus epidermidis*  Staphylococcus epidermidis*   GRAM STAIN RESULT   --   --   --   --  Gram positive cocci in clusters*  Gram positive cocci in clusters*   URINE CULTURE  No Growth <1000 cfu/mL  --  No Growth <1000 cfu/mL No Growth <1000 cfu/mL  --        Last 24 Hours Medication List:   Current Facility-Administered Medications   Medication Dose Route Frequency Provider Last Rate    acetaminophen  650 mg Oral Q6H PRN Apolinar Herron MD      Apalutamide  240 mg/day Oral Daily Sinda Guitar, DO      cefepime  2,000 mg Intravenous Q12H Sinda Guitar, DO 2,000 mg (03/13/22 1409)    [START ON 3/14/2022] dexamethasone  4 mg Oral Q12H [address] Apolinar Herron MD      famotidine  20 mg Oral BID Apolinar Herron MD      fentaNYL  50 mcg Intravenous Q5 Min PRN Ericka Leonard MD      fentanyl citrate (PF)  50 mcg Intravenous Q2H PRN Sinda Guitar, DO      gabapentin  600 mg Oral TID Sinda Guitar, DO      loperamide  2 mg Oral 4x Daily PRN Sinda Guitar, DO      LORazepam  1 mg Intravenous Q4H PRN Apolinar Herron MD      melatonin  12 mg Oral HS Apolinar Herron MD      metoclopramide  10 mg Intravenous Once PRN Ericka Leonard MD      metoprolol tartrate  25 mg Oral Q8H Apolinar Herron MD      ondansetron  4 mg Intravenous Q6H PRN Sinda Guitar, DO      oxyCODONE  20 mg Oral Q12H [address] Sinda Guitar, DO      oxyCODONE  10 mg Oral Q4H Apolinar Herron MD      polyethylene glycol  17 g Oral Daily PRN Apolinar Herron MD      sodium chloride  1 g Oral BID With Meals Sinda Guitar, DO      vancomycin  750 mg Intravenous Q12H Apolinar Herron  mg (03/13/22 8652)        Today, Patient Was Seen By: Apolinar Herron MD    **Please Note: This note may have been constructed using a voice recognition system  **

## 2022-03-13 NOTE — CONSULTS
Consultation - General Cardiology Team 2  Haley Tenorio 57 y o  male MRN: [id number]  Unit/Bed#: [facility] 933-01 Encounter: [id number]      Consults  PCP: Maxwell Rothman MD     History of Present Illness   Physician Requesting Consult: Winston Mackay MD  Reason for Consult / Principal Problem: tachycardia    HPI: Haley Tenorio is a 57y o  year old male with a history of metastatic prostate cancer on chemotherapy, pancytopenia, hyperlipidemia  Ascending aortic aneurysm, and non-obstructive CAD  He presented after his wife had noticed a fever of 102F at home  The patient himself reported no symptoms except for chronic fatigue which had been ongoing for months  He does not experience palpitations or shortness of breath  He was noted to have worsening right sided hydronephrosis on CT suspected to be a source of infection and underwent bilateral IR nephrostomy on 3/12  Worsening liver/adrenal metastases were noted as well  Urine cultures were negative  Blood cultures grew coagulase negative staph  From a cardiac standpoint he has been following with an outpatient cardiologist with [facility] every 6 months and has been stable  He had coronary angiography performed 6/2019 after an abnormal stress test performed for exertional angina, although the patient does not recall ever experiencing chest pain of any kind  He was found to have non-obstructive (50% stenosis) CAD of the proximal LAD which did not require stenting  He has been on chemotherapy for several months and reports his heart rate has been known to be elevated since then  Review of Systems  Review of system was conducted and was negative except for as stated in the HPI        Historical Information   Past Medical History:   Diagnosis Date    Cancer [facility])     Stomach    [facility] 05/2018    Non Hodgkin's lymphoma [facility] )     Prostate cancer [facility] ) 2020    Shingles     Thoracic aortic aneurysm (HCC)     PATIENT DENIES     Past Surgical History:   Procedure Laterality Date    APPENDECTOMY      COLONOSCOPY      HERNIA REPAIR      B/L hernia repair    IR NEPHROSTOMY TUBE PLACEMENT  3/12/2022    IR PORT PLACEMENT  11/4/2021    KNEE ARTHROSCOPY      KNEE ARTHROSCOPY, MEDIAL PATELLO FEMORAL LIGAMENT REPAIR Left     LINEAR ENDOSCOPIC U/S N/A 6/13/2018    Procedure: LINEAR ENDOSCOPIC U/S;  Surgeon: Conchita Ch MD;  Location: [facility];   Service: Gastroenterology    VT BIOPSY/EXCISION, LYMPH NODE(S) Left 6/24/2020    Procedure: EXCISION BIOPSY LYMPH NODE SUPRACLAVICULAR;  Surgeon: Edwin Pérez MD;  Location: [facility];  Service: General     Social History     Substance and Sexual Activity   Alcohol Use Not Currently     Social History     Substance and Sexual Activity   Drug Use No     Social History     Tobacco Use   Smoking Status Never Smoker   Smokeless Tobacco Never Used     Family History: non-contributory    Meds/Allergies   Hospital Medications:   Current Facility-Administered Medications   Medication Dose Route Frequency    acetaminophen (TYLENOL) tablet 650 mg  650 mg Oral Q6H PRN    Apalutamide TABS 240 mg/day  240 mg/day Oral Daily    cefepime (MAXIPIME) 2,000 mg in dextrose 5 % 50 mL IVPB  2,000 mg Intravenous Q12H    famotidine (PEPCID) tablet 20 mg  20 mg Oral Daily Before Breakfast    fentaNYL (SUBLIMAZE) injection 50 mcg  50 mcg Intravenous Q5 Min PRN    fentanyl citrate (PF) 100 MCG/2ML 50 mcg  50 mcg Intravenous Q2H PRN    gabapentin (NEURONTIN) capsule 600 mg  600 mg Oral TID    ketoconazole (NIZORAL) tablet 200 mg  200 mg Oral Daily    loperamide (IMODIUM) capsule 2 mg  2 mg Oral 4x Daily PRN    LORazepam (ATIVAN) injection 1 mg  1 mg Intravenous Q4H PRN    melatonin tablet 6 mg  6 mg Oral HS    methylPREDNISolone sodium succinate (Solu-MEDROL) injection 40 mg  40 mg Intravenous Q8H [address]    metoclopramide (REGLAN) injection 10 mg  10 mg Intravenous Once PRN    metoprolol tartrate (LOPRESSOR) partial tablet 12 5 mg  12 5 mg Oral Q12H [facility]    morphine (PF) 4 mg/mL injection 4 mg  4 mg Intravenous Q5 Min PRN    ondansetron (ZOFRAN) injection 4 mg  4 mg Intravenous Q6H PRN    oxyCODONE (OxyCONTIN) 12 hr tablet 20 mg  20 mg Oral Q12H [facility]    oxyCODONE (ROXICODONE) immediate release tablet 10 mg  10 mg Oral Q4H PRN    pantoprazole (PROTONIX) EC tablet 40 mg  40 mg Oral Daily Before Breakfast    polyethylene glycol (MIRALAX) packet 17 g  17 g Oral Daily    senna (SENOKOT) tablet 8 6 mg  1 tablet Oral HS    sodium chloride 0 9 % infusion  100 mL/hr Intravenous Continuous    sodium chloride tablet 1 g  1 g Oral BID With Meals    vancomycin (VANCOCIN) 1000 mg in sodium chloride 0 9% 250 mL IVPB  15 mg/kg Intravenous Q12H     Home Medications:   Medications Prior to Admission   Medication    Ascorbic Acid (vitamin C) 1000 MG tablet    Bacillus Coagulans-Inulin (Probiotic) 1-250 BILLION-MG CAPS    cholecalciferol (VITAMIN D3) 1,000 units tablet    dexamethasone (DECADRON) 4 mg tablet    Erleada 60 MG TABS    famotidine (PEPCID) 20 mg tablet    gabapentin (NEURONTIN) 300 mg capsule    magnesium 30 MG tablet    melatonin 1 mg    Nutritional Supplements (PRO-RAJINDER PLUS PO)    ondansetron (ZOFRAN) 4 mg tablet    oxyCODONE (ROXICODONE) 10 MG TABS    pantoprazole (PROTONIX) 40 mg tablet    sodium chloride 1 g tablet    zinc gluconate 50 mg tablet    ketoconazole (NIZORAL) 200 mg tablet    loperamide (IMODIUM) 2 mg capsule    metoprolol tartrate (LOPRESSOR) 25 mg tablet    Omega-3 Fatty Acids (Fish Oil) 1200 MG CAPS    predniSONE 5 mg tablet    RESTASIS 0 05 % ophthalmic emulsion       Allergies   Allergen Reactions    Granisetron Nausea Only     Weakness/nausea    Tetracycline Other (See Comments)     Increase LFT's    Valtrex [Valacyclovir] Nausea Only       Objective   Vitals: Blood pressure 107/76, pulse (!) 111, temperature (!) 97 4 °F (36 3 °C), resp   rate 16, weight 71 5 kg (157 lb 10 1 oz), SpO2 97 %  Orthostatic Blood Pressures      Most Recent Value   Blood Pressure 107/76 filed at 03/13/2022 0820   Patient Position - Orthostatic VS Lying filed at 03/12/2022 0418            Invasive Devices  Report    Central Venous Catheter Line            Port A Cath 03/11/22 Right Chest 1 day          Peripheral Intravenous Line            Peripheral IV 03/12/22 Right Antecubital <1 day          Drain            Nephrostomy Left 1 10 Fr  1 day    Nephrostomy Right 10 Fr  1 day    Urethral Catheter Coude 16 Fr  1 day                Physical Exam  GEN: [place] Cancer appears well, alert and oriented x 3, pleasant and cooperative   HEENT:  Normocephalic, atraumatic, anicteric, moist mucous membranes  NECK: No JVD or carotid bruits   HEART: Regular rhythm, rapid rate, normal S1 and S2, no murmurs, clicks, gallops or rubs   LUNGS: Clear to auscultation bilaterally; no wheezes, rales, or rhonchi; respiration nonlabored   ABDOMEN:  Normoactive bowel sounds, soft, no tenderness, no distention  EXTREMITIES: peripheral pulses palpable; no edema  NEURO: no gross focal findings; cranial nerves grossly intact   SKIN:  Dry, intact, warm to touch    Lab Results: I have personally reviewed pertinent lab results  Results from last 7 days   Lab Units 03/13/22  0616 03/12/22  0613 03/11/22  1952   POTASSIUM mmol/L 4 7 4 4 4 9   CO2 mmol/L 18* 17* 18*   CHLORIDE mmol/L 108 110* 104   BUN mg/dL 24 24 26*   CREATININE mg/dL 0 90 0 99 1 09     Results from last 7 days   Lab Units 03/13/22  0616 03/12/22  0613 03/11/22  1111   HEMOGLOBIN g/dL 7 9* 6 4* 8 6*   HEMATOCRIT % 24 0* 19 6* 27 1*   PLATELETS Thousands/uL 58* 69* 26*     Results from last 7 days   Lab Units 03/11/22  1111   PTT seconds 36         Imaging: I have personally reviewed pertinent reports          Telemetry:   Sinus tachycardia 100-120, with occasional atrial flutter    EKG:   Date: 3/11  Interpretation: Sinus tachycardia alternating with atrial flutter w/ 2:1 conduction      ECHO:  Results for orders placed during the hospital encounter of 09/15/21    Echo complete with contrast if indicated    Narrative  [address]  [facility], [address] [place]  [phone]    Transthoracic Echocardiogram  2D, M-mode, Doppler, and Color Doppler    Study date:  16-Sep-2021    Patient: Beni Levin  MR number: [id number]  Account number: [de-identified]  : 1960  Age: 63 years  Gender: Male  Status: Inpatient  Location: Bedside  Height: 70 in  Weight: 224 lb  BP: 97/ 61 mmHg    Indications: Pulmonary Embolism    Diagnoses: I26 09 - Other pulmonary embolism with acute cor pulmonale    Sonographer:  JET Quintero  Primary Physician:  Osiris Sanchez MD  Group:  [facility]  Interpreting Physician:  Khushbu Oneal MD    SUMMARY    LEFT VENTRICLE:  Systolic function was normal  Ejection fraction was estimated to be 64 %  There were no regional wall motion abnormalities  Wall thickness was mildly increased  There was mild concentric hypertrophy  Doppler parameters were consistent with abnormal left ventricular relaxation (grade 1 diastolic dysfunction)  VENTRICULAR SEPTUM:  There was dyssynergic motion  These changes are consistent with RV pressure overload  RIGHT VENTRICLE:  The ventricle was moderately dilated  LEFT ATRIUM:  The atrium was mildly dilated  MITRAL VALVE:  There was mild regurgitation  AORTIC VALVE:  There was mild regurgitation  TRICUSPID VALVE:  There was mild to moderate regurgitation  Estimated peak PA pressure was 50 mmHg  AORTA:  There was mild dilatation of the ascending aorta  The ascending aortic AP dimension was 40 mm  HISTORY: PRIOR HISTORY: Sepsis,Pulmonary Embolus,Prostate Cancer,MALT Lymphoma    PROCEDURE: The procedure was performed at the bedside  This was a routine study  The transthoracic approach was used   The study included complete 2D imaging, M-mode, complete spectral Doppler, and color Doppler  The heart rate was 99 bpm,  at the start of the study  Images were obtained from the parasternal, apical, subcostal, and suprasternal notch acoustic windows  Image quality was adequate  LEFT VENTRICLE: Size was normal  Systolic function was normal  Ejection fraction was estimated to be 64 %  There were no regional wall motion abnormalities  Wall thickness was mildly increased  There was mild concentric hypertrophy  DOPPLER: Doppler parameters were consistent with abnormal left ventricular relaxation (grade 1 diastolic dysfunction)  VENTRICULAR SEPTUM: There was dyssynergic motion  These changes are consistent with RV pressure overload  RIGHT VENTRICLE: The ventricle was moderately dilated  Systolic function was low normal     LEFT ATRIUM: The atrium was mildly dilated  RIGHT ATRIUM: Size was at the upper limits of normal     MITRAL VALVE: Valve structure was normal  There was normal leaflet separation  DOPPLER: The transmitral velocity was within the normal range  There was no evidence for stenosis  There was mild regurgitation  AORTIC VALVE: The valve was trileaflet  Leaflets exhibited normal thickness and normal cuspal separation  DOPPLER: Transaortic velocity was within the normal range  There was no evidence for stenosis  There was mild regurgitation  TRICUSPID VALVE: The valve structure was normal  There was normal leaflet separation  DOPPLER: The transtricuspid velocity was within the normal range  There was no evidence for stenosis  There was mild to moderate regurgitation  Estimated  peak PA pressure was 50 mmHg  The findings suggest moderate pulmonary hypertension  PULMONIC VALVE: Leaflets exhibited normal thickness, no calcification, and normal cuspal separation  DOPPLER: The transpulmonic velocity was within the normal range  There was no regurgitation  PERICARDIUM: There was no pericardial effusion   The pericardium was normal in appearance  AORTA: The root exhibited normal size  There was mild dilatation of the ascending aorta  MEASUREMENT TABLES    2D MEASUREMENTS  Aorta   (Reference normals)  AAo AP diam   40 mm   (--)    SYSTEM MEASUREMENT TABLES    2D  %FS: 43 81 %  Ao Diam: 3 67 cm  Ao asc: 4 01 cm  EDV(Teich): 160 09 ml  EF(Teich): 74 35 %  ESV(Teich): 41 07 ml  IVSd: 1 11 cm  LA Area: 26 23 cm2  LA Diam: 4 88 cm  LVEDV MOD A4C: 109 68 ml  LVEF MOD A4C: 63 79 %  LVESV MOD A4C: 39 72 ml  LVIDd: 5 7 cm  LVIDs: 3 2 cm  LVLd A4C: 8 46 cm  LVLs A4C: 6 97 cm  LVPWd: 1 09 cm  RA Area: 14 17 cm2  RVIDd: 3 11 cm  SV MOD A4C: 69 96 ml  SV(Teich): 119 02 ml    CW  TR Vmax: 2 99 m/s  TR maxP 79 mmHg    MM  TAPSE: 2 31 cm    PW  E' Sept: 0 11 m/s  E/E' Sept: 6 69  MV A Frantz: 0 64 m/s  MV Dec [place]: 4 19 m/s2  MV DecT: 176 5 ms  MV E Frantz: 0 74 m/s  MV E/A Ratio: 1 16  MV PHT: 51 18 ms  MVA By PHT: 4 3 cm2    [employer] Accredited Echocardiography Laboratory    Prepared and electronically signed by    Sury Ovalles MD  Signed 16-Sep-2021 17:17:00    No results found for this or any previous visit  Assessment/Plan     Paroxysmal Atrial Flutter    Appropriate chronic sinus tachycardia likely due to chronic anemia    Metastatic Prostate Cancer    Pancytopenia with symptomatic anemia due to chemotherapy    Sepsis due to urinary tract infection / pyelonephritis s/p nephrostomy, bacteremia ruled out    Non-anion gap metabolic acidosis likely iatrogenic    Severe protein-calorie malnutrition    Non-obstructive CAD    Hyperlipidemia    Ascending Aortic Aneurysm        61M who has been on multiple rounds of chemotherapy for prostate cancer presented with fevers in the setting of an otherwise asymptomatic ascending urinary infection  While there was initial concern for gram positive bacteremia, this turned out to be isolated coag  Neg  Staph, likely a contaminant  Cardiology consulted for tachycardia       The patient is aware of chronic tachycardia since starting chemotherapy  This mostly represents sinus tachycardia in the setting of anemia  He also has had several episodes of a faster rhythm (shown above) representing atrial flutter with spontaneous reversion to sinus tachycardia  Chronic RBBB with secondary repolarization changes also present  Given his pancytopenia, would hold off on any anticoagulation for this at present  His flutter may have been triggered post-operatively and possibly induced by a chemotherapy effect  Recent echo in 2/2022 showed right atrial dilatation which is the likely substrate for flutter  EF preserved  Will treat for flutter with metoprolol 25mg PO Q8H  Would not consider for flutter ablation given his comorbidities and lack of symptoms or any evidence of cardiomyopathy associated with his arrhythmia  His sinus tachycardia represents a compensatory response to increase oxygen delivery in the setting of progressive anemia and does not warrant aggressive treatment       Jing Redd MD  Cardiology Fellow

## 2022-03-13 NOTE — QUICK NOTE
Patient is a 69-year-old male with a history of metastatic prostate cancer managed by outside urologist in [place]  Patient presented to [address] [place] [address] due to sepsis  CT scan revealed chronic left-sided hydronephrosis along with new right-sided hydronephrosis  Worsening metastasis and mass compressing urinary bladder  Based on sepsis criteria patient transferred to [address] [facility] for bilateral PCN placement for renal decompression  Patient now status post bilateral PCN placement with Interventional Radiology  Recommend continuing medical optimization with IV fluids and antibiotics  Adjust antibiotics based off of culture data  Infectious disease following  Appreciate their input  Maintain bilateral PCNs to drainage, flush daily with 10 cc normal saline  Per IR patient may be able to have PCNs converted to PCNU on an outpatient basis  Patient will require outpatient urology follow-up based on progression and goals of care  Patient currently receiving chemotherapy with a Dr Madrid  Will assure patient [facility]  Urethral [place] catheter may be removed once patient is clinically stable  Would recommend urinary retention protocol in this setting  As patient presented with distended urinary bladder and also has a history of urinary retention in the past     No further  intervention required at this time  Urology will sign off  Our office will call patient upon discharge if patient would like to follow with [facility] center for urology moving forward, if patient prefers continue to follow with his outside urologist he may follow-up with them as desired  Urology is always available for additional questions or concerns regarding this patient      Hannah Mirza PA-C

## 2022-03-13 NOTE — ASSESSMENT & PLAN NOTE
Improving  Present on admission  Likely secondary to septic shock  Continue to monitor intermittently  Will closely monitor with PRN Tylenol for fever only

## 2022-03-13 NOTE — PROGRESS NOTES
Progress note - Palliative and Supportive Care   Excell Ryan 57 y o  male [id number]    Patient Active Problem List   Diagnosis    [facility]    Prostate cancer ([facility]    Abnormal nuclear stress test    Pure hypercholesterolemia    Osseous metastasis (HCC)    Pulmonary embolus (HCC)    SIRS (systemic inflammatory response syndrome) (HCC)    Anemia    Pancytopenia (HCC)    Hematuria    Cystitis    Tachycardia    Acute kidney injury [facility]    Severe protein-calorie malnutrition (HCC)    Severe sepsis (HCC)    Hydronephrosis of right kidney     Active issues specifically addressed today include:   MALT lymphoma  Prostate cancer   Pulmonary embolus  Acute metabolic encephalopathy   Hydronephrosis of right kidney   Severe sepsis  Severe protein calorie malnutrition   Palliative care encounter  Goals of care conversation    Plan:  1  Symptom management - Pain is currently well controlled  - No acute needs  Per primary team    2  Goals -  Continue full cares with no limits  -  Patient interested in feeling 5 wishes at this time  He would like to discuss with wife who will be visiting shortly  Recommended to read document and call us for assistant or questions  Patient would like wife to be the medical decision maker if he is unable to do so  -Will continue goals of care conversations as patient's clinical status evolves       Code Status: Full - Level 1   Decisional apparatus:  Patient is competent on my exam today  If competence is lost, patient's substitute decision maker would default to spouse by [place]  Advance Directive / Living Will / POLST:  Not on file  Five wishes provided today    Interval history:        Patient seen and evaluated at bedside  Patient reports feeling overall better today  Pain has been better controlled  He states he was waiting for 5 wishes document to we brought in and have discussion with wife    Recommended to call our service if he has any questions or concerns  He agreed for the team to follow-up tomorrow    He has no any other questions or concerns at this time    MEDICATIONS / ALLERGIES:     current meds:   Current Facility-Administered Medications   Medication Dose Route Frequency    acetaminophen (TYLENOL) tablet 650 mg  650 mg Oral Q6H PRN    Apalutamide TABS 240 mg/day  240 mg/day Oral Daily    cefepime (MAXIPIME) 2,000 mg in dextrose 5 % 50 mL IVPB  2,000 mg Intravenous Q12H    famotidine (PEPCID) tablet 20 mg  20 mg Oral Daily Before Breakfast    fentaNYL (SUBLIMAZE) injection 50 mcg  50 mcg Intravenous Q5 Min PRN    fentanyl citrate (PF) 100 MCG/2ML 50 mcg  50 mcg Intravenous Q2H PRN    gabapentin (NEURONTIN) capsule 600 mg  600 mg Oral TID    ketoconazole (NIZORAL) tablet 200 mg  200 mg Oral Daily    loperamide (IMODIUM) capsule 2 mg  2 mg Oral 4x Daily PRN    LORazepam (ATIVAN) injection 1 mg  1 mg Intravenous Q4H PRN    melatonin tablet 6 mg  6 mg Oral HS    methylPREDNISolone sodium succinate (Solu-MEDROL) injection 40 mg  40 mg Intravenous Q8H [address]    metoclopramide (REGLAN) injection 10 mg  10 mg Intravenous Once PRN    metoprolol tartrate (LOPRESSOR) partial tablet 12 5 mg  12 5 mg Oral Q12H [facility]    morphine (PF) 4 mg/mL injection 4 mg  4 mg Intravenous Q5 Min PRN    ondansetron (ZOFRAN) injection 4 mg  4 mg Intravenous Q6H PRN    oxyCODONE (OxyCONTIN) 12 hr tablet 20 mg  20 mg Oral Q12H [facility]    oxyCODONE (ROXICODONE) immediate release tablet 10 mg  10 mg Oral Q4H PRN    pantoprazole (PROTONIX) EC tablet 40 mg  40 mg Oral Daily Before Breakfast    polyethylene glycol (MIRALAX) packet 17 g  17 g Oral Daily    senna (SENOKOT) tablet 8 6 mg  1 tablet Oral HS    sodium chloride 0 9 % infusion  100 mL/hr Intravenous Continuous    sodium chloride tablet 1 g  1 g Oral BID With Meals    vancomycin (VANCOCIN) 1000 mg in sodium chloride 0 9% 250 mL IVPB  15 mg/kg Intravenous Q12H       Allergies   Allergen Reactions    Granisetron Nausea Only     Weakness/nausea    Tetracycline Other (See Comments)     Increase LFT's    Valtrex [Valacyclovir] Nausea Only       OBJECTIVE:    Physical Exam  Physical Exam  Constitutional:       General: He is not in acute distress  Appearance: He is ill-appearing  Cardiovascular:      Rate and Rhythm: Normal rate  Pulmonary:      Effort: Pulmonary effort is normal  No respiratory distress  Abdominal:      General: Bowel sounds are normal  There is no distension  Palpations: Abdomen is soft  Skin:     General: Skin is warm and dry  Capillary Refill: Capillary refill takes less than 2 seconds  Coloration: Skin is jaundiced  Neurological:      Mental Status: He is alert and oriented to person, place, and time  Psychiatric:         Mood and Affect: Mood normal          Behavior: Behavior normal  Behavior is cooperative  Lab Results:   CBC:   Lab Results   Component Value Date    WBC 11 32 (H) 03/13/2022    HGB 7 9 (L) 03/13/2022    HCT 24 0 (L) 03/13/2022    MCV 88 03/13/2022    PLT 58 (L) 03/13/2022    MCH 28 8 03/13/2022    MCHC 32 9 03/13/2022    RDW 17 2 (H) 03/13/2022    MPV 12 1 03/13/2022    NRBC 1 03/13/2022   , BMP:  Lab Results   Component Value Date    SODIUM 137 03/13/2022    K 4 7 03/13/2022     03/13/2022    CO2 18 (L) 03/13/2022    BUN 24 03/13/2022    CREATININE 0 90 03/13/2022    GLUC 100 03/13/2022    CALCIUM 7 1 (L) 03/13/2022    AGAP 11 03/13/2022    EGFR 91 03/13/2022       Counseling / Coordination of Care    Total floor / unit time spent today 20 minutes  Greater than 50% of total time was spent with the patient and / or family counseling and / or coordination of care   A description of the counseling / coordination of care: symptom management, goals of care, psychosocial support

## 2022-03-13 NOTE — ASSESSMENT & PLAN NOTE
Present on admission  Noted on UA performed on 03/11  No growth on Urine culture  Continue plan below Severe sepsis

## 2022-03-13 NOTE — ASSESSMENT & PLAN NOTE
Patient and his wife report scheduled use of IR Oxycodone  Continue Oxycodone ER 12 mg q12h  Started scheduled Roxicodone 10 mg q4h  Patient and wife declined bowel regimen at this time  Palliative Care involved; patient will need pain regimen optimization per pending recs (I e  fentanyl patch)

## 2022-03-13 NOTE — PLAN OF CARE
Problem: MOBILITY - ADULT  Goal: Maintain or return to baseline ADL function  Description: INTERVENTIONS:  -  Assess patient's ability to carry out ADLs; assess patient's baseline for ADL function and identify physical deficits which impact ability to perform ADLs (bathing, care of mouth/teeth, toileting, grooming, dressing, etc )  - Assess/evaluate cause of self-care deficits   - Assess range of motion  - Assess patient's mobility; develop plan if impaired  - Assess patient's need for assistive devices and provide as appropriate  - Encourage maximum independence but intervene and supervise when necessary  - Involve family in performance of ADLs  - Assess for home care needs following discharge   - Consider OT consult to assist with ADL evaluation and planning for discharge  - Provide patient education as appropriate  Outcome: Progressing     Problem: Potential for Falls  Goal: Patient will remain free of falls  Description: INTERVENTIONS:  - Educate patient/family on patient safety including physical limitations  - Instruct patient to call for assistance with activity   - Consult OT/PT to assist with strengthening/mobility   - Keep Call bell within reach  - Keep bed low and locked with side rails adjusted as appropriate  - Keep care items and personal belongings within reach  - Initiate and maintain comfort rounds  - Make Fall Risk Sign visible to staff  - Offer Toileting every  Hours, in advance of need  - Initiate/Maintain alarm  - Obtain necessary fall risk management equipment:   - Apply yellow socks and bracelet for high fall risk patients  - Consider moving patient to room near nurses station  Outcome: Progressing

## 2022-03-14 NOTE — MALNUTRITION/BMI
This medical record reflects one or more clinical indicators suggestive of malnutrition  Malnutrition Findings:   Adult Malnutrition type: Chronic illness (wt (9/5/21 102kg), severe subcutaneous fat loss/orbital fat pads, ribs, triceps, muscle wasting/hollowing of temporalis region, protruding clavicles, treated with liberal diet )  Adult Degree of Malnutrition: Other severe protein calorie malnutrition  Malnutrition Characteristics: Weight loss,Muscle loss,Fat loss,Inadequate energy                     BMI Findings: Body mass index is 22 62 kg/m²  See Nutrition note dated 3/13/22 for additional details  Completed nutrition assessment is viewable in the nutrition documentation

## 2022-03-14 NOTE — ASSESSMENT & PLAN NOTE
Worsening  Patient with longstanding history of tachycardia  Acute worsening likely secondary to sepsis and worsening anemia  Cardiology consulted; appreciate assistance  Lopressor discontinued; patient started on Toprol-XL 75 mg daily  Continue Telemetry

## 2022-03-14 NOTE — WOUND OSTOMY CARE
Consult Note - Wound   Jimbo Kearney 57 y o  male MRN: [id number]  [facility] Encounter: [id number]        History and Present Illness: Patient is seen for wound care consult   He is a 57year old male that is admitted with severe sepsis , MALT lymphoma , prostrate CA , osseous metastasis ,severe protein malnutrition , hydronephrosis of right kidney , SVT , transaminitis , and bacteremia   He is alert however wanting to keep his eyes closed   He had charted previously of a sacral and left buttocks wound and confirmed by the family   Max A of 2 for rolling in the bed   Very limited and guarded with pain   Turned to left side and heels elevated   Assessment Findings:   1  Bilateral heels dry and intact   2  Sacral is dry and intact   3  Left buttocks raised scarred area with noted dry chaffed appearance and a purple hue   Area may have been a evolving DTI due to purple hue   POA DTI area of the left buttocks suspect stage 3 stage 4 or unstageable - POA     Discussed with the family and allevyn applied offloaded the area and turned with heels elevated   Will order P - 500 low air loss   Discussed the assessment and findings with the wife and plan of care   Skin care plans:  1-Cleanse sacral buttocks with soap and water apply allevyn foam deepthi with a T and date check skin integrity change every other day or if soiled   2-Hydraguard to bilateral heel BID and PRN  3-Elevate heels to offload pressure  4-Ehob cushion when out of bed  5-Turn/repoisiton q2h or when medically stable for pressure re-distribution on skin  6-Moisturize skin daily with skin nourishing cream  7  P - 500 low air loss mattress - nursing to call purchasing for mattress   Wounds:  Wound 02/16/22 Pressure Injury Buttocks Left (Active)   Wound Image   03/14/22 1350   Wound Description Clean;Dry; Intact;Fragile;Light purple 03/14/22 1350   Pressure Injury Stage DTPI 03/14/22 1350   Dominique-wound Assessment Clean;Dry; Intact 03/14/22 1350   Wound Length (cm) 3 cm 03/14/22 1350   Wound Width (cm) 0 5 cm 03/14/22 1350   Wound Depth (cm) 0 cm 03/14/22 1350   Wound Surface Area (cm^2) 1 5 cm^2 03/14/22 1350   Wound Volume (cm^3) 0 cm^3 03/14/22 1350   Calculated Wound Volume (cm^3) 0 cm^3 03/14/22 1350   Drainage Amount None 03/14/22 1350   Non-staged Wound Description Not applicable 33/45/54 7462   Treatments Site care 03/14/22 1350   Dressing Foam, Silicon (eg  Allevyn, etc) 03/14/22 1350   Dressing Changed Reinforced 03/14/22 1350   Patient Tolerance Tolerated poorly 03/14/22 1350       Wound care will see weekly call or tiger text with questions      Prince Green RN BSN Keren Smith

## 2022-03-14 NOTE — ASSESSMENT & PLAN NOTE
Present on admission in the setting of septic shock  Likely secondary to septic shock  Continue to monitor CMP intermittently  Will closely monitor with PRN Tylenol for fever only

## 2022-03-14 NOTE — ASSESSMENT & PLAN NOTE
Malnutrition Findings:   Adult Malnutrition type: Chronic illness (wt (9/5/21 102kg), severe subcutaneous fat loss/orbital fat pads, ribs, triceps, muscle wasting/hollowing of temporalis region, protruding clavicles, treated with liberal diet )  Adult Degree of Malnutrition: Other severe protein calorie malnutrition    BMI Findings: Body mass index is 22 62 kg/m²       Nutrition consulted; continue with supplemental nutrition  Patient with minimal PO intake  Pending discussions with family about alternatives following Oncology discussions

## 2022-03-14 NOTE — UTILIZATION REVIEW
Initial Clinical Review    Pending [facility]#[id number]    Admission: Date/Time/Statement:   Admission Orders (From admission, onward)     Ordered        03/11/22 1421  Inpatient Admission  Once                      Orders Placed This Encounter   Procedures    Inpatient Admission     Standing Status:   Standing     Number of Occurrences:   1     Order Specific Question:   Level of Care     Answer:   Med Surg [16]     Order Specific Question:   Estimated length of stay     Answer:   More than 2 Midnights     Order Specific Question:   Certification     Answer:   I certify that inpatient services are medically necessary for this patient for a duration of greater than two midnights  See H&P and MD Progress Notes for additional information about the patient's course of treatment  ED Arrival Information     Expected Arrival Acuity    - 3/11/2022 10:25 Emergent         Means of arrival Escorted by Service Admission type    Wheelchair Spouse Hospitalist Emergency         Arrival complaint    fever, disoriented        Chief Complaint   Patient presents with    Fever - 9 weeks to 74 years     Patient presents to the ER with report of having a 102 4 fever this morning, being disoriented, lethargic and having "jerky movements "       Initial Presentation:  this is a 57year old male from home to ED admitted inpatient due to sepsis/acute metabolic encephalopathy  PMH of prostate cancer, MALT lymphoma, pancytopenia, PE, malnutrition  Presented due to fever starting morning of arrival with confusion, jerky movements  Fatigued  Has chronic pain in lower back and thighs  On exam tachycardic  Hypotensive  Procalcitonin 1 72  Lactic acid 2 6  Bun 28, creatinine 1 26    platelets 26  H&H 8 6/27 1  Ct abdomen showed right hydronephrosis  Worsening hepatic metastasis, left bladder wall mass, adrenal metastasis  Blood cultures done and gram stain showed gram + cocci in clusters    In the ED given 2 liters of IVF, started on antibiotics and given IV analgesia  Plan is continue antibiotics, IVF  Consult urology  Check ct head  Telemetry  [place]     3/11/22 per Urology -Has continued fever and tachycardia, now  recommend bilateral PCN  Suspect renal decompression, as left side obstructed and may have spread along the trigone over to right      3/11/22 2125 discharged to [facility] as higher level of care due to need for percutaneous nephrostomy tube which can not be done at [facility]  ED Triage Vitals [03/11/22 1039]   Temperature Pulse Respirations Blood Pressure SpO2   97 6 °F (36 4 °C) (!) 136 (!) 28 (!) 89/55 93 %      Temp Source Heart Rate Source Patient Position - Orthostatic VS BP Location FiO2 (%)   Oral Monitor Lying Left arm --      Pain Score       7          Wt Readings from Last 1 Encounters:   03/12/22 71 5 kg (157 lb 10 1 oz)     Additional Vital Signs:   03/11/22 20:54:43 99 1 °F (37 3 °C) 139 Abnormal  18 105/60 75 -- -- --   03/11/22 20:11:53 -- -- 18 106/68 81 -- -- --   03/11/22 2011 102 8 °F (39 3 °C) Abnormal  148 Abnormal  -- -- -- -- -- --   03/11/22 1945 103 2 °F (39 6 °C) Abnormal  -- -- -- -- -- -- --   03/11/22 19:38:15 103 6 °F (39 8 °C) Abnormal  -- -- 104/68 80 -- -- --   03/11/22 1852 102 9 °F (39 4 °C) Abnormal  146 Abnormal  -- -- -- -- -- --   03/11/22 15:31:13 97 1 °F (36 2 °C) Abnormal  -- 18 112/72 85 -- -- --   03/11/22 1500 -- 158 Abnormal  14 116/59 83 98 % None (Room air) Lying   03/11/22 1400 -- 120 Abnormal  13 98/61 74 93 % -- --   03/11/22 1300 -- 164 Abnormal  17 102/54 73 94 % -- --   03/11/22 1200 -- 124 Abnormal  19 92/56 70 95 % None (Room air)        Pertinent Labs/Diagnostic Test Results:   CT abdomen pelvis with contrast   Final Result by Joann Newman MD (03/11 7730)      1  New bibasilar dependent atelectatic changes with trace pleural fluid on the left  2   New moderate right hydronephrosis and hydroureter and could be the source of sepsis  Stable appearance of moderate left hydronephrosis and hydroureter  3  Worsening hepatic metastasis  4  Worsening Was Increasing left bladder wall mass  5 Worsening adrenal metastasis  Worsening anterior pelvic mesenteric nodule  Worsening aortocaval and retrocaval lymph nodes  6  Worsening left external iliac lymph node  The study was marked in [facility] for immediate notification  Workstation performed: [id number]         XR chest 1 view portable   Final Result by Authur Sever, MD (03/11 1245)      No findings of pneumonia or pulmonary edema  Findings related to metastatic disease as described above  Workstation performed: [id number]           3/11/22 ecg - Supraventricular tachycardia  Right bundle branch block  Abnormal ECG  When compared with ECG of 03-MAR-2022 19:11,  Vent  rate has increased BY  65 BPM    3/11/22 ecg - Sinus tachycardia  Left axis deviation  Right bundle branch block  Abnormal ECG  When compared with ECG of 11-MAR-2022 11:16, (unconfirmed) No significant change was found    3/11/22 ecg Supraventricular tachycardia  Right superior axis deviation  Right bundle branch block  Abnormal ECG     Results from last 7 days   Lab Units 03/14/22  1511 03/14/22  0539 03/13/22  0616 03/12/22  0613 03/12/22  0613 03/11/22  1111 03/11/22  1111   WBC Thousand/uL 11 92* 11 14* 11 32*   < > 10 37*   < > 9 63   HEMOGLOBIN g/dL 7 0* 7 9* 7 9*  --  6 4*  --  8 6*   HEMATOCRIT % 21 1* 24 3* 24 0*  --  19 6*  --  27 1*   PLATELETS Thousands/uL 45* 53* 58*   < > 69*   < > 26*    < > = values in this interval not displayed       Results from last 7 days   Lab Units 03/14/22  1511 03/14/22  0539 03/13/22  0616 03/12/22  0613 03/11/22  1952   SODIUM mmol/L 138 137 137 137 135*   POTASSIUM mmol/L 4 1 4 4 4 7 4 4 4 9   CHLORIDE mmol/L 110* 109* 108 110* 104   CO2 mmol/L 21 21 18* 17* 18*   ANION GAP mmol/L 7 7 11 10 13   BUN mg/dL 21 23 24 24 26*   CREATININE mg/dL 0 98 1 00 0  90 0 99 1 09   EGFR ml/min/1 73sq m 82 80 91 81 72   CALCIUM mg/dL 7 2* 7 7* 7 1* 7 2* 6 5*   MAGNESIUM mg/dL  --  2 1  --  2 2  --    PHOSPHORUS mg/dL  --  1 8*  --   --   --      Results from last 7 days   Lab Units 03/14/22  0539 03/12/22  0613 03/11/22  1111   AST U/L 178* 276* 431*   ALT U/L 17 23 38   ALK PHOS U/L 614* 671* 957*   TOTAL PROTEIN g/dL 5 1* 5 2* 5 8*   ALBUMIN g/dL 1 8* 1 9* 2 2*   TOTAL BILIRUBIN mg/dL 1 22* 1 20* 1 40*     Results from last 7 days   Lab Units 03/14/22  1511 03/14/22  0539 03/13/22  0616 03/12/22  0613 03/11/22 1952 03/11/22  1111   GLUCOSE RANDOM mg/dL 109 99 100 106 81 100     Results from last 7 days   Lab Units 03/11/22  1834 03/11/22  1357 03/11/22  1115   HS TNI 0HR ng/L  --   --  38   HS TNI 2HR ng/L  --  42  --    HSTNI D2 ng/L  --  4  --    HS TNI 4HR ng/L 48  --   --    HSTNI D4 ng/L 10  --   --      Results from last 7 days   Lab Units 03/11/22  1111   PROTIME seconds 16 5*   INR  1 35*   PTT seconds 36     Results from last 7 days   Lab Units 03/11/22  1111   TSH 3RD GENERATON uIU/mL 0 829     Results from last 7 days   Lab Units 03/12/22  0613 03/11/22  1111   PROCALCITONIN ng/ml 5 38* 1 72*     Results from last 7 days   Lab Units 03/14/22  1513 03/11/22  1952 03/11/22  1357 03/11/22  1111   LACTIC ACID mmol/L 3 5* 1 7 1 6 2 6*     Results from last 7 days   Lab Units 03/11/22  1358   CLARITY UA  Clear   COLOR UA  Yellow   SPEC GRAV UA  1 015   PH UA  5 5   GLUCOSE UA mg/dl Negative   KETONES UA mg/dl Negative   BLOOD UA  Moderate*   PROTEIN UA mg/dl Trace*   NITRITE UA  Negative   BILIRUBIN UA  Negative   UROBILINOGEN UA E U /dl 1 0   LEUKOCYTES UA  Negative   WBC UA /hpf 2-4   RBC UA /hpf 1-2   BACTERIA UA /hpf Occasional   EPITHELIAL CELLS WET PREP /hpf Occasional     Results from last 7 days   Lab Units 03/12/22  0207 03/12/22  0205 03/12/22  0049 03/12/22  0018 03/11/22  1111   BLOOD CULTURE   --  No Growth at 48 hrs    No Growth at 48 hrs   --   -- Staphylococcus epidermidis*  Staphylococcus epidermidis*   GRAM STAIN RESULT   --   --   --   --  Gram positive cocci in clusters*  Gram positive cocci in clusters*   URINE CULTURE  No Growth <1000 cfu/mL  --  No Growth <1000 cfu/mL No Growth <1000 cfu/mL  --        ED Treatment:   Medication Administration from 03/11/2022 1025 to 03/11/2022 1527       Date/Time Order Dose Route Action Comments     03/11/2022 1111 sodium chloride 0 9 % bolus 1,000 mL 1,000 mL Intravenous New Bag      03/11/2022 1353 vancomycin (VANCOCIN) IVPB (premix in dextrose) 1,000 mg 200 mL 1,000 mg Intravenous New Bag      03/11/2022 1229 cefepime (MAXIPIME) IVPB (premix in dextrose) 2,000 mg 50 mL 2,000 mg Intravenous New Bag      03/11/2022 1231 sodium chloride 0 9 % bolus 1,000 mL 1,000 mL Intravenous New Bag      03/11/2022 1352 HYDROmorphone (DILAUDID) injection 1 mg 1 mg Intravenous Given         Past Medical History:   Diagnosis Date    Cancer ([place] [address] )     Stomach    Lymphoma ([place] [address] ) 05/2018    Non Hodgkin's lymphoma ([place] [address] )     Prostate cancer ([place] [address] ) 2020    Shingles     Thoracic aortic aneurysm ([place] [address] )     PATIENT DENIES     Present on Admission:   Severe protein-calorie malnutrition (HCC)   MALT lymphoma ([facility] )   Prostate cancer ([facility] )   Pulmonary embolus (HCC)   Tachycardia   Pancytopenia ([place] [address] )      Admitting Diagnosis: Sinus tachycardia [R00 0]  Anemia [D64 9]  Hypotension [I95 9]  Fever [R50 9]  Sepsis ([place] [address] ) [A41 9]  Age/Sex: 57 y o  male  Admission Orders: 3/11/22 1421 inpatient   Scheduled Medications:  Current Facility-Administered Medications   Medication Dose Route Frequency    acetaminophen (TYLENOL) tablet 650 mg  650 mg Oral Q6H PRN - used x 1 3/11    [START ON 3/12/2022] cefepime (MAXIPIME) IVPB (premix in dextrose) 1,000 mg 50 mL  1,000 mg Intravenous Q12H    [START ON 3/12/2022] famotidine (PEPCID) tablet 20 mg  20 mg Oral Daily Before Breakfast    gabapentin (NEURONTIN) capsule 600 mg  600 mg Oral TID   Deshawn Ramirez ON 3/12/2022] ketoconazole (NIZORAL) tablet 200 mg  200 mg Oral Daily    loperamide (IMODIUM) capsule 2 mg  2 mg Oral 4x Daily PRN    LORazepam (ATIVAN) injection 1 mg  1 mg Intravenous Q4H PRN    melatonin tablet 6 mg  6 mg Oral HS    methylPREDNISolone sodium succinate (Solu-MEDROL) injection 40 mg  40 mg Intravenous Q8H [address]    metoprolol tartrate (LOPRESSOR) partial tablet 12 5 mg  12 5 mg Oral Q12H [address]    ondansetron (ZOFRAN) injection 4 mg  4 mg Intravenous Q6H PRN    oxyCODONE (ROXICODONE) IR tablet 10 mg  10 mg Oral Q4H PRN - used x 1     [START ON 3/12/2022] pantoprazole (PROTONIX) EC tablet 40 mg  40 mg Oral Daily Before Breakfast    polyethylene glycol (MIRALAX) packet 17 g  17 g Oral Daily PRN    sodium chloride 0 9 % infusion  100 mL/hr Intravenous Continuous    sodium chloride tablet 1 g  1 g Oral BID With Meals    [START ON 3/12/2022] vancomycin (VANCOCIN) IVPB (premix in dextrose) 750 mg 150 mL  12 5 mg/kg Intravenous Q12H           Telemetry   Urinary catheter    [address] Utilization Review Department  ATTENTION: Please call with any questions or concerns to [phone] and carefully listen to the prompts so that you are directed to the right person  All voicemails are confidential   Tae [place] all requests for admission clinical reviews, approved or denied determinations and any other requests to dedicated fax number below belonging to the campus where the patient is receiving treatment   List of dedicated fax numbers for the Facilities:  [address] [place] [address] DENIALS (Administrative/Medical Necessity) [phone]   [address] [place] [address] (Maternity/NICU/Pediatrics) [phone]   [address] [place] [address] [address] [facility] [address] [place] [address] [facility] [address] [place] [address] Vida Lozano [address] [phone]

## 2022-03-14 NOTE — CASE MANAGEMENT
Case Management Assessment & Discharge Planning Note    Patient name Hollis Li  Location [address] [facility] [address] 9[address]/PPHP 246-90 MRN [id number]  : 1960 Date 3/14/2022       Current Admission Date: 3/11/2022  Current Admission Diagnosis:Severe sepsis [facility])   Patient Active Problem List    Diagnosis Date Noted    SVT (supraventricular tachycardia) ([facility] [address] ) 2022    Transaminitis 2022    Bacteremia 2022    Severe sepsis ([address] ) 2022    Hydronephrosis of right kidney 2022    Severe protein-calorie malnutrition ([facility] [address] ) 2022    Acute kidney injury ([address] ) 2022    Pancytopenia ([facility] [address] ) 2022    Hematuria 2022    Acute cystitis with hematuria 2022    Tachycardia 2022    SIRS (systemic inflammatory response syndrome) ([facility] [address] ) 2021    Anemia 2021    Osseous metastasis ([address] ) 09/15/2021    Pulmonary embolus ([facility] [address] ) 09/15/2021    Prostate cancer ([facility] [address] ) 2020    MALT lymphoma ([facility] [address] ) 2019    Abnormal nuclear stress test 2019    Pure hypercholesterolemia 10/19/2012      LOS (days): 3  Geometric Mean LOS (GMLOS) (days):   Days to GMLOS:     OBJECTIVE:  PATIENT READMITTED TO HOSPITAL  Risk of Unplanned Readmission Score: 41         Current admission status: Inpatient       Preferred Pharmacy:   [facility] [id number]  Phone: [phone] Fax: [phone]    Primary Care Provider: Isa Polo MD    Primary Insurance: BLUE CROSS  Secondary Insurance:     ASSESSMENT:  [address], [address] [facility] Representative - Spouse   Primary Phone: [phone] (Mobile)                 Readmission Root Cause  30 Day Readmission: Yes  Who directed you to return to the hospital?: Family (Spouse bought pt to ER as he developed a fever and stared to have jerking movements)  Did you understand whom to contact if you had questions or problems?: Yes  Did you get your prescriptions before you left the hospital?: Yes  Were you able to get your prescriptions filled when you left the hospital?: Yes  Did you take your medications as prescribed?: Yes  Were you able to get to your follow-up appointments?: Yes  During previous admission, was a post-acute recommendation made?: No  Patient was readmitted due to: Severe sepsis  Action Plan: CM will follow for recomendations  Patient Information  Admitted from[de-identified] Home  Mental Status: Alert  During Assessment patient was accompanied by: Spouse  Assessment information provided by[de-identified] Spouse  Primary Caregiver: Family  Caregiver's Name[de-identified] Kurt Gan (Spouse)  Caregiver's Relationship to Patient[de-identified] Family Member  Caregiver's Telephone Number[de-identified] [phone]  Support Systems: Spouse/significant [facility] of Residence: [address] [place] [address] do you live in?: [address] [facility] entry access options   Select all that apply : Ramp  Type of Current Residence: 2 story home  Upon entering residence, is there a bedroom on the main floor (no further steps)?: No  A bedroom is located on the following floor levels of residence (select all that apply):: 2nd Floor  Upon entering residence, is there a bathroom on the main floor (no further steps)?: No  Indicate which floors of current residence have a bathroom (select all the apply):: 2nd Floor  Number of steps to 2nd floor from main floor: One Flight  In the last 12 months, was there a time when you were not able to pay the mortgage or rent on time?: No  In the last 12 months, how many places have you lived?: 1  In the last 12 months, was there a time when you did not have a steady place to sleep or slept in a shelter (including now)?: No  Homeless/housing insecurity resource given?: N/A  Living Arrangements: Lives w/ Spouse/significant other  Is patient a ?: No    Activities of Daily Living Prior to Admission  Functional Status: Assistance  Completes ADLs independently?: No  Level of ADL dependence: Assistance  Ambulates independently?: No  Level of ambulatory dependence: Assistance  Does patient use assisted devices?: Yes  Assisted Devices (DME) used: [facility]  Does patient currently own DME?: Yes  What DME does the patient currently own?: Isa Ventura  Does patient have a history of Outpatient Therapy (PT/OT)?: No  Does the patient have a history of Short-Term Rehab?: No  Does patient have a history of HHC?: Yes  Does patient currently have Bk Leon?: Yes    Current Home Health Care  Type of Current Home Care Services: Nurse visit  Current Home Health Agency[de-identified] [facility] Provider[de-identified] PCP    Patient Information Continued  Income Source: Employed  Does patient have prescription coverage?: Yes  Within the past 12 months, you worried that your food would run out before you got the money to buy more : Never true  Within the past 12 months, the food you bought just didnt last and you didnt have money to get more : Never true  Food insecurity resource given?: N/A  Does patient receive dialysis treatments?: No  Does patient have a history of substance abuse?: No  Does patient have a history of Mental Health Diagnosis?: No         Means of Transportation  Means of Transport to Appts[de-identified] Family transport  In the past 12 months, has lack of transportation kept you from medical appointments or from getting medications?: No  In the past 12 months, has lack of transportation kept you from meetings, work, or from getting things needed for daily living?: No  Was application for public transport provided?: N/A        DISCHARGE DETAILS:    Discharge planning discussed with[de-identified] Pt's spouse  Freedom of Choice: Yes  Comments - Freedom of Choice: Pending recs  CM contacted family/caregiver?: Yes    Contacts  Patient Contacts: Jules Ibarra: wife  Relationship to Patient[de-identified] Family  Contact Method: Phone  Phone Number: [phone]  Reason/Outcome: Continuity of [address]         Is the patient interested in [facility] at discharge?: Yes  [address] requested[de-identified] [address] Name[de-identified] [facility] Provider[de-identified] PCP  Home Health Services Needed[de-identified] Evaluate Functional Status and Safety,Gait/ADL Training,Strengthening/Theraputic Exercises to Improve Function  Homebound Criteria Met[de-identified] Requires the Assistance of Another Person for Safe Ambulation or to Leave the Home  Supporting Clincal Findings[de-identified] Limited Endurance,Bed Bound or Wheelchair Bound      Other Referral/Resources/Interventions Provided:  Interventions: [facility]  Referral Comments: referral sent for continuation of care       Patient/caregiver received discharge checklist   Content reviewed  Patient/caregiver encouraged to participate in discharge plan of care prior to discharge home  CM reviewed d/c planning process including the following: identifying help at home, patient preference for d/c planning needs, Discharge Lounge, [facility] Meds to Bed program, availability of treatment team to discuss questions or concerns patient and/or family may have regarding understanding medications and recognizing signs and symptoms once discharged  CM also encouraged patient to follow up with all recommended appointments after discharge  Patient advised of importance for patient and family to participate in managing patients medical well being

## 2022-03-14 NOTE — PLAN OF CARE
Problem: Nutrition/Hydration-ADULT  Goal: Nutrient/Hydration intake appropriate for improving, restoring or maintaining nutritional needs  Description: Monitor and assess patient's nutrition/hydration status for malnutrition  Collaborate with interdisciplinary team and initiate plan and interventions as ordered  Monitor patient's weight and dietary intake as ordered or per policy  Utilize nutrition screening tool and intervene as necessary  Determine patient's food preferences and provide high-protein, high-caloric foods as appropriate       INTERVENTIONS:  - Monitor oral intake, urinary output, labs, and treatment plans  - Assess nutrition and hydration status and recommend course of action  - Evaluate amount of meals eaten  - Assist patient with eating if necessary   - Allow adequate time for meals  - Recommend/ encourage appropriate diets, oral nutritional supplements, and vitamin/mineral supplements  - Order, calculate, and assess calorie counts as needed  - Recommend, monitor, and adjust tube feedings and TPN/PPN based on assessed needs  - Assess need for intravenous fluids  - Provide specific nutrition/hydration education as appropriate  - Include patient/family/caregiver in decisions related to nutrition  Outcome: Not Progressing     Problem: MOBILITY - ADULT  Goal: Maintain or return to baseline ADL function  Description: INTERVENTIONS:  -  Assess patient's ability to carry out ADLs; assess patient's baseline for ADL function and identify physical deficits which impact ability to perform ADLs (bathing, care of mouth/teeth, toileting, grooming, dressing, etc )  - Assess/evaluate cause of self-care deficits   - Assess range of motion  - Assess patient's mobility; develop plan if impaired  - Assess patient's need for assistive devices and provide as appropriate  - Encourage maximum independence but intervene and supervise when necessary  - Involve family in performance of ADLs  - Assess for home care needs following discharge   - Consider OT consult to assist with ADL evaluation and planning for discharge  - Provide patient education as appropriate  Outcome: Not Progressing  Goal: Maintains/Returns to pre admission functional level  Description: INTERVENTIONS:  - Perform BMAT or MOVE assessment daily    - Set and communicate daily mobility goal to care team and patient/family/caregiver     - Collaborate with rehabilitation services on mobility goals if consulted  - Out of bed for toileting  - Record patient progress and toleration of activity level   Outcome: Not Progressing     Problem: Potential for Falls  Goal: Patient will remain free of falls  Description: INTERVENTIONS:  - Educate patient/family on patient safety including physical limitations  - Instruct patient to call for assistance with activity   - Consult OT/PT to assist with strengthening/mobility   - Keep Call bell within reach  - Keep bed low and locked with side rails adjusted as appropriate  - Keep care items and personal belongings within reach  - Initiate and maintain comfort rounds  - Make Fall Risk Sign visible to staff  - Apply yellow socks and bracelet for high fall risk patients  - Consider moving patient to room near nurses station  Outcome: Progressing     Problem: Prexisting or High Potential for Compromised Skin Integrity  Goal: Skin integrity is maintained or improved  Description: INTERVENTIONS:  - Identify patients at risk for skin breakdown  - Assess and monitor skin integrity  - Assess and monitor nutrition and hydration status  - Monitor labs   - Assess for incontinence   - Turn and reposition patient  - Assist with mobility/ambulation  - Relieve pressure over bony prominences  - Avoid friction and shearing  - Provide appropriate hygiene as needed including keeping skin clean and dry  - Evaluate need for skin moisturizer/barrier cream  - Collaborate with interdisciplinary team   - Patient/family teaching  - Consider wound care consult Outcome: Not Progressing

## 2022-03-14 NOTE — DISCHARGE INSTR - OTHER ORDERS
Skin care plans:  1-Cleanse sacral buttocks with soap and water apply allevyn foam deepthi with a T and date check skin integrity change every other day or if soiled   2-Hydraguard to bilateral heel BID and PRN  3-Elevate heels to offload pressure  4-Ehob cushion when out of bed  5-Turn/repoisiton q2h or when medically stable for pressure re-distribution on skin  6-Moisturize skin daily with skin nourishing cream  7  P - 500 low air loss mattress - nursing to call purchasing for mattress

## 2022-03-14 NOTE — ASSESSMENT & PLAN NOTE
Present on admission  Noted on UA performed on 3/11  No growth on urine culture  See plan for severe sepsis above

## 2022-03-14 NOTE — ASSESSMENT & PLAN NOTE
Patient with prostate cancer with metastatic multiple processes  Patient currently established with Dr Nadine Calixto A/P identified "worsening hepatic metastasis, worsening mets of adrenal glands, worsening left bladder wall mass, worsening anterior pelvic mesenteric nodule, worsening aortocaval and retrocaval lymph nodes, and worsening left external iliac lymph node"    Overall worsening disease progression on imaging and indicative of poor prognosis in the setting of worsening sepsis  Oncology consulted; pending further discussions of goals of care and prognosis  Patient's family wishes to have full care with aggressive measures    Continue home Apalutamide 240 mg daily  Continue home Decadron 4 mg BID

## 2022-03-14 NOTE — ASSESSMENT & PLAN NOTE
Stable  Patient with anemia and thrombocytopenia  Secondary to metastatic prostate cancer, MALT, and chemotherapy  One Unit pRBCs transfused on 03/12    Repeat Hemoglobin measured at 7 0 g/dL  Transfuse Hemoglobin <7 g/dL

## 2022-03-14 NOTE — PROGRESS NOTES
Progress Note -Interventional Radiology NP  Chris Phillips 57 y o  male MRN: [id number]  Unit/Bed#: [facility] 933-01 Encounter: [id number]    Assessment/Plan:    70-year-old male with severe sepsis, likely urosepsis, in the setting metastatic prostate cancer, with evidence of hydronephrosis in bilateral kidneys on CT during 3/11  Patient was [facility] for bilateral PCN placement  Urine culture from bilateral PCN placement negative x2  Left is blood tinged, right is clear yellow  Possible consideration of future for internalization to PCNUs, as an outpatient  - continue to flush with 10 mL normal saline Q 8 hours  - record output q 8 hours  - follow-up with IR in 3 months for routine tube change  - consider conversion to PCNUs       Subjective: Chris Phillips is a 57 y o  male who presented with severe urosepsis  Patient is surrounded bedside by significant other and 3 additional family members  Discussed PCN tube care and possible conversion, evaluated left leg for swelling, discussed PE treatment, Lovenox treatment, and current care in the hospital       Patient Active Problem List   Diagnosis    MALT lymphoma ([address] )    Prostate cancer ([address] )    Abnormal nuclear stress test    Pure hypercholesterolemia    Osseous metastasis ([address] )    Pulmonary embolus ([address] )    SIRS (systemic inflammatory response syndrome) ([address] )    Anemia    Pancytopenia ([address] )    Hematuria    Acute cystitis with hematuria    Tachycardia    Acute kidney injury ([address] )    Severe protein-calorie malnutrition (HCC)    Severe sepsis (HCC)    Hydronephrosis of right kidney    SVT (supraventricular tachycardia) (HCC)    Transaminitis    Bacteremia          Objective:    Vitals:  /68 (BP Location: Left arm)   Pulse (!) 131   Temp (!) 100 6 °F (38 1 °C) (Oral)   Resp 16   Wt 71 5 kg (157 lb 10 1 oz)   SpO2 98%   BMI 22 62 kg/m²   Body mass index is 22 62 kg/m²    Weight (last 2 days)     Date/Time Weight    03/12/22 0600 71 5 (157 63)    03/12/22 0100 71 5 (157 63)    03/12/22 0012 71 5 (157 63)          I/Os:    Intake/Output Summary (Last 24 hours) at 3/14/2022 1457  Last data filed at 3/14/2022 1356  Gross per 24 hour   Intake 60 ml   Output 2850 ml   Net -2790 ml       Invasive Devices  Report    Central Venous Catheter Line            Port A Cath 03/11/22 Right Chest 3 days          Peripheral Intravenous Line            Peripheral IV 03/12/22 Right Antecubital 1 day          Drain            Nephrostomy Left 1 10 Fr  2 days    Nephrostomy Right 10 Fr  2 days    Urethral Catheter Coude 16 Fr  2 days                Physical Exam:  Physical Exam  Vitals and nursing note reviewed  Constitutional:       Appearance: He is well-developed  HENT:      Head: Normocephalic and atraumatic  Eyes:      Conjunctiva/sclera: Conjunctivae normal    Cardiovascular:      Rate and Rhythm: Regular rhythm  Tachycardia present  Pulses: Normal pulses  Heart sounds: No murmur heard  Comments: LLE thigh edema, pain, ongoing issue  Pulmonary:      Effort: Pulmonary effort is normal  No respiratory distress  Breath sounds: Normal breath sounds  Abdominal:      General: Bowel sounds are increased  Palpations: Abdomen is soft  Tenderness: There is no abdominal tenderness  Musculoskeletal:      Cervical back: Neck supple  Left lower leg: Edema present  Skin:     General: Skin is warm and dry  Capillary Refill: Capillary refill takes less than 2 seconds  Coloration: Skin is pale  Neurological:      Mental Status: He is alert     Psychiatric:         Mood and Affect: Mood normal          Behavior: Behavior normal                       Lab Results and Cultures:   CBC with diff:   Lab Results   Component Value Date    WBC 11 14 (H) 03/14/2022    HGB 7 9 (L) 03/14/2022    HCT 24 3 (L) 03/14/2022    MCV 89 03/14/2022    PLT 53 (L) 03/14/2022    MCH 29 0 03/14/2022    MCHC 32 5 03/14/2022    RDW 17 3 (H) 03/14/2022    MPV 11 4 03/14/2022    NRBC 2 03/14/2022      BMP/CMP:  Lab Results   Component Value Date    K 4 4 03/14/2022     (H) 03/14/2022    CO2 21 03/14/2022    BUN 23 03/14/2022    CREATININE 1 00 03/14/2022    CALCIUM 7 7 (L) 03/14/2022     (H) 03/14/2022    ALT 17 03/14/2022    ALKPHOS 614 (H) 03/14/2022    EGFR 80 03/14/2022   ,     Coags:   Lab Results   Component Value Date    PTT 36 03/11/2022    INR 1 35 (H) 03/11/2022   ,   Results from last 7 days   Lab Units 03/11/22  1111   PTT seconds 36   INR  1 35*        Lipid Panel: No results found for: CHOL  No results found for: HDL  No results found for: HDL  No results found for: LDLCALC  No results found for: TRIG    HgbA1c: No results found for: HGBA1C    Blood Culture:   Lab Results   Component Value Date    BLOODCX No Growth at 48 hrs  03/12/2022    BLOODCX No Growth at 48 hrs  03/12/2022   ,   Urinalysis:   Lab Results   Component Value Date    COLORU Yellow 03/11/2022    CLARITYU Clear 03/11/2022    SPECGRAV 1 015 03/11/2022    PHUR 5 5 03/11/2022    PHUR 7 2 07/07/2020    LEUKOCYTESUR Negative 03/11/2022    NITRITE Negative 03/11/2022    GLUCOSEU Negative 03/11/2022    KETONESU Negative 03/11/2022    BILIRUBINUR Negative 03/11/2022    BLOODU Moderate (A) 03/11/2022   ,   Urine Culture:   Lab Results   Component Value Date    URINECX No Growth <1000 cfu/mL 03/12/2022   ,   Wound Culure:  No results found for: WOUNDCULT    VTE Pharmacologic Prophylaxis: Sequential compression device (Venodyne)       Thank you for allowing me to participate in the care of Jimbo Kearney  Please don't hesitate to call, text, email, or TigerText with any questions  This text is generated with voice recognition software  There may be translation, syntax,  or grammatical errors  If you have any questions, please contact the dictating provider

## 2022-03-14 NOTE — ASSESSMENT & PLAN NOTE
Present on admission  Likely secondary to severe sepsis and acute on chronic anemia  Cardiology consulted - continue Toprol-XL

## 2022-03-14 NOTE — ASSESSMENT & PLAN NOTE
Patient with history of PE diagnosed in September 2021  Holding anticoagulation due to thrombocytopenia

## 2022-03-14 NOTE — PLAN OF CARE
Problem: Nutrition/Hydration-ADULT  Goal: Nutrient/Hydration intake appropriate for improving, restoring or maintaining nutritional needs  Description: Monitor and assess patient's nutrition/hydration status for malnutrition  Collaborate with interdisciplinary team and initiate plan and interventions as ordered  Monitor patient's weight and dietary intake as ordered or per policy  Utilize nutrition screening tool and intervene as necessary  Determine patient's food preferences and provide high-protein, high-caloric foods as appropriate       INTERVENTIONS:  - Monitor oral intake, urinary output, labs, and treatment plans  - Assess nutrition and hydration status and recommend course of action  - Evaluate amount of meals eaten  - Assist patient with eating if necessary   - Allow adequate time for meals  - Recommend/ encourage appropriate diets, oral nutritional supplements, and vitamin/mineral supplements  - Order, calculate, and assess calorie counts as needed  - Recommend, monitor, and adjust tube feedings and TPN/PPN based on assessed needs  - Assess need for intravenous fluids  - Provide specific nutrition/hydration education as appropriate  - Include patient/family/caregiver in decisions related to nutrition  Outcome: Progressing     Problem: MOBILITY - ADULT  Goal: Maintain or return to baseline ADL function  Description: INTERVENTIONS:  -  Assess patient's ability to carry out ADLs; assess patient's baseline for ADL function and identify physical deficits which impact ability to perform ADLs (bathing, care of mouth/teeth, toileting, grooming, dressing, etc )  - Assess/evaluate cause of self-care deficits   - Assess range of motion  - Assess patient's mobility; develop plan if impaired  - Assess patient's need for assistive devices and provide as appropriate  - Encourage maximum independence but intervene and supervise when necessary  - Involve family in performance of ADLs  - Assess for home care needs following discharge   - Consider OT consult to assist with ADL evaluation and planning for discharge  - Provide patient education as appropriate  Outcome: Progressing  Goal: Maintains/Returns to pre admission functional level  Description: INTERVENTIONS:  - Perform BMAT or MOVE assessment daily    - Set and communicate daily mobility goal to care team and patient/family/caregiver     - Collaborate with rehabilitation services on mobility goals if consulted  - Out of bed for toileting  - Record patient progress and toleration of activity level   Outcome: Progressing     Problem: Potential for Falls  Goal: Patient will remain free of falls  Description: INTERVENTIONS:  - Educate patient/family on patient safety including physical limitations  - Instruct patient to call for assistance with activity   - Consult OT/PT to assist with strengthening/mobility   - Keep Call bell within reach  - Keep bed low and locked with side rails adjusted as appropriate  - Keep care items and personal belongings within reach  - Initiate and maintain comfort rounds  - Make Fall Risk Sign visible to staff  - Apply yellow socks and bracelet for high fall risk patients  - Consider moving patient to room near nurses station  Outcome: Progressing     Problem: Prexisting or High Potential for Compromised Skin Integrity  Goal: Skin integrity is maintained or improved  Description: INTERVENTIONS:  - Identify patients at risk for skin breakdown  - Assess and monitor skin integrity  - Assess and monitor nutrition and hydration status  - Monitor labs   - Assess for incontinence   - Turn and reposition patient  - Assist with mobility/ambulation  - Relieve pressure over bony prominences  - Avoid friction and shearing  - Provide appropriate hygiene as needed including keeping skin clean and dry  - Evaluate need for skin moisturizer/barrier cream  - Collaborate with interdisciplinary team   - Patient/family teaching  - Consider wound care consult Outcome: Progressing

## 2022-03-14 NOTE — PROGRESS NOTES
Progress Note - Infectious Disease   Lewis Crum 57 y o  male MRN: [id number]  [facility] Encounter: [id number]         IMPRESSION & RECOMMENDATIONS:   Impression/Recommendations:  1  Severe sepsis, POA at [facility]   Leukopenia, tachycardia, fevers, elevated lactic acid   Secondary to #2 vs #3  Fever curve had improved and now with new fever today  Will keep on broad coverage for now due to new fever      -continue empiric IV vancomycin for now in light of new fevers  -continue IV cefepime  -recheck blood cultures x2 sets now due to new fevers  -follow temperatures and hemodynamics  -recheck CBC in a m   -supportive care     2  Staph epidermidis bacteremia   Blood cultures from 03/11 show coagulase-negative Staph   Both sets appear to have been drawn from the port at the same time  This could very well be reflective of contamination, however, difficult to say for certain  No other intravascular prosthetic devices  Repeat blood cultures drawn after antibiotics are negative     -continue IV vancomycin in light of new fevers  Dosing recommendations per pharmacy  -follow up final blood cultures from 03/11  -follow-up repeat blood cultures to ensure clearance of bacteremia     3  Presumptive UTI   In the setting of bilateral hydronephrosis secondary to #4   Hydronephrosis on the right side is new on this admission   Status post emergent bilateral PCN placement by IR 3/12  All urine cultures are negative, but did receive antibiotics beforehand      -continue IV cefepime, as above   -urology/IR follow-up     4  Metastatic prostate cancer with diffuse lymphatic and bony involvement, bilateral hydronephrosis   On active chemotherapy via port   Follows with Dr Kenneth Lo shows progressive metastatic disease      -palliative care evaluation pending  -oncology follow-up     5   Prior MALT lymphoma      Antibiotics:  Vancomycin/cefepime 4           Subjective:  Patient  Patient was more lethargic today  Now with high fever up to 101 2 this afternoon  No hypoxia  Was evaluated by Critical Care  Objective:  Vitals:  Temp:  [98 1 °F (36 7 °C)-101 2 °F (38 4 °C)] 101 2 °F (38 4 °C)  HR:  [] 108  Resp:  [16-20] 16  BP: ()/(65-78) 123/78  SpO2:  [96 %-98 %] 98 %  Temp (24hrs), Av 8 °F (37 7 °C), Min:98 1 °F (36 7 °C), Max:101 2 °F (38 4 °C)  Current: Temperature: (!) 101 2 °F (38 4 °C)    Physical Exam:   General:  No acute distress, very frail  HEENT:  Atraumatic normocephalic  Neck:  Trachea midline  Psychiatric:  No acute psychosis  Pulmonary:  Normal respiratory excursion without accessory muscle use  Chest wall port in place with no overlying erythema, tenderness or drainage  Abdomen:  Soft, nontender  Bilateral PCNs in place  Left is blood tinged, right is clear yellow  Extremities:  No edema  Skin:  No rashes    Lab Results:  I have personally reviewed pertinent labs  Results from last 7 days   Lab Units 22  0539 22  0616 22  0613 22  1111   POTASSIUM mmol/L 4 4 4 7 4 4   < > 5 0   CHLORIDE mmol/L 109* 108 110*   < > 99*   CO2 mmol/L 21 18* 17*   < > 20*   BUN mg/dL 23 24 24   < > 28*   CREATININE mg/dL 1 00 0 90 0 99   < > 1 26   EGFR ml/min/1 73sq m 80 91 81   < > 60   CALCIUM mg/dL 7 7* 7 1* 7 2*   < > 7 3*   AST U/L 178*  --  276*  --  431*   ALT U/L 17  --  23  --  38   ALK PHOS U/L 614*  --  671*  --  957*    < > = values in this interval not displayed  Results from last 7 days   Lab Units 22  0539 22  0616 22  0613   WBC Thousand/uL 11 14* 11 32* 10 37*   HEMOGLOBIN g/dL 7 9* 7 9* 6 4*   PLATELETS Thousands/uL 53* 58* 69*     Results from last 7 days   Lab Units 22  0207 22  0205 22  0049 22  0018 22  1111   BLOOD CULTURE   --  No Growth at 48 hrs    No Growth at 48 hrs   --   --  Staphylococcus epidermidis*  Staphylococcus epidermidis*   GRAM STAIN RESULT   --   --   --   -- Gram positive cocci in clusters*  Gram positive cocci in clusters*   URINE CULTURE  No Growth <1000 cfu/mL  --  No Growth <1000 cfu/mL No Growth <1000 cfu/mL  --        Imaging Studies:   I have personally reviewed pertinent imaging study reports and images in PACS  EKG, Pathology, and Other Studies:   I have personally reviewed pertinent reports

## 2022-03-14 NOTE — PROGRESS NOTES
Progress note - Palliative and Supportive Care   Jimbo Kearney 57 y o  male [id number]    Patient Active Problem List   Diagnosis    MALT lymphoma ([facility] )    Prostate cancer ([facility] )    Abnormal nuclear stress test    Pure hypercholesterolemia    Osseous metastasis (HCC)    Pulmonary embolus (HCC)    SIRS (systemic inflammatory response syndrome) (HCC)    Anemia    Pancytopenia (HCC)    Hematuria    Acute cystitis with hematuria    Tachycardia    Acute kidney injury ([facility] )    Severe protein-calorie malnutrition (HCC)    Severe sepsis (HCC)    Hydronephrosis of right kidney    SVT (supraventricular tachycardia) (HCC)    Transaminitis    Bacteremia     Active issues specifically addressed today include:   MALT lymphoma  Metastatic prostate cancer  Pulmonary embolus  Acute metabolic encephalopathy  Hydronephrosis of right kidney  Severe sepsis  Severe protein calorie malnutrition  Palliative care encounter  Goals of care conversation    Plan:  1  Symptom management -     Cancer-related pain:    Patient follows with Dr Davina Anna, heme/onc  She manages patient's pain medications  -Home regimen: OxyContin 20 mg Q 12 hrs ATC, Oxycodone 10 mg every 4 hrs PRN  Wife and family requested that this medication be scheduled since he is to weak to ask for medications specially at night    -Today patient appears to be more lethargic, but arousable  Hold parameters placed on OxyIR  Discussed this with family and nursing staff  They all agree  -If patient consistently requires frequent OxyIR and/or dilaudid IV for BT pain, will consider increasing OxyContin dose   -While on ICU patient received Fentanyl PRN for severe pain, this will be d/c'd  -Added hydromorphone 1 mg Q 4 hrs PRN for BT pain  -Total OME in 24 hrs: 135     Bowel regimen:  -Patient on Miralax daily PRN  -Added senna 1 tab daily ATC       2  Goals - Continue full cares with no limits   - Five wishes given during hospital stay   Patient yesterday and the day prior voiced interest in wife to be MDM if he is unable to make his own decisions  He also stated he would want all procedures or treatments if he has a fighting chance  Will continue to address [address] during patient's hospital stay  FM today canceled due to rapid decline and several teams addressing [address]  For now, wife requests full cares with no limits  Critical care involved due to HR elevated 170s, T 100 6  Will closely follow up     Code Status: Full - Level 1   Decisional apparatus:  Patient is not competent on my exam today  If competence is lost, patient's substitute decision maker would default to spouse by [place]  Advance Directive / Living Will / POLST:  Not on file  Five wishes given    Interval history:       Patient seen and examined at bedside  Patient appears more lethargic, but continues to be arousable on voice command  Responds appropriately to yes or no questions  In the room, wife, 2 adult children, patient's brother  They all have significant concerns and questions regarding patient's clinical status and steps moving forward  Wife stated that if patient at the end of life, she would like to keep him at home  Brother and children stated that then resources should be made available for him to receive IVF, nutrition and even blood transfusions at home if he needs it  Discussed side effects of giving IVF and other procedures at the end of life, but offered to discuss options at family meeting and they agreed   Discussed with heme/onc and SLIM team  Plan for FM today at 2:30 PM    MEDICATIONS / ALLERGIES:     current meds:   Current Facility-Administered Medications   Medication Dose Route Frequency    acetaminophen (TYLENOL) tablet 650 mg  650 mg Oral Q6H PRN    Apalutamide TABS 240 mg/day  240 mg/day Oral Daily    cefepime (MAXIPIME) 2,000 mg in dextrose 5 % 50 mL IVPB  2,000 mg Intravenous Q12H    dexamethasone (DECADRON) tablet 4 mg  4 mg Oral Q12H [facility]    famotidine (PEPCID) tablet 20 mg  20 mg Oral BID    gabapentin (NEURONTIN) capsule 600 mg  600 mg Oral TID    HYDROmorphone (DILAUDID) injection 1 mg  1 mg Intravenous Q4H PRN    loperamide (IMODIUM) capsule 2 mg  2 mg Oral 4x Daily PRN    LORazepam (ATIVAN) injection 1 mg  1 mg Intravenous Q4H PRN    melatonin tablet 12 mg  12 mg Oral HS    metoclopramide (REGLAN) injection 10 mg  10 mg Intravenous Once PRN    metoprolol tartrate (LOPRESSOR) tablet 25 mg  25 mg Oral Q8H    ondansetron (ZOFRAN) injection 4 mg  4 mg Intravenous Q6H PRN    oxyCODONE (OxyCONTIN) 12 hr tablet 20 mg  20 mg Oral Q12H KATHARINA    oxyCODONE (ROXICODONE) immediate release tablet 10 mg  10 mg Oral Q4H    polyethylene glycol (MIRALAX) packet 17 g  17 g Oral Daily PRN    sodium chloride tablet 1 g  1 g Oral BID With Meals    vancomycin (VANCOCIN) IVPB (premix in dextrose) 750 mg 150 mL  750 mg Intravenous Q12H       Allergies   Allergen Reactions    Granisetron Nausea Only     Weakness/nausea    Tetracycline Other (See Comments)     Increase LFT's    Valtrex [Valacyclovir] Nausea Only       OBJECTIVE:    Physical Exam  Physical Exam  Constitutional:       General: He is sleeping  He is not in acute distress  Appearance: He is ill-appearing  Eyes:      General:         Right eye: No discharge  Left eye: No discharge  Pulmonary:      Effort: Pulmonary effort is normal  No respiratory distress  Abdominal:      General: There is no distension  Palpations: Abdomen is soft  Skin:     General: Skin is warm  Capillary Refill: Capillary refill takes less than 2 seconds  Coloration: Skin is jaundiced  Neurological:      Mental Status: He is easily aroused  Comments: Responding appropriately to yes or no questions            Lab Results:   CBC:   Lab Results   Component Value Date    WBC 11 14 (H) 03/14/2022    HGB 7 9 (L) 03/14/2022    HCT 24 3 (L) 03/14/2022    MCV 89 03/14/2022    PLT 53 (L) 03/14/2022    MCH 29 0 03/14/2022    MCHC 32 5 03/14/2022    RDW 17 3 (H) 03/14/2022    MPV 11 4 03/14/2022    NRBC 2 03/14/2022   , BMP:  Lab Results   Component Value Date    SODIUM 137 03/14/2022    K 4 4 03/14/2022     (H) 03/14/2022    CO2 21 03/14/2022    BUN 23 03/14/2022    CREATININE 1 00 03/14/2022    GLUC 99 03/14/2022    CALCIUM 7 7 (L) 03/14/2022    AGAP 7 03/14/2022    EGFR 80 03/14/2022         Counseling / Coordination of Care    Total floor / unit time spent today 60 minutes  Greater than 50% of total time was spent with the patient and / or family counseling and / or coordination of care   A description of the counseling / coordination of care: symptom management, psychosocial support, goals of care

## 2022-03-14 NOTE — PROGRESS NOTES
[address] [facility]  Progress Note Elgin Claros 1960, 57 y o  male MRN: [id number]  Unit/Bed#: [facility] 933-01 Encounter: [id number]  Primary Care Provider: Vasiliy Echavarria MD   Date and time admitted to hospital: 3/11/2022 10:32 PM    * Severe sepsis [facility])  Assessment & Plan  Worsening  Present on admission  Sepsis criteria: febrile episodes at home, pancytopenia, tachycardia, hypotension with lactic acidosis and septic shock  Patient admitted to MICU for hypotension requiring pressor support (weaned off 03/12)  Etiologies: Staphylococcus epidermidis bacteremia and acute cystitis with severe hydronephrosis    Blood cultures collected on 03/11 POSITIVE for Staphylococcus epidermidis  Blood cultures collected on 03/12 showed NGTD  Pending repeat blood cultures ordered today following sinus tachycardia, febrile episodes, and worsening symptoms  ABG remarkable for metabolic alkalosis    ID consulted; continue IV Vancomycin and Cefepime    Bacteremia  Assessment & Plan  Present on admission  See plan below Severe sepsis    Transaminitis  Assessment & Plan  Improving  Present on admission  Likely secondary to septic shock  Continue to monitor CMP intermittently  Will closely monitor with PRN Tylenol for fever only    SVT (supraventricular tachycardia) (HCC)  Assessment & Plan  Present on admission  Likely secondary to severe sepsis and acute on chronic anemia  Cardiology consulted; appreciate assistance  See plan below Tachycardia    Hydronephrosis of right kidney  Assessment & Plan  "Patient noted to have new hydronephrosis and hydroureter on the right side  Also noted to have urinary retention  Status post Milan catheter placement    Urology evaluated the patient due to persistent fever and likely source of infection recommended PCN placement  · As per urology progress note left-sided is more and there is a possibility that it has spread along the trigone over to the right side"    Urology consulted, appreciate recommendations  Patient remains hemodynamically stable s/p bilateral PCN placed by IR on 03/11  See plan below Severe sepsis    Severe protein-calorie malnutrition ([facility] )  Assessment & Plan  Malnutrition Findings:   Adult Malnutrition type: Chronic illness (wt (9/5/21 102kg), severe subcutaneous fat loss/orbital fat pads, ribs, triceps, muscle wasting/hollowing of temporalis region, protruding clavicles, treated with liberal diet )  Adult Degree of Malnutrition: Other severe protein calorie malnutrition    BMI Findings: Body mass index is 22 62 kg/m²       Nutrition consulted; continue with supplemental nutrition  Patient with minimal PO intake  Pending discussions with family about alternatives following Oncology discussions     Tachycardia  Assessment & Plan  Worsening  Patient with longstanding history of tachycardia  Acute worsening likely secondary to sepsis and worsening anemia  Cardiology consulted; appreciate assistance  Lopressor discontinued; patient started on Toprol-XL 75 mg daily  Continue Telemetry    Acute cystitis with hematuria  Assessment & Plan  Present on admission  Noted on UA performed on 03/11  No growth on Urine culture  Continue plan below Severe sepsis    Pancytopenia ([facility] )  Assessment & Plan  Stable  Patient with anemia and thrombocytopenia  Secondary to metastatic prostate cancer, MALT, and chemotherapy  One Unit pRBCs transfused on 03/12    Repeat Hemoglobin measured at 7 0 g/dL  Transfuse Hemoglobin <7 g/dL    Pulmonary embolus [facility])  Assessment & Plan  Patient with history of PE diagnosed in 09/2021  Holding anticoagulation due to thrombocytopenia    Osseous metastasis [facility])  Assessment & Plan  Patient and his wife report scheduled use of IR Oxycodone on 03/13  Palliative care involved; continue regimen per recommendations  Continue Oxycodone ER 20 mg q12h  Continue scheduled Roxicodone 10 mg q4h (patient at risk for respiratory depression and hypotension; patient's wife understands risks)    Patient and wife declined bowel regimen on 03/13  Senokot resumed by Palliative Care    Prostate cancer [facility])  Assessment & Plan  Patient with prostate cancer with metastatic multiple processes  Patient currently established with Dr Eloina Ordoñez A/P identified "worsening hepatic metastasis, worsening mets of adrenal glands, worsening left bladder wall mass, worsening anterior pelvic mesenteric nodule, worsening aortocaval and retrocaval lymph nodes, and worsening left external iliac lymph node"    Overall worsening disease progression on imaging and indicative of poor prognosis in the setting of worsening sepsis  Oncology consulted; pending further discussions of goals of care and prognosis  Patient's family wishes to have full care with aggressive measures    Continue home Apalutamide 60 mg 4 tabs daily  Continue home Decadron 4 mg BID    MALT lymphoma ([address] )  Assessment & Plan  "Diagnosed in 05/2018  Status post triple antibiotic course for H pylori at that time and 2nd EGD in 9/21 showed resolution of the gastric lesion"    [address] 460 mg tabs taken Monday through Friday  Continue home Apalutamide 240 mg daily  Oncology consulted; appreciate assistance with discussions pending assessment per patient's home Oncologist  Palliative Care consulted; appreciate assistance and recommendations        VTE Pharmacologic Prophylaxis: VTE Score: 9 Moderate Risk (Score 3-4) - Pharmacological DVT Prophylaxis Contraindicated  Sequential Compression Devices Ordered  Patient Centered Rounds: I performed bedside rounds with nursing staff today  Discussions with Specialists or Other Care Team Provider: Cardiology, Oncology, Palliative Care, Nurse and      Education and Discussions with Family / Patient: Updated  (wife, son and brother) at bedside  Time Spent for Care: 60 minutes   More than 50% of total time spent on counseling and coordination of care as described above  Current Length of Stay: 3 day(s)  Current Patient Status: Inpatient   Certification Statement: The patient will continue to require additional inpatient hospital stay due to sepsis management  Discharge Plan: Anticipate discharge in >72 hrs to discharge location to be determined pending rehab evaluations  Code Status: Level 1 - Full Code      Subjective:   Patient is not able to rate his pain at this time and tremors  Wife is at bedside; blood count value given  Objective:     Vitals:   Temp (24hrs), Av 6 °F (37 6 °C), Min:98 1 °F (36 7 °C), Max:101 2 °F (38 4 °C)    Temp:  [98 1 °F (36 7 °C)-101 2 °F (38 4 °C)] 99 2 °F (37 3 °C)  HR:  [] 106  Resp:  [16-20] 16  BP: ()/(64-78) 101/64  SpO2:  [96 %-98 %] 98 %  Body mass index is 22 62 kg/m²  Input and Output Summary (last 24 hours): Intake/Output Summary (Last 24 hours) at 3/14/2022 1737  Last data filed at 3/14/2022 1525  Gross per 24 hour   Intake 1040 ml   Output 2850 ml   Net -1810 ml       Physical Exam:   Physical Exam  Vitals and nursing note reviewed  Constitutional:       General: He is not in acute distress  Appearance: He is not ill-appearing, toxic-appearing or diaphoretic  Comments: Cachetic, emaciated, and somnolent   HENT:      Head: Normocephalic and atraumatic  Right Ear: External ear normal       Left Ear: External ear normal       Nose: Nose normal       Mouth/Throat:      Mouth: Mucous membranes are dry  Eyes:      General: No scleral icterus  Right eye: No discharge  Left eye: No discharge  Comments: Miosis   Cardiovascular:      Rate and Rhythm: Regular rhythm  Tachycardia present  Heart sounds: No friction rub  No gallop  Pulmonary:      Effort: Pulmonary effort is normal       Breath sounds: No stridor  No rhonchi  Comments: Anterior chest only  Diminished bibasilar breath sounds  Abdominal:      General: Abdomen is flat   Bowel sounds are normal  There is no distension  Palpations: Abdomen is soft  Tenderness: There is no guarding or rebound  Genitourinary:     Comments: [place] in place draining yellow urine  Musculoskeletal:         General: No deformity  Cervical back: Normal range of motion  Right lower leg: No edema  Left lower leg: No edema  Comments: Bilateral nephrostomy tubes noted; hematuria on the left with clear urine on right   Skin:     General: Skin is warm  Coloration: Skin is not pale  Findings: No erythema or lesion  Comments: Dusky skin appearance   Neurological:      Mental Status: He is alert  Comments: Patient unable to answer questions due to weakness  Able to follow simple commands  Psychiatric:      Comments: Unable to assess as patient is nonverbal          Additional Data:   Labs:  Results from last 7 days   Lab Units 03/14/22  1511   WBC Thousand/uL 11 92*   HEMOGLOBIN g/dL 7 0*   HEMATOCRIT % 21 1*   PLATELETS Thousands/uL 45*   BANDS PCT % 2   LYMPHO PCT % 0*   MONO PCT % 3*   EOS PCT % 0     Results from last 7 days   Lab Units 03/14/22  1511 03/14/22  0539 03/14/22  0539   SODIUM mmol/L 138   < > 137   POTASSIUM mmol/L 4 1   < > 4 4   CHLORIDE mmol/L 110*   < > 109*   CO2 mmol/L 21   < > 21   BUN mg/dL 21   < > 23   CREATININE mg/dL 0 98   < > 1 00   ANION GAP mmol/L 7   < > 7   CALCIUM mg/dL 7 2*   < > 7 7*   ALBUMIN g/dL  --   --  1 8*   TOTAL BILIRUBIN mg/dL  --   --  1 22*   ALK PHOS U/L  --   --  614*   ALT U/L  --   --  17   AST U/L  --   --  178*   GLUCOSE RANDOM mg/dL 109   < > 99    < > = values in this interval not displayed       Results from last 7 days   Lab Units 03/11/22  1111   INR  1 35*     Results from last 7 days   Lab Units 03/14/22  1642   POC GLUCOSE mg/dl 128         Results from last 7 days   Lab Units 03/14/22  1513 03/12/22  0613 03/11/22  1952 03/11/22  1357 03/11/22  1111   LACTIC ACID mmol/L 3 5*  --  1 7 1 6 2 6* PROCALCITONIN ng/ml  --  5 38*  --   --  1 72*       Lines/Drains:  Invasive Devices  Report    Central Venous Catheter Line            Port A Cath 03/11/22 Right Chest 3 days          Peripheral Intravenous Line            Peripheral IV 03/12/22 Right Antecubital 2 days          Drain            Nephrostomy Left 1 10 Fr  2 days    Nephrostomy Right 10 Fr  2 days    Urethral Catheter Coude 16 Fr  2 days              Urinary Catheter:  Goal for removal: N/A - Chronic [place]         Central Line:  Goal for removal: N/A - Chronic PICC             Imaging: No pertinent imaging reviewed  Recent Cultures (last 7 days):   Results from last 7 days   Lab Units 03/12/22  0207 03/12/22  0205 03/12/22  0049 03/12/22  0018 03/11/22  1111   BLOOD CULTURE   --  No Growth at 48 hrs    No Growth at 48 hrs   --   --  Staphylococcus epidermidis*  Staphylococcus epidermidis*   GRAM STAIN RESULT   --   --   --   --  Gram positive cocci in clusters*  Gram positive cocci in clusters*   URINE CULTURE  No Growth <1000 cfu/mL  --  No Growth <1000 cfu/mL No Growth <1000 cfu/mL  --        Last 24 Hours Medication List:   Current Facility-Administered Medications   Medication Dose Route Frequency Provider Last Rate    acetaminophen  650 mg Oral Q6H PRN Suzi Garcia MD      Apalutamide  240 mg/day Oral Daily Indu Juli, DO      cefepime  2,000 mg Intravenous Q12H Indu Ujli, DO Stopped (03/14/22 1501)    dexamethasone  4 mg Oral Q12H [address] Suzi Garcia MD      famotidine  20 mg Oral BID Suzi Garcia MD      gabapentin  600 mg Oral TID Indu Pella, DO      HYDROmorphone  1 mg Intravenous Q4H PRN Adelia Godoy MD      loperamide  2 mg Oral 4x Daily PRN Indu Juli, DO      LORazepam  1 mg Intravenous Q4H PRN Suzi Garcia MD      melatonin  12 mg Oral HS Suzi Garcia MD      metoclopramide  10 mg Intravenous Once PRN Fredo Beckford MD      [START ON 3/15/2022] metoprolol succinate  75 mg Oral Daily Capo Wilder MD      ondansetron  4 mg Intravenous Q6H PRN Ivar Holli, DO      oxyCODONE  20 mg Oral Q12H [facility] Ivar Holli, DO      oxyCODONE  10 mg Oral Q4H Mabel Easley MD      polyethylene glycol  17 g Oral Daily PRN Eliot Menendez MD      senna  1 tablet Oral HS Mabel Easley MD      sodium chloride  125 mL/hr Intravenous Continuous Eliot Menendez  mL/hr (03/14/22 1647)    sodium chloride  1 g Oral BID With Meals Jacque De La Garza, DO      vancomycin  750 mg Intravenous Q12H lEiot Menendez MD Stopped (03/14/22 1642)        Today, Patient Was Seen By: Eliot Menendez MD    **Please Note: This note may have been constructed using a voice recognition system  **

## 2022-03-14 NOTE — ASSESSMENT & PLAN NOTE
Present on admission  Sepsis criteria: febrile episodes at home, pancytopenia, tachycardia, hypotension with lactic acidosis and septic shock  Patient admitted to MICU for hypotension requiring pressor support (weaned off 03/12)  Etiologies: Staphylococcus epidermidis bacteremia and acute cystitis with severe hydronephrosis    Blood cultures collected on 03/11 POSITIVE for Staphylococcus epidermidis  Blood cultures collected on 03/12 showed NGTD  Pending repeat blood cultures ordered today following sinus tachycardia, febrile episodes, and worsening symptoms  ABG remarkable for metabolic alkalosis    ID consulted; continue IV Vancomycin and Cefepime    3/15:  Plan to transition to home hospice tomorrow

## 2022-03-14 NOTE — ASSESSMENT & PLAN NOTE
"Patient noted to have new hydronephrosis and hydroureter on the right side  Also noted to have urinary retention  Status post [place] catheter placement    Urology evaluated the patient due to persistent fever and likely source of infection recommended PCN placement  · As per urology progress note left-sided is more and there is a possibility that it has spread along the trigone over to the right side"    Urology consulted, appreciate recommendations  Patient remains hemodynamically stable s/p bilateral PCN placed by IR on 3/11

## 2022-03-14 NOTE — QUICK NOTE
QUICK NOTE - Deterioration Index  Bandar Winston 57 y o  male MRN: [id number]  Unit/Bed#: [facility] 933-01 Encounter: [id number]    Date Paged: 22  Time Paged: [phone]  Room #: 465  Arrival Time: 1420  Deterioration index score at time of page: 62 6 - down to 54 1  % on my arrival  Spoke with Roshan Darden RN and Dr Harjit Irving from primary team  Need to escalate level of care: Pending family meeting this afternoon      PROBLEMS resulting in high DI score:   19% Pulse 137   18% Age 57   16% Neurological exam Lethargic   11% Likely coma scale 14   9% Respiratory rate 20          PLAN:     Patient with metastatic prostate CA stage IV and severe sepsis secondary to staph epidermis bacteremia (most recent blood cultures now cleared)   Per documentation more lethargic today - hold parameters placed on narcotic medications with concern for over medication  o Check ABG and ammonia    Fever trending up 100 6 now, likely contributing to tachycardia   o Trial 500 cc fluid bolus   o Check lactic - if >4 will need 30 cc/kg IVF per sepsis protocol   o On cefepime and vancomycin    [facility] sending STAT labs now    Recieved metoprolol 25 q8h as well - caution monitor BP closely and consider discontinuing    Pending family meeting today this afternoon     Please contact critical care via [address] [place] [address] with any questions or concerns       Vitals:   Vitals:    22 2024 22 1100 22 1217 22 1430   BP: 104/73 99/66 100/65 105/68   BP Location: Left arm  Left arm Left arm   Pulse: 98 (!) 122 (!) 131 (!) 131   Resp: 18 20  16   Temp: 98 1 °F (36 7 °C) 99 1 °F (37 3 °C)  (!) 100 6 °F (38 1 °C)   TempSrc: Oral Oral  Oral   SpO2: 98%      Weight:           Respiratory:  SpO2: SpO2: 98 %, SpO2 Activity: SpO2 Activity: At Rest, SpO2 Device: O2 Device: None (Room air)       Temperature: Temp (24hrs), Av 3 °F (37 4 °C), Min:98 1 °F (36 7 °C), Max:100 6 °F (38 1 °C)  Current: Temperature: (!) 100 6 °F (38 1 °C)    Labs:   Results from last 7 days   Lab Units 03/14/22  0539 03/13/22  0616 03/12/22  0613 03/11/22  1111 03/11/22  1111   WBC Thousand/uL 11 14* 11 32* 10 37*   < > 9 63   HEMOGLOBIN g/dL 7 9* 7 9* 6 4*   < > 8 6*   HEMATOCRIT % 24 3* 24 0* 19 6*   < > 27 1*   PLATELETS Thousands/uL 53* 58* 69*   < > 26*   MONO PCT %  --   --   --   --  12    < > = values in this interval not displayed  Results from last 7 days   Lab Units 03/14/22  0539 03/13/22  0616 03/12/22  0613 03/11/22 1952 03/11/22  1111   SODIUM mmol/L 137 137 137   < > 133*   POTASSIUM mmol/L 4 4 4 7 4 4   < > 5 0   CHLORIDE mmol/L 109* 108 110*   < > 99*   CO2 mmol/L 21 18* 17*   < > 20*   BUN mg/dL 23 24 24   < > 28*   CREATININE mg/dL 1 00 0 90 0 99   < > 1 26   CALCIUM mg/dL 7 7* 7 1* 7 2*   < > 7 3*   ALK PHOS U/L 614*  --  671*  --  957*   ALT U/L 17  --  23  --  38   AST U/L 178*  --  276*  --  431*    < > = values in this interval not displayed       Results from last 7 days   Lab Units 03/14/22  0539 03/12/22  0613   MAGNESIUM mg/dL 2 1 2 2     Results from last 7 days   Lab Units 03/11/22  1952 03/11/22  1357 03/11/22  1111   LACTIC ACID mmol/L 1 7 1 6 2 6*         Results from last 7 days   Lab Units 03/12/22  0613 03/11/22  1111   PROCALCITONIN ng/ml 5 38* 1 72*       Code Status: Level 1 - Full Code

## 2022-03-14 NOTE — PROGRESS NOTES
Cardiology Progress Note - Josie Scott 57 y o  male MRN: [id number]    Unit/Bed#: [facility] 933-01 Encounter: [id number]    Hospital Problems:  Principal Problem:    Severe sepsis ([facility] )  Active Problems:    MALT lymphoma ([facility] )    Prostate cancer ([facility] )    Osseous metastasis ([facility] )    Pulmonary embolus ([facility] )    Pancytopenia ([facility] )    Acute cystitis with hematuria    Tachycardia    Severe protein-calorie malnutrition ([facility] )    Hydronephrosis of right kidney    SVT (supraventricular tachycardia) (HCC)    Transaminitis    Bacteremia      Assessment & Plan:    1  Sinus tachycardia - provoked by metastatic cancer, failure thrive/malnutrition, hypokalemia, sepsis, fever, pancytopenia including anemia with hemoglobin recently 7 9  Would not artificially attempt to suppress  This is physiologic sinus tachycardia       2  Paroxysmal SVT, asymptomatic, on beta-blocker  Can convert to metoprolol succinate 75 mg once daily    Cardiology will sign off  Please call if questions  Subjective:   Patient seen and examined  Tachycardic this afternoon with peak heart rate in 150s  Wife reports tachycardia coincides with agitation and patient being moved by nurses  Objective:     Vitals: Blood pressure 123/78, pulse (!) 108, temperature (!) 101 2 °F (38 4 °C), temperature source Axillary, resp  rate 16, weight 71 5 kg (157 lb 10 1 oz), SpO2 98 %  , Body mass index is 22 62 kg/m² ,   Orthostatic Blood Pressures      Most Recent Value   Blood Pressure 123/78 filed at 03/14/2022 1526   Patient Position - Orthostatic VS Lying filed at 03/14/2022 1526            Intake/Output Summary (Last 24 hours) at 3/14/2022 1626  Last data filed at 3/14/2022 1525  Gross per 24 hour   Intake 1040 ml   Output 2850 ml   Net -1810 ml           Physical Exam:    General: Josie Scott is a cachectic and extremely frail male, in no acute distress, sleeping  HEENT: moist mucous membranes, temporal wasting  Neck:  No JVD, thin, trachea midline  Cardiovascular: unremarkable S1/S2, tachy, no M/R/G  Chest:  Supraclavicular wasting  Pulmonary: normal respiratory effort, CTAB  Abdomen: soft and nondistended  Extremities: No lower extremity edema  Warm and well perfused extremities    Neuro: no focal motor deficits, sleeping but easily aroused  Psych: Normal mood and affect, cooperative      Medications:      Current Facility-Administered Medications:     acetaminophen (TYLENOL) tablet 650 mg, 650 mg, Oral, Q6H PRN, Ethan Sage MD, 650 mg at 03/14/22 1523    Apalutamide TABS 240 mg/day, 240 mg/day, Oral, Daily, Milanjani Dyer DO, 240 mg/day at 03/13/22 1200    cefepime (MAXIPIME) 2,000 mg in dextrose 5 % 50 mL IVPB, 2,000 mg, Intravenous, Q12H, Milan Dyer DO, Stopped at 03/14/22 1501    dexamethasone (DECADRON) tablet 4 mg, 4 mg, Oral, Q12H [address], Ethan Sage MD    famotidine (PEPCID) tablet 20 mg, 20 mg, Oral, BID, Ethan Sage MD, 20 mg at 03/13/22 1821    gabapentin (NEURONTIN) capsule 600 mg, 600 mg, Oral, TID, Milan Dyer DO, 600 mg at 03/13/22 2251    HYDROmorphone (DILAUDID) injection 1 mg, 1 mg, Intravenous, Q4H PRN, Cisco Cameron MD, 1 mg at 03/14/22 0940    loperamide (IMODIUM) capsule 2 mg, 2 mg, Oral, 4x Daily PRN, Milan Dyer DO    LORazepam (ATIVAN) injection 1 mg, 1 mg, Intravenous, Q4H PRN, Ethan Sage MD    melatonin tablet 12 mg, 12 mg, Oral, HS, Ethan Sage MD, 12 mg at 03/13/22 2250    metoclopramide (REGLAN) injection 10 mg, 10 mg, Intravenous, Once PRN, Mary Navarro MD    metoprolol tartrate (LOPRESSOR) tablet 25 mg, 25 mg, Oral, Q8H, Ethan Sage MD, 25 mg at 03/14/22 0012    ondansetron (ZOFRAN) injection 4 mg, 4 mg, Intravenous, Q6H PRN, Milan Dyer DO    oxyCODONE (OxyCONTIN) 12 hr tablet 20 mg, 20 mg, Oral, Q12H [address], Kimberly Fierro DO, 20 mg at 03/14/22 0935    oxyCODONE (ROXICODONE) immediate release tablet 10 mg, 10 mg, Oral, Q4H, Sanjuana Bhavana Walker MD, 10 mg at 03/14/22 1507    polyethylene glycol (MIRALAX) packet 17 g, 17 g, Oral, Daily PRN, Suzi Garcia MD    [facility]) tablet 8 6 mg, 1 tablet, Oral, HS, Adelia Godoy MD    sodium chloride 0 9 % infusion, 125 mL/hr, Intravenous, Continuous, Suzi Garcia MD    sodium chloride tablet 1 g, 1 g, Oral, BID With Meals, Indu Denver, DO, 1 g at 03/13/22 1532    vancomycin (VANCOCIN) IVPB (premix in dextrose) 750 mg 150 mL, 750 mg, Intravenous, Q12H, Suzi Garcia MD, Last Rate: 150 mL/hr at 03/14/22 1513, 750 mg at 03/14/22 1513     Labs & Results:        Results from last 7 days   Lab Units 03/14/22  1511 03/14/22  0539 03/13/22  0616   WBC Thousand/uL 11 92* 11 14* 11 32*   HEMOGLOBIN g/dL 7 0* 7 9* 7 9*   HEMATOCRIT % 21 1* 24 3* 24 0*   PLATELETS Thousands/uL 45* 53* 58*         Results from last 7 days   Lab Units 03/14/22  1511 03/14/22  0539 03/13/22  0616 03/12/22  0613 03/12/22  0613 03/11/22 1952 03/11/22  1111   POTASSIUM mmol/L 4 1 4 4 4 7   < > 4 4   < > 5 0   CHLORIDE mmol/L 110* 109* 108   < > 110*   < > 99*   CO2 mmol/L 21 21 18*   < > 17*   < > 20*   BUN mg/dL 21 23 24   < > 24   < > 28*   CREATININE mg/dL 0 98 1 00 0 90   < > 0 99   < > 1 26   CALCIUM mg/dL 7 2* 7 7* 7 1*   < > 7 2*   < > 7 3*   ALK PHOS U/L  --  614*  --   --  671*  --  957*   ALT U/L  --  17  --   --  23  --  38   AST U/L  --  178*  --   --  276*  --  431*    < > = values in this interval not displayed  Results from last 7 days   Lab Units 03/11/22  1111   INR  1 35*   PTT seconds 36     Results from last 7 days   Lab Units 03/14/22  0539 03/12/22  0613   MAGNESIUM mg/dL 2 1 2 2            This note was completed in part utilizing [employer] direct voice recognition software   Grammatical errors, random word insertion, spelling mistakes, occasional wrong word or "sound-alike" substitutions and incomplete sentences may be an occasional consequence of the system secondary to software limitations, ambient noise and hardware issues  At the time of dictation, efforts were made to edit, clarify and /or correct errors  Please read the chart carefully and recognize, using context, where substitutions have occurred  If you have any questions or concerns about the context, text or information contained within the body of this dictation, please contact myself, the provider, for further clarification

## 2022-03-14 NOTE — PROGRESS NOTES
Vancomycin IV Pharmacy-to-Dose Consultation    Jenniffer Goodman is a 57 y o  male who is currently receiving Vancomycin IV with management by the Pharmacy Consult service  Assessment/Plan:  The patient was reviewed  Renal function is stable and no signs or symptoms of nephrotoxicity and/or infusion reactions were documented in the chart  Based on todays assessment, continue current vancomycin (day # 4) dosing of 750mg q12hrs, with a plan for trough to be drawn at 0300 on 3/15  We will continue to follow the patients culture results and clinical progress daily      Patrick Naranjo, Pharmacist

## 2022-03-14 NOTE — CONSULTS
Consultation - Oncology  Aimee Shah 57 y o  male MRN: [id number]  [facility] Encounter: [id number]  03/14/22  8:16 AM    Assessment/ Plan:  1  Metastatic prostate Cancer Stage IV  Per biopsy neuroendocrine component AR-V7  mutation  Status post multiple courses of chemotherapy  Currently on Erleada 60 mg daily, decadrone 4mg q12h  Follows with provider Valentino Solomons  With diffuse bone metastases, metastases to liver, adrenal glands, hilar mediastinal, iliac lymph nodes  Evidence of progression on recent imaging  NM bone scan 1/14/22 showed significant interval progressuion of osseous metastatic disease  Ct abdomen 3/11/22 shows worsening hepatic metastasis, adrenal metastasis, anterior pelvic mesenteric nodule, aortocaval and retrocaval lymph nodes, left external iliac lymph node  MRI brain 11/8/22 no evidence of metastasis to the brain  NM Pet CT 10/4/2021Showed widespread metastatic disease  IR port placement on     - not appropriate for inpatient chemo as patient is septic   - continue Erleada and decadrone  - outpatient follow up  - palliative on board  Patient wants to persue full treatment he is full code  Pain is controled  - pain control with long acting and short acting opioids    Family wants us to coordinate care with Dr Valentino Solomons, I gave a call to her office left a voicemail  2  Pancytopenia  Platelet count 53   Hx of multiple transfusions  Most likely ethiology 2/2 chemotherapy and bone marrow infiltration by the PCa    - transfuse for Hg<7    3  History of MALT lymphoma of the stomach  Last EGD with biopsy 6/30 showed resolution of the lymphoma    4  History of PE  Diagnosed in 09/2021  [facility] stopped in setting of pancytopenia bruising in the past      5  Protein calori malnutrition  - nutrition is on board  - continue supplement    6  UTI sepsis  Abx treatment per primary team    7   Hydronephrosis  S/p b/l nephrostomy tube placement  Urology is on board        History of Present Illness   Physician Requesting Consult: Pranav Madera MD  Reason for Consult / Principal Problem:   HPI: Mallorie Ervin is a 57y o  year old male who presents with hx of st IV prostate cancer,  MALT lymphoma in remission, pancytopenia hx of multiple transfusions, recurrent UTI, PE not on anticoagulation he ionitialy presented to [facility] with UTI sepsis and hydronephrosison 3/11 and was transferred to [facility] for nephrostomy tube placement  He is undergoing treatment with broad spectrum antibiotic  Patient was seen by hem onc during previous hospitalization on 3/4/22 when he was treated for pancytopenia  Patient follows with Dr Arabella Portillo as out patient  He had multiple courses of treatment for the prostate cancer  However, there is evidence of the disease progression with widespread metastases in bones, liver, lymph nodes, adrenals  Palliative is on board and patient is full code at this moment  Family inquired the inpatient consult for the inpatient chemo  Inpatient consult to Oncology     Performed by  Henrietta Clayton MD     Authorized by Pranav Madera MD                Review of Systems   Constitutional: Positive for activity change, appetite change, fatigue and unexpected weight change  Negative for chills, diaphoresis and fever  HENT: Negative for nosebleeds, rhinorrhea and sore throat  Respiratory: Negative for cough, chest tightness, shortness of breath and wheezing  Cardiovascular: Negative for chest pain, palpitations and leg swelling  Gastrointestinal: Negative for blood in stool, constipation, diarrhea and nausea  Genitourinary: Negative for flank pain  Musculoskeletal: Positive for arthralgias  Negative for back pain, joint swelling and myalgias  Skin: Positive for pallor  Negative for rash  Allergic/Immunologic: Positive for immunocompromised state  Neurological: Negative for seizures, light-headedness and numbness  Hematological: Positive for adenopathy  Psychiatric/Behavioral: Positive for confusion  Historical Information   Past Medical History:   Diagnosis Date    Cancer [facility] )     Stomach    Lymphoma ([facility] ) 05/2018    Non Hodgkin's lymphoma ([facility] )     Prostate cancer ([facility] ) 2020    Shingles     Thoracic aortic aneurysm ([facility] )     PATIENT DENIES     Past Surgical History:   Procedure Laterality Date    APPENDECTOMY      COLONOSCOPY      HERNIA REPAIR      B/L hernia repair    IR NEPHROSTOMY TUBE PLACEMENT  3/12/2022    IR PORT PLACEMENT  11/4/2021    KNEE ARTHROSCOPY      KNEE ARTHROSCOPY, MEDIAL PATELLO FEMORAL LIGAMENT REPAIR Left     LINEAR ENDOSCOPIC U/S N/A 6/13/2018    Procedure: LINEAR ENDOSCOPIC U/S;  Surgeon: Esteban Landaverde MD;  Location: [facility]; Service: Gastroenterology    SC BIOPSY/EXCISION, LYMPH NODE(S) Left 6/24/2020    Procedure: EXCISION BIOPSY LYMPH NODE SUPRACLAVICULAR;  Surgeon: Montse Denise MD;  Location: [facility];  Service: General     Social History     Substance and Sexual Activity   Alcohol Use Not Currently     Social History     Substance and Sexual Activity   Drug Use No     Social History     Tobacco Use   Smoking Status Never Smoker   Smokeless Tobacco Never Used       Family History:   Family History   Problem Relation Age of Onset   Arcenio Pert Melanoma Mother     Prostate cancer Father     Lung cancer Father    Arcenio Pert Breast cancer Sister     Pancreatic cancer Paternal Grandfather     Hodgkin's lymphoma Sister        Meds/Allergies   all current active meds have been reviewed  Allergies   Allergen Reactions    Granisetron Nausea Only     Weakness/nausea    Tetracycline Other (See Comments)     Increase LFT's    Valtrex [Valacyclovir] Nausea Only       Objective   Vitals: Blood pressure 104/73, pulse 98, temperature 98 1 °F (36 7 °C), temperature source Oral, resp  rate 18, weight 71 5 kg (157 lb 10 1 oz), SpO2 98 %  , Body mass index is 22 62 kg/m² ,   Orthostatic Blood Pressures      Most Recent Value   Blood Pressure 104/73 filed at 03/13/2022 2024   Patient Position - Orthostatic VS Lying filed at 03/13/2022 7452          Systolic (50QDH), XSK:341 , Min:104 , PED:074     Diastolic (76THY), IJR:92, Min:73, Max:76        Intake/Output Summary (Last 24 hours) at 3/14/2022 0816  Last data filed at 3/14/2022 0501  Gross per 24 hour   Intake 440 ml   Output 3350 ml   Net -2910 ml       Invasive Devices  Report    Central Venous Catheter Line            Port A Cath 03/11/22 Right Chest 2 days          Peripheral Intravenous Line            Peripheral IV 03/12/22 Right Antecubital 1 day          Drain            Nephrostomy Left 1 10 Fr  2 days    Nephrostomy Right 10 Fr  2 days    Urethral Catheter Coude 16 Fr  2 days                    Physical Exam  Constitutional:       General: He is in acute distress (due to pain)  Appearance: He is ill-appearing  He is not toxic-appearing  HENT:      Mouth/Throat:      Mouth: Mucous membranes are dry  Eyes:      General: Scleral icterus present  Conjunctiva/sclera: Conjunctivae normal    Neck:      Vascular: No carotid bruit  Cardiovascular:      Rate and Rhythm: Normal rate  Heart sounds: No murmur heard  Pulmonary:      Effort: No respiratory distress  Breath sounds: No wheezing, rhonchi or rales  Abdominal:      General: There is no distension  Palpations: There is mass  Tenderness: There is no abdominal tenderness  There is no rebound  Musculoskeletal:         General: Tenderness (tenderness of his legs and hips) present  Right lower leg: No edema  Left lower leg: No edema  Skin:     Coloration: Skin is pale  Skin is not jaundiced  Findings: No bruising or lesion  Neurological:      Mental Status: He is disoriented        Comments: Oriented to person   Psychiatric:         Mood and Affect: Mood normal            Lab Results:     Troponins:       CBC with diff:   Results from last 7 days   Lab Units 03/14/22  0539 03/13/22  0616 03/12/22 0613 03/11/22  1111   WBC Thousand/uL 11 14* 11 32* 10 37* 9 63   HEMOGLOBIN g/dL 7 9* 7 9* 6 4* 8 6*   HEMATOCRIT % 24 3* 24 0* 19 6* 27 1*   MCV fL 89 88 90 89   PLATELETS Thousands/uL 53* 58* 69* 26*   MCH pg 29 0 28 8 29 2 28 3   MCHC g/dL 32 5 32 9 32 7 31 7   RDW % 17 3* 17 2* 16 5* 15 5*   MPV fL 11 4 12 1 11 7 12 1   NRBC AUTO /100 WBCs 2 1 1  --          CMP:   Results from last 7 days   Lab Units 03/14/22  0539 03/13/22  0616 03/12/22 0613 03/11/22 1952 03/11/22  1111   POTASSIUM mmol/L 4 4 4 7 4 4 4 9 5 0   CHLORIDE mmol/L 109* 108 110* 104 99*   CO2 mmol/L 21 18* 17* 18* 20*   BUN mg/dL 23 24 24 26* 28*   CREATININE mg/dL 1 00 0 90 0 99 1 09 1 26   CALCIUM mg/dL 7 7* 7 1* 7 2* 6 5* 7 3*   AST U/L 178*  --  276*  --  431*   ALT U/L 17  --  23  --  38   ALK PHOS U/L 614*  --  671*  --  957*   EGFR ml/min/1 73sq m 80 91 81 72 60       CTA chest pe study    Result Date: 2/14/2022  Impression Some images are suboptimal secondary to respiratory motion which decreases sensitivity for evaluation of peripheral pulmonary emboli, subject to this, no pulmonary embolism is seen  Extensive hepatic and osseous metastatic disease as described  Prominent hilar and mediastinal lymph nodes may represent lymphatic metastatic disease as well  Coronary atherosclerosis, and other findings as above  NM PET CT skull base to mid thigh    Result Date: 10/4/2021  Impression 1  Widespread hypermetabolic osseous metastases throughout the axial and appendicular skeleton  2   Left supraclavicular, left hilar and mediastinal hypermetabolic metastatic adenopathy  Right hilar adenopathy may also be present  3   Liver and left adrenal metastases  4   Confluent pelvic hypermetabolic soft tissue as above, also most compatible with metastases  5   Hypermetabolic left upper lung nodular density may be metastatic versus inflammatory/infectious   6 Increasing severe left hydroureteronephrosis  7   Anterior pelvic wall subcutaneous hypermetabolic infiltrates likely inflammatory from injections  The study was marked in EPIC for significant notification

## 2022-03-14 NOTE — ASSESSMENT & PLAN NOTE
"Diagnosed in 05/2018    Status post triple antibiotic course for H pylori at that time and 2nd EGD in 9/21 showed resolution of the gastric lesion"    Erleada 460 mg tabs taken Monday through Friday  Continue home Apalutamide 240 mg daily  Oncology consulted; appreciate assistance with discussions pending assessment per patient's home Oncologist  Palliative Care consulted; appreciate assistance and recommendations

## 2022-03-15 PROBLEM — R79.89 ABNORMAL LFTS: Status: ACTIVE | Noted: 2022-01-01

## 2022-03-15 PROBLEM — D75.89 BICYTOPENIA: Status: ACTIVE | Noted: 2022-01-01

## 2022-03-15 NOTE — PROGRESS NOTES
Vancomycin Assessment    Ramón Barr is a 57 y o  male who is currently receiving vancomycin 750mg IV Q12H for Pneumonia,bacteremia  Relevant clinical data and objective history reviewed:  Creatinine   Date Value Ref Range Status   03/15/2022 0 93 0 60 - 1 30 mg/dL Final     Comment:     Standardized to IDMS reference method   03/14/2022 0 98 0 60 - 1 30 mg/dL Final     Comment:     Standardized to IDMS reference method   03/14/2022 1 00 0 60 - 1 30 mg/dL Final     Comment:     Standardized to IDMS reference method     Vancomycin Rm   Date Value Ref Range Status   03/15/2022 28 8 (H) 10 0 - 20 0 ug/mL Final     /73 (BP Location: Left arm)   Pulse (!) 107   Temp 99 4 °F (37 4 °C) (Axillary)   Resp 22   Wt 71 5 kg (157 lb 10 1 oz)   SpO2 95%   BMI 22 62 kg/m²   I/O last 3 completed shifts: In: 80 [Other:60; IV Piggyback:1000]  Out: 7675 [Urine:7675]  Lab Results   Component Value Date/Time    BUN 21 03/15/2022 04:24 AM    WBC 12 49 (H) 03/15/2022 04:24 AM    HGB 7 4 (L) 03/15/2022 04:24 AM    HCT 23 4 (L) 03/15/2022 04:24 AM    MCV 91 03/15/2022 04:24 AM    PLT 43 (LL) 03/15/2022 04:24 AM     Temp Readings from Last 3 Encounters:   03/15/22 99 4 °F (37 4 °C) (Axillary)   03/11/22 99 1 °F (37 3 °C) (Axillary)   03/06/22 97 7 °F (36 5 °C)     Vancomycin Days of Therapy: 5    Assessment/Plan  The patient is currently on vancomycin utilizing scheduled dosing  The patient is receiving 750mg IV Q12H with the most recent vancomycin level being at steady-state and supratherapeutic based on a goal of 15-20 (appropriate for most indications) ; therefore, after clinical evaluation will be changed to 1000mg Q24H   Pharmacy will continue to follow closely for s/sx of nephrotoxicity, infusion reactions, and appropriateness of therapy  BMP and CBC will be ordered per protocol  Plan for trough as patient approaches steady state, prior to the 4th  dose at approximately 1430 on 3/18   Pharmacy will continue to follow the patients culture results and clinical progress daily      Elis Chappell  Staff Pharmacist

## 2022-03-15 NOTE — PROGRESS NOTES
Nurse has drawn wrong trough after giving dose levels came falsely 31 7 elevated put random trough again 30minutes before dose at 1500 on 03/15/22

## 2022-03-15 NOTE — PROGRESS NOTES
[address] [facility]  Progress Note Louise Caul 1960, 57 y o  male MRN: [id number]  Unit/Bed#: [facility] 933-01 Encounter: [id number]  Primary Care Provider: Carter Burnham MD   Date and time admitted to hospital: 3/11/2022 10:32 PM      * Severe sepsis  Assessment & Plan  Present on admission  Sepsis criteria: febrile episodes at home, pancytopenia, tachycardia, hypotension with lactic acidosis and septic shock  Patient admitted to MICU for hypotension requiring pressor support (weaned off 03/12)  Etiologies: Staphylococcus epidermidis bacteremia and acute cystitis with severe hydronephrosis  Blood cultures collected on 03/11 POSITIVE for Staphylococcus epidermidis  Blood cultures collected on 03/12 showed NGTD  Pending repeat blood cultures ordered today following sinus tachycardia, febrile episodes, and worsening symptoms  ABG remarkable for metabolic alkalosis  ID consulted; continue IV Vancomycin and Cefepime    3/15:  Plan to transition to home hospice tomorrow    Bacteremia  Assessment & Plan  Present on admission  See plan for severe sepsis    Acute cystitis with hematuria  Assessment & Plan  Present on admission  Noted on UA performed on 3/11  No growth on urine culture  See plan for severe sepsis above    Hydronephrosis of right kidney  Assessment & Plan  "Patient noted to have new hydronephrosis and hydroureter on the right side  Also noted to have urinary retention  Status post [place] catheter placement    Urology evaluated the patient due to persistent fever and likely source of infection recommended PCN placement  · As per urology progress note left-sided is more and there is a possibility that it has spread along the trigone over to the right side"  Urology consulted, appreciate recommendations  Patient remains hemodynamically stable s/p bilateral PCN placed by IR on 3/11    Prostate cancer  Assessment & Plan  Patient with prostate cancer with metastatic multiple processes  Patient currently established with Dr Perla Bowden A/P identified "worsening hepatic metastasis, worsening mets of adrenal glands, worsening left bladder wall mass, worsening anterior pelvic mesenteric nodule, worsening aortocaval and retrocaval lymph nodes, and worsening left external iliac lymph node"    Overall worsening disease progression on imaging and indicative of poor prognosis in the setting of worsening sepsis  Oncology consulted; pending further discussions of goals of care and prognosis  Patient's family wishes to have full care with aggressive measures    Continue home Apalutamide 240 mg daily  Continue home Decadron 4 mg BID    MALT lymphoma  Assessment & Plan  "Diagnosed in 05/2018    Status post triple antibiotic course for H pylori at that time and 2nd EGD in 9/21 showed resolution of the gastric lesion  Erleada 460 mg tabs taken Monday through Friday  Continue home Apalutamide 240 mg daily  Oncology consulted; appreciate assistance with discussions pending assessment per patient's home Oncologist  Palliative Care consulted; appreciate assistance and recommendations    Osseous metastasis  Assessment & Plan  Continue pain regimen per palliative care w/ bowel prophylaxis  See plan for prostate cancer above    Bicytopenia  Assessment & Plan  Stable  Patient with anemia and thrombocytopenia  Secondary to metastatic prostate cancer, MALT, and chemotherapy  One Unit pRBCs transfused on 03/12    Repeat Hemoglobin measured at 7 0 g/dL  Transfuse Hemoglobin <7 g/dL    Pulmonary embolus  Assessment & Plan  Patient with history of PE diagnosed in September 2021  Holding anticoagulation due to thrombocytopenia    Severe protein-calorie malnutrition   Assessment & Plan  Malnutrition Findings:   Adult Malnutrition type: Chronic illness (wt (9/5/21 102kg), severe subcutaneous fat loss/orbital fat pads, ribs, triceps, muscle wasting/hollowing of temporalis region, protruding clavicles, treated with liberal diet )  Adult Degree of Malnutrition: Other severe protein calorie malnutrition    BMI Findings: Body mass index is 22 62 kg/m²  Nutrition consulted; continue with supplemental nutrition  Patient with minimal PO intake  Pending discussions with family about alternatives following Oncology discussions     SVT (supraventricular tachycardia)  Assessment & Plan  Present on admission  Likely secondary to severe sepsis and acute on chronic anemia  Cardiology consulted - continue Toprol-XL    Abnormal LFTs  Assessment & Plan  Present on admission in the setting of septic shock  Likely secondary to septic shock  Continue to monitor CMP intermittently  Will closely monitor with PRN Tylenol for fever only      DVT Prophylaxis:  SCDs      Patient Centered Rounds:  I have performed bedside rounds and discussed plan of care with nursing today  Discussions with Specialists or Other Care Team Provider:  see above assessments if applicable    Education and Discussions with Family / Patient:  Multiple family members present bedside today    Time Spent for Care:  32 minutes  More than 50% of total time spent on counseling and coordination of care as described above  Current Length of Stay: 4 day(s)    Current Patient Status: Inpatient   Certification Statement:  Patient will continue to require additional hospital stay due to assessments as noted above  Code Status: Level 3 - DNAR and DNI        Subjective:     Seen examined earlier today  He is ill-appearing at this time and nonverbal   Does not seem to be in distress at the time my encounter  Objective:     Vitals:   Temp (24hrs), Av 9 °F (37 7 °C), Min:98 2 °F (36 8 °C), Max:101 8 °F (38 8 °C)    Temp:  [98 2 °F (36 8 °C)-101 8 °F (38 8 °C)] 99 4 °F (37 4 °C)  HR:  [] 107  Resp:  [15-22] 22  BP: (100-109)/(56-75) 104/73  SpO2:  [94 %-98 %] 95 %  Body mass index is 22 62 kg/m²       Input and Output Summary (last 24 hours): Intake/Output Summary (Last 24 hours) at 3/15/2022 1645  Last data filed at 3/15/2022 1500  Gross per 24 hour   Intake 20 ml   Output 5975 ml   Net -5955 ml       Physical Exam:     GENERAL:  Frail/cachectic - ill-appearing  HEAD:  Normocephalic - atraumatic - temporal wasting present  EYES: PERRL - EOMI   MOUTH:  Mucosa moist  NECK:  Supple - full range of motion - clavicular wasting present  CARDIAC:  Intermittently tachycardic - S1/S2 positive  PULMONARY:  Clear breath sounds bilaterally - diminished respiratory effort  ABDOMEN:  Soft - nontender/nondistended - active bowel sounds  MUSCULOSKELETAL:  Motor strength/range of motion markedly deconditioned  NEUROLOGIC:  Minimally arousable  SKIN:  Chronic wrinkles/blemishes - various tattoos noted      Additional Data:     Labs & Recent Cultures:    Results from last 7 days   Lab Units 03/15/22  0424 03/14/22  1511 03/14/22  1511   WBC Thousand/uL 12 49*   < > 11 92*   HEMOGLOBIN g/dL 7 4*   < > 7 0*   HEMATOCRIT % 23 4*   < > 21 1*   PLATELETS Thousands/uL 43*   < > 45*   BANDS PCT %  --   --  2   LYMPHO PCT %  --   --  0*   MONO PCT %  --   --  3*   EOS PCT %  --   --  0    < > = values in this interval not displayed  Results from last 7 days   Lab Units 03/15/22  0424   POTASSIUM mmol/L 4 3   CHLORIDE mmol/L 112*   CO2 mmol/L 21   BUN mg/dL 21   CREATININE mg/dL 0 93   CALCIUM mg/dL 7 4*   ALK PHOS U/L 616*   ALT U/L 19   AST U/L 205*     Results from last 7 days   Lab Units 03/11/22  1111   INR  1 35*     Results from last 7 days   Lab Units 03/14/22  2009 03/14/22  1642   POC GLUCOSE mg/dl 139 128         Results from last 7 days   Lab Units 03/15/22  0049 03/14/22  2148 03/14/22  1839 03/14/22  1513 03/12/22  0613 03/11/22  1952 03/11/22  1357 03/11/22  1111   LACTIC ACID mmol/L 2 5* 3 9* 3 5* 3 5*  --    < >   < > 2 6*   PROCALCITONIN ng/ml  --   --   --   --  5 38*  --   --  1 72*    < > = values in this interval not displayed           Results from last 7 days   Lab Units 03/14/22  1635 03/12/22  0207 03/12/22  0205 03/12/22  0049 03/12/22  0018 03/11/22  1111   BLOOD CULTURE  Received in Microbiology Lab  Culture in Progress  Received in Microbiology Lab  Culture in Progress  --  No Growth at 72 hrs    No Growth at 72 hrs   --   --  Staphylococcus epidermidis*  Staphylococcus epidermidis*   GRAM STAIN RESULT   --   --   --   --   --  Gram positive cocci in clusters*  Gram positive cocci in clusters*   URINE CULTURE   --  No Growth <1000 cfu/mL  --  No Growth <1000 cfu/mL No Growth <1000 cfu/mL  --          Last 24 Hours Medication List:   Current Facility-Administered Medications   Medication Dose Route Frequency Provider Last Rate    acetaminophen  650 mg Oral Q6H PRN Rachell Navas MD      Apalutamide  240 mg/day Oral Daily Rosa Lantiguas, DO      cefepime  2,000 mg Intravenous Q12H Rosa Yepez, DO 2,000 mg (03/15/22 1408)    dexamethasone  4 mg Oral Q12H [facility] Rachell Navas MD      famotidine  20 mg Oral BID Rachell Navas MD      gabapentin  600 mg Oral TID Rosa Yepez, DO      HYDROmorphone  1 mg Intravenous Q4H PRN Nenita Barrow MD      loperamide  2 mg Oral 4x Daily PRN Rosa Lantiguas, DO      LORazepam  1 mg Intravenous Q4H PRN Rachell Navas MD      melatonin  12 mg Oral HS Rachell Navas MD      metoclopramide  10 mg Intravenous Once PRN Debby Ledbetter MD      metoprolol succinate  75 mg Oral Daily Tracie Blankenship PA-C      ondansetron  4 mg Intravenous Q6H PRN Rosa Lantiguas, DO      oxyCODONE  20 mg Oral Q12H [facility] Rosa Grzegorzs, DO      oxyCODONE  10 mg Oral Q4H Nenita Barrow MD      polyethylene glycol  17 g Oral Daily PRN Rachell Navas MD      senna  1 tablet Oral HS Nenita Barrow MD      sodium chloride  125 mL/hr Intravenous Continuous Rachell Navas  mL/hr (03/15/22 0050)    sodium chloride  1 g Oral BID With Meals Rosa Yepez, DO      [START ON 3/16/2022] vancomycin  1,000 mg Intravenous Q24H Donna Jimenez DO                      ** Please Note: This note is constructed using a voice recognition dictation system  An occasional wrong word/phrase or sound-a-like substitution may have been picked up by dictation device due to the inherent limitations of voice recognition software  Read the chart carefully and recognize, using reasonable context, where substitutions may have occurred  **

## 2022-03-15 NOTE — HOSPICE NOTE
Received hospice referral   I met with pt's wife to discuss hospice services  Hospice benefits reviewed per insurance guidelines and all questions answered  Dr Kayce Spencer approved for home hospice LOC  Consents reviewed and signed by pt's wife  Discussed equipment need at home  Equipment ordered from [facility] [address] an will be delivered to pt's home on 3/16 in the morning  DEVON Rocha updated and is making arrangements for pt to be discharged on 3/16 in the afternoon  Will follow pt until discharge

## 2022-03-15 NOTE — PROGRESS NOTES
Progress Note - Infectious Disease   Qian Nava 57 y o  male MRN: [id number]  Unit/Bed#: [facility] 933-01 Encounter: [id number]         IMPRESSION & RECOMMENDATIONS:   Impression/Recommendations:  1  Severe sepsis, POA at [facility]   Leukopenia, tachycardia, fevers, elevated lactic acid   Secondary to #2 vs #3  Fever curve had improved and now with new fever today  Will keep on broad coverage for now due to new fever      -continue empiric IV vancomycin for now in light of new fevers  -continue IV cefepime  -follow-up new blood culture sets on 03/14  -follow temperatures and hemodynamics  -recheck CBC in a m   -supportive care     2  Staph epidermidis bacteremia   Blood cultures from 03/11 show coagulase-negative Staph   Both sets appear to have been drawn from the port at the same time  This could very well be reflective of contamination, however, difficult to say for certain  No other intravascular prosthetic devices  Repeat blood cultures drawn after antibiotics are negative      -continue IV vancomycin in light of new fevers  Dosing recommendations per pharmacy  -follow up final blood cultures from 03/11  -follow-up repeat blood cultures to ensure clearance of bacteremia     3  Presumptive UTI   In the setting of bilateral hydronephrosis secondary to #4   Hydronephrosis on the right side is new on this admission   Status post emergent bilateral PCN placement by IR 3/12  All urine cultures are negative, but did receive antibiotics beforehand      -continue IV cefepime, as above   -urology/IR follow-up     4  Metastatic prostate cancer with diffuse lymphatic and bony involvement, bilateral hydronephrosis   On active chemotherapy via port   Follows with Dr Adolfo Martines shows progressive metastatic disease  Patient remains critically ill  Discussed with family who is planning transition to hospice care at home      -palliative care follow-up  Plan to transition to hospice noted    -oncology follow-up     5  Prior MALT lymphoma      Antibiotics:  Vancomycin/cefepime 5     I discussed above plan with patient and his family at bedside, and answered all their questions  I also discussed with Dr Hina Sutherland from [facility]  Subjective:  Patient is very weak and tired  He expresses no focal complaints  No fevers today  Objective:  Vitals:  Temp:  [98 2 °F (36 8 °C)-101 2 °F (38 4 °C)] 98 2 °F (36 8 °C)  HR:  [] 164  Resp:  [15-20] 20  BP: (100-123)/(56-78) 105/75  SpO2:  [94 %-98 %] 96 %  Temp (24hrs), Av 9 °F (37 7 °C), Min:98 2 °F (36 8 °C), Max:101 2 °F (38 4 °C)  Current: Temperature: 98 2 °F (36 8 °C)    Physical Exam:   General:  No acute distress, very frail, nontoxic  HEENT:  Atraumatic normocephalic  Neck:  Trachea midline  Psychiatric:  No acute psychosis  Pulmonary:  Normal respiratory excursion without accessory muscle use  Chest wall port in place with no overlying erythema, tenderness or drainage  Abdomen:  Soft, nontender  Bilateral PCNs in place  Extremities:  No edema  Skin:  No visible rashes    Lab Results:  I have personally reviewed pertinent labs  Results from last 7 days   Lab Units 03/15/22  0424 03/14/22  1511 22  0539 22  0616 22  0613   POTASSIUM mmol/L 4 3 4 1 4 4   < > 4 4   CHLORIDE mmol/L 112* 110* 109*   < > 110*   CO2 mmol/L 21 21 21   < > 17*   BUN mg/dL 21 21 23   < > 24   CREATININE mg/dL 0 93 0 98 1 00   < > 0 99   EGFR ml/min/1 73sq m 87 82 80   < > 81   CALCIUM mg/dL 7 4* 7 2* 7 7*   < > 7 2*   AST U/L 205*  --  178*  --  276*   ALT U/L 19  --  17  --  23   ALK PHOS U/L 616*  --  614*  --  671*    < > = values in this interval not displayed       Results from last 7 days   Lab Units 03/15/22  0424 03/14/22  1511 22  0539   WBC Thousand/uL 12 49* 11 92* 11 14*   HEMOGLOBIN g/dL 7 4* 7 0* 7 9*   PLATELETS Thousands/uL 43* 45* 53*     Results from last 7 days   Lab Units 22  1635 22  0207 22  0205 22  0049 03/12/22  0018 03/11/22  1111   BLOOD CULTURE  Received in Microbiology Lab  Culture in Progress  Received in Microbiology Lab  Culture in Progress  --  No Growth at 72 hrs  No Growth at 72 hrs   --   --  Staphylococcus epidermidis*  Staphylococcus epidermidis*   GRAM STAIN RESULT   --   --   --   --   --  Gram positive cocci in clusters*  Gram positive cocci in clusters*   URINE CULTURE   --  No Growth <1000 cfu/mL  --  No Growth <1000 cfu/mL No Growth <1000 cfu/mL  --        Imaging Studies:   I have personally reviewed pertinent imaging study reports and images in PACS  EKG, Pathology, and Other Studies:   I have personally reviewed pertinent reports

## 2022-03-15 NOTE — PLAN OF CARE
Problem: Nutrition/Hydration-ADULT  Goal: Nutrient/Hydration intake appropriate for improving, restoring or maintaining nutritional needs  Description: Monitor and assess patient's nutrition/hydration status for malnutrition  Collaborate with interdisciplinary team and initiate plan and interventions as ordered  Monitor patient's weight and dietary intake as ordered or per policy  Utilize nutrition screening tool and intervene as necessary  Determine patient's food preferences and provide high-protein, high-caloric foods as appropriate       INTERVENTIONS:  - Monitor oral intake, urinary output, labs, and treatment plans  - Assess nutrition and hydration status and recommend course of action  - Evaluate amount of meals eaten  - Assist patient with eating if necessary   - Allow adequate time for meals  - Recommend/ encourage appropriate diets, oral nutritional supplements, and vitamin/mineral supplements  - Order, calculate, and assess calorie counts as needed  - Recommend, monitor, and adjust tube feedings and TPN/PPN based on assessed needs  - Assess need for intravenous fluids  - Provide specific nutrition/hydration education as appropriate  - Include patient/family/caregiver in decisions related to nutrition  Outcome: Progressing     Problem: MOBILITY - ADULT  Goal: Maintain or return to baseline ADL function  Description: INTERVENTIONS:  -  Assess patient's ability to carry out ADLs; assess patient's baseline for ADL function and identify physical deficits which impact ability to perform ADLs (bathing, care of mouth/teeth, toileting, grooming, dressing, etc )  - Assess/evaluate cause of self-care deficits   - Assess range of motion  - Assess patient's mobility; develop plan if impaired  - Assess patient's need for assistive devices and provide as appropriate  - Encourage maximum independence but intervene and supervise when necessary  - Involve family in performance of ADLs  - Assess for home care needs following discharge   - Consider OT consult to assist with ADL evaluation and planning for discharge  - Provide patient education as appropriate  Outcome: Progressing  Goal: Maintains/Returns to pre admission functional level  Description: INTERVENTIONS:  - Perform BMAT or MOVE assessment daily    - Set and communicate daily mobility goal to care team and patient/family/caregiver     - Collaborate with rehabilitation services on mobility goals if consulted  - Out of bed for toileting  - Record patient progress and toleration of activity level   Outcome: Progressing     Problem: Potential for Falls  Goal: Patient will remain free of falls  Description: INTERVENTIONS:  - Educate patient/family on patient safety including physical limitations  - Instruct patient to call for assistance with activity   - Consult OT/PT to assist with strengthening/mobility   - Keep Call bell within reach  - Keep bed low and locked with side rails adjusted as appropriate  - Keep care items and personal belongings within reach  - Initiate and maintain comfort rounds  - Make Fall Risk Sign visible to staff  - Offer Toileting every in advance of need  - Apply yellow socks and bracelet for high fall risk patients  - Consider moving patient to room near nurses station  Outcome: Progressing     Problem: Prexisting or High Potential for Compromised Skin Integrity  Goal: Skin integrity is maintained or improved  Description: INTERVENTIONS:  - Identify patients at risk for skin breakdown  - Assess and monitor skin integrity  - Assess and monitor nutrition and hydration status  - Monitor labs   - Assess for incontinence   - Turn and reposition patient  - Assist with mobility/ambulation  - Relieve pressure over bony prominences  - Avoid friction and shearing  - Provide appropriate hygiene as needed including keeping skin clean and dry  - Evaluate need for skin moisturizer/barrier cream  - Collaborate with interdisciplinary team   - Patient/family teaching  - Consider wound care consult   Outcome: Progressing

## 2022-03-15 NOTE — PROGRESS NOTES
Progress note - Palliative and Supportive Care   Haley Tenorio 57 y o  male [id number]    Patient Active Problem List   Diagnosis    [facility]    Prostate cancer ([facility]    Abnormal nuclear stress test    Pure hypercholesterolemia    Osseous metastasis (HCC)    Pulmonary embolus (HCC)    SIRS (systemic inflammatory response syndrome) (HCC)    Anemia    Pancytopenia (HCC)    Hematuria    Acute cystitis with hematuria    Tachycardia    Acute kidney injury ([facility]    Severe protein-calorie malnutrition (HCC)    Severe sepsis (HCC)    Hydronephrosis of right kidney    SVT (supraventricular tachycardia) (HCC)    Transaminitis    Bacteremia     Active issues specifically addressed today include:   Hx of MALT lymphoma  Metastatic prostate cancer  Pulmonary embolus   Acute metabolic encephalopathy   Hydronephrosis of right kidney   Severe sepsis   Severe protein calorie malnutrition  Palliative care encounter   Goals of care discussion    Plan:  1  Symptom management -     Cancer related pain  Continue current regimen   · OxyContin 20 mq  12 hours  ATC  · Oxycodone 10 mg every 4 hours  ( continue hold parameters)   · Dilaudid 1 mg q 4 hours p r n  for breakthrough pain  · Total OME use in 24 hrs: 160     Bowel regimen   -Continue MiraLax daily p r n  and senna 1 tablet daily ATC      2  Goals - Transition to DNR/DNI  -  Wife requested patient to be transitioned to  Home with hospice this morning  She states that she realized that patient is at the end of life she wants him to be at as soon as possible  Family agrees with decision     - Wife has about IV fluids at home and explained that at the end of life, IVF dense to to 3rd spaces in the body and usually causes more discomfort  She and family understand     - They requested hospice liaison to talk to them regarding resources at hospice     - After discussing code status with wife, she requested to transition to DNR/DNI      Code Status: DNR/DNI - Level 3   Decisional apparatus:  Patient is not competent on my exam today  If competence is lost, patient's substitute decision maker would default to spouse by [place]  Advance Directive / Living Will / POLST:  Not on file    Interval history:        Patient seen and examined at bedside  Patient appears comfortable  He opens eyes on verbal command  He states he is not in pain  And goes back to sleep very quickly  Patient's wife has significant emotional distress due to rapid decline, but is okay with the decision of transition to home hospice      MEDICATIONS / ALLERGIES:     current meds:   Current Facility-Administered Medications   Medication Dose Route Frequency    acetaminophen (TYLENOL) tablet 650 mg  650 mg Oral Q6H PRN    Apalutamide TABS 240 mg/day  240 mg/day Oral Daily    cefepime (MAXIPIME) 2,000 mg in dextrose 5 % 50 mL IVPB  2,000 mg Intravenous Q12H    dexamethasone (DECADRON) tablet 4 mg  4 mg Oral Q12H KATHARINA    famotidine (PEPCID) tablet 20 mg  20 mg Oral BID    gabapentin (NEURONTIN) capsule 600 mg  600 mg Oral TID    HYDROmorphone (DILAUDID) injection 1 mg  1 mg Intravenous Q4H PRN    loperamide (IMODIUM) capsule 2 mg  2 mg Oral 4x Daily PRN    LORazepam (ATIVAN) injection 1 mg  1 mg Intravenous Q4H PRN    melatonin tablet 12 mg  12 mg Oral HS    metoclopramide (REGLAN) injection 10 mg  10 mg Intravenous Once PRN    metoprolol succinate (TOPROL-XL) 24 hr tablet 75 mg  75 mg Oral Daily    ondansetron (ZOFRAN) injection 4 mg  4 mg Intravenous Q6H PRN    oxyCODONE (OxyCONTIN) 12 hr tablet 20 mg  20 mg Oral Q12H KATHARINA    oxyCODONE (ROXICODONE) immediate release tablet 10 mg  10 mg Oral Q4H    polyethylene glycol (MIRALAX) packet 17 g  17 g Oral Daily PRN    senna (SENOKOT) tablet 8 6 mg  1 tablet Oral HS    sodium chloride 0 9 % infusion  125 mL/hr Intravenous Continuous    sodium chloride tablet 1 g  1 g Oral BID With Meals    vancomycin (VANCOCIN) IVPB (premix in dextrose) 750 mg 150 mL  750 mg Intravenous Q12H       Allergies   Allergen Reactions    Granisetron Nausea Only     Weakness/nausea    Tetracycline Other (See Comments)     Increase LFT's    Valtrex [Valacyclovir] Nausea Only       OBJECTIVE:    Physical Exam  Physical Exam  Constitutional:       General: He is not in acute distress  Appearance: He is ill-appearing  Comments: Appears comfortable  Significant bi-temporal wasting   Eyes:      General: Scleral icterus present  Cardiovascular:      Rate and Rhythm: Tachycardia present  Pulmonary:      Effort: Pulmonary effort is normal  No respiratory distress  Abdominal:      General: Abdomen is flat  There is no distension  Palpations: Abdomen is soft  Skin:     General: Skin is warm and dry  Coloration: Skin is jaundiced  Neurological:      Mental Status: He is easily aroused  He is lethargic  Lab Results:   CBC:   Lab Results   Component Value Date    WBC 12 49 (H) 03/15/2022    HGB 7 4 (L) 03/15/2022    HCT 23 4 (L) 03/15/2022    MCV 91 03/15/2022    PLT 43 (LL) 03/15/2022    MCH 28 7 03/15/2022    MCHC 31 6 03/15/2022    RDW 17 5 (H) 03/15/2022    MPV 12 4 03/15/2022    NRBC 1 03/15/2022   , BMP:  Lab Results   Component Value Date    SODIUM 139 03/15/2022    K 4 3 03/15/2022     (H) 03/15/2022    CO2 21 03/15/2022    BUN 21 03/15/2022    CREATININE 0 93 03/15/2022    GLUC 113 03/15/2022    CALCIUM 7 4 (L) 03/15/2022    AGAP 6 03/15/2022    EGFR 87 03/15/2022         Counseling / Coordination of Care    Total floor / unit time spent today 30 minutes  Greater than 50% of total time was spent with the patient and / or family counseling and / or coordination of care   A description of the counseling / coordination of care: symptom management, psychosocial support, coordination of care with other teams, goals of care discussion

## 2022-03-15 NOTE — NURSING NOTE
0357 Pt's HR sustaining in 150s on tele monitor  /76 manually  Patient asymptomatic  Per pt and son, pt just laying in bed sleeping/resting when HR increased  SLIM Isacc Rebel aware  Per Isacc Rebel EKG obtained  0415 Patient's HR back down in the 100 s      0445 Patient's HR sustaining in 150s on tele monitor   Isacc Rebel SLIM aware

## 2022-03-15 NOTE — QUICK NOTE
Patient unable to swallow at this time  Scheduled oxycodone and tylenol for fever of 101 8 not administered  Ice packs used to reduce temperature

## 2022-03-15 NOTE — PLAN OF CARE
Problem: Potential for Falls  Goal: Patient will remain free of falls  Description: INTERVENTIONS:  - Educate patient/family on patient safety including physical limitations  - Instruct patient to call for assistance with activity   - Consult OT/PT to assist with strengthening/mobility   - Keep Call bell within reach  - Keep bed low and locked with side rails adjusted as appropriate  - Keep care items and personal belongings within reach  - Initiate and maintain comfort rounds  - Make Fall Risk Sign visible to staff  - Offer Toileting every  Hours, in advance of need  - Initiate/Maintain bed alarm  - Obtain necessary fall risk management equipment: alarm  - Apply yellow socks and bracelet for high fall risk patients  - Consider moving patient to room near nurses station  Outcome: Progressing

## 2022-03-15 NOTE — CASE MANAGEMENT
Case Management Discharge Planning Note    Patient name Lewis Crum  [facility] [address] [facility] [id number]  : 1960 Date 3/15/2022       Current Admission Date: 3/11/2022  Current Admission Diagnosis:Severe sepsis [facility])   Patient Active Problem List    Diagnosis Date Noted    SVT [facility]supraventricular tachycardia) [facility] ) 2022    Transaminitis 2022    Bacteremia 2022    Severe sepsis ([facility] ) 2022    Hydronephrosis of right kidney 2022    Severe protein-calorie malnutrition ([facility] ) 2022    Acute kidney injury ([facility] ) 2022    Pancytopenia ([facility] ) 2022    Hematuria 2022    Acute cystitis with hematuria 2022    Tachycardia 2022    SIRS (systemic inflammatory response syndrome) ([facility] ) 2021    Anemia 2021    Osseous metastasis ([facility] ) 09/15/2021    Pulmonary embolus ([facility] ) 09/15/2021    Prostate cancer ([facility] ) 2020    MALT lymphoma ([facility] ) 2019    Abnormal nuclear stress test 2019    Pure hypercholesterolemia 10/19/2012      LOS (days): 4  Geometric Mean LOS (GMLOS) (days):   Days to GMLOS:     OBJECTIVE:  Risk of Unplanned Readmission Score: 39         Current admission status: Inpatient   Preferred Pharmacy:   [id number], [address] [place] [address] [address] [facility] [address]  Phone: [phone] Fax: [address], [address] [address] [place] [address]  Phone: [phone] Fax: [phone]    Primary Care Provider: Fe Shi MD    Primary Insurance: BLUE CROSS  Secondary Insurance:     DISCHARGE DETAILS:    Discharge planning discussed with[de-identified] Pt's spouse  Freedom of Choice: Yes  Comments - Freedom of Choice: Pt's spouse requested that CM send a referral to [facility]  CM contacted family/caregiver?: Yes  Were Treatment Team discharge recommendations reviewed with patient/caregiver?: Yes  Did patient/caregiver verbalize understanding of patient care needs?: Yes  Were patient/caregiver advised of the risks associated with not following Treatment Team discharge recommendations?: Yes    Contacts  Patient Contacts: Johnny Gillis: wife  Contact Method: In Person  Reason/Outcome: Continuity of [address]         Is the patient interested in [place] [address] at discharge?: No    DME Referral Provided  Referral made for DME?: No    Other Referral/Resources/Interventions Provided:  Interventions: Hospice      Treatment Team Recommendation: Hospice  Discharge Destination Plan[de-identified] Hospice  Transport at Discharge : [facility]  Dispatcher Contacted: Yes  Number/Name of Dispatcher: [phone]  Transported by [employer] and Unit #): [facility]TS  ETA of Transport (Date): 03/16/22  ETA of Transport (Time): 1300        Accompanied by: EMS personnel     Additional Comments: CM was notified that pt is approved for home hospice   [facility] transport was requested and Out of Hospital DNR was placed in pt's chart for signiture per attending's request

## 2022-03-15 NOTE — QUICK NOTE
PCN tube flushing education provided to family  Wife  Verbalized steps and flushed one of the tubes

## 2022-03-16 NOTE — UTILIZATION REVIEW
CLINICAL WAS ATTACHED IN THE PORTAL             Initial Clinical Review    Admission: Date/Time/Statement:   Admission Orders (From admission, onward)     Ordered        03/12/22 0006  Inpatient Admission  Once                      Orders Placed This Encounter   Procedures    Inpatient Admission     Standing Status:   Standing     Number of Occurrences:   1     Order Specific Question:   Level of Care     Answer:   Level 2 Stepdown / HOT [14]     Order Specific Question:   Bed Type     Answer:   Bariatric [1]     Order Specific Question:   Estimated length of stay     Answer:   More than 2 Midnights     Order Specific Question:   Certification     Answer:   I certify that inpatient services are medically necessary for this patient for a duration of greater than two midnights  See H&P and MD Progress Notes for additional information about the patient's course of treatment  Initial Presentation:  58 y/o male with PMHx of MALT lymphoma  PE, prostate cancer presents to [facility] as a transfer from [facility] where he initially presented d/t fever  Met sepsis criteria on presentation with tachycardia and hypotension  CT a/p done showing R-sided hydronephrosis which was felt to be source of infection  Tx'd to [facility] for IR consult for possible PCN placement  Given IVF's and IV abx in ED  ADMIT INPATIENT to M/S UNIT with SEVERE SEPSIS, hydronephrosis of R kidney -- continue IV abx  Blood cxs pending  ID consult  Tele monitoring  Continue home po steroids  Palliative care consult  IR note 3/12 -- Findings: b/l 10 Fr PCN placement    Urine on the right was mildly turbid, not obviously purulent  Urine on left was clear    Patient is likely a candidate for attempts at internalization to PCNUs in the future (likely as an outpatient)    Date: 3/12   Day 2: Encephalopathy resolved  Pressors have been weaned off  Cont hemodynamic monitoring-- has tacchyarrythmia (chronic)--but cardiology consulted    Cont cefepime and vanocmycin; f/u on cultures; monitor nephrostomy tube output-- f/u on IR recs  Obtain urology consultation  Appreciate ID following  Cont gabapentin  Cont ketaconazole  Stop solumedrol 40 mg tid -- transition back to home prednisone  Cont metoprolol  Cont prn oxycontin 12 mg q12 hours  Appreciate palliative care involvement  Regular diet  Monitor thrombocytopenia  Repeat BMP  ID consult 3/12 -- Severe sepsis 2/2 to gram positive bacteremia and UTI -- continue IV vancomycin and cefepime for now  F/u on UCX and blood cxs    Wt Readings from Last 1 Encounters:   03/12/22 71 5 kg (157 lb 10 1 oz)       Vital Signs:   Date/Time Temp Pulse Resp BP MAP (mmHg) SpO2 O2 Device   03/12/22 22:25:29 97 9 °F (36 6 °C) 103 16 102/68 79 97 % --   03/12/22 21:43:11 98 °F (36 7 °C) 100 -- 103/68 80 96 % None (Room air)   03/12/22 1700 -- 94 24 Abnormal  99/67 -- 95 % --   03/12/22 1600 -- 94 24 Abnormal  -- -- 96 % --   03/12/22 1500 98 4 °F (36 9 °C) 104 24 Abnormal  102/61 70 97 % --   03/12/22 1300 -- 96 12 85/55 Abnormal  60 97 % --   03/12/22 1230 -- 98 15 82/56 Abnormal  63 96 % --   03/12/22 1217 98 4 °F (36 9 °C) 97 12 85/53 Abnormal  -- -- --   03/12/22 1200 -- 100 22 87/56 Abnormal  61 98 % --   03/12/22 0900 -- 148 Abnormal  27 Abnormal  99/68 78 99 % --   03/12/22 0817 97 8 °F (36 6 °C) 164 Abnormal  -- 92/75 -- -- --   03/12/22 0600 -- 164 Abnormal  17 83/65 Abnormal  74 97 % --   03/12/22 0437 -- 118 Abnormal  12 91/63 68 97 % --   03/12/22 0418 98 °F (36 7 °C) 118 Abnormal  15 88/60 Abnormal  67 97 % None (Room air)   03/12/22 0127 98 4 °F (36 9 °C) 124 Abnormal  16 104/65 80 97 % None (Room air)   03/11/22 2238 99 °F (37 2 °C) 166 Abnormal  -- 92/63 73 -- --       Pertinent Labs/Diagnostic Test Results:   IR nephrostomy tube placement   Final Result by Alma Dietz MD (03/12 1321)   Bilateral nephrostomy catheter placement  PLAN:   The catheters were left to gravity drainage   The patient is a candidate for internalization to nephroureteral catheters  Workstation performed: [id number]         IR nephrostomy tube check/change/reposition/reinsertion/upsize    (Results Pending)     CT A/p 3/11 -- 1   New bibasilar dependent atelectatic changes with trace pleural fluid on the left  2   New moderate right hydronephrosis and hydroureter and could be the source of sepsis  Stable appearance of moderate left hydronephrosis and hydroureter  3  Worsening hepatic metastasis  4  Worsening Was Increasing left bladder wall mass  5 Worsening adrenal metastasis   Worsening anterior pelvic mesenteric nodule   Worsening aortocaval and retrocaval lymph nodes  6  Worsening left external iliac lymph node  CXR 3/11 -- No findings of pneumonia or pulmonary edema  Findings related to metastatic disease as described above  Results from last 7 days   Lab Units 03/15/22  0424 03/14/22  1511 03/14/22  0539 03/13/22  0616 03/13/22  0616 03/12/22  0613 03/12/22  0613   WBC Thousand/uL 12 49* 11 92* 11 14*   < > 11 32*   < > 10 37*   HEMOGLOBIN g/dL 7 4* 7 0* 7 9*  --  7 9*  --  6 4*   HEMATOCRIT % 23 4* 21 1* 24 3*  --  24 0*  --  19 6*   PLATELETS Thousands/uL 43* 45* 53*   < > 58*   < > 69*   BANDS PCT %  --  2  --   --   --   --   --     < > = values in this interval not displayed       Results from last 7 days   Lab Units 03/15/22  0424 03/14/22  1511 03/14/22  0539 03/13/22  0616 03/12/22  0613   SODIUM mmol/L 139 138 137 137 137   POTASSIUM mmol/L 4 3 4 1 4 4 4 7 4 4   CHLORIDE mmol/L 112* 110* 109* 108 110*   CO2 mmol/L 21 21 21 18* 17*   ANION GAP mmol/L 6 7 7 11 10   BUN mg/dL 21 21 23 24 24   CREATININE mg/dL 0 93 0 98 1 00 0 90 0 99   EGFR ml/min/1 73sq m 87 82 80 91 81   CALCIUM mg/dL 7 4* 7 2* 7 7* 7 1* 7 2*   MAGNESIUM mg/dL 2 0  --  2 1  --  2 2   PHOSPHORUS mg/dL 2 4  --  1 8*  --   --      Results from last 7 days   Lab Units 03/15/22  0424 03/14/22  1511 03/14/22  0539 03/12/22  0570 03/11/22  1111   AST U/L 205*  --  178* 276* 431*   ALT U/L 19  --  17 23 38   ALK PHOS U/L 616*  --  614* 671* 957*   TOTAL PROTEIN g/dL 5 1*  --  5 1* 5 2* 5 8*   ALBUMIN g/dL 1 6*  --  1 8* 1 9* 2 2*   TOTAL BILIRUBIN mg/dL 1 61*  --  1 22* 1 20* 1 40*   AMMONIA umol/L  --  80*  --   --   --      Results from last 7 days   Lab Units 03/15/22  0424 03/14/22  1511 03/14/22  0539 03/13/22  0616 03/12/22  0613 03/11/22 1952 03/11/22  1111   GLUCOSE RANDOM mg/dL 113 109 99 100 106 81 100     Results from last 7 days   Lab Units 03/11/22  1834 03/11/22  1357 03/11/22  1115   HS TNI 0HR ng/L  --   --  38   HS TNI 2HR ng/L  --  42  --    HSTNI D2 ng/L  --  4  --    HS TNI 4HR ng/L 48  --   --    HSTNI D4 ng/L 10  --   --          Results from last 7 days   Lab Units 03/11/22  1111   PROTIME seconds 16 5*   INR  1 35*   PTT seconds 36     Results from last 7 days   Lab Units 03/11/22  1111   TSH 3RD GENERATON uIU/mL 0 829     Results from last 7 days   Lab Units 03/12/22  0613 03/11/22  1111   PROCALCITONIN ng/ml 5 38* 1 72*     Results from last 7 days   Lab Units 03/15/22  0049 03/14/22  2148 03/14/22  1839 03/14/22  1513 03/11/22  1952 03/11/22  1357 03/11/22  1111   LACTIC ACID mmol/L 2 5* 3 9* 3 5* 3 5* 1 7 1 6 2 6*     Results from last 7 days   Lab Units 03/11/22  1358   CLARITY UA  Clear   COLOR UA  Yellow   SPEC GRAV UA  1 015   PH UA  5 5   GLUCOSE UA mg/dl Negative   KETONES UA mg/dl Negative   BLOOD UA  Moderate*   PROTEIN UA mg/dl Trace*   NITRITE UA  Negative   BILIRUBIN UA  Negative   UROBILINOGEN UA E U /dl 1 0   LEUKOCYTES UA  Negative   WBC UA /hpf 2-4   RBC UA /hpf 1-2   BACTERIA UA /hpf Occasional   EPITHELIAL CELLS WET PREP /hpf Occasional     Results from last 7 days   Lab Units 03/14/22  1635 03/12/22  0207 03/12/22  0205 03/12/22  0049 03/12/22  0018 03/11/22  1111   BLOOD CULTURE  No Growth at 48 hrs  No Growth at 48 hrs  --  No Growth After 5 Days  No Growth After 5 Days    --   -- Staphylococcus epidermidis*  Staphylococcus epidermidis*   GRAM STAIN RESULT   --   --   --   --   --  Gram positive cocci in clusters*  Gram positive cocci in clusters*   URINE CULTURE   --  No Growth <1000 cfu/mL  --  No Growth <1000 cfu/mL No Growth <1000 cfu/mL  --        Past Medical History:   Diagnosis Date    Cancer ([facility] )     Stomach    Lymphoma ([facility] ) 05/2018    Non Hodgkin's lymphoma ([facility] )     Prostate cancer ([facility] ) 2020    Shingles     [facility]     PATIENT DENIES     Present on Admission:   Prostate cancer   Pulmonary embolus   Severe sepsis   MALT lymphoma   (Resolved) Acute metabolic encephalopathy   Bicytopenia   Severe protein-calorie malnutrition    Osseous metastasis      Admitting Diagnosis: Severe sepsis ([place] [address] ) [A41 9, R65 20]  Age/Sex: 57 y o  male  Admission Orders:  Scheduled Medications:  No current facility-administered medications for this encounter  PRN Meds:  No current facility-administered medications for this encounter  IP CONSULT TO CASE MANAGEMENT  IP CONSULT TO PALLIATIVE CARE  IP CONSULT TO PASTORAL CARE  IP CONSULT TO UROLOGY  IP CONSULT TO INFECTIOUS DISEASES  IP CONSULT TO CARDIOLOGY  IP CONSULT TO ONCOLOGY  IP CONSULT TO HOSPICE    [facility] Utilization Review Department  ATTENTION: Please call with any questions or concerns to [phone] and carefully listen to the prompts so that you are directed to the right person  All voicemails are confidential   Dino Serrano all requests for admission clinical reviews, approved or denied determinations and any other requests to dedicated fax number below belonging to the campus where the patient is receiving treatment   List of dedicated fax numbers for the Facilities:  [address] DENIALS (Administrative/Medical Necessity) [phone]   [address] (Maternity/NICU/Pediatrics) 261 [facility],7Th Floor [phone]   Veena Monique [address] [place] [address] [address] [address] [place] [address] [facility] [address] [facility] [place] [address] [phone]         Notification of Discharge   This is a Notification of Discharge from our facility [address]  Please be advised that this patient has been discharge from our facility  Below you will find the admission and discharge date and time including the patients disposition  UTILIZATION REVIEW CONTACT:  Allen Garcia  Utilization   Network Utilization Review Department  Phone: [phone] x carefully listen to the prompts  All voicemails are confidential   Email: [email or web address]     PHYSICIAN ADVISORY SERVICES:  FOR JELA-RT-QCNX REVIEW - MEDICAL NECESSITY DENIAL  Phone: [phone]  Fax: [phone]  Email: [email or web address]     PRESENTATION DATE: 3/11/2022 10:32 PM  OBERVATION ADMISSION DATE:   INPATIENT ADMISSION DATE: 3/11/22 10:32 PM   DISCHARGE DATE: 3/16/2022  2:08 PM  DISPOSITION: Home/Self Care Home/Self Care      IMPORTANT INFORMATION:  Send all requests for admission clinical reviews, approved or denied determinations and any other requests to dedicated fax number below belonging to the campus where the patient is receiving treatment   List of dedicated fax numbers:  FACILITY NAME UR FAX NUMBER   ADMISSION DENIALS (Administrative/Medical Necessity) [phone]   [address] (Maternity/NICU/Pediatrics) [place] [address] [facility] [phone]   [address] [place] [address] [phone]   2000 [facility] [address] [place] [address],[address]Th Floor [address] [phone]   [facility]  [phone]   [facility], [address]  [address] [place] [address] [phone]

## 2022-03-16 NOTE — QUICK NOTE
Pt family at bedside, requesting to stay overnight  Supervisor made them aware that they can only stay if the pt is level 4 comfort measures only  Pt is going home hospice in the AM and the family was very agreeable to making him comfortable overnight and switching his code status at this time  Comfort medications ordered, all other orders have been dc'd

## 2022-03-16 NOTE — UTILIZATION REVIEW
Initial Clinical Review    Pending [facility]#[id number]    Admission: Date/Time/Statement:   Admission Orders (From admission, onward)     Ordered        03/11/22 1421  Inpatient Admission  Once                      Orders Placed This Encounter   Procedures    Inpatient Admission     Standing Status:   Standing     Number of Occurrences:   1     Order Specific Question:   Level of Care     Answer:   Med Surg [16]     Order Specific Question:   Estimated length of stay     Answer:   More than 2 Midnights     Order Specific Question:   Certification     Answer:   I certify that inpatient services are medically necessary for this patient for a duration of greater than two midnights  See H&P and MD Progress Notes for additional information about the patient's course of treatment  ED Arrival Information     Expected Arrival Acuity    - 3/11/2022 10:25 Emergent         Means of arrival Escorted by Service Admission type    Wheelchair Spouse Hospitalist Emergency         Arrival complaint    fever, disoriented        Chief Complaint   Patient presents with    Fever - 9 weeks to 74 years     Patient presents to the ER with report of having a 102 4 fever this morning, being disoriented, lethargic and having "jerky movements "       Initial Presentation:  this is a 57year old male from home to ED admitted inpatient due to sepsis/acute metabolic encephalopathy  PMH of prostate cancer, MALT lymphoma, pancytopenia, PE, malnutrition  Presented due to fever starting morning of arrival with confusion, jerky movements  Fatigued  Has chronic pain in lower back and thighs  On exam tachycardic  Hypotensive  Procalcitonin 1 72  Lactic acid 2 6  Bun 28, creatinine 1 26    platelets 26  H&H 8 6/27 1  Ct abdomen showed right hydronephrosis  Worsening hepatic metastasis, left bladder wall mass, adrenal metastasis  Blood cultures done and gram stain showed gram + cocci in clusters    In the ED given 2 liters of IVF, started on antibiotics and given IV analgesia  Plan is continue antibiotics, IVF  Consult urology  Check ct head  Telemetry  [place]     3/11/22 per Urology -Has continued fever and tachycardia, now  recommend bilateral PCN  Suspect renal decompression, as left side obstructed and may have spread along the trigone over to right      3/11/22 2125 discharged to [facility] as higher level of care due to need for percutaneous nephrostomy tube which can not be done at [facility]  ED Triage Vitals [03/11/22 1039]   Temperature Pulse Respirations Blood Pressure SpO2   97 6 °F (36 4 °C) (!) 136 (!) 28 (!) 89/55 93 %      Temp Source Heart Rate Source Patient Position - Orthostatic VS BP Location FiO2 (%)   Oral Monitor Lying Left arm --      Pain Score       7          Wt Readings from Last 1 Encounters:   03/12/22 71 5 kg (157 lb 10 1 oz)     Additional Vital Signs:   03/11/22 20:54:43 99 1 °F (37 3 °C) 139 Abnormal  18 105/60 75 -- -- --   03/11/22 20:11:53 -- -- 18 106/68 81 -- -- --   03/11/22 2011 102 8 °F (39 3 °C) Abnormal  148 Abnormal  -- -- -- -- -- --   03/11/22 1945 103 2 °F (39 6 °C) Abnormal  -- -- -- -- -- -- --   03/11/22 19:38:15 103 6 °F (39 8 °C) Abnormal  -- -- 104/68 80 -- -- --   03/11/22 1852 102 9 °F (39 4 °C) Abnormal  146 Abnormal  -- -- -- -- -- --   03/11/22 15:31:13 97 1 °F (36 2 °C) Abnormal  -- 18 112/72 85 -- -- --   03/11/22 1500 -- 158 Abnormal  14 116/59 83 98 % None (Room air) Lying   03/11/22 1400 -- 120 Abnormal  13 98/61 74 93 % -- --   03/11/22 1300 -- 164 Abnormal  17 102/54 73 94 % -- --   03/11/22 1200 -- 124 Abnormal  19 92/56 70 95 % None (Room air)        Pertinent Labs/Diagnostic Test Results:   CT abdomen pelvis with contrast   Final Result by Ulysses Graf MD (03/11 7399)      1  New bibasilar dependent atelectatic changes with trace pleural fluid on the left  2   New moderate right hydronephrosis and hydroureter and could be the source of sepsis  Stable appearance of moderate left hydronephrosis and hydroureter  3  Worsening hepatic metastasis  4  Worsening Was Increasing left bladder wall mass  5 Worsening adrenal metastasis  Worsening anterior pelvic mesenteric nodule  Worsening aortocaval and retrocaval lymph nodes  6  Worsening left external iliac lymph node  The study was marked in [facility] for immediate notification  Workstation performed: [id number]         XR chest 1 view portable   Final Result by Nena Jaimes MD (03/11 1245)      No findings of pneumonia or pulmonary edema  Findings related to metastatic disease as described above  Workstation performed: [id number]           3/11/22 ecg - Supraventricular tachycardia  Right bundle branch block  Abnormal ECG  When compared with ECG of 03-MAR-2022 19:11,  Vent  rate has increased BY  65 BPM    3/11/22 ecg - Sinus tachycardia  Left axis deviation  Right bundle branch block  Abnormal ECG  When compared with ECG of 11-MAR-2022 11:16, (unconfirmed) No significant change was found    3/11/22 ecg Supraventricular tachycardia  Right superior axis deviation  Right bundle branch block  Abnormal ECG     Results from last 7 days   Lab Units 03/15/22  0424 03/14/22  1511 03/14/22  0539 03/13/22  0616 03/13/22  0616 03/12/22  0613 03/12/22  0613   WBC Thousand/uL 12 49* 11 92* 11 14*   < > 11 32*   < > 10 37*   HEMOGLOBIN g/dL 7 4* 7 0* 7 9*  --  7 9*  --  6 4*   HEMATOCRIT % 23 4* 21 1* 24 3*  --  24 0*  --  19 6*   PLATELETS Thousands/uL 43* 45* 53*   < > 58*   < > 69*   BANDS PCT %  --  2  --   --   --   --   --     < > = values in this interval not displayed       Results from last 7 days   Lab Units 03/15/22  0424 03/14/22  1511 03/14/22  0539 03/13/22  0616 03/12/22  0613   SODIUM mmol/L 139 138 137 137 137   POTASSIUM mmol/L 4 3 4 1 4 4 4 7 4 4   CHLORIDE mmol/L 112* 110* 109* 108 110*   CO2 mmol/L 21 21 21 18* 17*   ANION GAP mmol/L 6 7 7 11 10   BUN mg/dL 21 21 23 24 24   CREATININE mg/dL 0 93 0 98 1 00 0 90 0 99   EGFR ml/min/1 73sq m 87 82 80 91 81   CALCIUM mg/dL 7 4* 7 2* 7 7* 7 1* 7 2*   MAGNESIUM mg/dL 2 0  --  2 1  --  2 2   PHOSPHORUS mg/dL 2 4  --  1 8*  --   --      Results from last 7 days   Lab Units 03/15/22  0424 03/14/22  1511 03/14/22  0539 03/12/22  0613 03/11/22  1111   AST U/L 205*  --  178* 276* 431*   ALT U/L 19  --  17 23 38   ALK PHOS U/L 616*  --  614* 671* 957*   TOTAL PROTEIN g/dL 5 1*  --  5 1* 5 2* 5 8*   ALBUMIN g/dL 1 6*  --  1 8* 1 9* 2 2*   TOTAL BILIRUBIN mg/dL 1 61*  --  1 22* 1 20* 1 40*   AMMONIA umol/L  --  80*  --   --   --      Results from last 7 days   Lab Units 03/15/22  0424 03/14/22  1511 03/14/22  0539 03/13/22  0616 03/12/22  0613 03/11/22  1952 03/11/22  1111   GLUCOSE RANDOM mg/dL 113 109 99 100 106 81 100     Results from last 7 days   Lab Units 03/11/22  1834 03/11/22  1357 03/11/22  1115   HS TNI 0HR ng/L  --   --  38   HS TNI 2HR ng/L  --  42  --    HSTNI D2 ng/L  --  4  --    HS TNI 4HR ng/L 48  --   --    HSTNI D4 ng/L 10  --   --      Results from last 7 days   Lab Units 03/11/22  1111   PROTIME seconds 16 5*   INR  1 35*   PTT seconds 36     Results from last 7 days   Lab Units 03/11/22  1111   TSH 3RD GENERATON uIU/mL 0 829     Results from last 7 days   Lab Units 03/12/22  0613 03/11/22  1111   PROCALCITONIN ng/ml 5 38* 1 72*     Results from last 7 days   Lab Units 03/15/22  0049 03/14/22  2148 03/14/22  1839 03/14/22  1513 03/11/22  1952 03/11/22  1357 03/11/22  1111   LACTIC ACID mmol/L 2 5* 3 9* 3 5* 3 5* 1 7 1 6 2 6*     Results from last 7 days   Lab Units 03/11/22  1358   CLARITY UA  Clear   COLOR UA  Yellow   SPEC GRAV UA  1 015   PH UA  5 5   GLUCOSE UA mg/dl Negative   KETONES UA mg/dl Negative   BLOOD UA  Moderate*   PROTEIN UA mg/dl Trace*   NITRITE UA  Negative   BILIRUBIN UA  Negative   UROBILINOGEN UA E U /dl 1 0   LEUKOCYTES UA  Negative   WBC UA /hpf 2-4   RBC UA /hpf 1-2   BACTERIA UA /hpf Occasional   EPITHELIAL CELLS WET PREP /hpf Occasional     Results from last 7 days   Lab Units 03/14/22  1635 03/12/22  0207 03/12/22  0205 03/12/22  0049 03/12/22  0018 03/11/22  1111   BLOOD CULTURE  No Growth at 24 hrs  No Growth at 24 hrs   --  No Growth After 4 Days  No Growth After 4 Days    --   --  Staphylococcus epidermidis*  Staphylococcus epidermidis*   GRAM STAIN RESULT   --   --   --   --   --  Gram positive cocci in clusters*  Gram positive cocci in clusters*   URINE CULTURE   --  No Growth <1000 cfu/mL  --  No Growth <1000 cfu/mL No Growth <1000 cfu/mL  --        ED Treatment:   Medication Administration from 03/11/2022 1025 to 03/11/2022 1527       Date/Time Order Dose Route Action Comments     03/11/2022 1111 sodium chloride 0 9 % bolus 1,000 mL 1,000 mL Intravenous New Bag      03/11/2022 1353 vancomycin (VANCOCIN) IVPB (premix in dextrose) 1,000 mg 200 mL 1,000 mg Intravenous New Bag      03/11/2022 1229 cefepime (MAXIPIME) IVPB (premix in dextrose) 2,000 mg 50 mL 2,000 mg Intravenous New Bag      03/11/2022 1231 sodium chloride 0 9 % bolus 1,000 mL 1,000 mL Intravenous New Bag      03/11/2022 1352 HYDROmorphone (DILAUDID) injection 1 mg 1 mg Intravenous Given         Past Medical History:   Diagnosis Date    Cancer ([place] [address] )     Stomach    Lymphoma ([place] [address] ) 05/2018    Non Hodgkin's lymphoma ([facility] )     Prostate cancer ([facility] ) 2020    Shingles     Thoracic aortic aneurysm ([place] [address] )     PATIENT DENIES     Present on Admission:   Severe protein-calorie malnutrition    MALT lymphoma   Prostate cancer   Pulmonary embolus   Tachycardia   Bicytopenia      Admitting Diagnosis: Sinus tachycardia [R00 0]  Anemia [D64 9]  Hypotension [I95 9]  Fever [R50 9]  Sepsis ([facility] ) [A41 9]  Age/Sex: 57 y o  male  Admission Orders: 3/11/22 1421 inpatient   Scheduled Medications:  Current Facility-Administered Medications   Medication Dose Route Frequency    acetaminophen (TYLENOL) tablet 650 mg  650 mg Oral Q6H PRN - used x 1 3/11    [START ON 3/12/2022] cefepime (MAXIPIME) IVPB (premix in dextrose) 1,000 mg 50 mL  1,000 mg Intravenous Q12H    [START ON 3/12/2022] famotidine (PEPCID) tablet 20 mg  20 mg Oral Daily Before Breakfast    gabapentin (NEURONTIN) capsule 600 mg  600 mg Oral TID    [START ON 3/12/2022] ketoconazole (NIZORAL) tablet 200 mg  200 mg Oral Daily    loperamide (IMODIUM) capsule 2 mg  2 mg Oral 4x Daily PRN    LORazepam (ATIVAN) injection 1 mg  1 mg Intravenous Q4H PRN    melatonin tablet 6 mg  6 mg Oral HS    methylPREDNISolone sodium succinate (Solu-MEDROL) injection 40 mg  40 mg Intravenous Q8H [address]    metoprolol tartrate (LOPRESSOR) partial tablet 12 5 mg  12 5 mg Oral Q12H [address]    ondansetron (ZOFRAN) injection 4 mg  4 mg Intravenous Q6H PRN    oxyCODONE (ROXICODONE) IR tablet 10 mg  10 mg Oral Q4H PRN - used x 1     [START ON 3/12/2022] pantoprazole (PROTONIX) EC tablet 40 mg  40 mg Oral Daily Before Breakfast    polyethylene glycol (MIRALAX) packet 17 g  17 g Oral Daily PRN    sodium chloride 0 9 % infusion  100 mL/hr Intravenous Continuous    sodium chloride tablet 1 g  1 g Oral BID With Meals    [START ON 3/12/2022] vancomycin (VANCOCIN) IVPB (premix in dextrose) 750 mg 150 mL  12 5 mg/kg Intravenous Q12H           Telemetry   Urinary catheter    [address] Utilization Review Department  ATTENTION: Please call with any questions or concerns to [phone] and carefully listen to the prompts so that you are directed to the right person  All voicemails are confidential   Toby Maki all requests for admission clinical reviews, approved or denied determinations and any other requests to dedicated fax number below belonging to the campus where the patient is receiving treatment   List of dedicated fax numbers for the Facilities:  FACILITY NAME UR FAX NUMBER   ADMISSION DENIALS (Administrative/Medical Necessity) [phone]   [address] (Maternity/NICU/Pediatrics) [facility],7[facility] Floor [facility] [address] [place] [address]  [phone]   [facility] [address] [place] [address] [address] [place] [address] [address] [facility] [place] [address] [phone]

## 2022-03-16 NOTE — CASE MANAGEMENT
Case Management Discharge Planning Note    Patient name Honey Book  Location [address] 9[address]/PPHP 378-50 MRN [id number]  : 1960 Date 3/16/2022       Current Admission Date: 3/11/2022  Current Admission Diagnosis:Severe sepsis   Patient Active Problem List    Diagnosis Date Noted    SVT (supraventricular tachycardia) 2022    Abnormal LFTs 2022    Bacteremia 2022    Severe sepsis 2022    Hydronephrosis of right kidney 2022    Severe protein-calorie malnutrition  2022    Acute kidney injury ([facility] ) 2022    Bicytopenia 2022    Hematuria 2022    Acute cystitis with hematuria 2022    Tachycardia 2022    SIRS (systemic inflammatory response syndrome) ([facility] ) 2021    Anemia 2021    Osseous metastasis 09/15/2021    Pulmonary embolus 09/15/2021    Prostate cancer 2020    MALT lymphoma 2019    Abnormal nuclear stress test 2019    Pure hypercholesterolemia 10/19/2012      LOS (days): 5  Geometric Mean LOS (GMLOS) (days):   Days to GMLOS:     OBJECTIVE:  Risk of Unplanned Readmission Score: 39         Current admission status: Inpatient   Preferred Pharmacy:   [id number], [address] [place] [address] Drive [address] [place] [address]  Phone: [phone] Fax: [address] [place] [address] [address] [facility]  Phone: [phone] Fax: [phone]    Primary Care Provider: Emily Mary MD    Primary Insurance: BLUE CROSS  Secondary Insurance:     DISCHARGE DETAILS:    Additional Comments: CM confirmed with pt's attending and hospice liasion that pt is cleared for d/c today  Out of hospital DNR was signed   [facility] transport scheduled for 1400

## 2022-03-16 NOTE — PLAN OF CARE
Problem: Potential for Falls  Goal: Patient will remain free of falls  Description: INTERVENTIONS:  - Educate patient/family on patient safety including physical limitations  - Instruct patient to call for assistance with activity   - Consult OT/PT to assist with strengthening/mobility   - Keep Call bell within reach  - Keep bed low and locked with side rails adjusted as appropriate  - Keep care items and personal belongings within reach  - Initiate and maintain comfort rounds  - Make Fall Risk Sign visible to staff  - Offer Toileting every in advance of need  - Apply yellow socks and bracelet for high fall risk patients  - Consider moving patient to room near nurses station  3/16/2022 0752 by Tresa Muñiz RN  Outcome: Progressing  3/16/2022 0752 by Tresa Muñiz RN  Outcome: Progressing     Problem: Prexisting or High Potential for Compromised Skin Integrity  Goal: Skin integrity is maintained or improved  Description: INTERVENTIONS:  - Identify patients at risk for skin breakdown  - Assess and monitor skin integrity  - Assess and monitor nutrition and hydration status  - Monitor labs   - Assess for incontinence   - Turn and reposition patient  - Assist with mobility/ambulation  - Relieve pressure over bony prominences  - Avoid friction and shearing  - Provide appropriate hygiene as needed including keeping skin clean and dry  - Evaluate need for skin moisturizer/barrier cream  - Collaborate with interdisciplinary team   - Patient/family teaching  - Consider wound care consult   3/16/2022 0752 by Tresa Muñiz RN  Outcome: Progressing  3/16/2022 0752 by Tresa Muñiz RN  Outcome: Progressing     Problem: PAIN - ADULT  Goal: Verbalizes/displays adequate comfort level or baseline comfort level  Description: Interventions:  - Encourage patient to monitor pain and request assistance  - Assess pain using appropriate pain scale  - Administer analgesics based on type and severity of pain and evaluate response  - Implement non-pharmacological measures as appropriate and evaluate response  - Consider cultural and social influences on pain and pain management  - Notify physician/advanced practitioner if interventions unsuccessful or patient reports new pain  Outcome: Progressing

## 2022-03-16 NOTE — DISCHARGE SUMMARY
Discharge Summary - [facility]  Patient: Jim Manning 57 y o  male   MRN: [id number]  PCP: Jason Nj MD  Unit/Bed#: 99 [facility] 933-01 Encounter: [id number]            Discharging Physician / Practitioner: oRmel Mattson MD  PCP: Jason Nj MD  Admission Date:   Admission Orders (From admission, onward)     Ordered        03/12/22 0006  Inpatient Admission  Once                      Discharge Date: 03/16/22      Reason for Admission:  Fevers and weakness       Discharge Diagnoses:     Principal Problem:    Severe sepsis    Active Problems:    Bacteremia    Acute cystitis with hematuria    Hydronephrosis of right kidney    MALT lymphoma    Prostate cancer    Osseous metastasis    Bicytopenia    Pulmonary embolus    Severe protein-calorie malnutrition     SVT (supraventricular tachycardia)    Abnormal LFTs    Resolved Problems:    Acute metabolic encephalopathy      Consultations During Hospital Stay:  · Medical oncology  · Cardiology  · Urology  · Palliative care  · Infectious disease  · General surgery      Hospital Course:     * Severe sepsis  Assessment & Plan  Present on admission  Sepsis criteria: febrile episodes at home, pancytopenia, tachycardia, hypotension with lactic acidosis and septic shock  Patient admitted to MICU for hypotension requiring pressor support (weaned off 03/12)  Etiologies: Staphylococcus epidermidis bacteremia and acute cystitis with severe hydronephrosis  Blood cultures collected on 03/11 POSITIVE for Staphylococcus epidermidis  Blood cultures collected on 03/12 showed NGTD  Pending repeat blood cultures ordered today following sinus tachycardia, febrile episodes, and worsening symptoms  ABG remarkable for metabolic alkalosis  ID consulted; previously maintained on IV Vancomycin and Cefepime     3/16:  Plan for discharge to home hospice today     Bacteremia  Assessment & Plan  Present on admission  See plan for severe sepsis     Acute cystitis with hematuria  Assessment & Plan  Present on admission  Noted on UA performed on 3/11  No growth on urine culture  See plan for severe sepsis above     Hydronephrosis of right kidney  Assessment & Plan  "Patient noted to have new hydronephrosis and hydroureter on the right side  Also noted to have urinary retention  Status post [place] catheter placement  Urology evaluated the patient due to persistent fever and likely source of infection recommended PCN placement  · As per urology progress note left-sided is more and there is a possibility that it has spread along the trigone over to the right side"  Urology consulted, appreciate recommendations  S/p bilateral PCN placed by IR on 3/11     Prostate cancer  Assessment & Plan  Patient with prostate cancer with metastatic multiple processes  Patient currently established with Dr Michael Crawley A/P identified "worsening hepatic metastasis, worsening mets of adrenal glands, worsening left bladder wall mass, worsening anterior pelvic mesenteric nodule, worsening aortocaval and retrocaval lymph nodes, and worsening left external iliac lymph node"     Overall worsening disease progression on imaging and indicative of poor prognosis in the setting of worsening sepsis  Oncology consulted; pending further discussions of goals of care and prognosis  Patient's family wishes to have full care with aggressive measures     Previously maintained on home Apalutamide 240 mg daily and Decadron 4 mg BID     MALT lymphoma  Assessment & Plan  "Diagnosed in 05/2018    Status post triple antibiotic course for H pylori at that time and 2nd EGD in 9/21 showed resolution of the gastric lesion  Erleada 460 mg tabs taken Monday through Friday  Also on Apalutamide 240 mg daily  Oncology consulted; appreciate assistance with discussions pending assessment per patient's home Oncologist  Palliative Care consulted; appreciate assistance and recommendations     Osseous metastasis  Assessment & Plan  Continue pain regimen per palliative care w/ bowel prophylaxis  See plan for prostate cancer above     Bicytopenia  Assessment & Plan  Stable  Patient with anemia and thrombocytopenia  Secondary to metastatic prostate cancer, MALT, and chemotherapy  One Unit pRBCs transfused on 03/12     Repeat Hemoglobin measured at 7 0 g/dL  Transfuse Hemoglobin <7 g/dL     Pulmonary embolus  Assessment & Plan  Patient with history of PE diagnosed in September 2021  Holding anticoagulation due to thrombocytopenia     Severe protein-calorie malnutrition   Assessment & Plan  Malnutrition Findings:   Adult Malnutrition type: Chronic illness (wt (9/5/21 102kg), severe subcutaneous fat loss/orbital fat pads, ribs, triceps, muscle wasting/hollowing of temporalis region, protruding clavicles, treated with liberal diet )  Adult Degree of Malnutrition: Other severe protein calorie malnutrition     BMI Findings:   Body mass index is 22 62 kg/m²       Nutrition consulted; continue with supplemental nutrition  Patient with minimal PO intake  Pending discussions with family about alternatives following Oncology discussions      SVT (supraventricular tachycardia)  Assessment & Plan  Present on admission  Likely secondary to severe sepsis and acute on chronic anemia  Cardiology consulted - continue Toprol-XL     Abnormal LFTs  Assessment & Plan  Present on admission in the setting of septic shock  Likely secondary to septic shock  Continue to monitor CMP intermittently  Will closely monitor with PRN Tylenol for fever only      Condition at Discharge: poor       Discharge Day Visit / Exam:     Vitals: Blood Pressure: 93/62 (03/15/22 2222)  Pulse: 102 (03/15/22 2007)  Temperature: 97 7 °F (36 5 °C) (03/15/22 2222)  Temp Source: Axillary (03/15/22 1555)  Respirations: 18 (03/15/22 2222)  Weight - Scale: 71 5 kg (157 lb 10 1 oz) (03/12/22 0600)  SpO2: 95 % (03/15/22 2007)      Physical exam - I had a face-to-face encounter with the patient on day of discharge  Discussion with Patient and/or Family:  The patient has been advised to return to the ER immediately if any symptoms recur or worsen  Discharge instructions/Information to Patient and/or Family:   See after visit summary for information provided to patient and/or family  Provisions for Follow-Up Care:  See after visit summary for information related to follow-up care and any pertinent home health orders  Disposition:   Home with hospice      Discharge Medications:  See after visit summary for reconciled discharge medications provided to patient and/or family  Discharge Statement:  I spent 38 minutes discharging the patient  This time was spent on the day of discharge  I had direct contact with the patient on the day of discharge  Greater than 50% of the total time was spent examining patient, answering all patient questions, arranging and discussing plan of care with patient as well as directly providing post-discharge instructions  Additional time then spent on discharge activities  ** Please Note: This note is constructed using a voice recognition dictation system  An occasional wrong word/phrase or sound-a-like substitution may have been picked up by dictation device due to the inherent limitations of voice recognition software  Read the chart carefully and recognize, using reasonable context, where substitutions may have occurred  **

## 2022-03-16 NOTE — HOSPICE NOTE
Met with pt's wife and son, reviewed Hospice discharge plan  [facility] leaving [facility]  Transport is scheduled for 2pm   They are awaiting a phone call about equipment delivery  RN will send them home with syringes to flush nephrostomy tubes  Kamari Kingsley updated  Will follow until discharge

## 2023-02-03 NOTE — UTILIZATION REVIEW
Initial Clinical Review    Admission: Date/Time/Statement:   Admission Orders (From admission, onward)     Ordered        09/15/21 1825  Inpatient Admission  Once                   Orders Placed This Encounter   Procedures    Inpatient Admission     Standing Status:   Standing     Number of Occurrences:   1     Order Specific Question:   Level of Care     Answer:   Med Surg [16]     Order Specific Question:   Estimated length of stay     Answer:   More than 2 Midnights     Order Specific Question:   Certification     Answer:   I certify that inpatient services are medically necessary for this patient for a duration of greater than two midnights  See H&P and MD Progress Notes for additional information about the patient's course of treatment  ED Arrival Information     Expected Arrival Acuity    - 9/15/2021 13:30 Emergent         Means of arrival Escorted by Service Admission type    Wheelchair Family Member Hospitalist Emergency         Arrival complaint    Fever        Chief Complaint   Patient presents with    Fever Immunocompromised     pt presented to Gi lab for EGD, was found hypoxic and febrile  Tmax for Gi lab 101 5, spo2 85% on ra  Gastroenteroalogy  HPI:  63y o  year old male who presents for EGD and endoscopic ultrasound with possible fine-needle aspiration of mediastinal adenopathy  Indications for the procedure:  History of MALT lymphoma of the stomach, history of metastatic prostate carcinoma  Poor response to chemotherapy  Worsening of mediastinal adenopathy on recent CT scan  Resampling of mediastinal adenopathy for reassessment  Initial Presentation:     63year old female presents to ed from home for evaluation and treatment of fever and hypoxia  Temp 101 5 and spo2 85% at GI lab for planned EGD  PMHX OSSEOUS METASTASIS, PROSTATE CANCER MALT LYMPHOMA  Clinical assessment significant for fever 102 9, O2 sat 87% , tachypnea, tachycardia   Procalcitonin 0 83,WBC 12 34, HB 7 8, TROP 0 07, , CR 1 40, BNP 2488,  INR 1 50  Imaging shows RML AND MODESTO pulmonary emboli, possible RML mets,  Lingular opacity possibly atelectasis or infiltrate  Treated in ed with iv ceftriaxone, iv ns bolus, iv multi electrolyte 100/hr, tylenol  Admit to inpatient medical surgical for sepsis, anemia, pulmonary embolism, cancer  Plan includes:  Continue lynparza, hold EGD, trend cbc, check stool for occult blood and follow up iron studies, obtain echo, transition lovenox to eliquis, consult Infectious disease, continue iv antibiotics, follow cultures and procalcitonin, 2L O2nc         Date: 9-16-21   Day 2: inpatient     Patient remains febrile and hypoxic  Continue iv antibiotics and iv fluids  Hb stable 7 8  Multiple diagnostics labs ordered  Continue 2L O2nc to maintain O2 sats at least 90%  09/16/21 0948  Occult blood 1-3, stool Once     Status: Needs to be Collected      09/16/21 0600  Comprehensive metabolic panel Morning draw     Status: Needs to be Collected      09/16/21 0600  Ferritin Morning draw     Status: Needs to be Collected      09/16/21 0600  Iron Saturation % Morning draw     Status: Needs to be Collected      09/16/21 0600  Vitamin B12 Morning draw     Status: Needs to be Collected      09/16/21 0600  Folate Morning draw     Status: Needs to be Collected      09/15/21 2108  Occult blood 1-3, stool Once     Status: Needs to be Collected      09/15/21 2004  Blood Parasite smear STAT     Status:  In process      09/15/21 1351  Troponin I Once     Status: Collected (09/15/21 1320)      09/15/21 1351  NT-BNP PRO Once     Status: Collected (09/15/21 1320)      09/15/21 1350  CBC and differential (Sepsis Treatment Panel) Once     Status: Collected (09/15/21 1320)      09/15/21 1350  Comprehensive metabolic panel (Sepsis Treatment Panel) Once     Status: Collected (09/15/21 1320)      09/15/21 1350  Procalcitonin with AM Reflex (Sepsis Treatment Panel) Once     Status: Collected (09/15/21 1320)      09/15/21 1350  Protime-INR (Sepsis Treatment Panel) Once     Status: Collected (09/15/21 1320)      09/15/21 1350  APTT (Sepsis Treatment Panel) Once     Status: Collected (09/15/21 1320)              ED Triage Vitals   09/15/21 1336 09/15/21 1336 09/15/21 1336 09/15/21 1336 09/15/21 1336   100 5 °F (38 1 °C) 103 (!) 24 105/60 (!) 89 %      Oral Monitor         Pain Score          09/15/21 102 kg (224 lb 6 9 oz)     Additional Vital Signs:     Date/  Time  Temp  Pulse  Resp  BP  MAP   SpO2  Nasal Cannula O2 Flow Rate (L/min)   09/16/21 08:36:25  100 6 °F (38 1 °C)  Abnormal   104  15  108/72  84  90 %  --   09/16/21 02:48:47  99 1 °F (37 3 °C)  95  --  97/61  73  95 %  --   09/15/21 2300  --  --  --  --  --  96 %  2 L/min   09/15/21 22:23:55  97 6 °F (36 4 °C)  88  --  98/62  74  97 %  --   09/15/21 22:23:26  97 6 °F (36 4 °C)  86  18  98/62  74  97 %  --   09/15/21 2154  98 6 °F (37 °C)  80  19  107/59  --  94 %  --   09/15/21 2032  --  102  20  123/59  --  96 %  --   09/15/21 1904  102 9 °F (39 4 °C)  Abnormal   --  --  --  --  --  --   09/15/21 1830  --  110  Abnormal   22  130/58  81  --  2 L/min   09/15/21 1730  --  102  20  117/58  82  96 %  --   09/15/21 1700  --  98  19  115/60  80  97 %  --   09/15/21 1600  --  94  18  108/57  76  96 %  2 L/min   09/15/21 1415  --  104  20  107/60  74  95 %  2 L/min             Pertinent Labs/Diagnostic Test Results:       Date/Time: 9/15/2021 2:12 PM     Patient location:  ED   Procedure details:     Exam Type:  Diagnostic     Indications: respiratory distress       Assessment / Evaluation for: cardiac function and pericardial effusion       Exam Type: initial exam       Image quality: diagnostic       Image availability:  Images available in PACS   Patient Details:     Cardiac Rhythm:  Regular   Cardiac findings:     Echo technique: limited 2D       Views obtained: parasternal long axis       Pericardial effusion: absent       Tamponade physiology: absent       Wall motion: normal       LV systolic function: normal       RV dilation: none      CTA - CHEST WITH IV CONTRAST - PULMONARY ANGIOGRAM   9-15-21 2101  INDICATION:   PE suspected, high pretest prob   Tachycardia, cancer Hx, new O2 requirement    Right middle lobe, lingular, and left upper lobe segmental pulmonary emboli are noted  RV LV ratio is approximately 0 9, this is the cut off for RV dysfunction/right heart strain  Recommend echocardiography for further evaluation  Mediastinal and bilateral hilar, left greater than right, adenopathy is again noted        8 mm nodule in the right middle lobe; considering the history of neoplasm this raises the possibility of metastasis  Lingular opacity abutting the left heart has increased in size suggesting developing atelectasis or infiltrate  Linear atelectatic changes in the lung bases are again noted       Results from last 7 days   Lab Units 09/15/21  1450   SARS-COV-2  Negative     Results from last 7 days   Lab Units 09/16/21  0525 09/15/21  2030 09/15/21  1648   WBC Thousand/uL 14 00*  --  12 34*   HEMOGLOBIN g/dL 7 6*  --  7 8*   HEMATOCRIT % 23 7*  --  24 5*   PLATELETS Thousands/uL 220 195 202   NEUTROS ABS Thousands/µL  --   --  10 28*     Results from last 7 days   Lab Units 09/16/21  0525   RETIC CT ABS  29,700   RETIC CT PCT % 1 13     Results from last 7 days   Lab Units 09/15/21  1648   SODIUM mmol/L 126*   POTASSIUM mmol/L 3 6   CHLORIDE mmol/L 95*   CO2 mmol/L 24   ANION GAP mmol/L 7   BUN mg/dL 20   CREATININE mg/dL 1 40*   EGFR ml/min/1 73sq m 54   CALCIUM mg/dL 7 6*     Results from last 7 days   Lab Units 09/15/21  1648   AST U/L 91*   ALT U/L 16   ALK PHOS U/L 638*   TOTAL PROTEIN g/dL 6 4   ALBUMIN g/dL 2 5*   TOTAL BILIRUBIN mg/dL 0 83         Results from last 7 days   Lab Units 09/15/21  1648   GLUCOSE RANDOM mg/dL 113       Results from last 7 days   Lab Units 09/15/21  1648   TROPONIN I ng/mL 0 07*         Results from last 7 days   Lab Units 09/15/21  1648   PROTIME seconds 17 5*   INR  1 50*   PTT seconds 35         Results from last 7 days   Lab Units 09/16/21  0524 09/15/21  1648   PROCALCITONIN ng/ml 0 93* 0 83*     Results from last 7 days   Lab Units 09/15/21  1320   LACTIC ACID mmol/L 1 2       Results from last 7 days   Lab Units 09/15/21  1648   NT-PRO BNP pg/mL 2,488*       Results from last 7 days   Lab Units 09/15/21  1451   CLARITY UA  Cloudy   COLOR UA  Yellow   SPEC GRAV UA  1 021   PH UA  5 5   GLUCOSE UA mg/dl Negative   KETONES UA mg/dl Negative   BLOOD UA  Negative   PROTEIN UA mg/dl 30 (1+)*   NITRITE UA  Negative   BILIRUBIN UA  Negative   UROBILINOGEN UA E U /dl 0 2   LEUKOCYTES UA  Negative   WBC UA /hpf 2-4   RBC UA /hpf 2-4   BACTERIA UA /hpf Occasional   EPITHELIAL CELLS WET PREP /hpf None Seen     Results from last 7 days   Lab Units 09/15/21  1450   INFLUENZA A PCR  Negative   INFLUENZA B PCR  Negative   RSV PCR  Negative       Results from last 7 days   Lab Units 09/15/21  1330 09/15/21  1320   BLOOD CULTURE  Received in Microbiology Lab  Culture in Progress  Received in Microbiology Lab  Culture in Progress         ED Treatment:   Medication Administration from 09/15/2021 1329 to 09/15/2021 2214       Date/Time Order Dose Route Action     09/15/2021 1453 ceftriaxone (ROCEPHIN) 1 g/50 mL in dextrose IVPB 1,000 mg Intravenous New Bag     09/15/2021 1837 sodium chloride 0 9 % bolus 500 mL 500 mL Intravenous New Bag     09/15/2021 1843 acetaminophen (TYLENOL) tablet 650 mg 650 mg Oral Given     09/15/2021 2031 multi-electrolyte (PLASMALYTE-A/ISOLYTE-S PH 7 4) IV solution 100 mL/hr Intravenous New Bag        Past Medical History:   Diagnosis Date    Cancer ([facility] )     Stomach    Hypertension     Lymphoma ([facility] ) 05/2018    Non Hodgkin's lymphoma ([facility] )     Prostate cancer ([facility] ) 2020    Shingles     Thoracic aortic aneurysm ([facility] )      Present on Admission:   Prostate cancer ([facility] )   Osseous metastasis ([place] [address] )   MALT lymphoma ([place] [address] )      Admitting Diagnosis:     Hypocalcemia [E83 51]  Back pain [M54 9]  Hyponatremia [E87 1]  Anemia [D64 9]  Tachycardia [R00 0]  Constipation [K59 00]  Hypoxia [R09 02]  Elevated troponin [R77 8]  Fever [R50 9]  Bacteria in urine [R82 71]  RANDI (acute kidney injury) ([place] [address] ) [N17 9]  Elevated brain natriuretic peptide (BNP) level [R79 89]  Pulmonary emboli (HCC) [I26 99]  T wave inversion in EKG [R94 31]  Sepsis ([place] [address] ) [A41 9]    Age/Sex: 63 y o  male    Scheduled Medications:  aspirin, 81 mg, Oral, Daily  cefTRIAXone, 1,000 mg, Intravenous, Q24H  cholecalciferol, 500 Units, Oral, Daily  enoxaparin, 1 mg/kg, Subcutaneous, Q12H KATHARINA  gabapentin, 300 mg, Oral, TID  metoprolol tartrate, 25 mg, Oral, Q12H KATHARINA  pantoprazole, 40 mg, Oral, Early Morning  pravastatin, 40 mg, Oral, Daily With Dinner  senna, 2 tablet, Oral, Daily  valACYclovir, 1,000 mg, Oral, BID      Continuous IV Infusions:  multi-electrolyte, 100 mL/hr, Intravenous, Continuous      PRN Meds:  acetaminophen, 650 mg, Oral, Q6H PRN  morphine injection, 2 mg, Intravenous, Q4H PRN  ondansetron, 4 mg, Intravenous, Q6H PRN  oxyCODONE, 10 mg, Oral, Q4H PRN  oxyCODONE, 5 mg, Oral, Q4H PRN        IP CONSULT TO INFECTIOUS DISEASES    Network Utilization Review Department  ATTENTION: Please call with any questions or concerns to [phone] and carefully listen to the prompts so that you are directed to the right person  All voicemails are confidential   Bernard Wood all requests for admission clinical reviews, approved or denied determinations and any other requests to dedicated fax number below belonging to the campus where the patient is receiving treatment   List of dedicated fax numbers for the Facilities:  [address] DENIALS (Administrative/Medical Necessity) [phone]   [address] [place] [address] (Maternity/NICU/Pediatrics) [phone]   5000 [facility] [place] [phone]   8049 [facility] [phone]   [facility] 8441 [address] [place] [address] [phone]   [address] [place] [address] [address] [phone] denies pain/discomfort (Rating = 0)

## 2024-01-18 NOTE — ED NOTES
Called lab, they report that "the labs were not labeled " This RN personally scanned and labelled all the labs and sent them all in the same bag that the lactic acid was run    stated "she will have to look around for them "     Renae Babinski, VIKASH  09/15/21 2506
Pt spo2 on room air 92% while awake and talking  Pt spo2 when sleeping on RA 89%  Placed on 2L NC       Gio Carpio RN  09/15/21 8266
Redraw needed on labs     Gio Carpio, [facility]  09/15/21 4240
Ambulance
